# Patient Record
Sex: FEMALE | Race: ASIAN | NOT HISPANIC OR LATINO
[De-identification: names, ages, dates, MRNs, and addresses within clinical notes are randomized per-mention and may not be internally consistent; named-entity substitution may affect disease eponyms.]

---

## 2018-11-26 ENCOUNTER — APPOINTMENT (OUTPATIENT)
Dept: GYNECOLOGIC ONCOLOGY | Facility: CLINIC | Age: 48
End: 2018-11-26

## 2018-12-10 ENCOUNTER — APPOINTMENT (OUTPATIENT)
Dept: GYNECOLOGIC ONCOLOGY | Facility: CLINIC | Age: 48
End: 2018-12-10
Payer: COMMERCIAL

## 2018-12-10 VITALS
SYSTOLIC BLOOD PRESSURE: 114 MMHG | WEIGHT: 202 LBS | OXYGEN SATURATION: 98 % | HEIGHT: 65 IN | BODY MASS INDEX: 33.66 KG/M2 | DIASTOLIC BLOOD PRESSURE: 74 MMHG | HEART RATE: 87 BPM | TEMPERATURE: 97.8 F

## 2018-12-10 DIAGNOSIS — Z86.39 PERSONAL HISTORY OF OTHER ENDOCRINE, NUTRITIONAL AND METABOLIC DISEASE: ICD-10-CM

## 2018-12-10 PROCEDURE — 99245 OFF/OP CONSLTJ NEW/EST HI 55: CPT

## 2018-12-12 PROBLEM — Z86.39 HISTORY OF DIABETES MELLITUS: Status: RESOLVED | Noted: 2018-12-12 | Resolved: 2018-12-12

## 2019-01-08 ENCOUNTER — FORM ENCOUNTER (OUTPATIENT)
Age: 49
End: 2019-01-08

## 2019-01-09 ENCOUNTER — APPOINTMENT (OUTPATIENT)
Dept: ULTRASOUND IMAGING | Facility: HOSPITAL | Age: 49
End: 2019-01-09
Payer: COMMERCIAL

## 2019-01-09 ENCOUNTER — APPOINTMENT (OUTPATIENT)
Dept: UROLOGY | Facility: CLINIC | Age: 49
End: 2019-01-09
Payer: COMMERCIAL

## 2019-01-09 ENCOUNTER — OUTPATIENT (OUTPATIENT)
Dept: OUTPATIENT SERVICES | Facility: HOSPITAL | Age: 49
LOS: 1 days | End: 2019-01-09
Payer: COMMERCIAL

## 2019-01-09 VITALS
HEIGHT: 65.5 IN | DIASTOLIC BLOOD PRESSURE: 70 MMHG | TEMPERATURE: 97.6 F | SYSTOLIC BLOOD PRESSURE: 106 MMHG | BODY MASS INDEX: 32.92 KG/M2 | WEIGHT: 200 LBS

## 2019-01-09 DIAGNOSIS — N20.9 URINARY CALCULUS, UNSPECIFIED: ICD-10-CM

## 2019-01-09 DIAGNOSIS — R19.00 INTRA-ABDOMINAL AND PELVIC SWELLING, MASS AND LUMP, UNSPECIFIED SITE: ICD-10-CM

## 2019-01-09 DIAGNOSIS — Z78.9 OTHER SPECIFIED HEALTH STATUS: ICD-10-CM

## 2019-01-09 PROCEDURE — 76830 TRANSVAGINAL US NON-OB: CPT

## 2019-01-09 PROCEDURE — 76856 US EXAM PELVIC COMPLETE: CPT | Mod: 26

## 2019-01-09 PROCEDURE — 76830 TRANSVAGINAL US NON-OB: CPT | Mod: 26

## 2019-01-09 PROCEDURE — 99203 OFFICE O/P NEW LOW 30 MIN: CPT

## 2019-01-09 PROCEDURE — 76856 US EXAM PELVIC COMPLETE: CPT

## 2019-01-09 NOTE — REVIEW OF SYSTEMS
[Dry Eyes] : dryness of the eyes [Heart Rate Is Fast] : fast heart rate [Abdominal Pain] : abdominal pain [Vomiting] : vomiting [see HPI] : see HPI [Wake up at night to urinate  How many times?  ___] : wakes up to urinate [unfilled] times during the night [Joint Pain] : joint pain [Dizziness] : dizziness [Negative] : Heme/Lymph

## 2019-01-27 PROBLEM — N20.9 UROLITHIASIS: Status: ACTIVE | Noted: 2019-01-27

## 2019-01-27 PROBLEM — Z78.9 CAFFEINE USE: Status: ACTIVE | Noted: 2018-12-10

## 2019-01-27 NOTE — ASSESSMENT
[FreeTextEntry1] : Discussed the management options for non obstructing kidney stones- watch and wait vs intervention via ureteroscopic approach vs ESL. Risks and benefits of each modality were discussed.  \par will observe small stones\par Recommended Kidney stone prevention diet:\par - Good oral hydration so that urine is clear to light yellow, usually 1.5 to 2 Liters of fluids, mainly water\par - Less red meat\par - Less salt\par - Limit foods with oxalate like- dark green vegetables, rhubarb, chocolate, wheat bran, nuts, cranberries, and beans\par - Increase citrate in diet by consuming citrus fruits and juices. Discussed that tita and limes have the most citric acid, oranges, grapefruits, and berries also contain appreciable amounts.\par

## 2019-01-27 NOTE — HISTORY OF PRESENT ILLNESS
[FreeTextEntry1] : cc kidney stones \par 47 yo fem referred for eval dr kitchen for kidney stones \par ct 9/18 \par MPRESSION:  \par 1. There are 2 small, 0.2 cm stones, one in either kidney, without hydronephrosis.\par 2. There is a 0.3 cm upper pole right renal benign cyst.\par 3. There is a right ovarian cyst measuring up to 4.2 cm. I recommend either pelvic ultrasound or pelvic MRI, for further right ovarian evaluation.\par 4. Multiple mildly prominent retroperitoneal-periaortic and bilateral external iliac lymph nodes. I cannot rule out retroperitoneal adenopathy.\par no flank pain . mild chronci achy abd pain \par no voiding complaints \par no flank pain \par no fam h/o sotnes \par

## 2019-10-10 ENCOUNTER — FORM ENCOUNTER (OUTPATIENT)
Age: 49
End: 2019-10-10

## 2019-10-11 ENCOUNTER — OUTPATIENT (OUTPATIENT)
Dept: OUTPATIENT SERVICES | Facility: HOSPITAL | Age: 49
LOS: 1 days | End: 2019-10-11
Payer: COMMERCIAL

## 2019-10-11 ENCOUNTER — APPOINTMENT (OUTPATIENT)
Dept: ULTRASOUND IMAGING | Facility: HOSPITAL | Age: 49
End: 2019-10-11
Payer: COMMERCIAL

## 2019-10-11 DIAGNOSIS — R19.00 INTRA-ABDOMINAL AND PELVIC SWELLING, MASS AND LUMP, UNSPECIFIED SITE: ICD-10-CM

## 2019-10-11 PROCEDURE — 76856 US EXAM PELVIC COMPLETE: CPT

## 2019-10-11 PROCEDURE — 76830 TRANSVAGINAL US NON-OB: CPT | Mod: 26

## 2019-10-11 PROCEDURE — 76830 TRANSVAGINAL US NON-OB: CPT

## 2019-10-11 PROCEDURE — 76856 US EXAM PELVIC COMPLETE: CPT | Mod: 26

## 2019-11-10 PROBLEM — Z00.00 ENCOUNTER FOR PREVENTIVE HEALTH EXAMINATION: Status: ACTIVE | Noted: 2018-11-12

## 2019-11-11 ENCOUNTER — APPOINTMENT (OUTPATIENT)
Dept: GYNECOLOGIC ONCOLOGY | Facility: CLINIC | Age: 49
End: 2019-11-11
Payer: COMMERCIAL

## 2019-11-11 VITALS
HEIGHT: 65 IN | BODY MASS INDEX: 34.32 KG/M2 | HEART RATE: 86 BPM | TEMPERATURE: 97.7 F | WEIGHT: 206 LBS | OXYGEN SATURATION: 99 % | DIASTOLIC BLOOD PRESSURE: 82 MMHG | SYSTOLIC BLOOD PRESSURE: 147 MMHG

## 2019-11-11 DIAGNOSIS — Z00.00 ENCOUNTER FOR GENERAL ADULT MEDICAL EXAMINATION W/OUT ABNORMAL FINDINGS: ICD-10-CM

## 2019-11-11 PROCEDURE — 99214 OFFICE O/P EST MOD 30 MIN: CPT

## 2019-11-13 LAB
CANCER AG125 SERPL-ACNC: 15 U/ML
HPV HIGH+LOW RISK DNA PNL CVX: NOT DETECTED

## 2019-11-15 LAB — CYTOLOGY CVX/VAG DOC THIN PREP: NORMAL

## 2020-02-06 ENCOUNTER — FORM ENCOUNTER (OUTPATIENT)
Age: 50
End: 2020-02-06

## 2020-02-07 ENCOUNTER — APPOINTMENT (OUTPATIENT)
Dept: ULTRASOUND IMAGING | Facility: HOSPITAL | Age: 50
End: 2020-02-07
Payer: COMMERCIAL

## 2020-02-07 ENCOUNTER — OUTPATIENT (OUTPATIENT)
Dept: OUTPATIENT SERVICES | Facility: HOSPITAL | Age: 50
LOS: 1 days | End: 2020-02-07
Payer: COMMERCIAL

## 2020-02-07 DIAGNOSIS — N83.201 UNSPECIFIED OVARIAN CYST, RIGHT SIDE: ICD-10-CM

## 2020-02-07 PROCEDURE — 76830 TRANSVAGINAL US NON-OB: CPT | Mod: 26

## 2020-02-07 PROCEDURE — 76856 US EXAM PELVIC COMPLETE: CPT | Mod: 26

## 2020-02-07 PROCEDURE — 76830 TRANSVAGINAL US NON-OB: CPT

## 2020-02-07 PROCEDURE — 76856 US EXAM PELVIC COMPLETE: CPT

## 2020-02-24 ENCOUNTER — APPOINTMENT (OUTPATIENT)
Dept: GYNECOLOGIC ONCOLOGY | Facility: CLINIC | Age: 50
End: 2020-02-24
Payer: COMMERCIAL

## 2020-02-24 ENCOUNTER — LABORATORY RESULT (OUTPATIENT)
Age: 50
End: 2020-02-24

## 2020-02-24 ENCOUNTER — APPOINTMENT (OUTPATIENT)
Dept: INTERNAL MEDICINE | Facility: CLINIC | Age: 50
End: 2020-02-24
Payer: COMMERCIAL

## 2020-02-24 VITALS
TEMPERATURE: 97.6 F | SYSTOLIC BLOOD PRESSURE: 129 MMHG | DIASTOLIC BLOOD PRESSURE: 83 MMHG | HEART RATE: 89 BPM | WEIGHT: 207 LBS | HEIGHT: 65 IN | OXYGEN SATURATION: 98 % | BODY MASS INDEX: 34.49 KG/M2

## 2020-02-24 VITALS
SYSTOLIC BLOOD PRESSURE: 129 MMHG | DIASTOLIC BLOOD PRESSURE: 83 MMHG | OXYGEN SATURATION: 98 % | HEIGHT: 65 IN | WEIGHT: 207 LBS | BODY MASS INDEX: 34.49 KG/M2 | HEART RATE: 89 BPM | TEMPERATURE: 98.1 F

## 2020-02-24 VITALS
SYSTOLIC BLOOD PRESSURE: 129 MMHG | BODY MASS INDEX: 34.49 KG/M2 | DIASTOLIC BLOOD PRESSURE: 83 MMHG | HEART RATE: 89 BPM | WEIGHT: 207 LBS | OXYGEN SATURATION: 98 % | TEMPERATURE: 98.1 F | HEIGHT: 65 IN

## 2020-02-24 DIAGNOSIS — Z82.49 FAMILY HISTORY OF ISCHEMIC HEART DISEASE AND OTHER DISEASES OF THE CIRCULATORY SYSTEM: ICD-10-CM

## 2020-02-24 DIAGNOSIS — E04.1 NONTOXIC SINGLE THYROID NODULE: ICD-10-CM

## 2020-02-24 DIAGNOSIS — Z72.3 LACK OF PHYSICAL EXERCISE: ICD-10-CM

## 2020-02-24 DIAGNOSIS — Z83.3 FAMILY HISTORY OF DIABETES MELLITUS: ICD-10-CM

## 2020-02-24 PROCEDURE — 83014 H PYLORI DRUG ADMIN: CPT

## 2020-02-24 PROCEDURE — 99215 OFFICE O/P EST HI 40 MIN: CPT | Mod: 25

## 2020-02-24 PROCEDURE — 99205 OFFICE O/P NEW HI 60 MIN: CPT | Mod: 25

## 2020-02-24 PROCEDURE — 93000 ELECTROCARDIOGRAM COMPLETE: CPT

## 2020-02-24 PROCEDURE — 82270 OCCULT BLOOD FECES: CPT

## 2020-02-24 PROCEDURE — 58100 BIOPSY OF UTERUS LINING: CPT

## 2020-02-24 RX ORDER — DICLOFENAC SODIUM 10 MG/G
1 GEL TOPICAL
Qty: 100 | Refills: 0 | Status: COMPLETED | COMMUNITY
Start: 2019-03-26 | End: 2020-02-24

## 2020-02-24 RX ORDER — AZELASTINE HYDROCHLORIDE 0.5 MG/ML
0.05 SOLUTION/ DROPS OPHTHALMIC
Qty: 6 | Refills: 0 | Status: DISCONTINUED | COMMUNITY
Start: 2019-03-21 | End: 2020-02-24

## 2020-02-24 RX ORDER — BACLOFEN 10 MG/1
10 TABLET ORAL
Qty: 30 | Refills: 0 | Status: COMPLETED | COMMUNITY
Start: 2019-07-02 | End: 2020-02-24

## 2020-02-24 RX ORDER — BUTALBITAL, ACETAMINOPHEN AND CAFFEINE 325; 50; 40 MG/1; MG/1; MG/1
50-325-40 TABLET ORAL
Qty: 30 | Refills: 0 | Status: DISCONTINUED | COMMUNITY
Start: 2019-08-22 | End: 2020-02-24

## 2020-02-24 RX ORDER — METOPROLOL TARTRATE 50 MG/1
50 TABLET, FILM COATED ORAL
Refills: 0 | Status: DISCONTINUED | COMMUNITY
End: 2020-02-24

## 2020-02-24 NOTE — HISTORY OF PRESENT ILLNESS
[FreeTextEntry1] : Ms. Smith is a 49 year old G0, referred by Dr. Stiles and initially seen on 10/10/18 for evaluation of a 4.2 cm multiloculated hemorrhagic cystic lesion replacing the right ovary. Today returns for f/u to discuss US results, no pain, however c/o heavy menses last few months.  \par \par The patient has a pmhx significant for leukocytosis and uncontrolled DM. Denies previous GYN history of abnormal pap smear, postmenopausal bleeding, uterine fibroids, and ovarian cysts. \par \par Health Maintenance:\par Pap- 2/2015, normal 11/2019\par Mammo- Summer 2019\par Colonoscopy: Due Now - referral to Dr. Nichols - has appt today\par \par \par Imaging:\par US 2/7/20 1.9 cm left complex cyst and 1.1 cm left cyst new , thickened endometrial echo 2.1 cm from 1.1 cm\par \par Transvaginal and pelvic US 10/11/19:\par - Left ovary measures 2.5 x 2.5 x 2.2 cm and contains a 2.1 cm complex cyst with a thin internal septation, increased from prior measurement of 1.7 cm. \par - Right ovary measures 3.2 x 2.3 x 2.1 cm and contains a 1.7 cm complex follicular cyst with low-level internal echoes, decreased from prior measurement of 2.6 cm.\par  - Duplex Doppler flow is demonstrated for both ovaries. No pelvic free fluid.

## 2020-02-24 NOTE — LETTER BODY
[I had the pleasure of evaluating your patient, [unfilled] for ___] : I had the pleasure of evaluating your patient, [unfilled] for [unfilled]. [Dear  ___] : Dear  [unfilled], [Attached please find my note.] : Attached please find my note. [FreeTextEntry1] : Pathology

## 2020-02-24 NOTE — PHYSICAL EXAM
[Well Developed] : well developed [Well Nourished] : well nourished [Conjunctiva] : the conjunctiva were normal in both eyes [PERRL] : pupils were equal in size, round, and reactive to light [EOM Intact] : extraocular movements were intact [Outer Ear] : the ears and nose were normal in appearance [Both Tympanic Membranes Were Examined] : both tympanic membranes were normal [Examination Of The Oral Cavity] : the lips and gums were normal [Oropharynx] : the oropharynx was normal [Nasal Cavity] : the nasal mucosa and septum were normal [Normal Appearance] : was normal in appearance [Neck Supple] : was supple [No Tracheal Deviation] : the trachea was midline [___] : a single ~M [unfilled] ~Ucm nodule palpated on the left lobe of the thyroid [Thyroid Multiple Nodules Right] :  multiple ~M nodules palpated on the right lobe of the thyroid [Normal Rhythm/Effort] : normal respiratory rhythm and effort [Rate ___] : at [unfilled] breaths per minute [Normal to Percussion] : the lungs were normal to percussion [Clear Bilaterally] : the lungs were clear to auscultation bilaterally [5th Left ICS - MCL] : palpated at the 5th LICS in the midclavicular line [Normal Rate] : normal [Heart Rate ___] : [unfilled] bpm [Rhythm Regular] : regular [Normal S1] : normal S1 [Normal S2] : normal S2 [No Murmur] : no murmurs heard [Rt] : varicose veins of the right leg noted [No Pitting Edema] : no pitting edema present [Lt] : varicose veins of the left leg noted [No Abnormalities] : the abdominal aorta was not enlarged and no bruit was heard [2+] : left 2+ [Examination Of The Breasts] : a normal appearance [Soft, Nontender] : the abdomen was soft and nontender [Obese] : obese [No Mass] : no masses were palpated [No HSM] : no hepatosplenomegaly noted [Normal rectal exam] : was normal [None] : no CVA tenderness [Stool Sample Taken] : a stool sample was obtained [No Lymphangitis] : no lymphangitis observed [Normal Kyphosis] : normal kyphosis [No Visual Abnormalities] : no visible abnormalities [Normal Lordosis] : normal lordosis [No Scoliosis] : no scoliosis [No Tenderness to Palpation] : no spine tenderness on palpation [No Masses] : no masses [Full ROM] : full ROM [No Pain with ROM] : no pain with motion in any direction [Intact] : all reflexes within normal limits bilaterally [Normal Station and Gait] : the gait and station were normal [Normal Motor Tone] : the muscle tone was normal [Involuntary Movements] : no involuntary movements were seen [Thin Hair] : thin hair [Complexion Medium] : medium complexion [Normal] : the deep tendon reflexes were normal [Normal Mental Status] : the patient's orientation, memory, attention, language and fund of knowledge were normal [Appropriate] : appropriate [JVP Elevated ___cm] : the JVP was not elevated [S3] : no S3 [S4] : no S4 [Right Carotid Bruit] : no bruit heard over the right carotid [Right Femoral Bruit] : no bruit heard over the right femoral artery [Left Femoral Bruit] : no bruit heard over the left femoral artery [Left Carotid Bruit] : no bruit heard over the left carotid [Hogue's] : a negative Hogue's sign [Occult Blood Positive] : was negative for occult blood [Gross Blood] : no gross blood [Preauricular Lymph Nodes Enlarged Bilaterally] : nodes not enlarged [Postauricular Lymph Nodes Enlarged Bilaterally] : nodes not enlarged [Fecal Impaction] : no fecal impaction was present [Submandibular Lymph Nodes Enlarged Bilaterally] : nodes not enlarged [Suboccipital Lymph Nodes Enlarged Bilaterally] : nodes not enlarged [Submental Lymph Nodes Enlarged] : nodes not enlarged [Cervical Lymph Nodes Enlarged Posterior Bilaterally] : nodes not enlarged [Cervical Lymph Nodes Enlarged Anterior Bilaterally] : nodes not enlarged [Supraclavicular Lymph Nodes Enlarged Bilaterally] : nodes not enlarged [Epitrochlear Lymph Nodes Enlarged Bilaterally] : nodes not enlarged [Axillary Lymph Nodes Enlarged Bilaterally] : nodes not enlarged [Inguinal Lymph Nodes Enlarged Bilaterally] : nodes not enlarged [Femoral Lymph Nodes Enlarged Bilaterally] : nodes not enlarged [Skin Lesions 1] : no skin lesions were observed [Abnormal Color] : normal color and pigmentation [Skin Turgor Decreased] : normal skin turgor [Impaired judgment] : intact judgment [Impaired Insight] : intact insight

## 2020-02-24 NOTE — PROCEDURE
[Patient] : the patient [Endometrial Biopsy] : an endometrial biopsy [Written consent] : written consent was obtained prior to the procedure and is detailed in the patient's record [Betadine] : betadine [FreeTextEntry1] : 2 passes, scant material

## 2020-02-24 NOTE — DISCUSSION/SUMMARY
[FreeTextEntry1] : Patient presenting for f/u - new left ovarian cyst and unchanged prior left ovarian cyst. Also new findings of thickened endometrium - biopsy performed today. discussed continued surveillance vs. surgery and given new lesion on left, recommend surgical management. patient agrees. Will proceed with LSC USO, likely left and right cystectomy, D&C, [facility] pending endometrial biopsy results.\par \par Plan for EUA, LSC USO, cystectomy, possible BSO, [facility] D&C, Pelvic washings, all indicated procedures, possible open surgery\par \par Risks discussed including bleeding, infection, injury to bowel/bladder/vessels/nerves/ureters/risk of blood transfusion, reoperation, icu admission\par \par Patient would like to wait until May as she has no days off at this time\par plan for OR 5/8 [facility]\par Med clearance\par f/u endometrial biopsy\par \par

## 2020-02-24 NOTE — PHYSICAL EXAM
[Absent] : CVAT: absent [Normal] : Recto-Vaginal Exam: Normal [Fully active, able to carry on all pre-disease performance without restriction] : Status 0 - Fully active, able to carry on all pre-disease performance without restriction [de-identified] : small 8 wk size uterus, no adnexal masses [de-identified] : smooth rectovag septum, no cul de sac nodules [de-identified] : limited exam, ovaries not palpable,

## 2020-02-24 NOTE — OB HISTORY
[Menarche Age: ____] : age at menarche was [unfilled] [Definite:  ___ (Date)] : the last menstrual period was [unfilled] [___] : Total Pregnancies: [unfilled]

## 2020-02-24 NOTE — HISTORY OF PRESENT ILLNESS
[Vomiting] : denies vomiting [Diarrhea] : denies diarrhea [Yellow Skin Or Eyes (Jaundice)] : denies jaundice [Rectal Pain] : denies rectal pain [Wt Gain ___ Lbs] : recent [unfilled] ~Upound(s) weight gain [Heartburn] : heartburn [Nausea] : nausea [Constipation] : constipation [Abdominal Pain] : abdominal pain [Abdominal Swelling] : abdominal swelling [Good Compliance] : good compliance with treatment [Wt Loss ___ Lbs] : no recent weight loss [Peptic Ulcer Disease] : no peptic ulcer disease [GERD] : no gastroesophageal reflux disease [Hiatus Hernia] : no hiatus hernia [Kidney Stone] : no kidney stone [Pancreatitis] : no pancreatitis [Cholelithiasis] : no cholelithiasis [Diverticulitis] : no diverticulitis [Irritable Bowel Syndrome] : no irritable bowel syndrome [Inflammatory Bowel Disease] : no inflammatory bowel disease [Malignancy] : no malignancy [Alcohol Abuse] : no alcohol abuse [Abdominal Surgery] : no abdominal surgery [Appendectomy] : no appendectomy [de-identified] : Pt is a 50 yr old woman who states she has been having epigastric pain non radiating and is a gnawing pain.  THis occurs intermittently and is happening more .  She states the pain can worsen with certain movement. It is not associated with food intake.  she has constipation and has a bm 1-3 days.  Her stools are  hard and small balls and when she strains and she bleeds.  she takes a stool softener and is unsure if it helps.  she doesn’t have weight loss and no dysphagia.  she has a bitter taste in her mouth on occasion  and doesn’t wake her up during the night , no bleeding gums or sore throat.  she doesn’t have early satiety.  she had a colonoscopy 19 yr sago.  it was negative  she had egd 6 yrs ago.  She is unsure of feeding.  she doesn’t have vomiting but feels nausea with her headaches.  She has abd bloating.  She is from [facility]. [Cholecystectomy] : no cholecystectomy

## 2020-02-24 NOTE — ASSESSMENT
[FreeTextEntry1] :  colon cancer screening -  WE discussed procedure and will return prior for blood testing and instruction.  Colon and rectal cancer screening is a way in which doctors check the colon and rectum for signs of cancer or growths (called polyps) that might become cancer. It is done in people who have no symptoms and no reason to think they have cancer. The goal is to find and remove polyps before they become cancer, or to find cancer early, before it grows, spreads, or causes problems.\par \par The colon and rectum are the last part of the digestive tract (figure 1). When doctors talk about colon and rectal cancer screening, they use the term "colorectal." That is just a shorter way of saying "colon and rectal." It's also possible to say just colon cancer screening.\par \par Studies show that having colon cancer screening lowers the chance of dying from colon cancer. There are several different types of screening test that can do this.\par \par What are the different screening tests for colon cancer? — They include:\par \par ?Colonoscopy – Colonoscopy allows the doctor to see directly inside the entire colon. Before you can have a colonoscopy, you must clean out your colon. You do this at home by drinking a special liquid that causes watery diarrhea for several hours. On the day of the test, you get medicine to help you relax. Then a doctor puts a thin tube into your anus and advances it into your colon (figure 2). The tube has a tiny camera attached to it, so the doctor can see inside your colon. The tube also has tiny tools on the end, so the doctor can remove pieces of tissue or polyps if they are there. After polyps or pieces of tissue are removed, they are sent to a lab to be checked for cancer.\par \par \par •Advantages of this test – Colonoscopy finds most small polyps and almost all large polyps and cancers. If found, polyps can be removed right away. This test gives the most accurate results. If any other screening tests are done first and come back positive, a colonoscopy will need to be done for follow-up. If you have a colonoscopy as your first test, you will probably not need a second follow-up test soon after.\par \par \par •Drawbacks to this test – Colonoscopy has some small risks. It can cause bleeding or tear the inside of the colon, but this only happens in 1 out of 1,000 people. Also, cleaning out the bowel beforehand can be unpleasant. Plus, people usually cannot work or drive for the rest of the day after the test, because of the relaxation medicine they must take during the test.\par \par \par Sigmoidoscopy – A sigmoidoscopy is similar to a colonoscopy. The difference is that this test looks only at the last part of the colon, and a colonoscopy looks at the whole colon. Before you have a sigmoidoscopy, you must clean out the lower part of your colon using an enema. This bowel cleaning is not as thorough or unpleasant as the one for colonoscopy. For this test, you do not need to take medicines to help you relax, so you can drive and work afterward if you want.\par \par \par •Advantages of this test – Sigmoidoscopy can find polyps and cancers in the rectum and the last part of the colon. If polyps are found, they can be removed right away.\par \par \par •Drawbacks to this test – In about 2 out of 10,000 people, sigmoidoscopy tears the inside of the colon. The test also can't find polyps or cancers that are in the part of the colon the test does not view (figure 3). If doctors find polyps or cancer during a sigmoidoscopy, they usually follow up with a colonoscopy.\par \par \par CT colonography (also known as virtual colonoscopy or CTC) – CTC looks for cancer and polyps using a special X-ray called a "CT scan." For most CTC tests, the preparation is the same as it is for colonoscopy.\par \par \par •Advantages of this test – CTC can find polyps and cancers in the whole colon without the need for medicines to relax.\par \par \par •Drawbacks to this test – If doctors find polyps or cancer with CTC, they usually follow up with a colonoscopy. CTC sometimes finds areas that look abnormal but that turn out to be healthy. This means that CTC can lead to tests and procedures you did not need. Plus, CTC exposes you to radiation. In most cases, the preparation needed to clean the bowel is the same as the one needed for a colonoscopy. The test is expensive, and some insurance companies might not cover this test for screening.\par \par \par Stool test for blood – "Stool" is another word for bowel movements. Stool tests most commonly check for blood in samples of stool. Cancers and polyps can bleed, and if they bleed around the time you do the stool test, then blood will show up on the test. The test can find even small amounts of blood that you can't see in your stool. Other less serious conditions can also cause small amounts of blood in the stool, and that will show up in this test. You will have to collect small samples from your bowel movements, which you will put in a special container you get from your doctor or nurse. Then you follow the instructions to mail the container out for the testing.\par \par \par •Advantages of this test – This test does not involve cleaning out the colon or having any procedures.\par \par \par •Drawbacks to this test – Stool tests are less likely to find polyps than other screening tests. These tests also often come up abnormal even in people who do not have cancer. If a stool test shows something abnormal, doctors usually follow up with a colonoscopy.\par \par \par Stool DNA test – The stool DNA test checks for genetic markers of cancer, as well as for signs of blood. For this test, you get a special kit in order to collect a whole bowel movement. Then you follow the instructions about how and where to ship it.\par \par \par •Advantages of this test – This test does not involve cleaning out the colon or having any procedures. When cancer is not present, it is less likely to be falsely abnormal than a stool test for blood. That means it leads to fewer unnecessary colonoscopies.\par \par \par •Drawbacks to this test – It might be unpleasant to collect and ship a whole bowel movement. If a DNA test shows something abnormal, doctors usually follow up with a colonoscopy.\par \par \par There is no blood test that most experts think is accurate enough to use for screening.\par \par How do I choose which test to have? — Work with your doctor or nurse to decide which test is best for you. Some doctors might choose to combine screening tests, for example, sigmoidoscopy plus stool testing for blood. Being screened–no matter how–is more important than which test you choose.\par \par Who should be screened for colon cancer? — Doctors recommend that most people begin having colon cancer screening at age 50. People who have an increased risk of getting colon cancer sometimes begin screening at a younger age. That might include people with a strong family history of colon cancer, and people with diseases of the colon called "Crohn's disease" and "ulcerative colitis."\par \par Most people can stop being screened around the age of 75, or at the latest 85.\par \par How often should I be screened? — That depends on your risk of colon cancer and which test you have. People who have a high risk of colon cancer often need to be tested more often and should have a colonoscopy.\par \par Most people are not at high risk, so they can choose one of these schedules:\par \par Colonoscopy every 10 years\par \par CT colonography (CTC) every 5 years\par \par Sigmoidoscopy every 5 to 10 years\par \par Stool testing for blood once a year\par \par Stool DNA testing every 3 years (but doctors are not yet sure of the best time frame)\par  Abd pain will do abd sonogram and also  h pylori testing.   Pt has ovarian mass and has had increased in size and new cyst forming complexed and is to have surgery in may.  she is to have colonoscopy prior to the testing  and also evaluation of her abd pain.   if needed abd ct will be done .   thyroid nodule is being followed by her  endocrinologist .  DM will check and fu with her pcp and endocrinologist.   constipation- Increase water  intake , Dietary fiber — found mainly in fruits, vegetables, whole grains and legumes — is probably best known for its ability to prevent or relieve constipation. But foods containing fiber can provide other health benefits as well, such as helping to maintain a healthy weight and lowering your risk of diabetes and heart disease.\par \par Selecting tasty foods that provide fiber isn't difficult. Find out how much dietary fiber you need, the foods that contain it, and how to add them to meals and snacks.\par \par Dietary fiber, also known as roughage or bulk, includes the parts of plant foods your body can't digest or absorb. Unlike other food components, such as fats, proteins or carbohydrates — which your body breaks down and absorbs — fiber isn't digested by your body. Instead, it passes relatively intact through your stomach, small intestine and colon and out of your body.\par \par Fiber is commonly classified as soluble, which dissolves in water, or insoluble, which doesn't dissolve.\par •Soluble fiber. This type of fiber dissolves in water to form a gel-like material. It can help lower blood cholesterol and glucose levels. Soluble fiber is found in oats, peas, beans, apples, citrus fruits, carrots, barley and psyllium.\par •Insoluble fiber. This type of fiber promotes the movement of material through your digestive system and increases stool bulk, so it can be of benefit to those who struggle with constipation or irregular stools. Whole-wheat flour, wheat bran, nuts, beans and vegetables, such as cauliflower, green beans and potatoes, are good sources of insoluble fiber.\par \par Most plant-based foods, such as oatmeal and beans, contain both soluble and insoluble fiber. However, the amount of each type varies in different plant foods. To receive the greatest health benefit, eat a wide variety of high-fiber foods.\par \par A high-fiber diet has many benefits, which include:\par •Normalizes bowel movements. Dietary fiber increases the weight and size of your stool and softens it. A bulky stool is easier to pass, decreasing your chance of constipation. If you have loose, watery stools, fiber may help to solidify the stool because it absorbs water and adds bulk to stool.\par •Helps maintain bowel health. A high-fiber diet may lower your risk of developing hemorrhoids and small pouches in your colon (diverticular disease). Some fiber is fermented in the colon. Researchers are looking at how this may play a role in preventing diseases of the colon.\par •Lowers cholesterol levels. Soluble fiber found in beans, oats, flaxseed and oat bran may help lower total blood cholesterol levels by lowering low-density lipoprotein, or "bad," cholesterol levels. Studies also have shown that high-fiber foods may have other heart-health benefits, such as reducing blood pressure and inflammation.\par •Helps control blood sugar levels. In people with diabetes, fiber — particularly soluble fiber — can slow the absorption of sugar and help improve blood sugar levels. A healthy diet that includes insoluble fiber may also reduce the risk of developing type 2 diabetes.\par •Aids in achieving healthy weight. High-fiber foods tend to be more filling than low-fiber foods, so you're likely to eat less and stay satisfied longer. And high-fiber foods tend to take longer to eat and to be less "energy dense," which means they have fewer calories for the same volume of food.\par \par Another benefit attributed to dietary fiber is prevention of colorectal cancer. However, the evidence that fiber reduces colorectal cancer is mixed.\par \par The [employer] [employer], which provides science-based advice on matters of medicine and health, gives the following daily fiber recommendations for adults:\par \par \par \par Age 50 or younger\par \par Age 51 or older\par \par \par [place] [employer] \par \par Men 38 grams 30 grams \par Women 25 grams 21 grams \par \par If you aren't getting enough fiber each day, you may need to boost your intake. Good choices include:\par •Whole-grain products\par •Fruits\par •Vegetables\par •Beans, peas and other legumes\par •Nuts and seeds\par \par Refined or processed foods — such as canned fruits and vegetables, pulp-free juices, white breads and pastas, and non-whole-grain cereals — are lower in fiber. The grain-refining process removes the outer coat (bran) from the grain, which lowers its fiber content. Enriched foods have some of the B vitamins and iron back after processing, but not the fiber.\par \par Whole foods rather than fiber supplements are generally better. Fiber supplements — such as Metamucil, Citrucel and FiberCon — don't provide the variety of fibers, vitamins, minerals and other beneficial nutrients that foods do.\par \par Another way to get more fiber is to eat foods, such as cereal, granola bars, yogurt, and ice cream, with fiber added. The added fiber usually is labeled as "inulin" or "chicory root." Some people complain of gassiness after eating foods with added fiber.\par \par However, some people may still need a fiber supplement if dietary changes aren't sufficient or if they have certain medical conditions, such as constipation, diarrhea or irritable bowel syndrome. Check with your doctor before taking fiber supplements.\par \par Need ideas for adding more fiber to your meals and snacks? Try these suggestions:\par •Jump-start your day. For breakfast choose a high-fiber breakfast cereal — 5 or more grams of fiber a serving. Opt for cereals with "whole grain," "bran" or "fiber" in the name. Or add a few tablespoons of unprocessed wheat bran to your favorite cereal.\par •Switch to whole grains. Consume at least half of all grains as whole grains. Look for breads that list whole wheat, whole-wheat flour or another whole grain as the first ingredient on the label and have least 2 grams of dietary fiber a serving. Bodega with brown rice, wild rice, barley, whole-wheat pasta and bulgur wheat.\par •Bulk up baked goods. Substitute whole-grain flour for half or all of the white flour when baking. Try adding crushed bran cereal, unprocessed wheat bran or uncooked oatmeal to muffins, cakes and cookies.\par •Lean on legumes. Beans, peas and lentils are excellent sources of fiber. Add kidney beans to canned soup or a green salad. Or make nachos with refried black beans, lots of fresh veggies, whole-wheat tortilla chips and salsa.\par •Eat more fruit and vegetables. Fruits and vegetables are rich in fiber, as well as vitamins and minerals. Try to eat five or more servings daily.\par •Make snacks count. Fresh fruits, raw vegetables, low-fat popcorn and whole-grain crackers are all good choices. An occasional handful of nuts or dried fruits also is a healthy, high-fiber snack — although be aware that nuts and dried fruits are high in calories.\par \par High-fiber foods are good for your health. But adding too much fiber too quickly can promote intestinal gas, abdominal bloating and cramping. Increase fiber in your diet gradually over a period of a few weeks. This allows the natural bacteria in your digestive system to adjust to the change.\par \par Also, drink plenty of water. Fiber works best when it absorbs water, making your stool soft and bulky\par ekg rate 80/min normal sinus rhythm qrs 82ns qt 350 ms /403 pr 170 ms p 104  bmi 34  risks of obesity and need for diet and eating healthy Obesity is a complex disorder involving an excessive amount of body fat. Obesity isn't just a cosmetic concern. It increases your risk of diseases and health problems, such as heart disease, diabetes and high blood pressure.\par \par Being extremely obese means you are especially likely to have health problems related to your weight.\par \par \par The good news is that even modest weight loss can improve or prevent the health problems associated with obesity. Dietary changes, increased physical activity and behavior changes can help you lose weight. Prescription medications and weight-loss surgery are additional options for treating obesity.\par \par \par \par \par Symptoms\par \par Obesity is diagnosed when your body mass index (BMI) is 30 or higher. Your body mass index is calculated by dividing your weight in kilograms (kg) by your height in meters (m) squared. \par \par \par BMI\par \par Weight status\par \par \par Below 18.5 Underweight \par 18.5-24.9 Normal \par 25.0-29.9 Overweight \par 30.0-34.9 Obese (Class I) \par 35.0-39.9 Obese (Class II) \par 40.0 and higher Extreme obesity (Class III) \par \par For most people, BMI provides a reasonable estimate of body fat. However, BMI doesn't directly measure body fat, so some people, such as muscular athletes, may have a BMI in the obese category even though they don't have excess body fat. Ask your doctor if your BMI is a problem. \par \par When to see a doctor\par \par If you think you may be obese, and especially if you're concerned about weight-related health problems, see your doctor or health care provider. You and your provider can evaluate your health risks and discuss your weight-loss options. \par \par Request an Appointment at [facility]\par \par Causes\par \par Although there are genetic, behavioral and hormonal influences on body weight, obesity occurs when you take in more calories than you burn through exercise and normal daily activities. Your body stores these excess calories as fat.\par \par Obesity can sometimes be traced to a medical cause, such as Prader-Willi syndrome, Cushing's syndrome, and other diseases and conditions. However, these disorders are rare and, in general, the principal causes of obesity are:\par •Inactivity. If you're not very active, you don't burn as many calories. With a sedentary lifestyle, you can easily take in more calories every day than you use through exercise and normal daily activities.\par •Unhealthy diet and eating habits. Weight gain is inevitable if you regularly eat more calories than you burn. And most Americans' diets are too high in calories and are full of fast food and high-calorie beverages.\par \par Risk factors\par \par Obesity usually results from a combination of causes and contributing factors, including:\par •Genetics. Your genes may affect the amount of body fat you store, and where that fat is distributed. Genetics may also play a role in how efficiently your body converts food into energy and how your body burns calories during exercise.\par •Family lifestyle. Obesity tends to run in families. If one or both of your parents are obese, your risk of being obese is increased. That's not just because of genetics. Family members tend to share similar eating and activity habits.\par •Inactivity. If you're not very active, you don't burn as many calories. With a sedentary lifestyle, you can easily take in more calories every day than you burn through exercise and routine daily activities. Having medical problems, such as arthritis, can lead to decreased activity, which contributes to weight gain.\par •Unhealthy diet. A diet that's high in calories, lacking in fruits and vegetables, full of fast food, and laden with high-calorie beverages and oversized portions contributes to weight gain.\par •Medical problems. In some people, obesity can be traced to a medical cause, such as Prader-Willi syndrome, Cushing's syndrome and other conditions. Medical problems, such as arthritis, also can lead to decreased activity, which may result in weight gain.\par •Certain medications. Some medications can lead to weight gain if you don't compensate through diet or activity. These medications include some antidepressants, anti-seizure medications, diabetes medications, antipsychotic medications, steroids and beta blockers.\par •Social and economic issues. Research has linked social and economic factors to obesity. Avoiding obesity is difficult if you don't have safe areas to exercise. Similarly, you may not have been taught healthy ways of cooking, or you may not have money to buy healthier foods. In addition, the people you spend time with may influence your weight — you're more likely to become obese if you have obese friends or relatives.\par •Age. Obesity can occur at any age, even in young children. But as you age, hormonal changes and a less active lifestyle increase your risk of obesity. In addition, the amount of muscle in your body tends to decrease with age. This lower muscle mass leads to a decrease in metabolism. These changes also reduce calorie needs, and can make it harder to keep off excess weight. If you don't consciously control what you eat and become more physically active as you age, you'll likely gain weight.\par •Pregnancy. During pregnancy, a woman's weight necessarily increases. Some women find this weight difficult to lose after the baby is born. This weight gain may contribute to the development of obesity in women.\par •Quitting smoking. Quitting smoking is often associated with weight gain. And for some, it can lead to enough weight gain that the person becomes obese. In the long run, however, quitting smoking is still a greater benefit to your health than continuing to smoke.\par •Lack of sleep. Not getting enough sleep or getting too much sleep can cause changes in hormones that increase your appetite. You may also crave foods high in calories and carbohydrates, which can contribute to weight gain.\par \par Even if you have one or more of these risk factors, it doesn't mean that you're destined to become obese. You can counteract most risk factors through diet, physical activity and exercise, and behavior changes.\par \par Complications\par \par If you're obese, you're more likely to develop a number of potentially serious health problems, including:\par •High triglycerides and low high-density lipoprotein (HDL) cholesterol\par •Type 2 diabetes\par •High blood pressure\par •Metabolic syndrome — a combination of high blood sugar, high blood pressure, high triglycerides and low HDL cholesterol\par •Heart disease\par •Stroke\par •Cancer, including cancer of the uterus, cervix, endometrium, ovaries, breast, colon, rectum, esophagus, liver, gallbladder, pancreas, kidney and prostate\par •Breathing disorders, including sleep apnea, a potentially serious sleep disorder in which breathing repeatedly stops and starts\par •Gallbladder disease\par •Gynecological problems, such as infertility and irregular periods\par •Erectile dysfunction and sexual health issues\par •Nonalcoholic fatty liver disease, a condition in which fat builds up in the liver and can cause inflammation or scarring\par •Osteoarthritis\par \par Quality of life\par \par When you're obese, your overall quality of life may be diminished. You may not be able to do things you used to do, such as participating in enjoyable activities. You may avoid public places. Obese people may even encounter discrimination.\par \par Other weight-related issues that may affect your quality of life include:\par •Depression\par •Disability\par •Sexual problems\par •Shame and guilt\par •Social isolation\par •Lower work achievement\par \par Prevention\par \par Whether you're at risk of becoming obese, currently overweight or at a healthy weight, you can take steps to prevent unhealthy weight gain and related health problems. Not surprisingly, the steps to prevent weight gain are the same as the steps to lose weight: daily exercise, a healthy diet, and a long-term commitment to watch what you eat and drink.\par •Exercise regularly. You need to get 150 to 300 minutes of moderate-intensity activity a week to prevent weight gain. Moderately intense physical activities include fast walking and swimming.\par •Follow a healthy eating plan. Focus on low-calorie, nutrient-dense foods, such as fruits, veg\par

## 2020-02-25 DIAGNOSIS — D72.829 ELEVATED WHITE BLOOD CELL COUNT, UNSPECIFIED: ICD-10-CM

## 2020-02-25 LAB
APPEARANCE: CLEAR
BASOPHILS # BLD AUTO: 0.09 K/UL
BASOPHILS NFR BLD AUTO: 0.8 %
BILIRUBIN URINE: NEGATIVE
BLOOD URINE: NEGATIVE
COLOR: COLORLESS
EOSINOPHIL # BLD AUTO: 0.67 K/UL
EOSINOPHIL NFR BLD AUTO: 6.2 %
ESTIMATED AVERAGE GLUCOSE: 151 MG/DL
GLUCOSE QUALITATIVE U: ABNORMAL
HBA1C MFR BLD HPLC: 6.9 %
HCT VFR BLD CALC: 43.8 %
HGB BLD-MCNC: 14 G/DL
IMM GRANULOCYTES NFR BLD AUTO: 0.2 %
KETONES URINE: NEGATIVE
LEUKOCYTE ESTERASE URINE: NEGATIVE
LYMPHOCYTES # BLD AUTO: 4.14 K/UL
LYMPHOCYTES NFR BLD AUTO: 38.3 %
MAN DIFF?: NORMAL
MCHC RBC-ENTMCNC: 26.5 PG
MCHC RBC-ENTMCNC: 32 GM/DL
MCV RBC AUTO: 82.8 FL
MONOCYTES # BLD AUTO: 0.55 K/UL
MONOCYTES NFR BLD AUTO: 5.1 %
NEUTROPHILS # BLD AUTO: 5.34 K/UL
NEUTROPHILS NFR BLD AUTO: 49.4 %
NITRITE URINE: NEGATIVE
PH URINE: 5.5
PLATELET # BLD AUTO: 340 K/UL
PROTEIN URINE: NEGATIVE
RBC # BLD: 5.29 M/UL
RBC # FLD: 14.6 %
SPECIFIC GRAVITY URINE: 1.03
UROBILINOGEN URINE: NORMAL
WBC # FLD AUTO: 10.81 K/UL

## 2020-02-26 LAB
25(OH)D3 SERPL-MCNC: 26.9 NG/ML
ALBUMIN SERPL ELPH-MCNC: 4.4 G/DL
ALP BLD-CCNC: 91 U/L
ALT SERPL-CCNC: 19 U/L
AMYLASE/CREAT SERPL: 58 U/L
ANION GAP SERPL CALC-SCNC: 15 MMOL/L
AST SERPL-CCNC: 20 U/L
BILIRUB SERPL-MCNC: 0.3 MG/DL
BUN SERPL-MCNC: 14 MG/DL
CALCIUM SERPL-MCNC: 10.1 MG/DL
CHLORIDE SERPL-SCNC: 102 MMOL/L
CHOLEST SERPL-MCNC: 140 MG/DL
CHOLEST/HDLC SERPL: 3 RATIO
CO2 SERPL-SCNC: 23 MMOL/L
CREAT SERPL-MCNC: 0.7 MG/DL
FERRITIN SERPL-MCNC: 58 NG/ML
FRUCTOSAMINE SERPL-MCNC: 220 UMOL/L
GLUCOSE SERPL-MCNC: 49 MG/DL
HBV CORE IGG+IGM SER QL: NONREACTIVE
HBV SURFACE AB SER QL: NONREACTIVE
HBV SURFACE AG SER QL: NONREACTIVE
HCV AB SER QL: NONREACTIVE
HCV S/CO RATIO: 0.27 S/CO
HDLC SERPL-MCNC: 47 MG/DL
HEPATITIS A IGG ANTIBODY: REACTIVE
IRON SATN MFR SERPL: 13 %
IRON SERPL-MCNC: 47 UG/DL
LDLC SERPL CALC-MCNC: 59 MG/DL
LPL SERPL-CCNC: 33 U/L
POTASSIUM SERPL-SCNC: 4.8 MMOL/L
PROT SERPL-MCNC: 7 G/DL
SODIUM SERPL-SCNC: 140 MMOL/L
TIBC SERPL-MCNC: 347 UG/DL
TRIGL SERPL-MCNC: 170 MG/DL
TSH SERPL-ACNC: 2.5 UIU/ML
UIBC SERPL-MCNC: 300 UG/DL

## 2020-04-06 ENCOUNTER — APPOINTMENT (OUTPATIENT)
Dept: INTERNAL MEDICINE | Facility: CLINIC | Age: 50
End: 2020-04-06

## 2020-04-17 ENCOUNTER — APPOINTMENT (OUTPATIENT)
Dept: INTERNAL MEDICINE | Facility: HOSPITAL | Age: 50
End: 2020-04-17

## 2020-05-08 ENCOUNTER — APPOINTMENT (OUTPATIENT)
Dept: GYNECOLOGIC ONCOLOGY | Facility: HOSPITAL | Age: 50
End: 2020-05-08

## 2020-06-01 ENCOUNTER — APPOINTMENT (OUTPATIENT)
Dept: GYNECOLOGIC ONCOLOGY | Facility: CLINIC | Age: 50
End: 2020-06-01

## 2020-09-27 PROBLEM — N83.201 CYSTS OF BOTH OVARIES: Status: ACTIVE | Noted: 2019-11-10

## 2020-09-28 ENCOUNTER — APPOINTMENT (OUTPATIENT)
Dept: ULTRASOUND IMAGING | Facility: HOSPITAL | Age: 50
End: 2020-09-28

## 2020-09-28 ENCOUNTER — OUTPATIENT (OUTPATIENT)
Dept: OUTPATIENT SERVICES | Facility: HOSPITAL | Age: 50
LOS: 1 days | End: 2020-09-28
Payer: COMMERCIAL

## 2020-09-28 ENCOUNTER — APPOINTMENT (OUTPATIENT)
Dept: GYNECOLOGIC ONCOLOGY | Facility: CLINIC | Age: 50
End: 2020-09-28
Payer: COMMERCIAL

## 2020-09-28 ENCOUNTER — APPOINTMENT (OUTPATIENT)
Dept: INTERNAL MEDICINE | Facility: CLINIC | Age: 50
End: 2020-09-28
Payer: COMMERCIAL

## 2020-09-28 VITALS
BODY MASS INDEX: 35.32 KG/M2 | OXYGEN SATURATION: 98 % | TEMPERATURE: 98 F | DIASTOLIC BLOOD PRESSURE: 72 MMHG | WEIGHT: 212 LBS | SYSTOLIC BLOOD PRESSURE: 121 MMHG | HEART RATE: 80 BPM | HEIGHT: 65 IN

## 2020-09-28 VITALS
DIASTOLIC BLOOD PRESSURE: 72 MMHG | OXYGEN SATURATION: 98 % | WEIGHT: 212 LBS | HEART RATE: 80 BPM | TEMPERATURE: 98 F | RESPIRATION RATE: 12 BRPM | HEIGHT: 65 IN | SYSTOLIC BLOOD PRESSURE: 121 MMHG | BODY MASS INDEX: 35.32 KG/M2

## 2020-09-28 DIAGNOSIS — R19.00 INTRA-ABDOMINAL AND PELVIC SWELLING, MASS AND LUMP, UNSPECIFIED SITE: ICD-10-CM

## 2020-09-28 DIAGNOSIS — N83.202 UNSPECIFIED OVARIAN CYST, RIGHT SIDE: ICD-10-CM

## 2020-09-28 DIAGNOSIS — N83.201 UNSPECIFIED OVARIAN CYST, RIGHT SIDE: ICD-10-CM

## 2020-09-28 DIAGNOSIS — R79.89 OTHER SPECIFIED ABNORMAL FINDINGS OF BLOOD CHEMISTRY: ICD-10-CM

## 2020-09-28 DIAGNOSIS — E11.9 TYPE 2 DIABETES MELLITUS WITHOUT COMPLICATIONS: ICD-10-CM

## 2020-09-28 LAB
ALBUMIN SERPL ELPH-MCNC: 4.3 G/DL
ALP BLD-CCNC: 91 U/L
ALT SERPL-CCNC: 19 U/L
ANION GAP SERPL CALC-SCNC: 13 MMOL/L
AST SERPL-CCNC: 17 U/L
BASOPHILS # BLD AUTO: 0.07 K/UL
BASOPHILS NFR BLD AUTO: 0.6 %
BILIRUB SERPL-MCNC: 0.2 MG/DL
BUN SERPL-MCNC: 18 MG/DL
CALCIUM SERPL-MCNC: 9.8 MG/DL
CHLORIDE SERPL-SCNC: 106 MMOL/L
CO2 SERPL-SCNC: 23 MMOL/L
CREAT SERPL-MCNC: 0.77 MG/DL
EOSINOPHIL # BLD AUTO: 0.45 K/UL
EOSINOPHIL NFR BLD AUTO: 4.1 %
FRUCTOSAMINE SERPL-MCNC: 282 UMOL/L
GLUCOSE SERPL-MCNC: 137 MG/DL
HCG SERPL-MCNC: <1 MIU/ML
HCT VFR BLD CALC: 44.1 %
HGB BLD-MCNC: 13.7 G/DL
IMM GRANULOCYTES NFR BLD AUTO: 0.2 %
LYMPHOCYTES # BLD AUTO: 3.64 K/UL
LYMPHOCYTES NFR BLD AUTO: 32.9 %
MAN DIFF?: NORMAL
MCHC RBC-ENTMCNC: 26.6 PG
MCHC RBC-ENTMCNC: 31.1 GM/DL
MCV RBC AUTO: 85.6 FL
MONOCYTES # BLD AUTO: 0.49 K/UL
MONOCYTES NFR BLD AUTO: 4.4 %
NEUTROPHILS # BLD AUTO: 6.38 K/UL
NEUTROPHILS NFR BLD AUTO: 57.8 %
PLATELET # BLD AUTO: 341 K/UL
POTASSIUM SERPL-SCNC: 4.3 MMOL/L
PROT SERPL-MCNC: 6.8 G/DL
RBC # BLD: 5.15 M/UL
RBC # FLD: 14.8 %
SODIUM SERPL-SCNC: 142 MMOL/L
WBC # FLD AUTO: 11.05 K/UL

## 2020-09-28 PROCEDURE — 76856 US EXAM PELVIC COMPLETE: CPT | Mod: 26

## 2020-09-28 PROCEDURE — 99215 OFFICE O/P EST HI 40 MIN: CPT | Mod: 25

## 2020-09-28 PROCEDURE — 93000 ELECTROCARDIOGRAM COMPLETE: CPT

## 2020-09-28 PROCEDURE — 76856 US EXAM PELVIC COMPLETE: CPT

## 2020-09-28 PROCEDURE — 76830 TRANSVAGINAL US NON-OB: CPT

## 2020-09-28 PROCEDURE — 99214 OFFICE O/P EST MOD 30 MIN: CPT

## 2020-09-28 PROCEDURE — 76830 TRANSVAGINAL US NON-OB: CPT | Mod: 26

## 2020-09-28 RX ORDER — DOXYCYCLINE HYCLATE 100 MG/1
100 TABLET ORAL
Qty: 14 | Refills: 0 | Status: COMPLETED | COMMUNITY
Start: 2020-08-26

## 2020-09-28 RX ORDER — MUPIROCIN 20 MG/G
2 OINTMENT TOPICAL
Qty: 22 | Refills: 0 | Status: COMPLETED | COMMUNITY
Start: 2020-08-26

## 2020-09-28 RX ORDER — KETOROLAC TROMETHAMINE 10 MG/1
10 TABLET, FILM COATED ORAL
Qty: 10 | Refills: 0 | Status: DISCONTINUED | COMMUNITY
Start: 2020-07-04

## 2020-09-28 RX ORDER — MECLIZINE HYDROCHLORIDE 25 MG/1
25 TABLET ORAL
Qty: 60 | Refills: 0 | Status: COMPLETED | COMMUNITY
Start: 2019-06-14 | End: 2020-09-28

## 2020-09-28 RX ORDER — IBUPROFEN 600 MG/1
600 TABLET, FILM COATED ORAL
Qty: 30 | Refills: 0 | Status: DISCONTINUED | COMMUNITY
Start: 2019-07-02 | End: 2020-09-28

## 2020-09-28 RX ORDER — PREDNISONE 5 MG/1
5 TABLET ORAL
Qty: 7 | Refills: 0 | Status: COMPLETED | COMMUNITY
Start: 2020-05-20

## 2020-09-28 NOTE — RESULTS/DATA
[ECG Reviewed] : reviewed [Normal] : The 12 - lead ECG is normal [NSR] : normal sinus rhythm [Ventricular Rate___] : ventricular rate is [unfilled] beats per minute [P Waves Normal] : the P wave is normal [ECG Intervals OK.] : OK interval is normal [NJ Interval___] : [unfilled] seconds [Normal QRS] : the QRS is normal [QRS Interval___] : QRS interval: [unfilled] seconds [ECG Axis] : the QRS axis is normal [QTc Interval___] : QTc interval: [unfilled] [Normal ST Segments] : the ST segments are normal [ECG T. Waves] : normal

## 2020-09-29 LAB
APPEARANCE: CLEAR
BILIRUBIN URINE: NEGATIVE
BLOOD URINE: NEGATIVE
COLOR: COLORLESS
ESTIMATED AVERAGE GLUCOSE: 183 MG/DL
GLUCOSE QUALITATIVE U: ABNORMAL
HBA1C MFR BLD HPLC: 8 %
KETONES URINE: NEGATIVE
LEUKOCYTE ESTERASE URINE: NEGATIVE
NITRITE URINE: NEGATIVE
PH URINE: 6
PROTEIN URINE: NEGATIVE
SPECIFIC GRAVITY URINE: 1.03
UROBILINOGEN URINE: NORMAL

## 2020-09-30 NOTE — ASSESSMENT
[High Risk Surgery - Intraperitoneal, Intrathoracic or Supringuinal Vascular Procedures] : High Risk Surgery - Intraperitoneal, Intrathoracic or Supringuinal Vascular Procedures - No (0) [Ischemic Heart Disease] : Ischemic Heart Disease - No (0) [Congestive Heart Failure] : Congestive Heart Failure - No (0) [Prior Cerebrovascular Accident or TIA] : Prior Cerebrovascular Accident or TIA - No (0) [Insulin-dependent Diabetic (1 point)] : Insulin-dependent Diabetic - Yes (1) [Creatinine >= 2mg/dL (1 Point)] : Creatinine >= 2mg/dL - No (0) [1] : 1 , RCRI Class: II, Risk of Post-Op Cardiac Complications: 6.0%, 95% CI for Risk Estimate: 4.9% - 7.4% [As per surgery] : as per surgery [FreeTextEntry4] : Pt is having a low risk procedure and is low risks for cardiac adverse outcome and cri is score 1 and has 6% change of having post  procedure cardiac event.   .  she was explained the procedures egd and colonoscopy, anesthesia   risks and complications alternatives , prep and post procedure care.  I have answered all her questions.  she will have blood testing today  The risks, benefits, and alternatives were explained in detail to the patient. Risks including but not limited to bleeding, perforation, adverse reaction to medications, cardiopulmonary compromise and their attendant sequalae explained. Sequelae including but not limited to need for surgery, colostomy, prolonged hospital stay, placement of drainage tubes, blood transfusions, disability, morbidity and mortality was explained. \par It has been made clear to the patient that although colonoscopy is still considered the best test to screen for colon cancer and polyps, no test is 100% accurate. He/she indicated understanding of the above and wishes to proceed. \par All questions and concerns have been addressed. \par \par \par Upper endoscopy is a procedure that lets a doctor look at the lining of the upper digestive tract. The upper digestive tract includes the esophagus (the tube than connects the mouth to the stomach), the stomach, and the duodenum (the first part of the small intestine).\par \par \par Why might my doctor do an upper endoscopy?\par You might have an upper endoscopy if you have:\par \par ?Pain in your upper belly that you cannot explain\par \par ?A condition called "acid reflux"\par \par ?Nausea and vomiting that has lasted a long time\par \par ?Diarrhea that has lasted a long time\par \par ?Black bowel movements or blood in your vomit\par \par ?Trouble swallowing or a feeling of food getting stuck in your throat\par \par ?Abnormal results from other tests of your digestive system\par \par ?Swallowed an object that you should not have swallowed\par \par ?Had growths or ulcers in your digestive tract, and your doctor wants to follow up\par \par \par \par What should I do before an upper endoscopy?\par Your doctor will give you instructions about what to do before an upper endoscopy. He or she will tell you if you need to stop eating or drinking, or stop taking any of your usual medicines beforehand. Make sure to read the instructions as soon as you get them. You might have to stop some medicines up to a week before the test. Let your doctor know if you have trouble getting ready for your upper endoscopy.\par \par \par What happens during an upper endoscopy?\par Your doctor will give you an IV, a thin tube that goes into a vein. You will get medicines through the IV to make you feel relaxed. He or she might give you a mouth spray or gargle to numb your mouth. You will also get a plastic mouth guard to protect your teeth.\par \par Then your doctor will put a thin tube with a camera and light on the end into your mouth and down into your esophagus, stomach, and duodenum. He or she will look for irritation, bleeding, ulcers, or growths.\par \par During an upper endoscopy, your doctor might also:\par \par ?Do a test called a biopsy – During a biopsy, a doctor takes a small piece of tissue from the lining of the digestive tract. (You will not feel this.) Then he or she looks at the tissue under a microscope.\par \par \par ?Treat problems that he or she sees – For example, a doctor can stop bleeding or sometimes remove a growth. He or she can also widen any narrow areas of the esophagus. Narrow areas of the esophagus can cause trouble swallowing.\par \par \par \par What happens after an upper endoscopy?\par After an upper endoscopy, you will be watched for 1 to 2 hours until the medicines wear off. Most doctors recommend that people not drive or go to work right after an upper endoscopy. Most can drive and go back to work the next day.\par \par \par What are the side effects of an upper endoscopy?\par The most common side effect is feeling bloated. Some people have nausea because of the medicines used before the procedure. If this happens to you, your doctor can give you medicine to make the nausea better. Most people can eat as usual after the procedure.\par \par Other side effects are not as common, but can occur. These can include:\par \par ?Food from the stomach getting into the lungs\par \par ?Bleeding, for example, after a growth is removed\par \par ?Getting a tear in the digestive tract lining\par \par ?Having redness or swelling of the skin around the IV\par \par \par \par Should I call my doctor or nurse?\par Call your doctor or nurse immediately if you have any of the following problems after your upper endoscopy:\par \par ?Belly pain that is much worse than gas pain or cramps\par \par ?A bloated and hard belly\par \par ?Vomiting\par \par ?Fever\par \par ?Trouble swallowing or severe throat pain\par \par ?Black bowel movements\par \par ?A "crunching" feeling under the skin in the neck\par \par Pt is not taking saxenda  at this time  [FreeTextEntry7] : toprol xl 3hrs prior to procedures with sip of water on day of procedure hold jardiance till after  and on day of prep hold jardiance  and take toujeo 10 units, take fbs in am

## 2020-09-30 NOTE — HISTORY OF PRESENT ILLNESS
[No Pertinent Cardiac History] : no history of aortic stenosis, atrial fibrillation, coronary artery disease, recent myocardial infarction, or implantable device/pacemaker [No Pertinent Pulmonary History] : no history of asthma, COPD, sleep apnea, or smoking [No Adverse Anesthesia Reaction] : no adverse anesthesia reaction in self or family member [Diabetes] : diabetes [(Patient denies any chest pain, claudication, dyspnea on exertion, orthopnea, palpitations or syncope)] : Patient denies any chest pain, claudication, dyspnea on exertion, orthopnea, palpitations or syncope [Chronic Anticoagulation] : no chronic anticoagulation [Chronic Kidney Disease] : no chronic kidney disease [FreeTextEntry1] : colonoscopy  [FreeTextEntry2] : 10/2/20 [FreeTextEntry3] : [facility]  [FreeTextEntry4] : Pt is a  50 yr old  with pelvic mass  and abd discomfort and is to have surgery for pelvic mass and will have colonoscopy and egd.  She is on motrin for pain and restarted last week.  She has headaches and migraines  .  She takes anti inflammatory every few months .

## 2020-09-30 NOTE — PHYSICAL EXAM
[Well Developed] : well developed [Well Nourished] : well nourished [Normal Sclera/Conjunctiva] : normal sclera/conjunctiva [PERRL] : pupils equal round and reactive to light [EOMI] : extraocular movements intact [Normal Oropharynx] : the oropharynx was normal [No JVD] : no jugular venous distention [No Lymphadenopathy] : no lymphadenopathy [Supple] : supple [Rate ___] : at [unfilled] breaths per minute [Normal Rhythm/Effort] : normal respiratory rhythm and effort [Clear Bilaterally] : the lungs were clear to auscultation bilaterally [Normal to Percussion] : the lungs were normal to percussion [5th Left ICS - MCL] : palpated at the 5th LICS in the midclavicular line [Heart Rate ___] : [unfilled] bpm [Rhythm Regular] : regular [I] : a grade 1 [Normal Rate] : normal [Normal S1] : normal S1 [Normal S2] : normal S2 [No Pitting Edema] : no pitting edema present [2+] : left 2+ [No Abnormalities] : the abdominal aorta was not enlarged and no bruit was heard [Soft, Nontender] : the abdomen was soft and nontender [No Mass] : no masses were palpated [No HSM] : no hepatosplenomegaly noted [Normal Posterior Cervical Nodes] : no posterior cervical lymphadenopathy [Normal Anterior Cervical Nodes] : no anterior cervical lymphadenopathy [Normal Station and Gait] : the gait and station were normal [Normal Motor Tone] : the muscle tone was normal [Involuntary Movements] : no involuntary movements were seen [Normal Scalp] : inspection of the scalp showed no abnormalities [Examination Of The Hair] : texture and distribution of hair was normal [Complexion Medium] : medium complexion [Normal] : affect was normal and insight and judgment were intact [S3] : no S3 [S4] : no S4 [Rt] : no varicose veins of the right leg [Lt] : no varicose veins of the left leg [Right Carotid Bruit] : no bruit heard over the right carotid [Left Carotid Bruit] : no bruit heard over the left carotid [Right Femoral Bruit] : no bruit heard over the right femoral artery [Left Femoral Bruit] : no bruit heard over the left femoral artery [Bruit] : no bruit heard [Abnormal Color] : normal color and pigmentation [Skin Lesions 1] : no skin lesions were observed [Skin Turgor Decreased] : normal skin turgor

## 2020-10-02 ENCOUNTER — APPOINTMENT (OUTPATIENT)
Dept: INTERNAL MEDICINE | Facility: HOSPITAL | Age: 50
End: 2020-10-02

## 2020-10-02 ENCOUNTER — RESULT REVIEW (OUTPATIENT)
Age: 50
End: 2020-10-02

## 2020-10-02 ENCOUNTER — OUTPATIENT (OUTPATIENT)
Dept: OUTPATIENT SERVICES | Facility: HOSPITAL | Age: 50
LOS: 1 days | End: 2020-10-02
Payer: COMMERCIAL

## 2020-10-02 DIAGNOSIS — R10.9 UNSPECIFIED ABDOMINAL PAIN: ICD-10-CM

## 2020-10-02 DIAGNOSIS — Z12.11 ENCOUNTER FOR SCREENING FOR MALIGNANT NEOPLASM OF COLON: ICD-10-CM

## 2020-10-02 PROCEDURE — 43239 EGD BIOPSY SINGLE/MULTIPLE: CPT

## 2020-10-02 PROCEDURE — 88305 TISSUE EXAM BY PATHOLOGIST: CPT | Mod: 26

## 2020-10-02 PROCEDURE — 45380 COLONOSCOPY AND BIOPSY: CPT

## 2020-10-02 PROCEDURE — 88305 TISSUE EXAM BY PATHOLOGIST: CPT

## 2020-10-02 PROCEDURE — 88312 SPECIAL STAINS GROUP 1: CPT

## 2020-10-02 PROCEDURE — 45380 COLONOSCOPY AND BIOPSY: CPT | Mod: PT

## 2020-10-02 PROCEDURE — 88312 SPECIAL STAINS GROUP 1: CPT | Mod: 26

## 2020-10-02 NOTE — CHART NOTE - NSCHARTNOTEFT_GEN_A_CORE
START TIME:   8:46 am                        CECUM REACH TIME:   8:57 am            END TIME:  9:10 am                         WITHDRAWL TIME;  13 min  PREOPERATIVE DIAGNOSIS: colon cancer screening pelvic mass  PROCEDURE PERFORMED:  Colonoscopy.    ENDOSCOPIST: Tonio GAYTAN    INSTRUMENT USED:  # 0219    ANESTHESIA:  MAC provided by ANESTHESIA    EXTENT OF EXAM:  To the terminal Ileum    PREPARATION: good     LIMITATIONS:  None.    INDICATIONS FOR EXAMINATION:      PROCEDURE IN DETAIL:  A physical examination was performed. Consent on chart.  The patient was connected to the appropriate monitoring devices and an IV was started. Continuous oxygen was provided via nasal cannula and intravenous sedation was administered in divided doses throughout the procedure.     After adequate sedation was achieved, the patient was placed in the left lateral decubitus position and a digital rectal exam was performed. A well-lubricated colonoscope was then inserted into the rectum and advanced under direct visualization to the level of the cecum. The cecum was identified by both visual and anatomic landmarks. A photograph was taken of the cecal cap, as well as the intussuscepting ileum. The scope was then fully withdrawn while examining the color, texture, anatomy and integrity of the mucosa from the cecum to the anal canal. The findings were consistent with normal colonic mucosa. The scope was completely retrieved upon exiting the anal canal and the procedure was terminated. The patient was then transferred to the recovery room in stable condition.  EBL: 0    PROCEDURE PERFORMED:  Total colonoscopy, cold biopsy polypectomy.    DETAILS OF PROCEDURE:      RECTUM:  normal   recto sigmoid  small  flat polyps removed by cold forceps small bx#6 completely removed   SIGMOID: diverticuli     DESCENDING COLON: diverticuli   SPLENIC flexure: normal   TRANSVERSE COLON: normal   HEPATIC FLEXURE normal   ASCENDING COLON: normal   CECUM: normal   ILEOCECAL VALVE;normal   TERMINAL ILEUM: normal      IMPRESSION; colon polyps   RECOMMENDATIONS; fu pathology for further recommendations     ATTENDING; Tonio MORAN

## 2020-10-02 NOTE — CHART NOTE - NSCHARTNOTEFT_GEN_A_CORE
Esophagogastroduodenoscopy Report      Indication: abdominal pain, dyspepsia   Referring MD: Dr. Nichols  Anesthesia: MAC  Consent:  Informed consent was obtained from the patient after providing any opportunity for questions  Procedure: The gastroscope was gently passed through the incisoral orifice into the oral cavity and under direct visualization the esophagus was intubated. The endoscope was passed down the esophagus, through the stomach and into proximal jejunum. Color, texture, mucosa and anatomy of the esophagus, stomach, and duodenum were carefully examined with the scope. The patient tolerated the procedure well. After completion of the examination, the patient was transferred to the recovery room.   Preparation: NPO   Findings:     Oropharynx	Normal    Esophagus	Normal.  bx of distal esophagus Bx # 5    EG-junction	Normal Zline 40 cm from incisors     Cardia:	erythema fundus erythema     Body: erythema radiating streaks of erythema   bx # 4 greater curvature , polyps noted and cold forceps removed Bx # 3  lesser curvature erythema     Antrum:	radiating streaks of erythema bx # 2    Pylorus:	Normal.    Duodenal Bulb:	Normal     2nd portion:	Normal bx # 1    3rd portion:	Normal.    EBL:0      Impression: gastritis, gastric polyps     Plan: 1- [facility] fu pathology for further recommendations       Time Started:   8:38 am                                 Time Ended: 8:44 am scope 1852      Attending:     Tonio Amato M.D., F.A.C.P.

## 2020-10-09 ENCOUNTER — APPOINTMENT (OUTPATIENT)
Dept: GYNECOLOGIC ONCOLOGY | Facility: HOSPITAL | Age: 50
End: 2020-10-09

## 2020-10-26 ENCOUNTER — APPOINTMENT (OUTPATIENT)
Dept: GYNECOLOGIC ONCOLOGY | Facility: CLINIC | Age: 50
End: 2020-10-26

## 2021-01-11 ENCOUNTER — LABORATORY RESULT (OUTPATIENT)
Age: 51
End: 2021-01-11

## 2021-01-11 ENCOUNTER — APPOINTMENT (OUTPATIENT)
Dept: INTERNAL MEDICINE | Facility: CLINIC | Age: 51
End: 2021-01-11
Payer: COMMERCIAL

## 2021-01-11 VITALS
BODY MASS INDEX: 33.32 KG/M2 | HEIGHT: 65 IN | SYSTOLIC BLOOD PRESSURE: 128 MMHG | WEIGHT: 200 LBS | DIASTOLIC BLOOD PRESSURE: 80 MMHG | OXYGEN SATURATION: 99 % | HEART RATE: 92 BPM | TEMPERATURE: 97.8 F

## 2021-01-11 DIAGNOSIS — Z01.818 ENCOUNTER FOR OTHER PREPROCEDURAL EXAMINATION: ICD-10-CM

## 2021-01-11 DIAGNOSIS — Z09 ENCOUNTER FOR FOLLOW-UP EXAMINATION AFTER COMPLETED TREATMENT FOR CONDITIONS OTHER THAN MALIGNANT NEOPLASM: ICD-10-CM

## 2021-01-11 DIAGNOSIS — Z87.898 PERSONAL HISTORY OF OTHER SPECIFIED CONDITIONS: ICD-10-CM

## 2021-01-11 DIAGNOSIS — Z12.11 ENCOUNTER FOR SCREENING FOR MALIGNANT NEOPLASM OF COLON: ICD-10-CM

## 2021-01-11 DIAGNOSIS — K63.5 POLYP OF COLON: ICD-10-CM

## 2021-01-11 PROCEDURE — 99205 OFFICE O/P NEW HI 60 MIN: CPT | Mod: 25

## 2021-01-11 PROCEDURE — 99072 ADDL SUPL MATRL&STAF TM PHE: CPT

## 2021-01-11 RX ORDER — AMOXICILLIN AND CLAVULANATE POTASSIUM 875; 125 MG/1; MG/1
875-125 TABLET, COATED ORAL
Qty: 14 | Refills: 0 | Status: DISCONTINUED | COMMUNITY
Start: 2021-01-04 | End: 2021-01-11

## 2021-01-11 RX ORDER — BISACODYL 5 MG/1
5 TABLET, COATED ORAL
Qty: 4 | Refills: 0 | Status: COMPLETED | COMMUNITY
Start: 2020-09-28 | End: 2021-01-11

## 2021-01-11 RX ORDER — POLYETHYLENE GLYCOL 3350 17 G/17G
17 POWDER, FOR SOLUTION ORAL
Qty: 1 | Refills: 0 | Status: COMPLETED | COMMUNITY
Start: 2020-09-28 | End: 2021-01-11

## 2021-01-11 NOTE — CONSULT LETTER
[Dear  ___] : Dear  [unfilled], [Courtesy Letter:] : I had the pleasure of seeing your patient, [unfilled], in my office today. [Please see my note below.] : Please see my note below. [Consult Closing:] : Thank you very much for allowing me to participate in the care of this patient.  If you have any questions, please do not hesitate to contact me. [Sincerely,] : Sincerely, [FreeTextEntry3] : Tonio Amato MDFACP

## 2021-01-11 NOTE — ASSESSMENT
[FreeTextEntry1] : 1 abd pain  perri obtain us of abd and will do testing for her for celiac disease and  food allergies  discussed Rule of 2's; pt should avoid eating too much; too fast; too spicy; too lousy; less than two hours before bed \par -Things to avoid including overeating, spicy foods, tight clothing, eating within three hours of bed, this list is not all inclusive. \par -For treatment of reflux, possible options discussed including diet control, H2 blockers, PPIs, as well as coating motility agents discussed as treatment options. Timing of meals and proximity of last meal to sleep were discussed. If symptoms persist, a formal gastrointestinal evaluation is needed. \par \par 2 she had fundic gland polyps and this is seen with pts with hpylori or ppi therapy they doent have any malignancy potential and no fu is warranted.  In patients with sporadic fundic gland polyps attributed to PPI use, we discontinue PPIs in patients with =20 polyps or polyps >1 cm. Hypergastrinemia in these patients suggests either the presence of a gastrinoma (Zollinger-Scanlon syndrome) or more commonly PPI-induced hypochlorhydria, which if present is further reason to stop or reduce the PPI dose. If acid suppression is needed for the management of gastroesophageal reflux disease (GERD) and patients fail a trial of an H2 receptor blocker, we consider using a different PPI at the lowest effective dose.\par 3 hyperplastic polyp of recto sigmoid  b9 and repeat colonoscopy is 10 yrs as long as she doesn’t have any gyn ca or other  changes  Small hyperplastic polyps are typically biopsied or removed in the process of endoscopy with biopsy forceps because they can be difficult to distinguish from adenomatous polyps . Although small left-sided hyperplastic polyps are not a significant marker of colon cancer risk, the biologic characteristics and natural history of large hyperplastic polyps, particularly the microvesicular type, are not well understood. In patients with <20 hyperplastic polyps that are <10 mm, surveillance colonoscopy is recommended in 10 years in countries including the [place] where screening for colorectal cancer is advocated\par 4 chest discomfort I discussed this could be  heart and could mean she has decreased blood supply to her heart or ischemia and needs to  go to cardiologist and let h er pcp  know . I offered her to see cardiologist today and refused .   If she gets pain in her chest go to er.

## 2021-01-11 NOTE — PHYSICAL EXAM
[General Appearance - Alert] : alert [General Appearance - In No Acute Distress] : in no acute distress [PERRL With Normal Accommodation] : pupils were equal in size, round, and reactive to light [Oropharynx] : the oropharynx was normal [Auscultation Breath Sounds / Voice Sounds] : lungs were clear to auscultation bilaterally [Apical Impulse] : the apical impulse was normal [Heart Rate And Rhythm] : heart rate was normal and rhythm regular [Heart Sounds] : normal S1 and S2 [Edema] : there was no peripheral edema [Full Pulse] : the pedal pulses are present [Distended] : distended [Normal] : normal [Soft, Nontender] : the abdomen was soft and nontender [No Mass] : no masses were palpated [No HSM] : no hepatosplenomegaly noted [Cervical Lymph Nodes Enlarged Posterior Bilaterally] : posterior cervical [Cervical Lymph Nodes Enlarged Anterior Bilaterally] : anterior cervical [Supraclavicular Lymph Nodes Enlarged Bilaterally] : supraclavicular [Axillary Lymph Nodes Enlarged Bilaterally] : axillary [Inguinal Lymph Nodes Enlarged Bilaterally] : inguinal [Femoral Lymph Nodes Enlarged Bilaterally] : femoral [No CVA Tenderness] : no ~M costovertebral angle tenderness [No Spinal Tenderness] : no spinal tenderness [Abnormal Walk] : normal gait [Nail Clubbing] : no clubbing  or cyanosis of the fingernails [Musculoskeletal - Swelling] : no joint swelling seen [Motor Tone] : muscle strength and tone were normal [Skin Color & Pigmentation] : normal skin color and pigmentation [Skin Turgor] : normal skin turgor [] : no rash [Deep Tendon Reflexes (DTR)] : deep tendon reflexes were 2+ and symmetric [Sensation] : the sensory exam was normal to light touch and pinprick [No Focal Deficits] : no focal deficits [Oriented To Time, Place, And Person] : oriented to person, place, and time [Impaired Insight] : insight and judgment were intact [Affect] : the affect was normal

## 2021-01-11 NOTE — HISTORY OF PRESENT ILLNESS
[Heartburn] : denies heartburn [Nausea] : denies nausea [Vomiting] : denies vomiting [Diarrhea] : denies diarrhea [Constipation] : denies constipation [Yellow Skin Or Eyes (Jaundice)] : denies jaundice [Abdominal Swelling] : denies abdominal swelling [Rectal Pain] : denies rectal pain [Wt Loss ___ Lbs] : recent [unfilled] ~Upound(s) weight loss [Abdominal Pain] : abdominal pain [GERD] : no gastroesophageal reflux disease [Hiatus Hernia] : no hiatus hernia [Peptic Ulcer Disease] : no peptic ulcer disease [Pancreatitis] : no pancreatitis [Cholelithiasis] : no cholelithiasis [Kidney Stone] : kidney stone [Inflammatory Bowel Disease] : no inflammatory bowel disease [Irritable Bowel Syndrome] : no irritable bowel syndrome [Diverticulitis] : no diverticulitis [Alcohol Abuse] : no alcohol abuse [Malignancy] : no malignancy [Abdominal Surgery] : no abdominal surgery [Appendectomy] : no appendectomy [Cholecystectomy] : no cholecystectomy [de-identified] : Pt has pain on left side of abd and midline  she had upper gi endoscopy and colonoscopy and has mild inflammation and fundic gland polyp and  hyperplastic polyp.   She no longer has constipation and doesn’t have black stools.  she doesn’t have reflux of acid.  she states if she walking she feels pressure in her chest.  She will see her pcp and cardiologist .i explained it could be her heart and should go immediately for evaluation.  No nausea or vomiting or diarrhea.  no dysphagia

## 2021-01-12 LAB — IGA SER QL IEP: 360 MG/DL

## 2021-01-13 LAB
BARLEY IGE QN: 0.16 KUA/L
CHERRY IGE QN: 0.38 KUA/L
COW MILK IGE QN: <0.1 KUA/L
CRAB IGE QN: <0.1 KUA/L
DEPRECATED BARLEY IGE RAST QL: NORMAL
DEPRECATED CHERRY IGE RAST QL: 1
DEPRECATED COW MILK IGE RAST QL: 0
DEPRECATED CRAB IGE RAST QL: 0
DEPRECATED EGG WHITE IGE RAST QL: NORMAL
DEPRECATED OAT IGE RAST QL: NORMAL
DEPRECATED PEANUT IGE RAST QL: NORMAL
DEPRECATED RYE IGE RAST QL: NORMAL
DEPRECATED SOYBEAN IGE RAST QL: NORMAL
DEPRECATED WHEAT IGE RAST QL: NORMAL
EGG WHITE IGE QN: 0.29 KUA/L
OAT IGE QN: 0.16 KUA/L
PEANUT IGE QN: 0.34 KUA/L
RYE IGE QN: 0.17 KUA/L
SOYBEAN IGE QN: 0.17 KUA/L
TOTAL IGE SMQN RAST: 95 KU/L
TTG IGA SER IA-ACNC: <1.2 U/ML
TTG IGA SER-ACNC: NEGATIVE
TTG IGG SER IA-ACNC: 4.4 U/ML
TTG IGG SER IA-ACNC: NEGATIVE
WHEAT IGE QN: 0.2 KUA/L

## 2021-02-03 ENCOUNTER — OUTPATIENT (OUTPATIENT)
Dept: OUTPATIENT SERVICES | Facility: HOSPITAL | Age: 51
LOS: 1 days | End: 2021-02-03
Payer: COMMERCIAL

## 2021-02-03 ENCOUNTER — APPOINTMENT (OUTPATIENT)
Dept: ULTRASOUND IMAGING | Facility: HOSPITAL | Age: 51
End: 2021-02-03
Payer: COMMERCIAL

## 2021-02-03 DIAGNOSIS — R10.9 UNSPECIFIED ABDOMINAL PAIN: ICD-10-CM

## 2021-02-03 PROCEDURE — 76700 US EXAM ABDOM COMPLETE: CPT

## 2021-02-03 PROCEDURE — 76700 US EXAM ABDOM COMPLETE: CPT | Mod: 26

## 2021-03-08 ENCOUNTER — APPOINTMENT (OUTPATIENT)
Dept: INTERNAL MEDICINE | Facility: CLINIC | Age: 51
End: 2021-03-08
Payer: COMMERCIAL

## 2021-03-08 VITALS
DIASTOLIC BLOOD PRESSURE: 78 MMHG | WEIGHT: 208 LBS | OXYGEN SATURATION: 98 % | TEMPERATURE: 98.2 F | SYSTOLIC BLOOD PRESSURE: 125 MMHG | HEART RATE: 84 BPM | HEIGHT: 65 IN | BODY MASS INDEX: 34.66 KG/M2

## 2021-03-08 DIAGNOSIS — D13.1 BENIGN NEOPLASM OF STOMACH: ICD-10-CM

## 2021-03-08 PROCEDURE — 99072 ADDL SUPL MATRL&STAF TM PHE: CPT

## 2021-03-08 PROCEDURE — 99213 OFFICE O/P EST LOW 20 MIN: CPT

## 2021-03-08 NOTE — PHYSICAL EXAM
[General Appearance - Alert] : alert [General Appearance - In No Acute Distress] : in no acute distress [PERRL With Normal Accommodation] : pupils were equal in size, round, and reactive to light [Outer Ear] : the ears and nose were normal in appearance [Oropharynx] : the oropharynx was normal [Auscultation Breath Sounds / Voice Sounds] : lungs were clear to auscultation bilaterally [Apical Impulse] : the apical impulse was normal [Heart Rate And Rhythm] : heart rate was normal and rhythm regular [Heart Sounds] : normal S1 and S2 [Heart Sounds Gallop] : no gallops [Murmurs] : no murmurs [Edema] : there was no peripheral edema [Bowel Sounds] : normal bowel sounds [Abdomen Soft] : soft [Abdomen Tenderness] : non-tender [Abdomen Mass (___ Cm)] : no abdominal mass palpated [No CVA Tenderness] : no ~M costovertebral angle tenderness [Abnormal Walk] : normal gait [Nail Clubbing] : no clubbing  or cyanosis of the fingernails [Musculoskeletal - Swelling] : no joint swelling seen [Motor Tone] : muscle strength and tone were normal [Skin Color & Pigmentation] : normal skin color and pigmentation [Skin Turgor] : normal skin turgor [] : no rash [Oriented To Time, Place, And Person] : oriented to person, place, and time [Impaired Insight] : insight and judgment were intact [Affect] : the affect was normal

## 2021-03-08 NOTE — HISTORY OF PRESENT ILLNESS
[Heartburn] : denies heartburn [Nausea] : denies nausea [Vomiting] : denies vomiting [Diarrhea] : denies diarrhea [Constipation] : denies constipation [Yellow Skin Or Eyes (Jaundice)] : denies jaundice [Abdominal Pain] : denies abdominal pain [Abdominal Swelling] : denies abdominal swelling [Rectal Pain] : denies rectal pain [Wt Gain ___ Lbs] : recent [unfilled] ~Upound(s) weight gain [de-identified] : Pt states she stopped taking iron and she feels much better.  She no longer has abd discomfort  and has normal bm.  she is taking famotidine as needed. she has food allergies  mild.  We discussed what her food allergies are and to avoid eating several of the foods at the same time .She would note when she gets discomfort  .  she is negative  celiac testing.  She has negative abd sonogram.

## 2021-03-08 NOTE — ASSESSMENT
[FreeTextEntry1] : abd discomfort We discussed allergies and keeping a diary of when she has discomfort  and to avoid eating foods she is allergic to.   \par 2 hyperplastic polyp of colon  fu 10 yrs unless her family hx or immediate family develops colon cancer \par 3. fundic glands polyp  no further fu needed .  \par 4 leukocytosis She has seen hematologist  and is being evaluated .  \par  5 renal stones - fu with urologist

## 2021-04-15 ENCOUNTER — APPOINTMENT (OUTPATIENT)
Dept: INTERNAL MEDICINE | Facility: CLINIC | Age: 51
End: 2021-04-15

## 2021-08-30 ENCOUNTER — APPOINTMENT (OUTPATIENT)
Dept: INTERNAL MEDICINE | Facility: CLINIC | Age: 51
End: 2021-08-30
Payer: COMMERCIAL

## 2021-08-30 VITALS
TEMPERATURE: 97.6 F | WEIGHT: 206 LBS | BODY MASS INDEX: 34.32 KG/M2 | OXYGEN SATURATION: 98 % | HEART RATE: 86 BPM | HEIGHT: 65 IN | SYSTOLIC BLOOD PRESSURE: 123 MMHG | DIASTOLIC BLOOD PRESSURE: 81 MMHG

## 2021-08-30 DIAGNOSIS — Z79.4 TYPE 2 DIABETES MELLITUS W/OUT COMPLICATIONS: ICD-10-CM

## 2021-08-30 DIAGNOSIS — K59.00 CONSTIPATION, UNSPECIFIED: ICD-10-CM

## 2021-08-30 DIAGNOSIS — Z91.018 ALLERGY TO OTHER FOODS: ICD-10-CM

## 2021-08-30 DIAGNOSIS — E11.9 TYPE 2 DIABETES MELLITUS W/OUT COMPLICATIONS: ICD-10-CM

## 2021-08-30 DIAGNOSIS — R10.13 EPIGASTRIC PAIN: ICD-10-CM

## 2021-08-30 DIAGNOSIS — R10.9 UNSPECIFIED ABDOMINAL PAIN: ICD-10-CM

## 2021-08-30 PROCEDURE — 99213 OFFICE O/P EST LOW 20 MIN: CPT

## 2021-08-30 RX ORDER — ERYTHROMYCIN 5 MG/G
5 OINTMENT OPHTHALMIC
Qty: 3 | Refills: 0 | Status: COMPLETED | COMMUNITY
Start: 2021-08-20

## 2021-08-30 NOTE — ASSESSMENT
[FreeTextEntry1] : 1 .  abd discomfort improved  and only occurs occ  and is unsure if its related to her food intake.   \par 2 food allergies she will see a allergist  for further  more comprehensive testing.  \par 3 constipation continue fiber in your diet  healthy greens  veggies  Dietary fiber — found mainly in fruits, vegetables, whole grains and legumes — is probably best known for its ability to prevent or relieve constipation. But foods containing fiber can provide other health benefits as well, such as helping to maintain a healthy weight and lowering your risk of diabetes and heart disease.\par \par Selecting tasty foods that provide fiber isn't difficult. Find out how much dietary fiber you need, the foods that contain it, and how to add them to meals and snacks.\par \par Dietary fiber, also known as roughage or bulk, includes the parts of plant foods your body can't digest or absorb. Unlike other food components, such as fats, proteins or carbohydrates — which your body breaks down and absorbs — fiber isn't digested by your body. Instead, it passes relatively intact through your stomach, small intestine and colon and out of your body.\par \par Fiber is commonly classified as soluble, which dissolves in water, or insoluble, which doesn't dissolve.\par •Soluble fiber. This type of fiber dissolves in water to form a gel-like material. It can help lower blood cholesterol and glucose levels. Soluble fiber is found in oats, peas, beans, apples, citrus fruits, carrots, barley and psyllium.\par •Insoluble fiber. This type of fiber promotes the movement of material through your digestive system and increases stool bulk, so it can be of benefit to those who struggle with constipation or irregular stools. Whole-wheat flour, wheat bran, nuts, beans and vegetables, such as cauliflower, green beans and potatoes, are good sources of insoluble fiber.\par \par Most plant-based foods, such as oatmeal and beans, contain both soluble and insoluble fiber. However, the amount of each type varies in different plant foods. To receive the greatest health benefit, eat a wide variety of high-fiber foods.\par \par A high-fiber diet has many benefits, which include:\par •Normalizes bowel movements. Dietary fiber increases the weight and size of your stool and softens it. A bulky stool is easier to pass, decreasing your chance of constipation. If you have loose, watery stools, fiber may help to solidify the stool because it absorbs water and adds bulk to stool.\par •Helps maintain bowel health. A high-fiber diet may lower your risk of developing hemorrhoids and small pouches in your colon (diverticular disease). Some fiber is fermented in the colon. Researchers are looking at how this may play a role in preventing diseases of the colon.\par •Lowers cholesterol levels. Soluble fiber found in beans, oats, flaxseed and oat bran may help lower total blood cholesterol levels by lowering low-density lipoprotein, or "bad," cholesterol levels. Studies also have shown that high-fiber foods may have other heart-health benefits, such as reducing blood pressure and inflammation.\par •Helps control blood sugar levels. In people with diabetes, fiber — particularly soluble fiber — can slow the absorption of sugar and help improve blood sugar levels. A healthy diet that includes insoluble fiber may also reduce the risk of developing type 2 diabetes.\par •Aids in achieving healthy weight. High-fiber foods tend to be more filling than low-fiber foods, so you're likely to eat less and stay satisfied longer. And high-fiber foods tend to take longer to eat and to be less "energy dense," which means they have fewer calories for the same volume of food.\par \par Another benefit attributed to dietary fiber is prevention of colorectal cancer. However, the evidence that fiber reduces colorectal cancer is mixed.\par \par The [employer], which provides science-based advice on matters of medicine and health, gives the following daily fiber recommendations for adults:\par \par \par \par Age 50 or younger\par \par Age 51 or older\par \par \par [employer] \par \par Men 38 grams 30 grams \par Women 25 grams 21 grams \par \par If you aren't getting enough fiber each day, you may need to boost your intake. Good choices include:\par •Whole-grain products\par •Fruits\par •Vegetables\par •Beans, peas and other legumes\par •Nuts and seeds\par \par Refined or processed foods — such as canned fruits and vegetables, pulp-free juices, white breads and pastas, and non-whole-grain cereals — are lower in fiber. The grain-refining process removes the outer coat (bran) from the grain, which lowers its fiber content. Enriched foods have some of the B vitamins and iron back after processing, but not the fiber.\par \par Whole foods rather than fiber supplements are generally better. Fiber supplements — such as Metamucil, Citrucel and FiberCon — don't provide the variety of fibers, vitamins, minerals and other beneficial nutrients that foods do.\par \par Another way to get more fiber is to eat foods, such as cereal, granola bars, yogurt, and ice cream, with fiber added. The added fiber usually is labeled as "inulin" or "chicory root." Some people complain of gassiness after eating foods with added fiber.\par \par However, some people may still need a fiber supplement if dietary changes aren't sufficient or if they have certain medical conditions, such as constipation, diarrhea or irritable bowel syndrome. Check with your doctor before taking fiber supplements.\par \par Need ideas for adding more fiber to your meals and snacks? Try these suggestions:\par •Jump-start your day. For breakfast choose a high-fiber breakfast cereal — 5 or more grams of fiber a serving. Opt for cereals with "whole grain," "bran" or "fiber" in the name. Or add a few tablespoons of unprocessed wheat bran to your favorite cereal.\par •Switch to whole grains. Consume at least half of all grains as whole grains. Look for breads that list whole wheat, whole-wheat flour or another whole grain as the first ingredient on the label and have least 2 grams of dietary fiber a serving. [place] with brown rice, wild rice, barley, whole-wheat pasta and bulgur wheat.\par •Bulk up baked goods. Substitute whole-grain flour for half or all of the white flour when baking. Try adding crushed bran cereal, unprocessed wheat bran or uncooked oatmeal to muffins, cakes and cookies.\par •Lean on legumes. Beans, peas and lentils are excellent sources of fiber. Add kidney beans to canned soup or a green salad. Or make nachos with refried black beans, lots of fresh veggies, whole-wheat tortilla chips and salsa.\par •Eat more fruit and vegetables. Fruits and vegetables are rich in fiber, as well as vitamins and minerals. Try to eat five or more servings daily.\par •Make snacks count. Fresh fruits, raw vegetables, low-fat popcorn and whole-grain crackers are all good choices. An occasional handful of nuts or dried fruits also is a healthy, high-fiber snack — although be aware that nuts and dried fruits are high in calories.\par \par High-fiber foods are good for your health. But adding too much fiber too quickly can promote intestinal gas, abdominal bloating and cramping. Increase fiber in your diet gradually over a period of a few weeks. This allows the natural bacteria in your digestive system to adjust to the change.\par \par Also, drink plenty of water. Fiber works best when it absorbs water, making your stool soft and bulky\par  It has improved.  \par 4 dm This is being managed by her endocrinologist   ---The following has been discussed:---\par -Targets for weight and HgA1c have been discussed with patient \par -FS goals have been reviewed with the patient in detail:\par AM <130 post meal<160-180\par -Diet and weight goals have been discussed with the patient in detail.\par -The importance of exercise in the treatment of diabetes has been discussed \par with the patient in detail.\par -Extensive dietary advice provided to patient and the need to avoid concentrated \par sweets/simple carbohydrates and to ensure to consume protein with each meal. \par -Patient instructed to limit carbohydrates to 60 gms per meal and 15 gms per \par snacks. \par -Patient to keep a blood sugar log to check fasting and before meals\par -Patient instructed on importance of daily feet inspection and to reports any \par open lesions to physician promptly\par \par

## 2021-08-30 NOTE — HISTORY OF PRESENT ILLNESS
[Heartburn] : denies heartburn [Nausea] : denies nausea [Vomiting] : denies vomiting [Diarrhea] : denies diarrhea [Constipation] : denies constipation [Yellow Skin Or Eyes (Jaundice)] : denies jaundice [Abdominal Pain] : denies abdominal pain [Abdominal Swelling] : denies abdominal swelling [Rectal Pain] : denies rectal pain [Wt Loss ___ Lbs] : recent [unfilled] ~Upound(s) weight loss [GERD] : no gastroesophageal reflux disease [Hiatus Hernia] : no hiatus hernia [Peptic Ulcer Disease] : no peptic ulcer disease [Pancreatitis] : no pancreatitis [Cholelithiasis] : no cholelithiasis [Kidney Stone] : no kidney stone [Inflammatory Bowel Disease] : no inflammatory bowel disease [Irritable Bowel Syndrome] : no irritable bowel syndrome [Diverticulitis] : no diverticulitis [Alcohol Abuse] : no alcohol abuse [Malignancy] : no malignancy [Abdominal Surgery] : no abdominal surgery [Appendectomy] : no appendectomy [Cholecystectomy] : no cholecystectomy [de-identified] : Pt is a 50 year old female who is here for followup. Patient still complaining of LUQ abdominal pain intermittently that is sharp, frequency less often than before, few times a month. Patient states she has been eating crackers and peaches since she does not know what to eat.  she states her constipation has improved and having daily bm .  she is taking  famotidine when needed.  she has allergies  to eggs, milk peanuts soy and has mild allergies.   she should fu with allergist  .

## 2021-08-30 NOTE — PHYSICAL EXAM
[General Appearance - Alert] : alert [General Appearance - In No Acute Distress] : in no acute distress [PERRL With Normal Accommodation] : pupils were equal in size, round, and reactive to light [Oropharynx] : the oropharynx was normal [Auscultation Breath Sounds / Voice Sounds] : lungs were clear to auscultation bilaterally [Apical Impulse] : the apical impulse was normal [Heart Rate And Rhythm] : heart rate was normal and rhythm regular [Heart Sounds] : normal S1 and S2 [Heart Sounds Gallop] : no gallops [Edema] : there was no peripheral edema [Normal] : normal [Soft, Nontender] : the abdomen was soft and nontender [No Mass] : no masses were palpated [No HSM] : no hepatosplenomegaly noted [Cervical Lymph Nodes Enlarged Posterior Bilaterally] : posterior cervical [Cervical Lymph Nodes Enlarged Anterior Bilaterally] : anterior cervical [No CVA Tenderness] : no ~M costovertebral angle tenderness [No Spinal Tenderness] : no spinal tenderness [Abnormal Walk] : normal gait [Nail Clubbing] : no clubbing  or cyanosis of the fingernails [Musculoskeletal - Swelling] : no joint swelling seen [Motor Tone] : muscle strength and tone were normal [Skin Color & Pigmentation] : normal skin color and pigmentation [Skin Turgor] : normal skin turgor [] : no rash [Deep Tendon Reflexes (DTR)] : deep tendon reflexes were 2+ and symmetric [Sensation] : the sensory exam was normal to light touch and pinprick [No Focal Deficits] : no focal deficits [Oriented To Time, Place, And Person] : oriented to person, place, and time [Impaired Insight] : insight and judgment were intact [Affect] : the affect was normal

## 2022-03-10 ENCOUNTER — INPATIENT (INPATIENT)
Facility: HOSPITAL | Age: 52
LOS: 2 days | Discharge: HOME | DRG: 247 | End: 2022-03-13
Attending: INTERNAL MEDICINE | Admitting: INTERNAL MEDICINE
Payer: COMMERCIAL

## 2022-03-10 VITALS
WEIGHT: 207.9 LBS | RESPIRATION RATE: 16 BRPM | HEART RATE: 50 BPM | HEIGHT: 67 IN | DIASTOLIC BLOOD PRESSURE: 65 MMHG | SYSTOLIC BLOOD PRESSURE: 123 MMHG | OXYGEN SATURATION: 97 % | TEMPERATURE: 98 F

## 2022-03-10 DIAGNOSIS — I21.4 NON-ST ELEVATION (NSTEMI) MYOCARDIAL INFARCTION: ICD-10-CM

## 2022-03-10 LAB
ALBUMIN SERPL ELPH-MCNC: 4.2 G/DL — SIGNIFICANT CHANGE UP (ref 3.3–5)
ALP SERPL-CCNC: 90 U/L — SIGNIFICANT CHANGE UP (ref 40–120)
ALT FLD-CCNC: 29 U/L — SIGNIFICANT CHANGE UP (ref 10–45)
ANION GAP SERPL CALC-SCNC: 14 MMOL/L — SIGNIFICANT CHANGE UP (ref 5–17)
APTT BLD: 35 SEC — SIGNIFICANT CHANGE UP (ref 27.5–35.5)
AST SERPL-CCNC: 124 U/L — HIGH (ref 10–40)
BASOPHILS # BLD AUTO: 0.09 K/UL — SIGNIFICANT CHANGE UP (ref 0–0.2)
BASOPHILS NFR BLD AUTO: 0.5 % — SIGNIFICANT CHANGE UP (ref 0–2)
BILIRUB SERPL-MCNC: 0.8 MG/DL — SIGNIFICANT CHANGE UP (ref 0.2–1.2)
BUN SERPL-MCNC: 13 MG/DL — SIGNIFICANT CHANGE UP (ref 7–23)
CALCIUM SERPL-MCNC: 10.2 MG/DL — SIGNIFICANT CHANGE UP (ref 8.4–10.5)
CHLORIDE SERPL-SCNC: 102 MMOL/L — SIGNIFICANT CHANGE UP (ref 96–108)
CO2 SERPL-SCNC: 24 MMOL/L — SIGNIFICANT CHANGE UP (ref 22–31)
CREAT SERPL-MCNC: 0.63 MG/DL — SIGNIFICANT CHANGE UP (ref 0.5–1.3)
EGFR: 108 ML/MIN/1.73M2 — SIGNIFICANT CHANGE UP
EOSINOPHIL # BLD AUTO: 0.05 K/UL — SIGNIFICANT CHANGE UP (ref 0–0.5)
EOSINOPHIL NFR BLD AUTO: 0.3 % — SIGNIFICANT CHANGE UP (ref 0–6)
GLUCOSE SERPL-MCNC: 136 MG/DL — HIGH (ref 70–99)
HCG SERPL-ACNC: <2 MIU/ML — SIGNIFICANT CHANGE UP
HCT VFR BLD CALC: 46.4 % — HIGH (ref 34.5–45)
HGB BLD-MCNC: 15.4 G/DL — SIGNIFICANT CHANGE UP (ref 11.5–15.5)
IMM GRANULOCYTES NFR BLD AUTO: 0.2 % — SIGNIFICANT CHANGE UP (ref 0–1.5)
INR BLD: 1.11 RATIO — SIGNIFICANT CHANGE UP (ref 0.88–1.16)
LYMPHOCYTES # BLD AUTO: 14.8 % — SIGNIFICANT CHANGE UP (ref 13–44)
LYMPHOCYTES # BLD AUTO: 2.62 K/UL — SIGNIFICANT CHANGE UP (ref 1–3.3)
MAGNESIUM SERPL-MCNC: 1.9 MG/DL — SIGNIFICANT CHANGE UP (ref 1.6–2.6)
MCHC RBC-ENTMCNC: 26.6 PG — LOW (ref 27–34)
MCHC RBC-ENTMCNC: 33.2 GM/DL — SIGNIFICANT CHANGE UP (ref 32–36)
MCV RBC AUTO: 80.1 FL — SIGNIFICANT CHANGE UP (ref 80–100)
MONOCYTES # BLD AUTO: 0.6 K/UL — SIGNIFICANT CHANGE UP (ref 0–0.9)
MONOCYTES NFR BLD AUTO: 3.4 % — SIGNIFICANT CHANGE UP (ref 2–14)
NEUTROPHILS # BLD AUTO: 14.31 K/UL — HIGH (ref 1.8–7.4)
NEUTROPHILS NFR BLD AUTO: 80.8 % — HIGH (ref 43–77)
NRBC # BLD: 0 /100 WBCS — SIGNIFICANT CHANGE UP (ref 0–0)
NT-PROBNP SERPL-SCNC: 362 PG/ML — HIGH (ref 0–300)
PLATELET # BLD AUTO: 347 K/UL — SIGNIFICANT CHANGE UP (ref 150–400)
POTASSIUM SERPL-MCNC: 4.5 MMOL/L — SIGNIFICANT CHANGE UP (ref 3.5–5.3)
POTASSIUM SERPL-SCNC: 4.5 MMOL/L — SIGNIFICANT CHANGE UP (ref 3.5–5.3)
PROT SERPL-MCNC: 7.5 G/DL — SIGNIFICANT CHANGE UP (ref 6–8.3)
PROTHROM AB SERPL-ACNC: 12.8 SEC — SIGNIFICANT CHANGE UP (ref 10.5–13.4)
RBC # BLD: 5.79 M/UL — HIGH (ref 3.8–5.2)
RBC # FLD: 14.4 % — SIGNIFICANT CHANGE UP (ref 10.3–14.5)
SODIUM SERPL-SCNC: 140 MMOL/L — SIGNIFICANT CHANGE UP (ref 135–145)
TROPONIN T, HIGH SENSITIVITY RESULT: 1172 NG/L — HIGH (ref 0–51)
WBC # BLD: 17.71 K/UL — HIGH (ref 3.8–10.5)
WBC # FLD AUTO: 17.71 K/UL — HIGH (ref 3.8–10.5)

## 2022-03-10 PROCEDURE — 71045 X-RAY EXAM CHEST 1 VIEW: CPT | Mod: 26

## 2022-03-10 PROCEDURE — 92921: CPT | Mod: RC

## 2022-03-10 PROCEDURE — 93458 L HRT ARTERY/VENTRICLE ANGIO: CPT | Mod: 26,59

## 2022-03-10 PROCEDURE — 92941 PRQ TRLML REVSC TOT OCCL AMI: CPT | Mod: RC

## 2022-03-10 PROCEDURE — 99285 EMERGENCY DEPT VISIT HI MDM: CPT

## 2022-03-10 PROCEDURE — 99152 MOD SED SAME PHYS/QHP 5/>YRS: CPT

## 2022-03-10 PROCEDURE — 93010 ELECTROCARDIOGRAM REPORT: CPT

## 2022-03-10 RX ORDER — ASPIRIN/CALCIUM CARB/MAGNESIUM 324 MG
81 TABLET ORAL DAILY
Refills: 0 | Status: DISCONTINUED | OUTPATIENT
Start: 2022-03-10 | End: 2022-03-13

## 2022-03-10 RX ORDER — CHLORHEXIDINE GLUCONATE 213 G/1000ML
1 SOLUTION TOPICAL AT BEDTIME
Refills: 0 | Status: DISCONTINUED | OUTPATIENT
Start: 2022-03-10 | End: 2022-03-13

## 2022-03-10 RX ORDER — HEPARIN SODIUM 5000 [USP'U]/ML
INJECTION INTRAVENOUS; SUBCUTANEOUS
Qty: 25000 | Refills: 0 | Status: DISCONTINUED | OUTPATIENT
Start: 2022-03-10 | End: 2022-03-10

## 2022-03-10 RX ORDER — TICAGRELOR 90 MG/1
90 TABLET ORAL EVERY 12 HOURS
Refills: 0 | Status: DISCONTINUED | OUTPATIENT
Start: 2022-03-11 | End: 2022-03-13

## 2022-03-10 RX ORDER — HEPARIN SODIUM 5000 [USP'U]/ML
4000 INJECTION INTRAVENOUS; SUBCUTANEOUS EVERY 6 HOURS
Refills: 0 | Status: DISCONTINUED | OUTPATIENT
Start: 2022-03-10 | End: 2022-03-10

## 2022-03-10 RX ORDER — ATORVASTATIN CALCIUM 80 MG/1
80 TABLET, FILM COATED ORAL AT BEDTIME
Refills: 0 | Status: DISCONTINUED | OUTPATIENT
Start: 2022-03-10 | End: 2022-03-13

## 2022-03-10 RX ORDER — HEPARIN SODIUM 5000 [USP'U]/ML
4000 INJECTION INTRAVENOUS; SUBCUTANEOUS ONCE
Refills: 0 | Status: COMPLETED | OUTPATIENT
Start: 2022-03-10 | End: 2022-03-10

## 2022-03-10 RX ADMIN — HEPARIN SODIUM 4000 UNIT(S): 5000 INJECTION INTRAVENOUS; SUBCUTANEOUS at 20:59

## 2022-03-10 RX ADMIN — HEPARIN SODIUM 1000 UNIT(S)/HR: 5000 INJECTION INTRAVENOUS; SUBCUTANEOUS at 21:01

## 2022-03-10 NOTE — ED PROVIDER NOTE - CADM POA PRESS ULCER
[facility]  [id number] [place] [id number]  Phone: [phone]    December 15, 2020        Prince Patel  [id number] [facility] [address] [id number]          To whom it may concern:    RE: Prince Patel    Patient was seen and treated today at our clinic and tested for COVID. She may not return to work until her test result is back and is negative.      Please contact me for questions or concerns.      Sincerely,        Susan Haase, APRN CNP  
No

## 2022-03-10 NOTE — CONSULT NOTE ADULT - SUBJECTIVE AND OBJECTIVE BOX
Patient seen and evaluated at bedside    Time of STEMI: 09:49PM   Patient on cath lab table: 10:35PM    Chief Complaint: NSTEMI    HPI:  52F history of DM, HTN, sister with MI at early age who presented to OSH with CP upon wakening this morning at 6am. CP left sided, no radiation, 9/10, with associated SOB. At OSH, trop I 0.6 with normal ECG. Loaded with ASA, Brilinta, Atorvastatin and transferred over to [facility] as STEMI. Upon arrival, patient CP free (received nitro prior to transfer). Initial ECG unremarkable. Trop 1100. Serial repeat ECG showing dynamic ST changes in inferior leads c/f STEMI. Patient with recurrence of CP (5/10). Cath team activated and patient went emergently to cath.     PMHx:   Diabetes mellitus    Essential hypertension    Hyperlipidemia        PSHx:       Allergies:  amoxicillin (Unknown)      Home Meds: As per admission medication reconcilliation.     Current Medications:   heparin   Injectable 4000 Unit(s) IV Push every 6 hours PRN  heparin  Infusion.  Unit(s)/Hr IV Continuous <Continuous>      FAMILY HISTORY: Sister with MI at early age.       Social History: No tobacco or etoh.     REVIEW OF SYSTEMS:  Constitutional:     [x ] negative [ ] fevers [ ] chills [ ] weight loss [ ] weight gain  HEENT:                  [x ] negative [ ] dry eyes [ ] eye irritation [ ] postnasal drip [ ] nasal congestion  CV:                         [ ] negative  [x ] chest pain [ ] orthopnea [ ] palpitations [ ] murmur  Resp:                     [x ] negative [ ] cough [ ] shortness of breath [ ] dyspnea [ ] wheezing [ ] sputum [ ]hemoptysis  GI:                          [ x] negative [ ] nausea [ ] vomiting [ ] diarrhea [ ] constipation [ ] abd pain [ ] dysphagia   :                        [ x] negative [ ] dysuria [ ] nocturia [ ] hematuria [ ] increased urinary frequency  Musculoskeletal: [x ] negative [ ] back pain [ ] myalgias [ ] arthralgias [ ] fracture  Skin:                       [ x] negative [ ] rash [ ] itch  Neurological:        [ x] negative [ ] headache [ ] dizziness [ ] syncope [ ] weakness [ ] numbness  Psychiatric:           [ x] negative [ ] anxiety [ ] depression  Endocrine:            [ x] negative [ ] diabetes [ ] thyroid problem  Heme/Lymph:      [ x] negative [ ] anemia [ ] bleeding problem  Allergic/Immune: [ x] negative [ ] itchy eyes [ ] nasal discharge [ ] hives [ ] angioedema    [ x] All other systems negative  [ ] Unable to assess ROS due to      Physical Exam:  T(F): 98.5 (03-10), Max: 98.5 (03-10)  HR: 60 (03-10) (50 - 60)  BP: 140/69 (03-10) (123/65 - 140/69)  RR: 17 (03-10)  SpO2: 100% (03-10)  General: Alert, no acute distress, appears comfortable   HEENT: No scleral icterus, EOMI, no facial dysmorphia, no external ear lesions   Cardiac: Regular rate and rhythm, no murmurs, no rubs, no gallops   Pulmonary: Clear breath sounds throughout, no wheezing, no stridor, no crackles   Abdomen: Nondistended, nontender, appears soft   Skin: no obvious rash or lesions   Extremities: no LE edema  Neurological: Moving all 4 extremities, no overt focal deficits noted   Psych: normal mood and affect     Cardiovascular Diagnostic Testing:    ECG: Personally reviewed:  Initial ECG unremarkable. Repeat with inferior ST changes.     Echo: Personally reviewed:    Stress Testing:    Cath:    Imaging:    CXR: Personally reviewed    Labs: Personally reviewed                        15.4   17.71 )-----------( 347      ( 10 Mar 2022 20:21 )             46.4     03-10    140  |  102  |  13  ----------------------------<  136<H>  4.5   |  24  |  0.63    Ca    10.2      10 Mar 2022 20:21  Mg     1.9     03-10    TPro  7.5  /  Alb  4.2  /  TBili  0.8  /  DBili  x   /  AST  124<H>  /  ALT  29  /  AlkPhos  90  03-10    PT/INR - ( 10 Mar 2022 20:21 )   PT: 12.8 sec;   INR: 1.11 ratio         PTT - ( 10 Mar 2022 20:21 )  PTT:35.0 sec  Serum Pro-Brain Natriuretic Peptide: 362 pg/mL (03-10 @ 20:21)

## 2022-03-10 NOTE — H&P ADULT - NSHPLABSRESULTS_GEN_ALL_CORE
.  LABS:                         15.4   17.71 )-----------( 347      ( 10 Mar 2022 20:21 )             46.4     03-10    140  |  102  |  13  ----------------------------<  136<H>  4.5   |  24  |  0.63    Ca    10.2      10 Mar 2022 20:21  Mg     1.9     03-10    TPro  7.5  /  Alb  4.2  /  TBili  0.8  /  DBili  x   /  AST  124<H>  /  ALT  29  /  AlkPhos  90  03-10    PT/INR - ( 10 Mar 2022 20:21 )   PT: 12.8 sec;   INR: 1.11 ratio         PTT - ( 10 Mar 2022 20:21 )  PTT:35.0 sec    Serum Pro-Brain Natriuretic Peptide: 362 pg/mL (03-10 @ 20:21)        RADIOLOGY, EKG & ADDITIONAL TESTS: Reviewed.

## 2022-03-10 NOTE — ED PROVIDER NOTE - PHYSICAL EXAMINATION
Attn - alert, NAD, no pallor or jaundice, PERRL 3 mm, moist mm, skin - warm and dry, Lungs - clear, no w/r/r, good BS bilaterally, Cor - rr, no M, no rub, Abdo - ND, soft, NT, no HSM, no CVAT, no guarding or rebound. Extremities - no edema, no calf tenderness, distal pulses intact and symmetrical, Neuro - intact and non-focal

## 2022-03-10 NOTE — H&P ADULT - HISTORY OF PRESENT ILLNESS
Jagruti DO PGY-2:  [facility] CICU    52F history of DM, HTN, sister with MI at early age who presented to OSH with CP upon wakening this morning at 6am. CP left sided, no radiation, 9/10, with associated SOB. At OSH, trop I 0.6 with normal ECG. Loaded with ASA, Brilinta, Atorvastatin and transferred over to [facility] as STEMI. Upon arrival, patient CP free (received nitro prior to transfer). Initial ECG unremarkable. Trop 1100 at [facility] ED. Serial repeat ECG showing dynamic ST changes in inferior leads concerning for STEMI. Patient with recurrence of CP (5/10). Cath team activated and patient went emergently to cath.

## 2022-03-10 NOTE — H&P ADULT - NSHPPHYSICALEXAM_GEN_ALL_CORE
CONSTITUTIONAL: Well-developed; well-nourished; in no acute distress.   SKIN: warm, dry  HEAD: Normocephalic; atraumatic.  EYES: no conjunctival injection. PERRL.   ENT: No nasal discharge; airway clear.  NECK: Supple; non tender.  CARD: S1, S2 normal; no murmurs, gallops, or rubs. Regular rate and rhythm.   RESP: No wheezes, rales or rhonchi. Good air movement bilaterally.   ABD: soft ntnd, no guarding, no distention, no rigidity.   EXT: No cyanosis or edema.   NEURO: non-focal   PSYCH: Cooperative, appropriate.

## 2022-03-10 NOTE — H&P ADULT - ATTENDING COMMENTS
Patient seen and examined. Agree with assessment and plan as outlined above.  51 yo F with HTN, DM, family history of early CAD with inferior STEMI s/p PCI with GAVIN to RCA. Patient loaded with brilinta, on aspirin, high intensity statin. Patient hemodynamically stable. Cardiac enzymes peaked. Start low dose beta blocker and add ACE-I. Follow-up TTE. Patient for telemetry transfer.

## 2022-03-10 NOTE — ED ADULT NURSE NOTE - OBJECTIVE STATEMENT
52 y.o F PMH of diabetes and high cholesterol. Presenting to the ED as a transfer from [facility] as a NSTEMI. Pt was BIBEMS, complaining of 7/10 midsternal chest pain that does not radiate. Pt endorses nausea, denies episodes of vomiting/diarrhea. Denies blurry vison. headache, backpain. Pt describes the pain as "chest tightness and chest pressure that feels like it wants to move to the back." The pt received nitro, aspirin and brilinta at [facility]. 52 y.o F PMH of diabetes, HTN, high cholesterol. Presenting to the ED as a transfer from [facility] as a NSTEMI. Pt was BIBEMS, complaining of 7/10 midsternal chest pain that does not radiate. Pt endorses nausea, denies episodes of vomiting/diarrhea. Pt describes the pain as "chest tightness and chest pressure that feels like it wants to move to the back." The pt received nitro, aspirin and brilinta at [facility]. Pt states that she has been feeling SOB lately, the chest pain began this morning but has since improved slightly. Denies dizziness, weakness, palpitations, blurry vison, headaches. 52 y.o F PMH of diabetes, HTN, high cholesterol. Presenting to the ED as a transfer from [facility] as a NSTEMI. Pt was BIBEMS, complaining of 7/10 midsternal chest pain that does not radiate. Pt endorses nausea, denies episodes of vomiting/diarrhea. Pt describes the pain as "chest tightness and chest pressure that feels like it wants to move to the back." The pt received nitro, aspirin and brilinta at [facility]. Pt states that she has been feeling SOB lately, the chest pain began this morning but has since improved slightly. Denies dizziness, weakness, palpitations, blurry vison, headaches. Denies smoking, drinking history. AOx3, moves all extremities, distal pulses intact, abdomen soft nontender/nondistended, skin warm/dry/intact. Bed in lowest position, wheels locked, appropriate siderails up. Placed on cardiac monitor. Safety maintained, comfort provided. Will continue to monitor.

## 2022-03-10 NOTE — CONSULT NOTE ADULT - ASSESSMENT
52F history of DM who presents with inferior STEMI.     #IWSTEMI     Recommendations:  -Resume ASA+Brilinta.   -Resume Atorvastatin 80mg, obtain lipid profile.   -Obtain TSH, HgbA1c.   -Obtain formal TTE.   -Admit to CICU.

## 2022-03-10 NOTE — ED PROVIDER NOTE - CLINICAL SUMMARY MEDICAL DECISION MAKING FREE TEXT BOX
Attn - pt with NSTEMI transfer from [facility] - Cardiology consult, labs, ekg, cardiac monitor. heparin

## 2022-03-10 NOTE — PATIENT PROFILE ADULT - FALL HARM RISK - RISK INTERVENTIONS
Assistance OOB with selected safe patient handling equipment/Assistance with ambulation/Communicate Fall Risk and Risk Factors to all staff, patient, and family/Monitor gait and stability/Reinforce activity limits and safety measures with patient and family/Sit up slowly, dangle for a short time, stand at bedside before walking/Use of alarms - bed, chair and/or voice tab/Visual Cue: Yellow wristband/Bed in lowest position, wheels locked, appropriate side rails in place/Call bell, personal items and telephone in reach/Instruct patient to call for assistance before getting out of bed or chair/Non-slip footwear when patient is out of bed/[place] to call system/Physically safe environment - no spills, clutter or unnecessary equipment/Purposeful Proactive Rounding/Room/bathroom lighting operational, light cord in reach

## 2022-03-10 NOTE — ED PROVIDER NOTE - PROGRESS NOTE DETAILS
Nilesh Smith, PGY3: Trop ~1200, patient w/ persistent mild chest pain. Cardiology aware, requesting repeat normal and posterior EKGs, as well as stat TTE which was ordered 30 minutes ago. He will see patient again shortly. Patient may require more urgent cath. Nilesh Smith, PGY3: Patient w/ dynamic EKG changes. Cardiology fellow at bedside. Will admit for cath.

## 2022-03-10 NOTE — H&P ADULT - ASSESSMENT
ASSESSMENT AND PLAN  52F history of DM, HTN, sister with MI at early age who presented to OSH with CP upon wakening this morning at 6am. CP left sided, no radiation, 9/10, with associated SOB. At OSH, trop I 0.6 with normal ECG. Loaded with ASA, Brilinta, Atorvastatin and transferred over to [facility] as STEMI. Upon arrival, patient CP free (received nitro prior to transfer). Initial ECG unremarkable. Trop 1100. Serial repeat ECG showing dynamic ST changes in inferior leads c/f STEMI. Patient with recurrence of CP (5/10). Cath team activated and patient went emergently to cath.     Impression: pt p/w chest pain, positive troponin and EKG reflecting inferior was STEMI    #Neuro  No acute issues     #Pulm  No acute issues    #Cardiovasc  IWSTEMI on 3/10/22  S/p emregnt cath soon after ED arrival.   Hx of HLD and HTN  C/w atorvastatin 80mg   C/w ASA and Brilinta  Transferred from [facility] due to chest pain, was given nitro and loaded w/ ASA and brillinta.   F/u lipid profile  F/u TTE     #GI    #Renal  Replete electrolytes as a needed   Monitor I's and O's     #Heme    #Endocrine  Hx of DM  F/u A1c   F/u TSH    #ID  Monitor WBC  Monitor for fevers        ASSESSMENT AND PLAN  52F history of DM, HTN, sister with MI at early age who presented to OSH with CP upon wakening this morning at 6am. CP left sided, no radiation, 9/10, with associated SOB. At OSH, trop I 0.6 with normal ECG. Loaded with ASA, Brilinta, Atorvastatin and transferred over to [facility] as STEMI. Upon arrival, patient CP free (received nitro prior to transfer). Initial ECG unremarkable. Trop 1100. Serial repeat ECG showing dynamic ST changes in inferior leads c/f STEMI. Patient with recurrence of CP (5/10). Cath team activated and patient went emergently to cath.     Impression: pt p/w chest pain, positive troponin and EKG reflecting inferior wall STEMI    #Neuro  No acute issues     #Pulm  No acute issues    #Cardiovasc  IWSTEMI on 3/10/22  S/p emregnt cath soon after ED arrival.   Hx of HLD and HTN  C/w atorvastatin 80mg   C/w ASA 81mg QD and Brilinta 90mg BID   Transferred from [facility] due to chest pain, was given nitro and loaded w/ ASA and brillinta.   F/u lipid profile  F/u TTE     #GI  No acute issues     #Renal  Replete electrolytes as a needed   Monitor I's and O's     #Heme  Monitor H/H on daily CBC     #Endocrine  Hx of DM  F/u A1c   F/u TSH    #ID  Monitor WBC  Monitor for fevers

## 2022-03-11 LAB
A1C WITH ESTIMATED AVERAGE GLUCOSE RESULT: 6.7 % — HIGH (ref 4–5.6)
A1C WITH ESTIMATED AVERAGE GLUCOSE RESULT: 7.4 % — HIGH (ref 4–5.6)
ALBUMIN SERPL ELPH-MCNC: 3.6 G/DL — SIGNIFICANT CHANGE UP (ref 3.3–5)
ALP SERPL-CCNC: 82 U/L — SIGNIFICANT CHANGE UP (ref 40–120)
ALT FLD-CCNC: 40 U/L — SIGNIFICANT CHANGE UP (ref 10–45)
ANION GAP SERPL CALC-SCNC: 12 MMOL/L — SIGNIFICANT CHANGE UP (ref 5–17)
APTT BLD: 90.3 SEC — HIGH (ref 27.5–35.5)
AST SERPL-CCNC: 264 U/L — HIGH (ref 10–40)
BILIRUB SERPL-MCNC: 1.1 MG/DL — SIGNIFICANT CHANGE UP (ref 0.2–1.2)
BLD GP AB SCN SERPL QL: NEGATIVE — SIGNIFICANT CHANGE UP
BUN SERPL-MCNC: 18 MG/DL — SIGNIFICANT CHANGE UP (ref 7–23)
CALCIUM SERPL-MCNC: 9.4 MG/DL — SIGNIFICANT CHANGE UP (ref 8.4–10.5)
CHLORIDE SERPL-SCNC: 104 MMOL/L — SIGNIFICANT CHANGE UP (ref 96–108)
CHOLEST SERPL-MCNC: 162 MG/DL — SIGNIFICANT CHANGE UP
CK MB BLD-MCNC: 8.7 % — HIGH (ref 0–3.5)
CK MB BLD-MCNC: 9.4 % — HIGH (ref 0–3.5)
CK MB CFR SERPL CALC: 146.4 NG/ML — HIGH (ref 0–3.8)
CK MB CFR SERPL CALC: 221.5 NG/ML — HIGH (ref 0–3.8)
CK SERPL-CCNC: 1681 U/L — HIGH (ref 25–170)
CK SERPL-CCNC: 2352 U/L — HIGH (ref 25–170)
CO2 SERPL-SCNC: 24 MMOL/L — SIGNIFICANT CHANGE UP (ref 22–31)
CREAT SERPL-MCNC: 0.61 MG/DL — SIGNIFICANT CHANGE UP (ref 0.5–1.3)
EGFR: 109 ML/MIN/1.73M2 — SIGNIFICANT CHANGE UP
ESTIMATED AVERAGE GLUCOSE: 146 MG/DL — HIGH (ref 68–114)
ESTIMATED AVERAGE GLUCOSE: 166 MG/DL — HIGH (ref 68–114)
GLUCOSE BLDC GLUCOMTR-MCNC: 179 MG/DL — HIGH (ref 70–99)
GLUCOSE BLDC GLUCOMTR-MCNC: 183 MG/DL — HIGH (ref 70–99)
GLUCOSE BLDC GLUCOMTR-MCNC: 231 MG/DL — HIGH (ref 70–99)
GLUCOSE SERPL-MCNC: 127 MG/DL — HIGH (ref 70–99)
HCT VFR BLD CALC: 42.7 % — SIGNIFICANT CHANGE UP (ref 34.5–45)
HCT VFR BLD CALC: 44.3 % — SIGNIFICANT CHANGE UP (ref 34.5–45)
HDLC SERPL-MCNC: 44 MG/DL — LOW
HGB BLD-MCNC: 14.2 G/DL — SIGNIFICANT CHANGE UP (ref 11.5–15.5)
HGB BLD-MCNC: 14.4 G/DL — SIGNIFICANT CHANGE UP (ref 11.5–15.5)
INR BLD: 1.21 RATIO — HIGH (ref 0.88–1.16)
LIPID PNL WITH DIRECT LDL SERPL: 88 MG/DL — SIGNIFICANT CHANGE UP
MAGNESIUM SERPL-MCNC: 1.8 MG/DL — SIGNIFICANT CHANGE UP (ref 1.6–2.6)
MCHC RBC-ENTMCNC: 26.9 PG — LOW (ref 27–34)
MCHC RBC-ENTMCNC: 27.2 PG — SIGNIFICANT CHANGE UP (ref 27–34)
MCHC RBC-ENTMCNC: 32.5 GM/DL — SIGNIFICANT CHANGE UP (ref 32–36)
MCHC RBC-ENTMCNC: 33.3 GM/DL — SIGNIFICANT CHANGE UP (ref 32–36)
MCV RBC AUTO: 81 FL — SIGNIFICANT CHANGE UP (ref 80–100)
MCV RBC AUTO: 83.6 FL — SIGNIFICANT CHANGE UP (ref 80–100)
NON HDL CHOLESTEROL: 118 MG/DL — SIGNIFICANT CHANGE UP
NRBC # BLD: 0 /100 WBCS — SIGNIFICANT CHANGE UP (ref 0–0)
NRBC # BLD: 0 /100 WBCS — SIGNIFICANT CHANGE UP (ref 0–0)
PHOSPHATE SERPL-MCNC: 3.3 MG/DL — SIGNIFICANT CHANGE UP (ref 2.5–4.5)
PLATELET # BLD AUTO: 294 K/UL — SIGNIFICANT CHANGE UP (ref 150–400)
PLATELET # BLD AUTO: 307 K/UL — SIGNIFICANT CHANGE UP (ref 150–400)
POTASSIUM SERPL-MCNC: 4.1 MMOL/L — SIGNIFICANT CHANGE UP (ref 3.5–5.3)
POTASSIUM SERPL-SCNC: 4.1 MMOL/L — SIGNIFICANT CHANGE UP (ref 3.5–5.3)
PROT SERPL-MCNC: 6.9 G/DL — SIGNIFICANT CHANGE UP (ref 6–8.3)
PROTHROM AB SERPL-ACNC: 13.9 SEC — HIGH (ref 10.5–13.4)
RBC # BLD: 5.27 M/UL — HIGH (ref 3.8–5.2)
RBC # BLD: 5.3 M/UL — HIGH (ref 3.8–5.2)
RBC # FLD: 14.3 % — SIGNIFICANT CHANGE UP (ref 10.3–14.5)
RBC # FLD: 14.8 % — HIGH (ref 10.3–14.5)
RH IG SCN BLD-IMP: POSITIVE — SIGNIFICANT CHANGE UP
SARS-COV-2 RNA SPEC QL NAA+PROBE: SIGNIFICANT CHANGE UP
SODIUM SERPL-SCNC: 140 MMOL/L — SIGNIFICANT CHANGE UP (ref 135–145)
TRIGL SERPL-MCNC: 149 MG/DL — SIGNIFICANT CHANGE UP
TROPONIN T, HIGH SENSITIVITY RESULT: 2543 NG/L — HIGH (ref 0–51)
TROPONIN T, HIGH SENSITIVITY RESULT: 3962 NG/L — HIGH (ref 0–51)
TSH SERPL-MCNC: 0.57 UIU/ML — SIGNIFICANT CHANGE UP (ref 0.27–4.2)
WBC # BLD: 15.73 K/UL — HIGH (ref 3.8–10.5)
WBC # BLD: 20.55 K/UL — HIGH (ref 3.8–10.5)
WBC # FLD AUTO: 15.73 K/UL — HIGH (ref 3.8–10.5)
WBC # FLD AUTO: 20.55 K/UL — HIGH (ref 3.8–10.5)

## 2022-03-11 PROCEDURE — 93010 ELECTROCARDIOGRAM REPORT: CPT

## 2022-03-11 PROCEDURE — 93306 TTE W/DOPPLER COMPLETE: CPT | Mod: 26

## 2022-03-11 PROCEDURE — 99291 CRITICAL CARE FIRST HOUR: CPT

## 2022-03-11 RX ORDER — ACETAMINOPHEN 500 MG
650 TABLET ORAL EVERY 6 HOURS
Refills: 0 | Status: DISCONTINUED | OUTPATIENT
Start: 2022-03-11 | End: 2022-03-13

## 2022-03-11 RX ORDER — INSULIN LISPRO 100/ML
VIAL (ML) SUBCUTANEOUS
Refills: 0 | Status: DISCONTINUED | OUTPATIENT
Start: 2022-03-11 | End: 2022-03-13

## 2022-03-11 RX ORDER — HEPARIN SODIUM 5000 [USP'U]/ML
5000 INJECTION INTRAVENOUS; SUBCUTANEOUS EVERY 8 HOURS
Refills: 0 | Status: DISCONTINUED | OUTPATIENT
Start: 2022-03-12 | End: 2022-03-13

## 2022-03-11 RX ORDER — GLUCAGON INJECTION, SOLUTION 0.5 MG/.1ML
1 INJECTION, SOLUTION SUBCUTANEOUS ONCE
Refills: 0 | Status: DISCONTINUED | OUTPATIENT
Start: 2022-03-11 | End: 2022-03-11

## 2022-03-11 RX ORDER — SODIUM CHLORIDE 9 MG/ML
1000 INJECTION, SOLUTION INTRAVENOUS
Refills: 0 | Status: DISCONTINUED | OUTPATIENT
Start: 2022-03-11 | End: 2022-03-11

## 2022-03-11 RX ORDER — DEXTROSE 50 % IN WATER 50 %
25 SYRINGE (ML) INTRAVENOUS ONCE
Refills: 0 | Status: DISCONTINUED | OUTPATIENT
Start: 2022-03-11 | End: 2022-03-11

## 2022-03-11 RX ORDER — DEXTROSE 50 % IN WATER 50 %
15 SYRINGE (ML) INTRAVENOUS ONCE
Refills: 0 | Status: DISCONTINUED | OUTPATIENT
Start: 2022-03-11 | End: 2022-03-11

## 2022-03-11 RX ORDER — DEXTROSE 50 % IN WATER 50 %
12.5 SYRINGE (ML) INTRAVENOUS ONCE
Refills: 0 | Status: DISCONTINUED | OUTPATIENT
Start: 2022-03-11 | End: 2022-03-11

## 2022-03-11 RX ORDER — INSULIN LISPRO 100/ML
VIAL (ML) SUBCUTANEOUS AT BEDTIME
Refills: 0 | Status: DISCONTINUED | OUTPATIENT
Start: 2022-03-11 | End: 2022-03-13

## 2022-03-11 RX ORDER — MAGNESIUM SULFATE 500 MG/ML
2 VIAL (ML) INJECTION ONCE
Refills: 0 | Status: COMPLETED | OUTPATIENT
Start: 2022-03-11 | End: 2022-03-11

## 2022-03-11 RX ORDER — METOPROLOL TARTRATE 50 MG
12.5 TABLET ORAL
Refills: 0 | Status: DISCONTINUED | OUTPATIENT
Start: 2022-03-11 | End: 2022-03-12

## 2022-03-11 RX ADMIN — TICAGRELOR 90 MILLIGRAM(S): 90 TABLET ORAL at 05:47

## 2022-03-11 RX ADMIN — Medication 25 GRAM(S): at 05:47

## 2022-03-11 RX ADMIN — Medication 650 MILLIGRAM(S): at 12:37

## 2022-03-11 RX ADMIN — Medication 650 MILLIGRAM(S): at 13:10

## 2022-03-11 RX ADMIN — ATORVASTATIN CALCIUM 80 MILLIGRAM(S): 80 TABLET, FILM COATED ORAL at 21:57

## 2022-03-11 RX ADMIN — Medication 12.5 MILLIGRAM(S): at 17:14

## 2022-03-11 RX ADMIN — CHLORHEXIDINE GLUCONATE 1 APPLICATION(S): 213 SOLUTION TOPICAL at 21:55

## 2022-03-11 RX ADMIN — Medication 1: at 12:39

## 2022-03-11 RX ADMIN — Medication 1: at 17:22

## 2022-03-11 RX ADMIN — Medication 12.5 MILLIGRAM(S): at 05:47

## 2022-03-11 RX ADMIN — TICAGRELOR 90 MILLIGRAM(S): 90 TABLET ORAL at 17:14

## 2022-03-11 RX ADMIN — Medication 81 MILLIGRAM(S): at 12:31

## 2022-03-11 NOTE — PROGRESS NOTE ADULT - SUBJECTIVE AND OBJECTIVE BOX
CHELITA MONTEMAYOR  MRN-[id number]  Patient is a 52y old  Female who presents with a chief complaint of STEMI (10 Mar 2022 23:25)    HPI:  O'Ezekiel DO PGY-2:  [facility] CICU    52F history of DM, HTN, sister with MI at early age who presented to OSH with CP upon wakening this morning at 6am. CP left sided, no radiation, 9/10, with associated SOB. At OSH, trop I 0.6 with normal ECG. Loaded with ASA, Brilinta, Atorvastatin and transferred over to [facility] as STEMI. Upon arrival, patient CP free (received nitro prior to transfer). Initial ECG unremarkable. Trop 1100 at [facility] ED. Serial repeat ECG showing dynamic ST changes in inferior leads concerning for STEMI. Patient with recurrence of CP (5/10). Cath team activated and patient went emergently to cath.  (10 Mar 2022 23:25)      24 HOUR EVENTS: Patient reports chest pain improved. Denies SOB, cough, chills, pain with urination, diarrhea, and constipation. RFA removed this morning.     REVIEW OF SYSTEMS:    CONSTITUTIONAL: No weakness, fevers or chills  EYES/ENT: No visual changes;  No vertigo or throat pain   NECK: No pain or stiffness  RESPIRATORY: No cough, wheezing, hemoptysis; No shortness of breath  CARDIOVASCULAR: No chest pain or palpitations  GASTROINTESTINAL: No abdominal or epigastric pain. No nausea, vomiting, or hematemesis; No diarrhea or constipation. No melena or hematochezia.  GENITOURINARY: No dysuria, frequency or hematuria  NEUROLOGICAL: No numbness or weakness  SKIN: No itching, rashes      ICU Vital Signs Last 24 Hrs  T(C): 36.6 (11 Mar 2022 03:00), Max: 36.9 (10 Mar 2022 19:45)  T(F): 97.8 (11 Mar 2022 03:00), Max: 98.5 (10 Mar 2022 19:45)  HR: 64 (11 Mar 2022 06:00) (50 - 78)  BP: 125/63 (11 Mar 2022 06:00) (103/60 - 140/69)  BP(mean): 89 (11 Mar 2022 06:00) (77 - 91)  ABP: --  ABP(mean): --  RR: 20 (11 Mar 2022 06:00) (14 - 23)  SpO2: 95% (11 Mar 2022 06:00) (91% - 100%)    I&O's Summary    10 Mar 2022 07:01  -  11 Mar 2022 07:00  --------------------------------------------------------  IN: 170 mL / OUT: 0 mL / NET: 170 mL        CAPILLARY BLOOD GLUCOSE    CAPILLARY BLOOD GLUCOSE          PHYSICAL EXAM:  GENERAL: No acute distress, well-developed  HEAD:  Atraumatic, Normocephalic  EYES: EOMI, PERRLA, conjunctiva and sclera clear  NECK: Supple, no lymphadenopathy, no JVD  CHEST/LUNG: CTAB; No wheezes, rales, or rhonchi  HEART: Regular rate and rhythm. Normal S1/S2. No murmurs, rubs, or gallops  ABDOMEN: Soft, non-tender, non-distended; normal bowel sounds, no organomegaly  EXTREMITIES:  2+ peripheral pulses b/l, No clubbing, cyanosis, or edema. R femoral access site dry, no bleeding, no signs of hematoma  NEUROLOGY: A&O x 3, no focal deficits  SKIN: No rashes or lesions    ============================I/O===========================   I&O's Detail    10 Mar 2022 07:01  -  11 Mar 2022 07:00  --------------------------------------------------------  IN:    IV PiggyBack: 50 mL    Oral Fluid: 120 mL  Total IN: 170 mL    OUT:  Total OUT: 0 mL    Total NET: 170 mL        ============================ LABS =========================                        14.2   20.55 )-----------( 307      ( 11 Mar 2022 03:19 )             42.7     03-11    140  |  104  |  18  ----------------------------<  127<H>  4.1   |  24  |  0.61    Ca    9.4      11 Mar 2022 03:19  Phos  3.3     03-11  Mg     1.8     03-11    TPro  6.9  /  Alb  3.6  /  TBili  1.1  /  DBili  x   /  AST  264<H>  /  ALT  40  /  AlkPhos  82  03-11    Troponin T, High Sensitivity Result: 3962 ng/L (03-11-22 @ 03:19)  Troponin T, High Sensitivity Result: 1172 ng/L (03-10-22 @ 20:21)    CKMB Units: 221.5 ng/mL (03-11-22 @ 03:19)    Creatine Kinase, Serum: 2352 U/L (03-11-22 @ 03:19)    CPK Mass Assay %: 9.4 % (03-11-22 @ 03:19)        LIVER FUNCTIONS - ( 11 Mar 2022 03:19 )  Alb: 3.6 g/dL / Pro: 6.9 g/dL / ALK PHOS: 82 U/L / ALT: 40 U/L / AST: 264 U/L / GGT: x           PT/INR - ( 11 Mar 2022 03:19 )   PT: 13.9 sec;   INR: 1.21 ratio         PTT - ( 11 Mar 2022 03:19 )  PTT:90.3 sec        ======================Micro/Rad/Cardio=================  Telemetry: Reviewed   EKG: Reviewed  CXR: Reviewed    ======================================================  PAST MEDICAL & SURGICAL HISTORY:  Diabetes mellitus    Essential hypertension    Hyperlipidemia

## 2022-03-11 NOTE — CHART NOTE - NSCHARTNOTEFT_GEN_A_CORE
MAR Accept Note  Transfer to:    Accepting Attending Physician:    Assigned Room:      Patient seen and examined.   Labs and data reviewed.   No findings precluding transfer of service.       HPI/MICU COURSE:   Please refer to MICU transfer note for full details. Briefly, this is a      FOR FOLLOW-UP:      Katerina Cox  PGY3
CCU Transfer Note    Transfer from: CCU    Transfer to: (  ) Medicine    ( x ) Telemetry     (   ) RCU        (    ) Palliative         (   ) Stroke Unit       (  ) MICU   (   ) __________________    Accepting Physician: Dr. Falk    Signout given to: Dr. Falk     CCU COURSE: 52F DM, HTN transferred from [facility] to [facility] for Inf Wall STEMI. S/p C 3/11 GAVIN x1 RCA, pt also with diffuse CAD in LCx and LAD not amenable to PCI. Pt was continued on DAPT and GDMT and has remained hemodynamically stable. TTE performed      PAST MEDICAL & SURGICAL HISTORY:  Diabetes mellitus    Essential hypertension    Hyperlipidemia        Vital Signs Last 24 Hrs  T(C): 36.7 (11 Mar 2022 08:00), Max: 36.9 (10 Mar 2022 19:45)  T(F): 98 (11 Mar 2022 08:00), Max: 98.5 (10 Mar 2022 19:45)  HR: 70 (11 Mar 2022 15:00) (50 - 78)  BP: 123/59 (11 Mar 2022 15:00) (103/60 - 140/69)  BP(mean): 85 (11 Mar 2022 15:00) (77 - 94)  RR: 23 (11 Mar 2022 15:00) (14 - 31)  SpO2: 95% (11 Mar 2022 15:00) (91% - 100%)  I&O's Summary    10 Mar 2022 07:01  -  11 Mar 2022 07:00  --------------------------------------------------------  IN: 170 mL / OUT: 0 mL / NET: 170 mL    11 Mar 2022 07:01  -  11 Mar 2022 16:15  --------------------------------------------------------  IN: 480 mL / OUT: 0 mL / NET: 480 mL      Allergies    amoxicillin (Unknown)  Cheese (Unknown)  eggs (Unknown)    Intolerances      MEDICATIONS  (STANDING):  aspirin  chewable 81 milliGRAM(s) Oral daily  atorvastatin 80 milliGRAM(s) Oral at bedtime  chlorhexidine 2% Cloths 1 Application(s) Topical at bedtime  insulin lispro (ADMELOG) corrective regimen sliding scale   SubCutaneous three times a day before meals  insulin lispro (ADMELOG) corrective regimen sliding scale   SubCutaneous at bedtime  metoprolol tartrate 12.5 milliGRAM(s) Oral two times a day  ticagrelor 90 milliGRAM(s) Oral every 12 hours    MEDICATIONS  (PRN):  acetaminophen     Tablet .. 650 milliGRAM(s) Oral every 6 hours PRN Mild Pain (1 - 3), Moderate Pain (4 - 6)      Adult Advanced Hemodynamics Last 24 Hrs  CVP(mm Hg): --  CVP(cm H2O): --  CO: --  CI: --  PA: --  PA(mean): --  PCWP: --  SVR: --  SVRI: --  PVR: --  PVRI: --    CARDIAC MARKERS ( 11 Mar 2022 11:55 )  x     / x     / 1681 U/L / x     / 146.4 ng/mL  CARDIAC MARKERS ( 11 Mar 2022 03:19 )  x     / x     / 2352 U/L / x     / 221.5 ng/mL                            14.4   15.73 )-----------( 294      ( 11 Mar 2022 11:56 )             44.3     03-11    140  |  104  |  18  ----------------------------<  127<H>  4.1   |  24  |  0.61    Ca    9.4      11 Mar 2022 03:19  Phos  3.3     03-11  Mg     1.8     03-11    TPro  6.9  /  Alb  3.6  /  TBili  1.1  /  DBili  x   /  AST  264<H>  /  ALT  40  /  AlkPhos  82  03-11    PT/INR - ( 11 Mar 2022 03:19 )   PT: 13.9 sec;   INR: 1.21 ratio         PTT - ( 11 Mar 2022 03:19 )  PTT:90.3 sec      PLAN:     #NEURO  - AOx3, no active concerns  - continue to monitor per ICU protocol     #PULM  - O2 >90% on RA  - Continue to monitor    #CARDIOVASCULAR  IWSTEMI  - LHC via RFA with GAVIN in RCA LAD/LCx with diffuse CAD no PCI   - ASA/Brilinta and Lipitor 80  - Lopressor 12.5 BID needs addition of ACei or ARB pending AM serum Cr   - f/u lipid profile  - f/u echo results    #GI  - No concerns  - diabetic diet    #RENAL  - BUN, Cr WNL  - continue to monitor BUN, Cr, I&Os  - Replete electrolytes as needed    #HEME  - H/H stable  - Continue to monitor    VTE ppx   - HSQ q8h    #ID  - afebrile, no signs of infection    #ENDOCRINE  D2M  - ISS, A1c 6.7%         FOR FOLLOW UP:  - TTE Results  - GDMT optimization: Start ACEi or ARB
Removal of Femoral Sheath    Pulses in the right lower extremity are palpable and audible by doppler. The patient was placed in the supine position. The insertion site was identified and the sutures were removed per protocol.  The __6__ [place] femoral sheath was then removed. Direct pressure was applied for  __16____ minutes.     Monitoring of the right groin and both lower extremities including neuro-vascular checks and vital signs every 15 minutes x 4, then every 30 minutes x 2, then every 1 hour was ordered.    Complications: None/Other    Comments: Pt tolerated well, no cyanosis and hematoma noted.

## 2022-03-11 NOTE — PROGRESS NOTE ADULT - ASSESSMENT
52 year old female with PMH HTN, DM, sister with MI at 35, who presented to OSH with CP in AM of 3/10, found to have STEMI, transferred to [facility] now s/p PCI.     #NEURO  - AOx3, no active concerns  - continue to monitor per ICU protocol     #PULM  - O2 >90% on RA  - Continue to monitor    #CARDIOVASCULAR  NSTEMI  - LHC via RFA with GAVIN in RCA  - atorvastation 80mg  - ASA 81mg QD and Brilinta 90mg QD  - f/u lipid profile  - f/u echo results  Hx HLD   - atorvastatin 90mg QD  Hx HTN  - metoprolol 12.5mg BID    #GI  - No concerns    #RENAL  - BUN, Cr WNL  - continue to monitor BUN, Cr, I&Os  - Replete electrolytes as needed    #HEME  - H/H stable  - Continue to monitor    #ID  - WBC uptrending  - afebrile, no signs of infection  - continue to monitor CBC    #ENDOCRINE  - Hx DM  - f/u A1c  - f/u TSH 52 year old female with PMH HTN, DM, sister with MI at 35, who presented to OSH with CP in AM of 3/10, found to have STEMI, transferred to [facility] now s/p PCI w/ GAVIN to RCA.     #NEURO  - AOx3, no active concerns  - continue to monitor per ICU protocol     #PULM  - O2 >90% on RA  - Continue to monitor    #CARDIOVASCULAR  NSTEMI  - LHC via RFA with GAVIN in RCA  - atorvastation 80mg  - ASA 81mg QD and Brilinta 90mg QD  - f/u lipid profile  - f/u echo results  Hx HLD   - atorvastatin 90mg QD  Hx HTN  - metoprolol 12.5mg BID    #GI  - No concerns    #RENAL  - BUN, Cr WNL  - continue to monitor BUN, Cr, I&Os  - Replete electrolytes as needed    #HEME  - H/H stable  - Continue to monitor    #ID  - WBC uptrending  - afebrile, no signs of infection  - continue to monitor CBC    #ENDOCRINE  - Hx DM  - f/u A1c  - f/u TSH 52 year old female with PMH HTN, DM, sister with MI at 35, who presented to OSH with CP in AM of 3/10, found to have STEMI, transferred to [facility] now s/p PCI w/ GAVIN to RCA.     #NEURO  - AOx3, no active concerns  - continue to monitor per ICU protocol     #PULM  - O2 >90% on RA  - Continue to monitor    #CARDIOVASCULAR  NSTEMI  - LHC via RFA with GAVIN in RCA  - atorvastation 80mg  - ASA 81mg QD and Brilinta 90mg QD  - f/u lipid profile  - f/u echo results  - continue to trend cardiac enzymes  Hx HLD   - atorvastatin 90mg QD  Hx HTN  - metoprolol 12.5mg BID    #GI  - No concerns  - diabetic diet    #RENAL  - BUN, Cr WNL  - continue to monitor BUN, Cr, I&Os  - Replete electrolytes as needed    #HEME  - H/H stable  - Continue to monitor    #ID  - WBC uptrending  - afebrile, no signs of infection  - continue to monitor CBC    #ENDOCRINE  D2M  - ISS  - f/u A1c  - f/u TSH

## 2022-03-12 ENCOUNTER — TRANSCRIPTION ENCOUNTER (OUTPATIENT)
Age: 52
End: 2022-03-12

## 2022-03-12 DIAGNOSIS — Z29.9 ENCOUNTER FOR PROPHYLACTIC MEASURES, UNSPECIFIED: ICD-10-CM

## 2022-03-12 DIAGNOSIS — I21.9 ACUTE MYOCARDIAL INFARCTION, UNSPECIFIED: ICD-10-CM

## 2022-03-12 LAB
ALBUMIN SERPL ELPH-MCNC: 3.6 G/DL — SIGNIFICANT CHANGE UP (ref 3.3–5)
ALP SERPL-CCNC: 74 U/L — SIGNIFICANT CHANGE UP (ref 40–120)
ALT FLD-CCNC: 35 U/L — SIGNIFICANT CHANGE UP (ref 10–45)
ANION GAP SERPL CALC-SCNC: 11 MMOL/L — SIGNIFICANT CHANGE UP (ref 5–17)
AST SERPL-CCNC: 121 U/L — HIGH (ref 10–40)
BASOPHILS # BLD AUTO: 0.08 K/UL — SIGNIFICANT CHANGE UP (ref 0–0.2)
BASOPHILS NFR BLD AUTO: 0.6 % — SIGNIFICANT CHANGE UP (ref 0–2)
BILIRUB SERPL-MCNC: 1.3 MG/DL — HIGH (ref 0.2–1.2)
BUN SERPL-MCNC: 17 MG/DL — SIGNIFICANT CHANGE UP (ref 7–23)
CALCIUM SERPL-MCNC: 8.9 MG/DL — SIGNIFICANT CHANGE UP (ref 8.4–10.5)
CHLORIDE SERPL-SCNC: 104 MMOL/L — SIGNIFICANT CHANGE UP (ref 96–108)
CO2 SERPL-SCNC: 23 MMOL/L — SIGNIFICANT CHANGE UP (ref 22–31)
CREAT SERPL-MCNC: 0.76 MG/DL — SIGNIFICANT CHANGE UP (ref 0.5–1.3)
EGFR: 95 ML/MIN/1.73M2 — SIGNIFICANT CHANGE UP
EOSINOPHIL # BLD AUTO: 0.19 K/UL — SIGNIFICANT CHANGE UP (ref 0–0.5)
EOSINOPHIL NFR BLD AUTO: 1.4 % — SIGNIFICANT CHANGE UP (ref 0–6)
GLUCOSE BLDC GLUCOMTR-MCNC: 133 MG/DL — HIGH (ref 70–99)
GLUCOSE BLDC GLUCOMTR-MCNC: 139 MG/DL — HIGH (ref 70–99)
GLUCOSE BLDC GLUCOMTR-MCNC: 178 MG/DL — HIGH (ref 70–99)
GLUCOSE BLDC GLUCOMTR-MCNC: 99 MG/DL — SIGNIFICANT CHANGE UP (ref 70–99)
GLUCOSE SERPL-MCNC: 111 MG/DL — HIGH (ref 70–99)
HCT VFR BLD CALC: 40.4 % — SIGNIFICANT CHANGE UP (ref 34.5–45)
HGB BLD-MCNC: 13.4 G/DL — SIGNIFICANT CHANGE UP (ref 11.5–15.5)
IMM GRANULOCYTES NFR BLD AUTO: 0.4 % — SIGNIFICANT CHANGE UP (ref 0–1.5)
LYMPHOCYTES # BLD AUTO: 25.1 % — SIGNIFICANT CHANGE UP (ref 13–44)
LYMPHOCYTES # BLD AUTO: 3.35 K/UL — HIGH (ref 1–3.3)
MAGNESIUM SERPL-MCNC: 1.8 MG/DL — SIGNIFICANT CHANGE UP (ref 1.6–2.6)
MCHC RBC-ENTMCNC: 27 PG — SIGNIFICANT CHANGE UP (ref 27–34)
MCHC RBC-ENTMCNC: 33.2 GM/DL — SIGNIFICANT CHANGE UP (ref 32–36)
MCV RBC AUTO: 81.3 FL — SIGNIFICANT CHANGE UP (ref 80–100)
MONOCYTES # BLD AUTO: 0.89 K/UL — SIGNIFICANT CHANGE UP (ref 0–0.9)
MONOCYTES NFR BLD AUTO: 6.7 % — SIGNIFICANT CHANGE UP (ref 2–14)
NEUTROPHILS # BLD AUTO: 8.78 K/UL — HIGH (ref 1.8–7.4)
NEUTROPHILS NFR BLD AUTO: 65.8 % — SIGNIFICANT CHANGE UP (ref 43–77)
NRBC # BLD: 0 /100 WBCS — SIGNIFICANT CHANGE UP (ref 0–0)
PHOSPHATE SERPL-MCNC: 2.3 MG/DL — LOW (ref 2.5–4.5)
PLATELET # BLD AUTO: 295 K/UL — SIGNIFICANT CHANGE UP (ref 150–400)
POTASSIUM SERPL-MCNC: 3.9 MMOL/L — SIGNIFICANT CHANGE UP (ref 3.5–5.3)
POTASSIUM SERPL-SCNC: 3.9 MMOL/L — SIGNIFICANT CHANGE UP (ref 3.5–5.3)
PROT SERPL-MCNC: 6.3 G/DL — SIGNIFICANT CHANGE UP (ref 6–8.3)
RBC # BLD: 4.97 M/UL — SIGNIFICANT CHANGE UP (ref 3.8–5.2)
RBC # FLD: 14.6 % — HIGH (ref 10.3–14.5)
SODIUM SERPL-SCNC: 138 MMOL/L — SIGNIFICANT CHANGE UP (ref 135–145)
WBC # BLD: 13.35 K/UL — HIGH (ref 3.8–10.5)
WBC # FLD AUTO: 13.35 K/UL — HIGH (ref 3.8–10.5)

## 2022-03-12 PROCEDURE — 99233 SBSQ HOSP IP/OBS HIGH 50: CPT | Mod: GC

## 2022-03-12 RX ORDER — TICAGRELOR 90 MG/1
1 TABLET ORAL
Qty: 0 | Refills: 0 | DISCHARGE
Start: 2022-03-12

## 2022-03-12 RX ORDER — LIRAGLUTIDE 6 MG/ML
1 INJECTION SUBCUTANEOUS
Qty: 0 | Refills: 0 | DISCHARGE

## 2022-03-12 RX ORDER — MAGNESIUM OXIDE 400 MG ORAL TABLET 241.3 MG
400 TABLET ORAL EVERY 4 HOURS
Refills: 0 | Status: COMPLETED | OUTPATIENT
Start: 2022-03-12 | End: 2022-03-12

## 2022-03-12 RX ORDER — LISINOPRIL 2.5 MG/1
5 TABLET ORAL DAILY
Refills: 0 | Status: DISCONTINUED | OUTPATIENT
Start: 2022-03-12 | End: 2022-03-13

## 2022-03-12 RX ORDER — FAMOTIDINE 10 MG/ML
20 INJECTION INTRAVENOUS DAILY
Refills: 0 | Status: DISCONTINUED | OUTPATIENT
Start: 2022-03-12 | End: 2022-03-13

## 2022-03-12 RX ORDER — LISINOPRIL 2.5 MG/1
1 TABLET ORAL
Qty: 0 | Refills: 0 | DISCHARGE
Start: 2022-03-12

## 2022-03-12 RX ORDER — METOPROLOL TARTRATE 50 MG
50 TABLET ORAL DAILY
Refills: 0 | Status: DISCONTINUED | OUTPATIENT
Start: 2022-03-12 | End: 2022-03-13

## 2022-03-12 RX ORDER — TICAGRELOR 90 MG/1
1 TABLET ORAL
Qty: 30 | Refills: 0
Start: 2022-03-12 | End: 2022-04-10

## 2022-03-12 RX ORDER — ASPIRIN/CALCIUM CARB/MAGNESIUM 324 MG
1 TABLET ORAL
Qty: 0 | Refills: 0 | DISCHARGE
Start: 2022-03-12

## 2022-03-12 RX ORDER — SODIUM,POTASSIUM PHOSPHATES 278-250MG
1 POWDER IN PACKET (EA) ORAL ONCE
Refills: 0 | Status: COMPLETED | OUTPATIENT
Start: 2022-03-12 | End: 2022-03-12

## 2022-03-12 RX ADMIN — Medication 12.5 MILLIGRAM(S): at 06:33

## 2022-03-12 RX ADMIN — LISINOPRIL 5 MILLIGRAM(S): 2.5 TABLET ORAL at 09:57

## 2022-03-12 RX ADMIN — MAGNESIUM OXIDE 400 MG ORAL TABLET 400 MILLIGRAM(S): 241.3 TABLET ORAL at 09:57

## 2022-03-12 RX ADMIN — CHLORHEXIDINE GLUCONATE 1 APPLICATION(S): 213 SOLUTION TOPICAL at 22:04

## 2022-03-12 RX ADMIN — TICAGRELOR 90 MILLIGRAM(S): 90 TABLET ORAL at 17:48

## 2022-03-12 RX ADMIN — HEPARIN SODIUM 5000 UNIT(S): 5000 INJECTION INTRAVENOUS; SUBCUTANEOUS at 13:42

## 2022-03-12 RX ADMIN — FAMOTIDINE 20 MILLIGRAM(S): 10 INJECTION INTRAVENOUS at 12:36

## 2022-03-12 RX ADMIN — TICAGRELOR 90 MILLIGRAM(S): 90 TABLET ORAL at 06:33

## 2022-03-12 RX ADMIN — HEPARIN SODIUM 5000 UNIT(S): 5000 INJECTION INTRAVENOUS; SUBCUTANEOUS at 22:01

## 2022-03-12 RX ADMIN — Medication 1 PACKET(S): at 09:57

## 2022-03-12 RX ADMIN — ATORVASTATIN CALCIUM 80 MILLIGRAM(S): 80 TABLET, FILM COATED ORAL at 22:00

## 2022-03-12 RX ADMIN — Medication 81 MILLIGRAM(S): at 12:37

## 2022-03-12 RX ADMIN — HEPARIN SODIUM 5000 UNIT(S): 5000 INJECTION INTRAVENOUS; SUBCUTANEOUS at 06:33

## 2022-03-12 RX ADMIN — MAGNESIUM OXIDE 400 MG ORAL TABLET 400 MILLIGRAM(S): 241.3 TABLET ORAL at 13:42

## 2022-03-12 NOTE — PHYSICAL THERAPY INITIAL EVALUATION ADULT - PRECAUTIONS/LIMITATIONS, REHAB EVAL
Initial ECG unremarkable. Trop 1100 at [facility] ED. Serial repeat ECG showing dynamic ST changes in inferior leads concerning for STEMI. Patient with recurrence of CP (5/10). Cath team activated and patient went emergently to cath./no known precautions/limitations

## 2022-03-12 NOTE — PROGRESS NOTE ADULT - ASSESSMENT
52 year old female with PMH HTN, DM, sister with MI at 35, who presented to OSH with CP in AM of 3/10, found to have STEMI, transferred to [facility] now s/p PCI w/ GAVIN to RCA. Mild segmental LV dysfunction.

## 2022-03-12 NOTE — PHYSICAL THERAPY INITIAL EVALUATION ADULT - ADDITIONAL COMMENTS
Pt resides in private home with family, flight of steps, PTA independent with mobility and ADL's, owns cane, ambulatory without AD, (+)working.

## 2022-03-12 NOTE — PROGRESS NOTE ADULT - ASSESSMENT
52 year old female with PMH HTN, DM, sister with MI at 35, who presented to OSH with CP in AM of 3/10, found to have STEMI, transferred to [facility] now s/p PCI w/ GAVIN to RCA. Mild segmental LV dysfunction.     - LHC via RFA with GAVIN in RCA  - atorvastation 80mg  - ASA 81mg QD and Brilinta 90mg QD  - mild segmental LV dysfunction on TTE   - agree with metoprolol, can increase / transition to metoprolol succinate 50mg, hold for HR < 60, no evidence of conduction disease   - agree with lisinopril  - cardiology will continue to follow, will need close follow up on discharge

## 2022-03-12 NOTE — PROGRESS NOTE ADULT - SUBJECTIVE AND OBJECTIVE BOX
Helio Kruger, PGY1    PROGRESS NOTE    Patient is a 52y old  Female who presents with a chief complaint of STEMI (12 Mar 2022 07:10)      SUBJECTIVE/INTERVAL EVENTS:     Review of Systems:  See subjective above. All other systems unchanged from previous.    MEDICATIONS  (STANDING):  aspirin  chewable 81 milliGRAM(s) Oral daily  atorvastatin 80 milliGRAM(s) Oral at bedtime  chlorhexidine 2% Cloths 1 Application(s) Topical at bedtime  heparin   Injectable 5000 Unit(s) SubCutaneous every 8 hours  insulin lispro (ADMELOG) corrective regimen sliding scale   SubCutaneous three times a day before meals  insulin lispro (ADMELOG) corrective regimen sliding scale   SubCutaneous at bedtime  lisinopril 5 milliGRAM(s) Oral daily  metoprolol succinate ER 50 milliGRAM(s) Oral daily  ticagrelor 90 milliGRAM(s) Oral every 12 hours    MEDICATIONS  (PRN):  acetaminophen     Tablet .. 650 milliGRAM(s) Oral every 6 hours PRN Mild Pain (1 - 3), Moderate Pain (4 - 6)      VITAL SIGNS:  T(F): 99.1 (03-12-22 @ 04:12), Max: 99.1 (03-12-22 @ 04:12)  HR: 68 (03-12-22 @ 04:12) (62 - 76)  BP: 109/62 (03-12-22 @ 04:12) (109/62 - 135/74)  RR: 18 (03-12-22 @ 04:12) (18 - 31)  SpO2: 97% (03-12-22 @ 04:12) (94% - 100%)    I&O's Summary    11 Mar 2022 07:01  -  12 Mar 2022 07:00  --------------------------------------------------------  IN: 720 mL / OUT: 0 mL / NET: 720 mL      Daily     Daily     PHYSICAL EXAM:  Gen: Alert, NAD  HEENT: NCAT, conjunctiva clear, sclera anicteric, mmm  Neck: Supple, no JVD  CV: RRR, S1S2, no m/r/g  Resp: CTAB, normal respiratory effort  Abd: Soft, nontender, nondistended, normal bowel sounds  Ext: no edema, no clubbing or cyanosis  Neuro: AOx3, CN2-12 grossly intact, ZAMAN  SKIN: warm, perfused    LABS:                        13.4   13.35 )-----------( 295      ( 12 Mar 2022 05:48 )             40.4     Hgb Trend: 13.4<--, 14.4<--, 14.2<--, 15.4<--  03-12    138  |  104  |  17  ----------------------------<  111<H>  3.9   |  23  |  0.76    Ca    8.9      12 Mar 2022 05:49  Phos  2.3     03-12  Mg     1.8     03-12    TPro  6.3  /  Alb  3.6  /  TBili  1.3<H>  /  DBili  x   /  AST  121<H>  /  ALT  35  /  AlkPhos  74  03-12    Creatinine Trend: 0.76<--, 0.61<--, 0.63<--  LIVER FUNCTIONS - ( 12 Mar 2022 05:49 )  Alb: 3.6 g/dL / Pro: 6.3 g/dL / ALK PHOS: 74 U/L / ALT: 35 U/L / AST: 121 U/L / GGT: x           PT/INR - ( 11 Mar 2022 03:19 )   PT: 13.9 sec;   INR: 1.21 ratio         PTT - ( 11 Mar 2022 03:19 )  PTT:90.3 sec  CARDIAC MARKERS ( 11 Mar 2022 11:55 )  x     / x     / 1681 U/L / x     / 146.4 ng/mL  CARDIAC MARKERS ( 11 Mar 2022 03:19 )  x     / x     / 2352 U/L / x     / 221.5 ng/mL          CAPILLARY BLOOD GLUCOSE      POCT Blood Glucose.: 231 mg/dL (11 Mar 2022 21:49)  POCT Blood Glucose.: 179 mg/dL (11 Mar 2022 17:09)  POCT Blood Glucose.: 183 mg/dL (11 Mar 2022 11:20)      RADIOLOGY & ADDITIONAL TESTS: Reviewed    Imaging Personally Reviewed:    Consultant(s) Notes Reviewed:      Care Discussed with Consultants/Other Providers:   Helio Kruger, PGY1    PROGRESS NOTE    Patient is a 52y old  Female who presents with a chief complaint of STEMI (12 Mar 2022 07:10)      SUBJECTIVE/INTERVAL EVENTS:   Patient was seen and examined at bedside this morning. Periodic chest heaviness, not with exertion. Denies any nausea/vomiting/diarrhea, headache, shortness of breath, abdominal pain or chest pain/palpitations. Patient responding appropriately to questions and able to make needs known. Vital signs/imaging/telemetry events reviewed.      Review of Systems:  See subjective above. All other systems unchanged from previous.    MEDICATIONS  (STANDING):  aspirin  chewable 81 milliGRAM(s) Oral daily  atorvastatin 80 milliGRAM(s) Oral at bedtime  chlorhexidine 2% Cloths 1 Application(s) Topical at bedtime  heparin   Injectable 5000 Unit(s) SubCutaneous every 8 hours  insulin lispro (ADMELOG) corrective regimen sliding scale   SubCutaneous three times a day before meals  insulin lispro (ADMELOG) corrective regimen sliding scale   SubCutaneous at bedtime  lisinopril 5 milliGRAM(s) Oral daily  metoprolol succinate ER 50 milliGRAM(s) Oral daily  ticagrelor 90 milliGRAM(s) Oral every 12 hours    MEDICATIONS  (PRN):  acetaminophen     Tablet .. 650 milliGRAM(s) Oral every 6 hours PRN Mild Pain (1 - 3), Moderate Pain (4 - 6)      VITAL SIGNS:  T(F): 99.1 (03-12-22 @ 04:12), Max: 99.1 (03-12-22 @ 04:12)  HR: 68 (03-12-22 @ 04:12) (62 - 76)  BP: 109/62 (03-12-22 @ 04:12) (109/62 - 135/74)  RR: 18 (03-12-22 @ 04:12) (18 - 31)  SpO2: 97% (03-12-22 @ 04:12) (94% - 100%)    I&O's Summary    11 Mar 2022 07:01  -  12 Mar 2022 07:00  --------------------------------------------------------  IN: 720 mL / OUT: 0 mL / NET: 720 mL      Daily     Daily     PHYSICAL EXAM:  Gen: Alert, NAD  HEENT: NCAT, conjunctiva clear, sclera anicteric, mmm  Neck: Supple, no JVD  CV: RRR, S1S2, no m/r/g  Resp: CTAB, normal respiratory effort  Abd: Soft, nontender, nondistended, normal bowel sounds  Ext: no edema, no clubbing or cyanosis  Neuro: AOx3, CN2-12 grossly intact, ZAMAN  SKIN: warm, perfused    LABS:                        13.4   13.35 )-----------( 295      ( 12 Mar 2022 05:48 )             40.4     Hgb Trend: 13.4<--, 14.4<--, 14.2<--, 15.4<--  03-12    138  |  104  |  17  ----------------------------<  111<H>  3.9   |  23  |  0.76    Ca    8.9      12 Mar 2022 05:49  Phos  2.3     03-12  Mg     1.8     03-12    TPro  6.3  /  Alb  3.6  /  TBili  1.3<H>  /  DBili  x   /  AST  121<H>  /  ALT  35  /  AlkPhos  74  03-12    Creatinine Trend: 0.76<--, 0.61<--, 0.63<--  LIVER FUNCTIONS - ( 12 Mar 2022 05:49 )  Alb: 3.6 g/dL / Pro: 6.3 g/dL / ALK PHOS: 74 U/L / ALT: 35 U/L / AST: 121 U/L / GGT: x           PT/INR - ( 11 Mar 2022 03:19 )   PT: 13.9 sec;   INR: 1.21 ratio         PTT - ( 11 Mar 2022 03:19 )  PTT:90.3 sec  CARDIAC MARKERS ( 11 Mar 2022 11:55 )  x     / x     / 1681 U/L / x     / 146.4 ng/mL  CARDIAC MARKERS ( 11 Mar 2022 03:19 )  x     / x     / 2352 U/L / x     / 221.5 ng/mL          CAPILLARY BLOOD GLUCOSE      POCT Blood Glucose.: 231 mg/dL (11 Mar 2022 21:49)  POCT Blood Glucose.: 179 mg/dL (11 Mar 2022 17:09)  POCT Blood Glucose.: 183 mg/dL (11 Mar 2022 11:20)      RADIOLOGY & ADDITIONAL TESTS: Reviewed    Imaging Personally Reviewed:    Consultant(s) Notes Reviewed:      Care Discussed with Consultants/Other Providers:

## 2022-03-12 NOTE — DISCHARGE NOTE PROVIDER - PROVIDER TOKENS
FREE:[LAST:[LEONARDO],FIRST:[ALDAIR],PHONE:[(   )    -],FAX:[(   )    -],ADDRESS:[INTERNAL MEDICINE],FOLLOWUP:[1 week],ESTABLISHEDPATIENT:[T]]

## 2022-03-12 NOTE — DISCHARGE NOTE PROVIDER - NSDCFUADDAPPT_GEN_ALL_CORE_FT
Follow-up with a cardiologist within 1 week. Follow-up with a cardiologist within 1 week.  1. Please be sure to follow-up with your PCP in 1 to 2 weeks to monitor the progression of your symptoms. If you do not have a primary care physician, please call your insurance company to inquire about locating a primary care provider close to you, or call [facility] at [phone].

## 2022-03-12 NOTE — DISCHARGE NOTE PROVIDER - NSDCCPCAREPLAN_GEN_ALL_CORE_FT
PRINCIPAL DISCHARGE DIAGNOSIS  Diagnosis: NSTEMI (non-ST elevation myocardial infarction)  Assessment and Plan of Treatment: A heart attack happens when the blood vessels that supply blood to your heart are blocked. This can damage your heart or lead to an abnormal heart rhythm or heart failure. A heart attack is also called a myocardial infarction.  Call your local emergency number (911 in the [place]) for any of the following:   •You have any of the following signs of a heart attack:   Squeezing, pressure, or pain in your chest  You may also have any of the following:   Discomfort or pain in your back, neck, jaw, stomach, or arm  Shortness of breath  Nausea or vomiting  Lightheadedness or a sudden cold sweat  Seek care immediately if:   •You are tired and cannot think clearly.  •Your heart is beating faster than usual.  •You are bleeding from your gums or nose.  •You see blood in your urine or bowel movements.  •You urinate less than usual or not at all.  •You have new or increased swelling in your feet or ankles.  Call your doctor or cardiologist if:   •You have trouble taking your heart medicine.  Patient was started on atorvastatin 80 qD, ASA 81 qD Brilinta 90 BID, metoprolol succinate 50mg qD, lisinopril 5mg.  You will continue to take:   atorvastatin 80mg once per day  ASA 81 once per day  Brilinta 90 twice per day  metoprolol succinate 50mg once per day  lisinopril 5mg once per day

## 2022-03-12 NOTE — DISCHARGE NOTE PROVIDER - NSDCMRMEDTOKEN_GEN_ALL_CORE_FT
aspirin 81 mg oral tablet, chewable: 1 tab(s) orally once a day  atorvastatin 40 mg oral tablet: 1 tab(s) orally once a day  baclofen 20 mg oral tablet: 1 tab(s) orally 3 times a day, As Needed  eletriptan 40 mg oral tablet: 1 tab(s) orally once a day, As Needed  famotidine 20 mg oral tablet: 1 tab(s) orally once a day  Jardiance 10 mg oral tablet: 1 tab(s) orally once a day (in the morning)  lisinopril 5 mg oral tablet: 1 tab(s) orally once a day  metoprolol succinate 50 mg oral tablet, extended release: 1 tab(s) orally once a day  Saxenda 18 mg/3 mL subcutaneous solution: 1 unit(s) subcutaneous 3 times a day, As Needed  ticagrelor 90 mg oral tablet: 1 tab(s) orally every 12 hours  Toujeo SoloStar 300 units/mL subcutaneous solution: 110 unit(s) subcutaneous once a day (at bedtime)   aspirin 81 mg oral tablet, chewable: 1 tab(s) orally once a day  atorvastatin 40 mg oral tablet: 1 tab(s) orally once a day  baclofen 20 mg oral tablet: 1 tab(s) orally 3 times a day, As Needed  eletriptan 40 mg oral tablet: 1 tab(s) orally once a day, As Needed  famotidine 20 mg oral tablet: 1 tab(s) orally once a day  Jardiance 10 mg oral tablet: 1 tab(s) orally once a day (in the morning)  lisinopril 5 mg oral tablet: 1 tab(s) orally once a day  metoprolol succinate 50 mg oral tablet, extended release: 1 tab(s) orally once a day  Saxenda 18 mg/3 mL subcutaneous solution: 1.2 unit(s) subcutaneous once a day, As Needed  ticagrelor 90 mg oral tablet: 1 tab(s) orally every 12 hours  Toujeo SoloStar 300 units/mL subcutaneous solution: 110 unit(s) subcutaneous once a day (at bedtime)

## 2022-03-12 NOTE — PHYSICAL THERAPY INITIAL EVALUATION ADULT - PERTINENT HX OF CURRENT PROBLEM, REHAB EVAL
Pt is 52F admitted 3/10/22 PMHx DM, HTN, sister with MI at early age who presented to OSH with CP upon wakening this morning at 6am.

## 2022-03-12 NOTE — DISCHARGE NOTE PROVIDER - NSFOLLOWUPCLINICS_GEN_ALL_ED_FT
[facility]  Cardiology  [address] [place] [id number]  Phone: [phone]  Fax:   Follow Up Time: 1 week

## 2022-03-12 NOTE — DISCHARGE NOTE PROVIDER - HOSPITAL COURSE
HPI:  [facility] CICU    52F history of DM, HTN, sister with MI at early age who presented to OSH with CP upon wakening this morning at 6am. CP left sided, no radiation, 9/10, with associated SOB. At OSH, trop I 0.6 with normal ECG. Loaded with ASA, Brilinta, Atorvastatin and transferred over to [facility] as STEMI. Upon arrival, patient CP free (received nitro prior to transfer). Initial ECG unremarkable. Trop 1100 at [facility] ED. Serial repeat ECG showing dynamic ST changes in inferior leads concerning for STEMI. Patient with recurrence of CP (5/10). Cath team activated and patient went emergently to cath.  (10 Mar 2022 23:25)    Hospital Course:   S/p LHC via RFA with GAVIN in RCA then transitioned from CICU to medicine floors. Echo had shown mild segmental LV dysfunction.   Patient was started on atorvastatin 80 qD, ASA 81 qD Brilinta 90 BID, metoprolol succinate 50mg qD, lisinopril 5mg.    Patient to follow-up with cardiologist.

## 2022-03-12 NOTE — PROGRESS NOTE ADULT - PROBLEM SELECTOR PLAN 2
Plan:  DVT: [facility] ppx  Diet: CC diet  Bowel regimen: None  PT: f/u recs  PCP:   Family:   Dispo: Plan:  DVT: [facility] ppx  Diet: CC diet  Bowel regimen: None  PT: home   PCP:   Family:   Dispo: Home

## 2022-03-12 NOTE — PROGRESS NOTE ADULT - SUBJECTIVE AND OBJECTIVE BOX
Patient seen and examined at bedside.    Overnight Events: No acute events, remains asymptomatic     Review Of Systems:   CONSTITUTIONAL: No weakness, fevers or chills  EYES/ENT: No visual changes;  No dysphagia  NECK: No pain or stiffness  RESPIRATORY: No cough, wheezing, hemoptysis  CARDIOVASCULAR: No chest pain or palpitations; No lower extremity edema  GASTROINTESTINAL: No abdominal or epigastric pain. No nausea, vomiting, or hematemesis; No diarrhea or constipation. No melena or hematochezia.  BACK: No back pain  GENITOURINARY: No dysuria, frequency or hematuria  NEUROLOGICAL: No numbness or weakness  SKIN: No itching, burning, rashes, or lesions   All other review of systems is negative unless indicated above.          Current Meds:  acetaminophen     Tablet .. 650 milliGRAM(s) Oral every 6 hours PRN  aspirin  chewable 81 milliGRAM(s) Oral daily  atorvastatin 80 milliGRAM(s) Oral at bedtime  chlorhexidine 2% Cloths 1 Application(s) Topical at bedtime  heparin   Injectable 5000 Unit(s) SubCutaneous every 8 hours  insulin lispro (ADMELOG) corrective regimen sliding scale   SubCutaneous three times a day before meals  insulin lispro (ADMELOG) corrective regimen sliding scale   SubCutaneous at bedtime  lisinopril 5 milliGRAM(s) Oral daily  metoprolol tartrate 12.5 milliGRAM(s) Oral two times a day  ticagrelor 90 milliGRAM(s) Oral every 12 hours      Vitals:  T(F): 99.1 (-), Max: 99.1 ()  HR: 68 () (62 - 76)  BP: 109/62 () (109/62 - 135/74)  RR: 18 (-)  SpO2: 97% ()  I&O's Summary    11 Mar 2022 07:01  -  12 Mar 2022 07:00  --------------------------------------------------------  IN: 720 mL / OUT: 0 mL / NET: 720 mL        Physical Exam:  Appearance: No acute distress; well appearing  Eyes: PERRL, EOMI, pink conjunctiva  HEENT: Normal oral mucosa  Cardiovascular: RRR, S1, S2, no murmurs, rubs, or gallops; no edema; no JVD  Respiratory: Clear to auscultation bilaterally  Gastrointestinal: soft, non-tender, non-distended with normal bowel sounds  Musculoskeletal: No clubbing; no joint deformity   Neurologic: Non-focal  Lymphatic: No lymphadenopathy  Psychiatry: AAOx3, mood & affect appropriate  Skin: No rashes, ecchymoses, or cyanosis                          13.4   13.35 )-----------( 295      ( 12 Mar 2022 05:48 )             40.4     03-12    138  |  104  |  17  ----------------------------<  111<H>  3.9   |  23  |  0.76    Ca    8.9      12 Mar 2022 05:49  Phos  2.3     03-12  Mg     1.8     -12    TPro  6.3  /  Alb  3.6  /  TBili  1.3<H>  /  DBili  x   /  AST  121<H>  /  ALT  35  /  AlkPhos  74  03-12    PT/INR - ( 11 Mar 2022 03:19 )   PT: 13.9 sec;   INR: 1.21 ratio         PTT - ( 11 Mar 2022 03:19 )  PTT:90.3 sec  CARDIAC MARKERS ( 11 Mar 2022 11:55 )  2543 ng/L / x     / x     / 1681 U/L / x     / 146.4 ng/mL  CARDIAC MARKERS ( 11 Mar 2022 03:19 )  3962 ng/L / x     / x     / 2352 U/L / x     / 221.5 ng/mL  CARDIAC MARKERS ( 10 Mar 2022 20:21 )  1172 ng/L / x     / x     / x     / x     / x          Serum Pro-Brain Natriuretic Peptide: 362 pg/mL (03-10 @ 20:21)    Total Cholesterol: 162  LDL: --  HDL: 44  T    Acute inferior STEMI - EF 40%. Culprit lesion in the mRCA and rpda.  Diffuse OM and mLAD disease best treated with med    Rx. - Successful PCiI to RCA, RPDA, RPL. GAVIN placed to RCA and POBA to  distal small branches    Recommendations:     DAPT for 9 mo. Aggressive med Rx. Integrilin gtt for 2 hours.     Conclusions:  1. Normal mitral valve. Minimal mitral regurgitation.  2. Endocardial visualization enhanced with intravenous  injection of Ultrasonic Enhancing Agent (Definity). Mild  segmental left ventricular systolic dysfunction. No left  ventricular thrombus. The mid anterior septum, the apical  septum, the basal anterior septum, the basal inferior wall,  the mid inferior wall, the mid inferoseptum, and the basal  inferoseptum are hypokinetic.  3. Mild diastolic dysfunction (Stage I).  4. The right ventricle is not well visualized; grossly  normal right ventricular systolic function.  *** No previous Echo exam.   Patient seen and examined at bedside.    Overnight Events: No acute events, remains asymptomatic     Review Of Systems:   CONSTITUTIONAL: No weakness, fevers or chills  EYES/ENT: No visual changes;  No dysphagia  NECK: No pain or stiffness  RESPIRATORY: No cough, wheezing, hemoptysis  CARDIOVASCULAR: No chest pain or palpitations; No lower extremity edema  GASTROINTESTINAL: No abdominal or epigastric pain. No nausea, vomiting, or hematemesis; No diarrhea or constipation. No melena or hematochezia.  BACK: No back pain  GENITOURINARY: No dysuria, frequency or hematuria  NEUROLOGICAL: No numbness or weakness  SKIN: No itching, burning, rashes, or lesions   All other review of systems is negative unless indicated above.          Current Meds:  acetaminophen     Tablet .. 650 milliGRAM(s) Oral every 6 hours PRN  aspirin  chewable 81 milliGRAM(s) Oral daily  atorvastatin 80 milliGRAM(s) Oral at bedtime  chlorhexidine 2% Cloths 1 Application(s) Topical at bedtime  heparin   Injectable 5000 Unit(s) SubCutaneous every 8 hours  insulin lispro (ADMELOG) corrective regimen sliding scale   SubCutaneous three times a day before meals  insulin lispro (ADMELOG) corrective regimen sliding scale   SubCutaneous at bedtime  lisinopril 5 milliGRAM(s) Oral daily  metoprolol tartrate 12.5 milliGRAM(s) Oral two times a day  ticagrelor 90 milliGRAM(s) Oral every 12 hours      Vitals:  T(F): 99.1 (-), Max: 99.1 ()  HR: 68 () (62 - 76)  BP: 109/62 () (109/62 - 135/74)  RR: 18 (-)  SpO2: 97% ()  I&O's Summary    11 Mar 2022 07:01  -  12 Mar 2022 07:00  --------------------------------------------------------  IN: 720 mL / OUT: 0 mL / NET: 720 mL        Physical Exam:  Appearance: No acute distress; well appearing  Eyes: PERRL, EOMI, pink conjunctiva  HEENT: Normal oral mucosa  Cardiovascular: RRR, S1, S2, no murmurs, rubs, or gallops; no edema; no JVD  Respiratory: Clear to auscultation bilaterally  Gastrointestinal: soft, non-tender, non-distended with normal bowel sounds  Musculoskeletal: No clubbing; no joint deformity   Neurologic: Non-focal  Lymphatic: No lymphadenopathy  Psychiatry: AAOx3, mood & affect appropriate  Skin: No rashes, ecchymoses, or cyanosis    telemetry - sinus rhythm 70s.                          13.4   13.35 )-----------( 295      ( 12 Mar 2022 05:48 )             40.4     03-12    138  |  104  |  17  ----------------------------<  111<H>  3.9   |  23  |  0.76    Ca    8.9      12 Mar 2022 05:49  Phos  2.3     03-12  Mg     1.8     -12    TPro  6.3  /  Alb  3.6  /  TBili  1.3<H>  /  DBili  x   /  AST  121<H>  /  ALT  35  /  AlkPhos  74  03-12    PT/INR - ( 11 Mar 2022 03:19 )   PT: 13.9 sec;   INR: 1.21 ratio         PTT - ( 11 Mar 2022 03:19 )  PTT:90.3 sec  CARDIAC MARKERS ( 11 Mar 2022 11:55 )  2543 ng/L / x     / x     / 1681 U/L / x     / 146.4 ng/mL  CARDIAC MARKERS ( 11 Mar 2022 03:19 )  3962 ng/L / x     / x     / 2352 U/L / x     / 221.5 ng/mL  CARDIAC MARKERS ( 10 Mar 2022 20:21 )  1172 ng/L / x     / x     / x     / x     / x          Serum Pro-Brain Natriuretic Peptide: 362 pg/mL (03-10 @ 20:21)    Total Cholesterol: 162  LDL: --  HDL: 44  T    Acute inferior STEMI - EF 40%. Culprit lesion in the mRCA and rpda.  Diffuse OM and mLAD disease best treated with med    Rx. - Successful PCiI to RCA, RPDA, RPL. GAVIN placed to RCA and POBA to  distal small branches    Recommendations:     DAPT for 9 mo. Aggressive med Rx. Integrilin gtt for 2 hours.     Conclusions:  1. Normal mitral valve. Minimal mitral regurgitation.  2. Endocardial visualization enhanced with intravenous  injection of Ultrasonic Enhancing Agent (Definity). Mild  segmental left ventricular systolic dysfunction. No left  ventricular thrombus. The mid anterior septum, the apical  septum, the basal anterior septum, the basal inferior wall,  the mid inferior wall, the mid inferoseptum, and the basal  inferoseptum are hypokinetic.  3. Mild diastolic dysfunction (Stage I).  4. The right ventricle is not well visualized; grossly  normal right ventricular systolic function.  *** No previous Echo exam.

## 2022-03-12 NOTE — DISCHARGE NOTE PROVIDER - CARE PROVIDER_API CALL
ALDAIR PATTERSON  INTERNAL MEDICINE  Phone: (   )    -  Fax: (   )    -  Established Patient  Follow Up Time: 1 week

## 2022-03-13 ENCOUNTER — TRANSCRIPTION ENCOUNTER (OUTPATIENT)
Age: 52
End: 2022-03-13

## 2022-03-13 VITALS
OXYGEN SATURATION: 97 % | DIASTOLIC BLOOD PRESSURE: 76 MMHG | RESPIRATION RATE: 17 BRPM | TEMPERATURE: 99 F | SYSTOLIC BLOOD PRESSURE: 110 MMHG | HEART RATE: 76 BPM

## 2022-03-13 LAB
ALBUMIN SERPL ELPH-MCNC: 3.5 G/DL — SIGNIFICANT CHANGE UP (ref 3.3–5)
ALP SERPL-CCNC: 74 U/L — SIGNIFICANT CHANGE UP (ref 40–120)
ALT FLD-CCNC: 24 U/L — SIGNIFICANT CHANGE UP (ref 10–45)
ANION GAP SERPL CALC-SCNC: 11 MMOL/L — SIGNIFICANT CHANGE UP (ref 5–17)
AST SERPL-CCNC: 52 U/L — HIGH (ref 10–40)
BILIRUB SERPL-MCNC: 1.2 MG/DL — SIGNIFICANT CHANGE UP (ref 0.2–1.2)
BUN SERPL-MCNC: 15 MG/DL — SIGNIFICANT CHANGE UP (ref 7–23)
CALCIUM SERPL-MCNC: 9.1 MG/DL — SIGNIFICANT CHANGE UP (ref 8.4–10.5)
CHLORIDE SERPL-SCNC: 102 MMOL/L — SIGNIFICANT CHANGE UP (ref 96–108)
CO2 SERPL-SCNC: 23 MMOL/L — SIGNIFICANT CHANGE UP (ref 22–31)
CREAT SERPL-MCNC: 0.71 MG/DL — SIGNIFICANT CHANGE UP (ref 0.5–1.3)
EGFR: 104 ML/MIN/1.73M2 — SIGNIFICANT CHANGE UP
GLUCOSE BLDC GLUCOMTR-MCNC: 121 MG/DL — HIGH (ref 70–99)
GLUCOSE BLDC GLUCOMTR-MCNC: 147 MG/DL — HIGH (ref 70–99)
GLUCOSE SERPL-MCNC: 133 MG/DL — HIGH (ref 70–99)
MAGNESIUM SERPL-MCNC: 1.8 MG/DL — SIGNIFICANT CHANGE UP (ref 1.6–2.6)
PHOSPHATE SERPL-MCNC: 2.8 MG/DL — SIGNIFICANT CHANGE UP (ref 2.5–4.5)
POTASSIUM SERPL-MCNC: 3.9 MMOL/L — SIGNIFICANT CHANGE UP (ref 3.5–5.3)
POTASSIUM SERPL-SCNC: 3.9 MMOL/L — SIGNIFICANT CHANGE UP (ref 3.5–5.3)
PROT SERPL-MCNC: 6.6 G/DL — SIGNIFICANT CHANGE UP (ref 6–8.3)
SODIUM SERPL-SCNC: 136 MMOL/L — SIGNIFICANT CHANGE UP (ref 135–145)

## 2022-03-13 PROCEDURE — 99233 SBSQ HOSP IP/OBS HIGH 50: CPT | Mod: GC

## 2022-03-13 PROCEDURE — 93458 L HRT ARTERY/VENTRICLE ANGIO: CPT | Mod: 59

## 2022-03-13 PROCEDURE — 84443 ASSAY THYROID STIM HORMONE: CPT

## 2022-03-13 PROCEDURE — C1887: CPT

## 2022-03-13 PROCEDURE — 86901 BLOOD TYPING SEROLOGIC RH(D): CPT

## 2022-03-13 PROCEDURE — 96374 THER/PROPH/DIAG INJ IV PUSH: CPT

## 2022-03-13 PROCEDURE — 84484 ASSAY OF TROPONIN QUANT: CPT

## 2022-03-13 PROCEDURE — 84702 CHORIONIC GONADOTROPIN TEST: CPT

## 2022-03-13 PROCEDURE — 86850 RBC ANTIBODY SCREEN: CPT

## 2022-03-13 PROCEDURE — 97161 PT EVAL LOW COMPLEX 20 MIN: CPT

## 2022-03-13 PROCEDURE — 99152 MOD SED SAME PHYS/QHP 5/>YRS: CPT

## 2022-03-13 PROCEDURE — C8929: CPT

## 2022-03-13 PROCEDURE — 85610 PROTHROMBIN TIME: CPT

## 2022-03-13 PROCEDURE — 36415 COLL VENOUS BLD VENIPUNCTURE: CPT

## 2022-03-13 PROCEDURE — C1725: CPT

## 2022-03-13 PROCEDURE — 82962 GLUCOSE BLOOD TEST: CPT

## 2022-03-13 PROCEDURE — 85730 THROMBOPLASTIN TIME PARTIAL: CPT

## 2022-03-13 PROCEDURE — 86900 BLOOD TYPING SEROLOGIC ABO: CPT

## 2022-03-13 PROCEDURE — 93005 ELECTROCARDIOGRAM TRACING: CPT

## 2022-03-13 PROCEDURE — 92921: CPT | Mod: RC

## 2022-03-13 PROCEDURE — 80053 COMPREHEN METABOLIC PANEL: CPT

## 2022-03-13 PROCEDURE — U0005: CPT

## 2022-03-13 PROCEDURE — U0003: CPT

## 2022-03-13 PROCEDURE — 83695 ASSAY OF LIPOPROTEIN(A): CPT

## 2022-03-13 PROCEDURE — 82553 CREATINE MB FRACTION: CPT

## 2022-03-13 PROCEDURE — 99285 EMERGENCY DEPT VISIT HI MDM: CPT

## 2022-03-13 PROCEDURE — 83036 HEMOGLOBIN GLYCOSYLATED A1C: CPT

## 2022-03-13 PROCEDURE — 85025 COMPLETE CBC W/AUTO DIFF WBC: CPT

## 2022-03-13 PROCEDURE — C1769: CPT

## 2022-03-13 PROCEDURE — 83880 ASSAY OF NATRIURETIC PEPTIDE: CPT

## 2022-03-13 PROCEDURE — 82550 ASSAY OF CK (CPK): CPT

## 2022-03-13 PROCEDURE — C1874: CPT

## 2022-03-13 PROCEDURE — 99239 HOSP IP/OBS DSCHRG MGMT >30: CPT | Mod: GC

## 2022-03-13 PROCEDURE — 80061 LIPID PANEL: CPT

## 2022-03-13 PROCEDURE — 85027 COMPLETE CBC AUTOMATED: CPT

## 2022-03-13 PROCEDURE — 99153 MOD SED SAME PHYS/QHP EA: CPT

## 2022-03-13 PROCEDURE — 83735 ASSAY OF MAGNESIUM: CPT

## 2022-03-13 PROCEDURE — 84100 ASSAY OF PHOSPHORUS: CPT

## 2022-03-13 PROCEDURE — 71045 X-RAY EXAM CHEST 1 VIEW: CPT

## 2022-03-13 PROCEDURE — C9606: CPT | Mod: RC

## 2022-03-13 PROCEDURE — C1894: CPT

## 2022-03-13 RX ORDER — TICAGRELOR 90 MG/1
1 TABLET ORAL
Qty: 60 | Refills: 0
Start: 2022-03-13 | End: 2022-04-11

## 2022-03-13 RX ORDER — ASPIRIN/CALCIUM CARB/MAGNESIUM 324 MG
1 TABLET ORAL
Qty: 30 | Refills: 0
Start: 2022-03-13 | End: 2022-04-11

## 2022-03-13 RX ORDER — LISINOPRIL 2.5 MG/1
1 TABLET ORAL
Qty: 30 | Refills: 0
Start: 2022-03-13 | End: 2022-04-11

## 2022-03-13 RX ADMIN — LISINOPRIL 5 MILLIGRAM(S): 2.5 TABLET ORAL at 05:32

## 2022-03-13 RX ADMIN — HEPARIN SODIUM 5000 UNIT(S): 5000 INJECTION INTRAVENOUS; SUBCUTANEOUS at 05:33

## 2022-03-13 RX ADMIN — TICAGRELOR 90 MILLIGRAM(S): 90 TABLET ORAL at 05:32

## 2022-03-13 RX ADMIN — Medication 50 MILLIGRAM(S): at 05:32

## 2022-03-13 RX ADMIN — Medication 81 MILLIGRAM(S): at 11:10

## 2022-03-13 RX ADMIN — FAMOTIDINE 20 MILLIGRAM(S): 10 INJECTION INTRAVENOUS at 11:09

## 2022-03-13 NOTE — PROGRESS NOTE ADULT - PROBLEM SELECTOR PLAN 1
s/p LHC via RFA with GAVIN in RCA  - atorvastatin 80mg  - ASA 81mg QD, Brilinta 90mg QD  - metoprolol sucicnate 50mg, hold HR < 60  - lisinopril 5mg  - f/u cards recs
s/p LHC via RFA with GAVIN in RCA  - atorvastatin 80mg  - ASA 81mg QD, Brilinta 90mg QD  - metoprolol sucicnate 50mg, hold HR < 60  - lisinopril 5mg  - f/u cards recs

## 2022-03-13 NOTE — PROGRESS NOTE ADULT - SUBJECTIVE AND OBJECTIVE BOX
Patient seen and examined at bedside.    Overnight Events: No acute events overnight      REVIEW OF SYSTEMS:  Constitutional:     [x ] negative [ ] fevers [ ] chills [ ] weight loss [ ] weight gain  HEENT:                  [ x] negative [ ] dry eyes [ ] eye irritation [ ] postnasal drip [ ] nasal congestion  CV:                         [ x] negative  [ ] chest pain [ ] orthopnea [ ] palpitations [ ] murmur  Resp:                     [ x] negative [ ] cough [ ] shortness of breath [ ] dyspnea [ ] wheezing [ ] sputum [ ]hemoptysis  GI:                          [x ] negative [ ] nausea [ ] vomiting [ ] diarrhea [ ] constipation [ ] abd pain [ ] dysphagia   :                        [x ] negative [ ] dysuria [ ] nocturia [ ] hematuria [ ] increased urinary frequency  Musculoskeletal: [x ] negative [ ] back pain [ ] myalgias [ ] arthralgias [ ] fracture  Skin:                       [x ] negative [ ] rash [ ] itch  Neurological:        [x ] negative [ ] headache [ ] dizziness [ ] syncope [ ] weakness [ ] numbness  Psychiatric:           [ x] negative [ ] anxiety [ ] depression  Endocrine:            [x ] negative [ ] diabetes [ ] thyroid problem  Heme/Lymph:      [x ] negative [ ] anemia [ ] bleeding problem  Allergic/Immune: [x ] negative [ ] itchy eyes [ ] nasal discharge [ ] hives [ ] angioedema    [x ] All other systems negative  [ ] Unable to assess ROS due to    Current Meds:  acetaminophen     Tablet .. 650 milliGRAM(s) Oral every 6 hours PRN  aspirin  chewable 81 milliGRAM(s) Oral daily  atorvastatin 80 milliGRAM(s) Oral at bedtime  chlorhexidine 2% Cloths 1 Application(s) Topical at bedtime  famotidine    Tablet 20 milliGRAM(s) Oral daily  heparin   Injectable 5000 Unit(s) SubCutaneous every 8 hours  insulin lispro (ADMELOG) corrective regimen sliding scale   SubCutaneous three times a day before meals  insulin lispro (ADMELOG) corrective regimen sliding scale   SubCutaneous at bedtime  lisinopril 5 milliGRAM(s) Oral daily  metoprolol succinate ER 50 milliGRAM(s) Oral daily  ticagrelor 90 milliGRAM(s) Oral every 12 hours      PAST MEDICAL & SURGICAL HISTORY:  Diabetes mellitus    Essential hypertension    Hyperlipidemia        Vitals:  T(F): 99.3 (03-13), Max: 99.3 (03-13)  HR: 80 (03-13) (63 - 88)  BP: 117/73 (03-13) (106/68 - 123/78)  RR: 18 (03-13)  SpO2: 97% (03-13)  I&O's Summary    12 Mar 2022 06:01  -  13 Mar 2022 07:00  --------------------------------------------------------  IN: 680 mL / OUT: 0 mL / NET: 680 mL      Physical Exam:  Appearance: No acute distress; well appearing  Eyes: PERRL, EOMI, pink conjunctiva  HEENT: Normal oral mucosa  Cardiovascular: RRR, S1, S2, no murmurs, rubs, or gallops; no edema; no JVD  Respiratory: Clear to auscultation bilaterally  Gastrointestinal: soft, non-tender, non-distended with normal bowel sounds  Musculoskeletal: No clubbing; no joint deformity   Neurologic: Non-focal  Lymphatic: No lymphadenopathy  Psychiatry: AAOx3, mood & affect appropriate  Skin: No rashes, ecchymoses, or cyanosis                            13.4   13.35 )-----------( 295      ( 12 Mar 2022 05:48 )             40.4     03-13    136  |  102  |  15  ----------------------------<  133<H>  3.9   |  23  |  0.71    Ca    9.1      13 Mar 2022 05:37  Phos  2.8     03-13  Mg     1.8     03-13    TPro  6.6  /  Alb  3.5  /  TBili  1.2  /  DBili  x   /  AST  52<H>  /  ALT  24  /  AlkPhos  74  03-13      CARDIAC MARKERS ( 11 Mar 2022 11:55 )  x     / x     / 1681 U/L / x     / 146.4 ng/mL      Serum Pro-Brain Natriuretic Peptide: 362 pg/mL (03-10 @ 20:21)      Acute inferior STEMI - EF 40%. Culprit lesion in the mRCA and rpda.  Diffuse OM and mLAD disease best treated with med    Rx. - Successful PCiI to RCA, RPDA, RPL. GAVIN placed to RCA and POBA to  distal small branches    Recommendations:     DAPT for 9 mo. Aggressive med Rx. Integrilin gtt for 2 hours.     Conclusions:  1. Normal mitral valve. Minimal mitral regurgitation.  2. Endocardial visualization enhanced with intravenous  injection of Ultrasonic Enhancing Agent (Definity). Mild  segmental left ventricular systolic dysfunction. No left  ventricular thrombus. The mid anterior septum, the apical  septum, the basal anterior septum, the basal inferior wall,  the mid inferior wall, the mid inferoseptum, and the basal  inferoseptum are hypokinetic.  3. Mild diastolic dysfunction (Stage I).  4. The right ventricle is not well visualized; grossly  normal right ventricular systolic function.  *** No previous Echo exam.

## 2022-03-13 NOTE — DISCHARGE NOTE NURSING/CASE MANAGEMENT/SOCIAL WORK - NSDCFUADDAPPT_GEN_ALL_CORE_FT
Follow-up with a cardiologist within 1 week.  1. Please be sure to follow-up with your PCP in 1 to 2 weeks to monitor the progression of your symptoms. If you do not have a primary care physician, please call your insurance company to inquire about locating a primary care provider close to you, or call [facility] at [phone].

## 2022-03-13 NOTE — PROGRESS NOTE ADULT - ASSESSMENT
52 year old female with PMH HTN, DM, sister with MI at 35, who presented to OSH with CP in AM of 3/10, found to have STEMI, transferred to [facility] now s/p PCI w/ GAVIN to RCA. Mild segmental LV dysfunction.      52 year old female with PMH HTN, DM, sister with MI at 35, who presented to OSH with CP in AM of 3/10, found to have STEMI, transferred to [facility] now s/p PCI w/ GAVIN to RCA. Mild segmental LV dysfunction.   Medically ready for discharge to home today 3/13/21.

## 2022-03-13 NOTE — DISCHARGE NOTE NURSING/CASE MANAGEMENT/SOCIAL WORK - NSDCPEFALRISK_GEN_ALL_CORE
For information on Fall & Injury Prevention, visit: [email or web address] OR  [email or web address] OR  https://www.cdc.gov/steadi/patient.html

## 2022-03-13 NOTE — DISCHARGE NOTE NURSING/CASE MANAGEMENT/SOCIAL WORK - PATIENT PORTAL LINK FT
You can access the FollowMyHealth Patient Portal offered by [facility] by registering at the following website: [email or web address]. By joining [facility]’s FollowMyHealth portal, you will also be able to view your health information using other applications (apps) compatible with our system.

## 2022-03-13 NOTE — PROGRESS NOTE ADULT - ASSESSMENT
52 year old female with PMH HTN, DM, sister with MI at 35, who presented to OSH with CP in AM of 3/10, found to have STEMI, transferred to [facility] now s/p PCI w/ GAVIN to RCA. Mild segmental LV dysfunction.     - LHC via RFA with GAVIN in RCA.  PDA and POBA to distal small branches. Diffuse mid obtuse marginal disease and mid LAD disease deemed best treated medically  - atorvastatin 80mg  - ASA 81mg QD and Brilinta 90mg QD  - TTE- mild segmental LV dysfunction EF 40-45%. The mid anterior septum, the apical  septum, the basal anterior septum, the basal inferior wall,  the mid inferior wall, the mid inferoseptum, and the basal  inferoseptum are hypokinetic.  -continue metoprolol succinate 50mg, hold for HR < 60, no evidence of conduction disease   - continue lisinopril 5mg  - cardiology will continue to follow, will need close follow up on discharge

## 2022-03-13 NOTE — PROGRESS NOTE ADULT - SUBJECTIVE AND OBJECTIVE BOX
INTERNAL MEDICINE PROGRESS NOTE    Patient is a 52y old  Female who presents with a chief complaint of STEMI (13 Mar 2022 07:20)    Overnight events: none  Subjective: no CP or SOB. Feels tired as did not sleep well overnight due to environmental noise.    Medications:  MEDICATIONS  (STANDING):  aspirin  chewable 81 milliGRAM(s) Oral daily  atorvastatin 80 milliGRAM(s) Oral at bedtime  chlorhexidine 2% Cloths 1 Application(s) Topical at bedtime  famotidine    Tablet 20 milliGRAM(s) Oral daily  heparin   Injectable 5000 Unit(s) SubCutaneous every 8 hours  insulin lispro (ADMELOG) corrective regimen sliding scale   SubCutaneous three times a day before meals  insulin lispro (ADMELOG) corrective regimen sliding scale   SubCutaneous at bedtime  lisinopril 5 milliGRAM(s) Oral daily  metoprolol succinate ER 50 milliGRAM(s) Oral daily  ticagrelor 90 milliGRAM(s) Oral every 12 hours    MEDICATIONS  (PRN):  acetaminophen     Tablet .. 650 milliGRAM(s) Oral every 6 hours PRN Mild Pain (1 - 3), Moderate Pain (4 - 6)      Objective:    Vitals: Vital Signs Last 24 Hrs  T(C): 37.2 (03-13-22 @ 11:01), Max: 37.4 (03-13-22 @ 05:08)  T(F): 99 (03-13-22 @ 11:01), Max: 99.3 (03-13-22 @ 05:08)  HR: 76 (03-13-22 @ 11:01) (63 - 82)  BP: 110/76 (03-13-22 @ 11:01) (106/68 - 117/73)  BP(mean): --  RR: 17 (03-13-22 @ 11:01) (17 - 18)  SpO2: 97% (03-13-22 @ 11:01) (96% - 97%)              I&O's Summary    12 Mar 2022 06:01  -  13 Mar 2022 07:00  --------------------------------------------------------  IN: 680 mL / OUT: 0 mL / NET: 680 mL    13 Mar 2022 07:01  -  13 Mar 2022 11:39  --------------------------------------------------------  IN: 240 mL / OUT: 0 mL / NET: 240 mL    TELE: NSR /    PHYSICAL EXAM:  GENERAL: NAD, conversant  CHEST/LUNG: Clear to auscultation bilaterally; No crackles, rhonchi, wheezing, or rubs  HEART: Regular rate and rhythm; No murmurs, rubs, or gallops  ABDOMEN: Soft, Nontender, Nondistended; Bowel sounds present  EXTREMITIES:  2+ Peripheral Pulses, No clubbing, cyanosis, or edema  SKIN: No rashes or lesions  NERVOUS SYSTEM:  Alert & Oriented, Good concentration                                                                                  LABS:  03-13    136  |  102  |  15  ----------------------------<  133<H>  3.9   |  23  |  0.71  03-12    138  |  104  |  17  ----------------------------<  111<H>  3.9   |  23  |  0.76  03-11    140  |  104  |  18  ----------------------------<  127<H>  4.1   |  24  |  0.61    Ca    9.1      13 Mar 2022 05:37  Ca    8.9      12 Mar 2022 05:49  Ca    9.4      11 Mar 2022 03:19  Phos  2.8     03-13  Mg     1.8     03-13    TPro  6.6  /  Alb  3.5  /  TBili  1.2  /  DBili  x   /  AST  52<H>  /  ALT  24  /  AlkPhos  74  03-13  TPro  6.3  /  Alb  3.6  /  TBili  1.3<H>  /  DBili  x   /  AST  121<H>  /  ALT  35  /  AlkPhos  74  03-12  TPro  6.9  /  Alb  3.6  /  TBili  1.1  /  DBili  x   /  AST  264<H>  /  ALT  40  /  AlkPhos  82  03-11                                                    13.4   13.35 )-----------( 295      ( 12 Mar 2022 05:48 )             40.4                         14.4   15.73 )-----------( 294      ( 11 Mar 2022 11:56 )             44.3                         14.2   20.55 )-----------( 307      ( 11 Mar 2022 03:19 )             42.7     CAPILLARY BLOOD GLUCOSE      POCT Blood Glucose.: 121 mg/dL (13 Mar 2022 07:51)  POCT Blood Glucose.: 178 mg/dL (12 Mar 2022 21:42)  POCT Blood Glucose.: 139 mg/dL (12 Mar 2022 16:33)  POCT Blood Glucose.: 133 mg/dL (12 Mar 2022 11:45)    CARDIAC MARKERS ( 11 Mar 2022 11:55 )  x     / x     / 1681 U/L / x     / 146.4 ng/mL    RECENT CULTURES:     RADIOLOGY & ADDITIONAL TESTS: N  Consultants involved in case: cardiology

## 2022-03-13 NOTE — PROGRESS NOTE ADULT - ATTENDING COMMENTS
52 year old woman with HTN, DM, family history of early CAD present with acute inferior wall STEMI, had acute PCI GAVIN to mid RCA culprit lesion and PDA and POBA to distal small branches. Also observed diffuse mid obtuse marginal disease and mid LAD disease deemed best treated medically. She is now maintained on ticagrelor, aspirin, high intensity statin low dose beta blocker which should be increased and converted to long acting and also on ACE-i. Hemodynamically stable and transferred out of CICU. Cardiac enzymes peaked. Started low dose beta blocker and dose increased appropropriately and added ACE-I. Follow-up TTE with EF 40-45% inferior and inferoseptal, apical septal hypokinesis. Ready for discharge to home today. Need close outpatient cardiology follow up.    To contact call Cardiology Fellow or Attending as listed on [email or web address] password: [email or web address].
52 year old woman with HTN, DM, family history of early CAD present with acute inferior wall STEMI, had acute PCI GAVIN to mid RCA culprit lesion and PDA and POBA to distal small branches. Also observed diffuse mid obtuse marginal disease and mid LAD disease deemed best treated medically. She is now maintained on ticagrelor, aspirin, high intensity statin low dose beta blocker which should be increased and converted to long acting and also on ACE-i. Hemodynamically stable and transferred out of CICU. Cardiac enzymes peaked. Start low dose beta blocker and add ACE-I. Follow-up TTE with EF 40-45% inferior and inferoseptal, apical septal hypokinesis.    To contact call Cardiology Fellow or Attending as listed on [email or web address] password: [email or web address].
Patient seen and examined. Agree with assessment and plan as outlined above.  53 yo F with HTN, DM, family history of early CAD with inferior STEMI s/p PCI with GAVIN to RCA. Patient loaded with brilinta, on aspirin, high intensity statin. Patient hemodynamically stable. Cardiac enzymes peaked. Start low dose beta blocker and add ACE-I. Follow-up TTE. Patient for telemetry transfer.
52 year old female with PMH HTN, DM, sister with MI at 35, who presented to OSH with CP in AM of 3/10, found to have STEMI, transferred to [facility] now s/p PCI w/ GAVIN to RCA. Mild segmental LV dysfunction.   Medically ready for discharge to home today 3/13/21.    STEMI:  s/p PCI w/ GAVIN to RCA. c/w - atorvastatin 80mg, ASA 81mg QD, Brilinta 90mg QD, metoprolol sucicnate 50mg, hold HR < 60, lisinopril 5mg. Pt feels well. Pt for DC today. Appreciate cardiology recs. Pt  to followup with cardiology after discharge.     Discharge; 35 minutes spent on Discharge.
STEMI without CP now s/p GAVIN to RCA. Being medically managed with aspirin, brilinta, beta blocker, high dose statin, and low dose ACEI. TTE without LV thrombus or reduced EF. Will monitor today and likely discharge tomorrow.

## 2022-03-14 ENCOUNTER — NON-APPOINTMENT (OUTPATIENT)
Age: 52
End: 2022-03-14

## 2022-03-14 LAB — LIDOCAIN SERPL-MCNC: 171.1 NMOL/L — HIGH

## 2022-03-23 PROBLEM — E78.5 HYPERLIPIDEMIA, UNSPECIFIED: Chronic | Status: ACTIVE | Noted: 2022-03-10

## 2022-03-23 PROBLEM — E11.9 TYPE 2 DIABETES MELLITUS WITHOUT COMPLICATIONS: Chronic | Status: ACTIVE | Noted: 2022-03-10

## 2022-03-23 PROBLEM — I10 ESSENTIAL (PRIMARY) HYPERTENSION: Chronic | Status: ACTIVE | Noted: 2022-03-10

## 2022-03-28 ENCOUNTER — APPOINTMENT (OUTPATIENT)
Dept: CARDIOLOGY | Facility: CLINIC | Age: 52
End: 2022-03-28
Payer: COMMERCIAL

## 2022-03-28 ENCOUNTER — NON-APPOINTMENT (OUTPATIENT)
Age: 52
End: 2022-03-28

## 2022-03-28 VITALS
HEART RATE: 82 BPM | OXYGEN SATURATION: 98 % | HEIGHT: 65 IN | WEIGHT: 204 LBS | SYSTOLIC BLOOD PRESSURE: 118 MMHG | DIASTOLIC BLOOD PRESSURE: 80 MMHG | BODY MASS INDEX: 33.99 KG/M2 | TEMPERATURE: 97.6 F

## 2022-03-28 DIAGNOSIS — I21.9 ACUTE MYOCARDIAL INFARCTION, UNSPECIFIED: ICD-10-CM

## 2022-03-28 PROCEDURE — 93000 ELECTROCARDIOGRAM COMPLETE: CPT

## 2022-03-28 PROCEDURE — 99214 OFFICE O/P EST MOD 30 MIN: CPT

## 2022-03-28 RX ORDER — IRBESARTAN 150 MG/1
150 TABLET ORAL DAILY
Qty: 90 | Refills: 0 | Status: DISCONTINUED | COMMUNITY
End: 2022-03-28

## 2022-03-28 RX ORDER — ELETRIPTAN HYDROBROMIDE 40 MG/1
40 TABLET, FILM COATED ORAL
Qty: 6 | Refills: 0 | Status: DISCONTINUED | COMMUNITY
Start: 2019-06-14 | End: 2022-03-28

## 2022-03-28 RX ORDER — NAPROXEN SODIUM 550 MG/1
550 TABLET ORAL
Qty: 60 | Refills: 0 | Status: DISCONTINUED | COMMUNITY
Start: 2021-08-19 | End: 2022-03-28

## 2022-03-28 RX ORDER — ASPIRIN ENTERIC COATED TABLETS 81 MG 81 MG/1
81 TABLET, DELAYED RELEASE ORAL DAILY
Refills: 0 | Status: ACTIVE | COMMUNITY

## 2022-03-28 RX ORDER — LISINOPRIL 5 MG/1
5 TABLET ORAL DAILY
Refills: 0 | Status: DISCONTINUED | COMMUNITY
End: 2022-03-28

## 2022-03-28 NOTE — PHYSICAL EXAM
[Normal S1, S2] : normal S1, S2 [No Murmur] : no murmur [Soft] : abdomen soft [Non Tender] : non-tender [Normal Bowel Sounds] : normal bowel sounds [Normal] : alert and oriented, normal memory

## 2022-03-29 NOTE — REASON FOR VISIT
[Hyperlipidemia] : hyperlipidemia [Hypertension] : hypertension [Coronary Artery Disease] : coronary artery disease [FreeTextEntry3] : Dr. Hill Arriola.

## 2022-03-29 NOTE — REVIEW OF SYSTEMS
[Feeling Fatigued] : feeling fatigued [Joint Pain] : joint pain [Negative] : Respiratory [Fever] : no fever [Headache] : no headache [Chills] : no chills [Blurry Vision] : no blurred vision [Dyspnea on exertion] : not dyspnea during exertion [Chest Discomfort] : no chest discomfort [Lower Ext Edema] : no extremity edema [Palpitations] : no palpitations [Orthopnea] : no orthopnea [PND] : no PND [Abdominal Pain] : no abdominal pain [Nausea] : no nausea [Vomiting] : no vomiting [Rash] : no rash [Itching] : no itching [Dizziness] : no dizziness [Tremor] : no tremor was seen [Numbness (Hypoesthesia)] : no numbness [Tingling (Paresthesia)] : no tingling [Confusion] : no confusion was observed [Anxiety] : no anxiety [Under Stress] : not under stress [Easy Bleeding] : no tendency for easy bleeding [Easy Bruising] : no tendency for easy bruising [FreeTextEntry5] : mild shortness of breath.

## 2022-03-29 NOTE — DISCUSSION/SUMMARY
[FreeTextEntry1] : In a summary Joselyn Escobedo is a middle aged female with recent MI and stents, continue Aspirin and Brilinta. Hypertension and cardiomyopathy, continue Metoprolol. Dry cough may be secondary to Lisinopril which discontinued. Started on Losartan 25 mg once daily. Euvolemic. Hypercholesterolemia, continue Atorvastatin and low cholesterol diet. LDL goal less than 70 g/dL. Diabetes, on medications and low Carb diet. Cath and Echo reports reviewed. Healthy lifestyle. Follow up in 1 month. Spent over 50 minutes.

## 2022-03-29 NOTE — HISTORY OF PRESENT ILLNESS
[FreeTextEntry1] : Joselyn Escobedo is a 52 year old female with recent inferior STEMI admitted to [facility] s/p cardiac cath and stents to Mid RCA and RPDA , ischemic cardiomyopathy , Hypertension, hypercholesterolemia and Diabetes comes for cardiac evaluation. Denies any chest pains or palpitations. Shortness of breath on exertion improving. Still having fatigue. Taking medications regularly. Dry cough since started taking Lisinopril. Started her work since last week.

## 2022-04-25 ENCOUNTER — APPOINTMENT (OUTPATIENT)
Dept: CARDIOLOGY | Facility: CLINIC | Age: 52
End: 2022-04-25
Payer: COMMERCIAL

## 2022-04-25 VITALS
BODY MASS INDEX: 33.99 KG/M2 | HEIGHT: 65 IN | DIASTOLIC BLOOD PRESSURE: 82 MMHG | WEIGHT: 204 LBS | OXYGEN SATURATION: 99 % | TEMPERATURE: 97.5 F | HEART RATE: 91 BPM | SYSTOLIC BLOOD PRESSURE: 134 MMHG

## 2022-04-25 PROCEDURE — 93225 XTRNL ECG REC<48 HRS REC: CPT

## 2022-04-25 PROCEDURE — 99214 OFFICE O/P EST MOD 30 MIN: CPT

## 2022-04-25 NOTE — DISCUSSION/SUMMARY
[FreeTextEntry1] : In a summary Joselyn Escobedo is a middle aged female with recent MI and stents, continue Aspirin and Brilinta. Hypertension and cardiomyopathy, continue Metoprolol and  Losartan 25 mg once daily. Euvolemic. Hypercholesterolemia, continue Atorvastatin and low cholesterol diet. LDL goal less than 70 g/dL. Diabetes, on medications and low Carb diet. Palpitations, stop caffeinated drinks. 24 hour Holter monitor to rule out arrhythmias.  Healthy lifestyle. Follow up in 3 months.

## 2022-04-25 NOTE — HISTORY OF PRESENT ILLNESS
[FreeTextEntry1] : Joselyn Escobedo is a 52 year old female with recent inferior STEMI ( 3/2022) admitted to [facility] s/p cardiac cath and stents to Mid RCA and RPDA , ischemic cardiomyopathy , Hypertension, hypercholesterolemia and Diabetes comes for follow up visit.. Denies any chest pains. Complaining of on and off palpitations, random onset lasts for few seconds and relieved by itself of couple of weeks duration. Drinks coffee and Tea.  Shortness of breath on exertion improving slowly. Still having fatigue. Complaint to medications and diet.

## 2022-04-25 NOTE — REVIEW OF SYSTEMS
[Feeling Fatigued] : feeling fatigued [Palpitations] : palpitations [Joint Pain] : joint pain [Negative] : Respiratory [Fever] : no fever [Headache] : no headache [Chills] : no chills [Blurry Vision] : no blurred vision [Dyspnea on exertion] : not dyspnea during exertion [Chest Discomfort] : no chest discomfort [Lower Ext Edema] : no extremity edema [Orthopnea] : no orthopnea [PND] : no PND [Abdominal Pain] : no abdominal pain [Nausea] : no nausea [Vomiting] : no vomiting [Rash] : no rash [Itching] : no itching [Dizziness] : no dizziness [Tremor] : no tremor was seen [Numbness (Hypoesthesia)] : no numbness [Tingling (Paresthesia)] : no tingling [Confusion] : no confusion was observed [Anxiety] : no anxiety [Under Stress] : not under stress [Easy Bleeding] : no tendency for easy bleeding [Easy Bruising] : no tendency for easy bruising [FreeTextEntry5] : mild shortness of breath.

## 2022-04-25 NOTE — REASON FOR VISIT
[Hyperlipidemia] : hyperlipidemia [Hypertension] : hypertension [Coronary Artery Disease] : coronary artery disease [Other: ____] : [unfilled] [FreeTextEntry3] : Dr. Hill Arriola.

## 2022-04-29 ENCOUNTER — NON-APPOINTMENT (OUTPATIENT)
Age: 52
End: 2022-04-29

## 2022-05-03 ENCOUNTER — APPOINTMENT (OUTPATIENT)
Dept: CARDIOLOGY | Facility: CLINIC | Age: 52
End: 2022-05-03
Payer: COMMERCIAL

## 2022-05-03 PROCEDURE — 93227 XTRNL ECG REC<48 HR R&I: CPT

## 2022-07-08 ENCOUNTER — APPOINTMENT (OUTPATIENT)
Dept: VASCULAR SURGERY | Facility: CLINIC | Age: 52
End: 2022-07-08

## 2022-07-08 VITALS
DIASTOLIC BLOOD PRESSURE: 79 MMHG | BODY MASS INDEX: 33.99 KG/M2 | HEART RATE: 79 BPM | WEIGHT: 204 LBS | HEIGHT: 65 IN | SYSTOLIC BLOOD PRESSURE: 120 MMHG

## 2022-07-08 PROCEDURE — 93970 EXTREMITY STUDY: CPT

## 2022-07-08 PROCEDURE — 99204 OFFICE O/P NEW MOD 45 MIN: CPT

## 2022-07-08 NOTE — CONSULT LETTER
[Dear  ___] : Dear  [unfilled], [Consult Letter:] : I had the pleasure of evaluating your patient, [unfilled]. [Please see my note below.] : Please see my note below. [Consult Closing:] : Thank you very much for allowing me to participate in the care of this patient.  If you have any questions, please do not hesitate to contact me. [Sincerely,] : Sincerely, [FreeTextEntry3] : Sherif Shipley M.D., F.MENAS., [email or web address].\par  of Vascular Surgery\par Assistant Professor of Radiology\par Director of Endovascular Program/ Vascular Access Center\par [facility]

## 2022-07-08 NOTE — PHYSICAL EXAM
[JVD] : no jugular venous distention  [Normal Breath Sounds] : Normal breath sounds [Normal Heart Sounds] : normal heart sounds [2+] : left 2+ [] : of the left leg [Ankle Swelling On The Right] : mild [Abdomen Masses] : No abdominal masses

## 2022-07-08 NOTE — HISTORY OF PRESENT ILLNESS
[FreeTextEntry1] : Patient is a 52-year-old female with past medical history significant for diabetes, hypertension, coronary artery stent placement for coronary artery disease presenting to us for evaluation of left lower extremity.  Patient does not smoke.  No complaint of significant claudication or rest pain.  Patient complains of left medial ankle discoloration with intermittent pain.  No chest pain or shortness of breath.  No history of DVT.

## 2022-07-08 NOTE — ASSESSMENT
[FreeTextEntry1] : Patient with left lower extremity discoloration and pain.  No evidence of arterial insufficiency.  Patient has no evidence of significant DVT or venous insufficiency.  There is significant edema around the left medial ankle with a question of resolved cellulitis.  This may be residual effects of the cellulitis and therefore recommend compression stockings and elevation.  No vascular intervention necessary.

## 2022-07-22 ENCOUNTER — NON-APPOINTMENT (OUTPATIENT)
Age: 52
End: 2022-07-22

## 2022-07-22 ENCOUNTER — APPOINTMENT (OUTPATIENT)
Dept: CARDIOLOGY | Facility: CLINIC | Age: 52
End: 2022-07-22

## 2022-07-22 VITALS
HEART RATE: 80 BPM | WEIGHT: 204 LBS | SYSTOLIC BLOOD PRESSURE: 130 MMHG | HEIGHT: 65 IN | DIASTOLIC BLOOD PRESSURE: 80 MMHG | BODY MASS INDEX: 33.99 KG/M2 | OXYGEN SATURATION: 96 %

## 2022-07-22 DIAGNOSIS — R07.89 OTHER CHEST PAIN: ICD-10-CM

## 2022-07-22 PROCEDURE — 99214 OFFICE O/P EST MOD 30 MIN: CPT

## 2022-07-22 PROCEDURE — 93000 ELECTROCARDIOGRAM COMPLETE: CPT

## 2022-07-22 NOTE — HISTORY OF PRESENT ILLNESS
[FreeTextEntry1] : Joselyn Escobedo is a 52 year old female with recent inferior STEMI ( 3/2022) admitted to [facility] s/p cardiac cath and stents to Mid RCA and RPDA , ischemic cardiomyopathy , Hypertension, hypercholesterolemia and Diabetes comes for follow up visit. Complaining of chest pressure on walking 8/10 in intensity, non radiating relieved with rest in 10- 15 minutes.  Complaining of on and off palpitations.   Shortness of breath on exertion improving slowly. Still having fatigue. Complaint to medications and diet.

## 2022-07-22 NOTE — REVIEW OF SYSTEMS
[Feeling Fatigued] : feeling fatigued [Joint Pain] : joint pain [Negative] : Respiratory [Fever] : no fever [Headache] : no headache [Chills] : no chills [Blurry Vision] : no blurred vision [Dyspnea on exertion] : not dyspnea during exertion [Chest Discomfort] : no chest discomfort [Lower Ext Edema] : no extremity edema [Palpitations] : no palpitations [Orthopnea] : no orthopnea [PND] : no PND [Abdominal Pain] : no abdominal pain [Nausea] : no nausea [Vomiting] : no vomiting [Rash] : no rash [Itching] : no itching [Dizziness] : no dizziness [Tremor] : no tremor was seen [Numbness (Hypoesthesia)] : no numbness [Tingling (Paresthesia)] : no tingling [Confusion] : no confusion was observed [Anxiety] : no anxiety [Under Stress] : not under stress [Easy Bleeding] : no tendency for easy bleeding [Easy Bruising] : no tendency for easy bruising [FreeTextEntry5] : mild shortness of breath and chest pressure on walking.

## 2022-07-22 NOTE — DISCUSSION/SUMMARY
[FreeTextEntry1] : In a summary Joselyn Escobedo is a middle aged female with recent MI and stents, continue Aspirin and Brilinta. Hypertension and cardiomyopathy, continue Metoprolol and  Losartan 25 mg once daily. Euvolemic. Hypercholesterolemia, continue Atorvastatin and low cholesterol diet. LDL goal less than 70 g/dL. Diabetes, on medications and low Carb diet. Palpitations, stop caffeinated drinks. Chest pressure and CAD, Will get Nuclear stress test to evaluate stents. Started on Imdur 30 mg once daily. Further plan based on Stress test results. Healthy lifestyle. Follow up in 1 month.

## 2022-07-29 ENCOUNTER — OUTPATIENT (OUTPATIENT)
Dept: OUTPATIENT SERVICES | Facility: HOSPITAL | Age: 52
LOS: 1 days | End: 2022-07-29

## 2022-07-29 ENCOUNTER — APPOINTMENT (OUTPATIENT)
Dept: CV DIAGNOSTICS | Facility: HOSPITAL | Age: 52
End: 2022-07-29

## 2022-07-29 DIAGNOSIS — I25.10 ATHEROSCLEROTIC HEART DISEASE OF NATIVE CORONARY ARTERY WITHOUT ANGINA PECTORIS: ICD-10-CM

## 2022-07-29 DIAGNOSIS — R07.89 OTHER CHEST PAIN: ICD-10-CM

## 2022-07-29 LAB — HCG UR QL: NEGATIVE — SIGNIFICANT CHANGE UP

## 2022-07-29 PROCEDURE — 93018 CV STRESS TEST I&R ONLY: CPT | Mod: GC

## 2022-07-29 PROCEDURE — 93016 CV STRESS TEST SUPVJ ONLY: CPT | Mod: GC

## 2022-07-29 PROCEDURE — 78452 HT MUSCLE IMAGE SPECT MULT: CPT | Mod: 26,MH

## 2022-08-12 ENCOUNTER — OUTPATIENT (OUTPATIENT)
Dept: OUTPATIENT SERVICES | Facility: HOSPITAL | Age: 52
LOS: 1 days | Discharge: HOME | End: 2022-08-12
Payer: COMMERCIAL

## 2022-08-12 DIAGNOSIS — R07.89 OTHER CHEST PAIN: ICD-10-CM

## 2022-08-12 DIAGNOSIS — Z95.5 PRESENCE OF CORONARY ANGIOPLASTY IMPLANT AND GRAFT: Chronic | ICD-10-CM

## 2022-08-12 LAB
ANION GAP SERPL CALC-SCNC: 12 MMOL/L — SIGNIFICANT CHANGE UP (ref 7–14)
BUN SERPL-MCNC: 12 MG/DL — SIGNIFICANT CHANGE UP (ref 7–23)
CALCIUM SERPL-MCNC: 9.6 MG/DL — SIGNIFICANT CHANGE UP (ref 8.4–10.5)
CHLORIDE SERPL-SCNC: 101 MMOL/L — SIGNIFICANT CHANGE UP (ref 98–107)
CO2 SERPL-SCNC: 26 MMOL/L — SIGNIFICANT CHANGE UP (ref 22–31)
CREAT SERPL-MCNC: 0.71 MG/DL — SIGNIFICANT CHANGE UP (ref 0.5–1.3)
EGFR: 104 ML/MIN/1.73M2 — SIGNIFICANT CHANGE UP
GLUCOSE SERPL-MCNC: 115 MG/DL — HIGH (ref 70–99)
HCT VFR BLD CALC: 45.2 % — HIGH (ref 34.5–45)
HGB BLD-MCNC: 14.6 G/DL — SIGNIFICANT CHANGE UP (ref 11.5–15.5)
MCHC RBC-ENTMCNC: 27 PG — SIGNIFICANT CHANGE UP (ref 27–34)
MCHC RBC-ENTMCNC: 32.3 GM/DL — SIGNIFICANT CHANGE UP (ref 32–36)
MCV RBC AUTO: 83.5 FL — SIGNIFICANT CHANGE UP (ref 80–100)
NRBC # BLD: 0 /100 WBCS — SIGNIFICANT CHANGE UP
NRBC # FLD: 0 K/UL — SIGNIFICANT CHANGE UP
PLATELET # BLD AUTO: 362 K/UL — SIGNIFICANT CHANGE UP (ref 150–400)
POTASSIUM SERPL-MCNC: 3.9 MMOL/L — SIGNIFICANT CHANGE UP (ref 3.5–5.3)
POTASSIUM SERPL-SCNC: 3.9 MMOL/L — SIGNIFICANT CHANGE UP (ref 3.5–5.3)
RBC # BLD: 5.41 M/UL — HIGH (ref 3.8–5.2)
RBC # FLD: 14.3 % — SIGNIFICANT CHANGE UP (ref 10.3–14.5)
SODIUM SERPL-SCNC: 139 MMOL/L — SIGNIFICANT CHANGE UP (ref 135–145)
WBC # BLD: 9.9 K/UL — SIGNIFICANT CHANGE UP (ref 3.8–10.5)
WBC # FLD AUTO: 9.9 K/UL — SIGNIFICANT CHANGE UP (ref 3.8–10.5)

## 2022-08-12 PROCEDURE — 93010 ELECTROCARDIOGRAM REPORT: CPT | Mod: 76

## 2022-08-12 PROCEDURE — 0715T: CPT | Mod: 1L

## 2022-08-12 RX ORDER — FAMOTIDINE 10 MG/ML
20 INJECTION INTRAVENOUS
Refills: 0 | Status: DISCONTINUED | OUTPATIENT
Start: 2022-08-12 | End: 2022-08-12

## 2022-08-12 RX ORDER — DEXTROSE 50 % IN WATER 50 %
25 SYRINGE (ML) INTRAVENOUS ONCE
Refills: 0 | Status: DISCONTINUED | OUTPATIENT
Start: 2022-08-12 | End: 2022-08-12

## 2022-08-12 RX ORDER — BACLOFEN 100 %
1 POWDER (GRAM) MISCELLANEOUS
Qty: 0 | Refills: 0 | DISCHARGE

## 2022-08-12 RX ORDER — METOPROLOL TARTRATE 50 MG
50 TABLET ORAL DAILY
Refills: 0 | Status: DISCONTINUED | OUTPATIENT
Start: 2022-08-12 | End: 2022-08-12

## 2022-08-12 RX ORDER — LOSARTAN POTASSIUM 100 MG/1
25 TABLET, FILM COATED ORAL DAILY
Refills: 0 | Status: DISCONTINUED | OUTPATIENT
Start: 2022-08-12 | End: 2022-08-12

## 2022-08-12 RX ORDER — LIRAGLUTIDE 6 MG/ML
1.2 INJECTION SUBCUTANEOUS
Qty: 0 | Refills: 0 | DISCHARGE

## 2022-08-12 RX ORDER — DEXTROSE 50 % IN WATER 50 %
12.5 SYRINGE (ML) INTRAVENOUS ONCE
Refills: 0 | Status: DISCONTINUED | OUTPATIENT
Start: 2022-08-12 | End: 2022-08-12

## 2022-08-12 RX ORDER — SODIUM CHLORIDE 9 MG/ML
1000 INJECTION INTRAMUSCULAR; INTRAVENOUS; SUBCUTANEOUS
Refills: 0 | Status: DISCONTINUED | OUTPATIENT
Start: 2022-08-12 | End: 2022-08-12

## 2022-08-12 RX ORDER — GLUCAGON INJECTION, SOLUTION 0.5 MG/.1ML
1 INJECTION, SOLUTION SUBCUTANEOUS ONCE
Refills: 0 | Status: DISCONTINUED | OUTPATIENT
Start: 2022-08-12 | End: 2022-08-12

## 2022-08-12 RX ORDER — SODIUM CHLORIDE 9 MG/ML
3 INJECTION INTRAMUSCULAR; INTRAVENOUS; SUBCUTANEOUS EVERY 8 HOURS
Refills: 0 | Status: DISCONTINUED | OUTPATIENT
Start: 2022-08-12 | End: 2022-08-12

## 2022-08-12 RX ORDER — ATORVASTATIN CALCIUM 80 MG/1
40 TABLET, FILM COATED ORAL AT BEDTIME
Refills: 0 | Status: DISCONTINUED | OUTPATIENT
Start: 2022-08-12 | End: 2022-08-12

## 2022-08-12 RX ORDER — SODIUM CHLORIDE 9 MG/ML
1000 INJECTION, SOLUTION INTRAVENOUS
Refills: 0 | Status: DISCONTINUED | OUTPATIENT
Start: 2022-08-12 | End: 2022-08-12

## 2022-08-12 RX ORDER — ELETRIPTAN HYDROBROMIDE 20 MG/1
1 TABLET, FILM COATED ORAL
Qty: 0 | Refills: 0 | DISCHARGE

## 2022-08-12 RX ORDER — INSULIN GLARGINE 100 [IU]/ML
110 INJECTION, SOLUTION SUBCUTANEOUS
Qty: 0 | Refills: 0 | DISCHARGE

## 2022-08-12 RX ORDER — INSULIN LISPRO 100/ML
VIAL (ML) SUBCUTANEOUS
Refills: 0 | Status: DISCONTINUED | OUTPATIENT
Start: 2022-08-12 | End: 2022-08-12

## 2022-08-12 RX ORDER — INSULIN GLARGINE 100 [IU]/ML
30 INJECTION, SOLUTION SUBCUTANEOUS AT BEDTIME
Refills: 0 | Status: DISCONTINUED | OUTPATIENT
Start: 2022-08-12 | End: 2022-08-12

## 2022-08-12 RX ORDER — ASPIRIN/CALCIUM CARB/MAGNESIUM 324 MG
81 TABLET ORAL DAILY
Refills: 0 | Status: DISCONTINUED | OUTPATIENT
Start: 2022-08-13 | End: 2022-08-12

## 2022-08-12 RX ORDER — TICAGRELOR 90 MG/1
90 TABLET ORAL EVERY 12 HOURS
Refills: 0 | Status: DISCONTINUED | OUTPATIENT
Start: 2022-08-12 | End: 2022-08-12

## 2022-08-12 RX ORDER — SODIUM CHLORIDE 9 MG/ML
250 INJECTION INTRAMUSCULAR; INTRAVENOUS; SUBCUTANEOUS ONCE
Refills: 0 | Status: COMPLETED | OUTPATIENT
Start: 2022-08-12 | End: 2022-08-12

## 2022-08-12 RX ORDER — DEXTROSE 50 % IN WATER 50 %
15 SYRINGE (ML) INTRAVENOUS ONCE
Refills: 0 | Status: DISCONTINUED | OUTPATIENT
Start: 2022-08-12 | End: 2022-08-12

## 2022-08-12 RX ORDER — FAMOTIDINE 10 MG/ML
1 INJECTION INTRAVENOUS
Qty: 0 | Refills: 0 | DISCHARGE

## 2022-08-12 RX ADMIN — SODIUM CHLORIDE 50 MILLILITER(S): 9 INJECTION INTRAMUSCULAR; INTRAVENOUS; SUBCUTANEOUS at 09:09

## 2022-08-12 RX ADMIN — SODIUM CHLORIDE 500 MILLILITER(S): 9 INJECTION INTRAMUSCULAR; INTRAVENOUS; SUBCUTANEOUS at 09:01

## 2022-08-12 RX ADMIN — SODIUM CHLORIDE 100 MILLILITER(S): 9 INJECTION INTRAMUSCULAR; INTRAVENOUS; SUBCUTANEOUS at 11:15

## 2022-08-12 RX ADMIN — SODIUM CHLORIDE 3 MILLILITER(S): 9 INJECTION INTRAMUSCULAR; INTRAVENOUS; SUBCUTANEOUS at 14:05

## 2022-08-12 NOTE — H&P CARDIOLOGY - REVIEW OF SYSTEMS
Admits exertional chest pressure, SOB.   Denies  palpitations, dizziness, presyncope, syncope,  headache, visual disturbances, CVA, PE, DVT, MAULIK, abdominal pain, N/V/D/C, hematochezia, melena, dysuria, hematuria, fever, chills, ulcers, b/l lower extremities edema.

## 2022-08-12 NOTE — H&P CARDIOLOGY - HISTORY OF PRESENT ILLNESS
52 year old female with a PMH of hypertension, hyperlipidemia, diabetes mellitus, CAD status post stents x2 (03/2022), ischemic CMP with EF 40-45%  presented today for elective cardiac catheterization. The patient  was found to have abnormal Nuclear Stress Test which shows a large, severe defect in inferolateral wall that is partially reversible, suggestive of moderate to severe ischemia with focal basal inferolateral scarring (infarct).There is a small, mild defect in anterolateral wall that is reversible, suggestive of ischemia. Patient also admits chest pressure on exertion after walking couple of blocks associated with SOB. Due to patient's symptoms and abnormal non invasive tests, the patient was recommended to have cardiac catheterization.  The process of the procedures along with the risk and benefits for the procedure were explained in detail which included but not limited to bleeding, infection, stroke, pericardial effusion, cardiac tamponade, renal failure, intubation, and death. Patient expressed understanding an all questions were answered.  The patient denies palpitations, dizziness, presyncope, syncope,  headache, visual disturbances, CVA, PE, DVT, MAULIK, abdominal pain, N/V/D/C, hematochezia, melena, dysuria, hematuria, fever, chills, ulcers, b/l lower extremities edema.      7/29/22:  IMPRESSIONS:Abnormal Study  * Myocardial Perfusion SPECT results are abnormal.  * The left ventricle was normal in size. There is a large,  severe defect in inferolateral wall that is partially  reversible, suggestive of moderate to severe ischemia with  focal basal inferolateral scarring (infarct).There is a  small, mild defect in anterolateral wall that is  reversible, suggestive of ischemia.  * Post-stress gated wall motion analysis was performed  (LVEF = 55 %;LVEDV = 77 ml.), revealing normal overall  LV  function, despite segmental wall motion abnormality. There  was focal inferolateral severe hypokinesis with absence of  systolic thickening. The remaining segments contracted  very well, thus overall function was normal. RV function  appeared normal.

## 2022-08-12 NOTE — H&P CARDIOLOGY - NSICDXPASTMEDICALHX_GEN_ALL_CORE_FT
PAST MEDICAL HISTORY:  CAD (coronary artery disease) 3/10/22 stent to midRCA and RPDA    Diabetes mellitus     Essential hypertension     Hyperlipidemia

## 2022-08-12 NOTE — H&P CARDIOLOGY - PATIENT'S PREFERRED PRONOUN
[FreeTextEntry1] : Pt with Nasal congestion with sharp septal deviation and severe bilateral turbinate hypertrophy. Will start regiment to see if may improve symptoms and escalate if needed. Pt refractory to medical management and with no improvement with nasal sprays. \par - continue Nasonex \par - patient follows up status post ESS, septoplasty with turbs 01/27/2021. The patient was debrided bilaterally with rigid suction as a result of nasal crusting. breathing much improved after detriment. \par - covid test ordered \par \par \par \par \par 
Her/She

## 2022-08-15 ENCOUNTER — NON-APPOINTMENT (OUTPATIENT)
Age: 52
End: 2022-08-15

## 2022-08-17 PROBLEM — I25.10 ATHEROSCLEROTIC HEART DISEASE OF NATIVE CORONARY ARTERY WITHOUT ANGINA PECTORIS: Chronic | Status: ACTIVE | Noted: 2022-08-12

## 2022-08-22 ENCOUNTER — APPOINTMENT (OUTPATIENT)
Dept: CARDIOLOGY | Facility: CLINIC | Age: 52
End: 2022-08-22

## 2022-08-22 VITALS
HEART RATE: 74 BPM | SYSTOLIC BLOOD PRESSURE: 112 MMHG | BODY MASS INDEX: 33.99 KG/M2 | DIASTOLIC BLOOD PRESSURE: 76 MMHG | HEIGHT: 65 IN | WEIGHT: 204 LBS | OXYGEN SATURATION: 98 %

## 2022-08-22 DIAGNOSIS — I25.5 ISCHEMIC CARDIOMYOPATHY: ICD-10-CM

## 2022-08-22 DIAGNOSIS — Z95.5 PRESENCE OF CORONARY ANGIOPLASTY IMPLANT AND GRAFT: ICD-10-CM

## 2022-08-22 PROCEDURE — 99214 OFFICE O/P EST MOD 30 MIN: CPT

## 2022-08-22 NOTE — REVIEW OF SYSTEMS
[Feeling Fatigued] : feeling fatigued [Dyspnea on exertion] : dyspnea during exertion [Joint Pain] : joint pain [Negative] : Respiratory [Fever] : no fever [Headache] : no headache [Chills] : no chills [Blurry Vision] : no blurred vision [Chest Discomfort] : no chest discomfort [Lower Ext Edema] : no extremity edema [Palpitations] : no palpitations [Orthopnea] : no orthopnea [PND] : no PND [Abdominal Pain] : no abdominal pain [Nausea] : no nausea [Vomiting] : no vomiting [Rash] : no rash [Itching] : no itching [Dizziness] : no dizziness [Tremor] : no tremor was seen [Numbness (Hypoesthesia)] : no numbness [Tingling (Paresthesia)] : no tingling [Confusion] : no confusion was observed [Anxiety] : no anxiety [Under Stress] : not under stress [Easy Bleeding] : no tendency for easy bleeding [Easy Bruising] : no tendency for easy bruising [FreeTextEntry5] : on and off palpitations.

## 2022-08-22 NOTE — HISTORY OF PRESENT ILLNESS
[FreeTextEntry1] : Joselyn Escobedo is a 52 year old female with recent inferior STEMI ( 3/2022) admitted to [facility] s/p cardiac cath and stents to Mid RCA and RPDA , ischemic cardiomyopathy , Hypertension, hypercholesterolemia and Diabetes comes for follow up visit. Had cardiac cath and stent to Proximal RCA on 8/12/ 2022. Chest pains resolved.  Complaining of still on and off palpitations.   Shortness of breath on exertion improving slowly. Still having fatigue. Complaint to medications and diet.

## 2022-08-22 NOTE — DISCUSSION/SUMMARY
[FreeTextEntry1] : In a summary Joselyn Escobedo is a middle aged female with recent MI and stents, and stent to Proximal RCA 10 days ago,  continue Aspirin and Brilinta. Hypertension and cardiomyopathy, continue Metoprolol and  Losartan 25 mg once daily. Euvolemic. Hypercholesterolemia, continue Atorvastatin and low cholesterol diet. LDL goal less than 70 g/dL. Diabetes, on medications and low Carb diet. Palpitations, stop caffeinated drinks. Continue Imdur 30 mg once daily. Healthy lifestyle. Follow up in 6 weeks.

## 2022-09-19 ENCOUNTER — APPOINTMENT (OUTPATIENT)
Dept: INTERNAL MEDICINE | Facility: CLINIC | Age: 52
End: 2022-09-19

## 2022-10-03 ENCOUNTER — APPOINTMENT (OUTPATIENT)
Dept: CARDIOLOGY | Facility: CLINIC | Age: 52
End: 2022-10-03

## 2022-10-21 ENCOUNTER — APPOINTMENT (OUTPATIENT)
Dept: CARDIOLOGY | Facility: CLINIC | Age: 52
End: 2022-10-21

## 2022-10-24 ENCOUNTER — NON-APPOINTMENT (OUTPATIENT)
Age: 52
End: 2022-10-24

## 2022-10-24 ENCOUNTER — APPOINTMENT (OUTPATIENT)
Dept: ELECTROPHYSIOLOGY | Facility: CLINIC | Age: 52
End: 2022-10-24

## 2022-10-24 VITALS
WEIGHT: 206 LBS | HEIGHT: 65 IN | DIASTOLIC BLOOD PRESSURE: 74 MMHG | HEART RATE: 86 BPM | OXYGEN SATURATION: 100 % | BODY MASS INDEX: 34.32 KG/M2 | SYSTOLIC BLOOD PRESSURE: 110 MMHG

## 2022-10-24 PROCEDURE — 93000 ELECTROCARDIOGRAM COMPLETE: CPT

## 2022-10-24 PROCEDURE — 99205 OFFICE O/P NEW HI 60 MIN: CPT

## 2022-10-25 NOTE — REASON FOR VISIT
[Symptom and Test Evaluation] : symptom and test evaluation [FreeTextEntry3] : Vianca De La Rosa MD

## 2022-10-25 NOTE — CARDIOLOGY SUMMARY
[de-identified] : 7/29/2022: IMPRESSIONS:Abnormal Study\par * Myocardial Perfusion SPECT results are abnormal.\par * The left ventricle was normal in size. There is a large,\par severe defect in inferolateral wall that is partially\par reversible, suggestive of moderate to severe ischemia with\par focal basal inferolateral scarring (infarct).There is a\par small, mild defect in anterolateral wall that is\par reversible, suggestive of ischemia.\par * Post-stress gated wall motion analysis was performed\par (LVEF = 55 %;LVEDV = 77 ml.), revealing normal overall  LV\par function, despite segmental wall motion abnormality. There\par was focal inferolateral severe hypokinesis with absence of\par systolic thickening. The remaining segments contracted\par very well, thus overall function was normal. RV function\par appeared normal. [de-identified] : 3/11/2022 Conclusions: \par 1. Normal mitral valve. Minimal mitral regurgitation.\par 2. Endocardial visualization enhanced with intravenous\par injection of Ultrasonic Enhancing Agent (Definity). Mild\par segmental left ventricular systolic dysfunction. No left\par ventricular thrombus. The mid anterior septum, the apical\par septum, the basal anterior septum, the basal inferior wall,\par the mid inferior wall, the mid inferoseptum, and the basal\par inferoseptum are hypokinetic. \par 3. Mild diastolic dysfunction (Stage I).\par 4. The right ventricle is not well visualized; grossly\par normal right ventricular systolic function.\par *** No previous Echo exam. [de-identified] : 8/12/2022: \par \par \par The coronary circulation is right dominant.  \par \par LM \par Left main artery: Normal.  \par \par LAD \par Mid left anterior descending: There is a 50 % stenosis.  \par \par CX \par Circumflex: Angiography shows moderate atherosclerosis. Small vessel,\par diffuse disease.\par \par RCA \par Distal right coronary artery: There is a 99 % stenosis at the distal\par margin of the stented segment. CLAU Flow 3. First right\par posterolateral: There is a 90 % stenosis. CLAU Flow 3.  \par \par Interventional Findings: \par \par Interventional Details \par Distal right coronary artery: This was a 99 % De Sunday stenosis. The\par lesion length was 15 mm. This was an ACC/AHA High/C\par lesion for intervention. Guidewire crossing was successful.  \par \par A successful Balloon angioplasty was performed using a 6 Fr. JR 4.0\par Launcher, a [employer] Sanya Blue 190CM, and a 2.5 X 12\par Euphora RX.  \par   The inflation pressure was 14 rona for the duration of 34.0 seconds. \par   The inflation pressure was 14 rona for the duration of 227.0 seconds.\par \par \par A successful Drug Eluting Stent was deployed using a 2.5 X 22 Resolute\par Meredith.\par   The inflation pressure was 14 rona for the duration of 49.0 seconds. \par \par A successful Balloon angioplasty was performed using a 2.5 X 22\par Resolute Diego.\par   The inflation pressure was 0 rona for the duration of 7.0 seconds. \par \par A successful Balloon angioplasty was performed using a 3.0 X 20\par Euphora NC.\par   The inflation pressure was 20 rona for the duration of 9.0 seconds. \par   The inflation pressure was 20 rona for the duration of 16.0 seconds. \par   The inflation pressure was 18 rona for the duration of 6.0 seconds. \par   The inflation pressure was 18 rona for the duration of 4.0 seconds. \par \par Following intervention there is a 1 % residual stenosis. There was\par CLAU Flow 3 before the procedure and CLAU Flow 3 following the\par procedure. Following intervention there is an excellent angiographic\par appearance and no evidence of thrombus.\par \par First right posterolateral: The initial stenosis was 90 %. Guidewire\par crossing was successful.\par \par A Balloon was attempted but was unsuccessful using a 6 Fr. JR 4.0\par Launcher, a [employer] Sanya Blue 190CM, a 190cm BMW\par Universal II, and a Legend RX 1.5 X 15.  \par   The inflation pressure was 12 rona for the duration of 0.0 seconds. \par   The inflation pressure was 12 rona for the duration of 0.0 seconds. \par \par Following intervention there is a 90 % residual stenosis. There was\par CLAU Flow 3 before the procedure and CLAU Flow 3 following\par the procedure.  \par \par Patient: CHELITA MONTEMAYOR             MRN: Study Date: 08/12/2022\par 09:39 AM      Page 2 of 5

## 2022-10-25 NOTE — DISCUSSION/SUMMARY
[EKG obtained to assist in diagnosis and management of assessed problem(s)] : EKG obtained to assist in diagnosis and management of assessed problem(s) [FreeTextEntry1] : Impression:\par \par 1. Palpitations: EKG performed today to assess for presence of pre excitation and reveals NSR. Review of recent ECHO with normal LVEF. Recent LHC 8/2022 with PCI placement. Last Holter in 4/2022 prior to onset of symptoms without noted tachyarrhythmias. Given recurrent symptoms despite metoprolol use, recommend undergoing 30 day CardioNet to assess for presence of tachyarrhythmias associated with symptoms. \par \par 2. HTN: resume oral antihypertensives as prescribed. Encouraged heart healthy diet, sodium restriction, and weight loss. Continue regular f/u with Cardiologist for further HTN management.\par \par 3. HLD: resume statin therapy as prescribed and regular f/u with Cardiologist for routine lipid monitoring and management.\par \par Plan for 30 day CardioNet. \par

## 2022-10-25 NOTE — HISTORY OF PRESENT ILLNESS
[FreeTextEntry1] : Joselyn Smith is a 51y/o woman with Hx of HTN, HLD, DMII, CAD s/p MI and PCI, and recurrent palpitations who presents today for initial evaluation. Admits recurring palpitations which occurs daily, sometimes lasting all day. Has been going on for almost two months now. Feels like her heart is racing and will lay down when it occurs to relieve symptoms. Has been on metoprolol 50mg daily. Can also feel an overall sensation of pressure and discomfort. Last Holter in April 2022 with no noted tachyarrhythmias and rare PVCs. During episodes, can feel out of breath and lightheaded as well. Denies chest pain, syncope or near syncope.\par

## 2022-11-08 ENCOUNTER — NON-APPOINTMENT (OUTPATIENT)
Age: 52
End: 2022-11-08

## 2022-11-10 ENCOUNTER — INPATIENT (INPATIENT)
Facility: HOSPITAL | Age: 52
LOS: 0 days | Discharge: ANOTHER HOSPITAL | End: 2022-11-11
Attending: STUDENT IN AN ORGANIZED HEALTH CARE EDUCATION/TRAINING PROGRAM | Admitting: STUDENT IN AN ORGANIZED HEALTH CARE EDUCATION/TRAINING PROGRAM

## 2022-11-10 VITALS
HEART RATE: 89 BPM | DIASTOLIC BLOOD PRESSURE: 74 MMHG | SYSTOLIC BLOOD PRESSURE: 121 MMHG | TEMPERATURE: 98 F | RESPIRATION RATE: 20 BRPM | OXYGEN SATURATION: 98 %

## 2022-11-10 DIAGNOSIS — Z95.5 PRESENCE OF CORONARY ANGIOPLASTY IMPLANT AND GRAFT: Chronic | ICD-10-CM

## 2022-11-10 DIAGNOSIS — R07.9 CHEST PAIN, UNSPECIFIED: ICD-10-CM

## 2022-11-10 LAB
ALBUMIN SERPL ELPH-MCNC: 4.1 G/DL — SIGNIFICANT CHANGE UP (ref 3.3–5)
ALP SERPL-CCNC: 89 U/L — SIGNIFICANT CHANGE UP (ref 40–120)
ALT FLD-CCNC: 22 U/L — SIGNIFICANT CHANGE UP (ref 4–33)
ANION GAP SERPL CALC-SCNC: 11 MMOL/L — SIGNIFICANT CHANGE UP (ref 7–14)
AST SERPL-CCNC: 17 U/L — SIGNIFICANT CHANGE UP (ref 4–32)
BASOPHILS # BLD AUTO: 0.08 K/UL — SIGNIFICANT CHANGE UP (ref 0–0.2)
BASOPHILS NFR BLD AUTO: 0.7 % — SIGNIFICANT CHANGE UP (ref 0–2)
BILIRUB SERPL-MCNC: 0.7 MG/DL — SIGNIFICANT CHANGE UP (ref 0.2–1.2)
BUN SERPL-MCNC: 16 MG/DL — SIGNIFICANT CHANGE UP (ref 7–23)
CALCIUM SERPL-MCNC: 9.9 MG/DL — SIGNIFICANT CHANGE UP (ref 8.4–10.5)
CHLORIDE SERPL-SCNC: 104 MMOL/L — SIGNIFICANT CHANGE UP (ref 98–107)
CK MB BLD-MCNC: 1 % — SIGNIFICANT CHANGE UP (ref 0–2.5)
CK MB CFR SERPL CALC: 1.5 NG/ML — SIGNIFICANT CHANGE UP
CK SERPL-CCNC: 143 U/L — SIGNIFICANT CHANGE UP (ref 25–170)
CO2 SERPL-SCNC: 27 MMOL/L — SIGNIFICANT CHANGE UP (ref 22–31)
CREAT SERPL-MCNC: 0.74 MG/DL — SIGNIFICANT CHANGE UP (ref 0.5–1.3)
EGFR: 98 ML/MIN/1.73M2 — SIGNIFICANT CHANGE UP
EOSINOPHIL # BLD AUTO: 0.45 K/UL — SIGNIFICANT CHANGE UP (ref 0–0.5)
EOSINOPHIL NFR BLD AUTO: 4 % — SIGNIFICANT CHANGE UP (ref 0–6)
FLUAV AG NPH QL: SIGNIFICANT CHANGE UP
FLUBV AG NPH QL: SIGNIFICANT CHANGE UP
GLUCOSE SERPL-MCNC: 85 MG/DL — SIGNIFICANT CHANGE UP (ref 70–99)
HCT VFR BLD CALC: 44.1 % — SIGNIFICANT CHANGE UP (ref 34.5–45)
HGB BLD-MCNC: 14.4 G/DL — SIGNIFICANT CHANGE UP (ref 11.5–15.5)
IANC: 6.82 K/UL — SIGNIFICANT CHANGE UP (ref 1.8–7.4)
IMM GRANULOCYTES NFR BLD AUTO: 0.4 % — SIGNIFICANT CHANGE UP (ref 0–0.9)
LYMPHOCYTES # BLD AUTO: 29.5 % — SIGNIFICANT CHANGE UP (ref 13–44)
LYMPHOCYTES # BLD AUTO: 3.36 K/UL — HIGH (ref 1–3.3)
MCHC RBC-ENTMCNC: 26.6 PG — LOW (ref 27–34)
MCHC RBC-ENTMCNC: 32.7 GM/DL — SIGNIFICANT CHANGE UP (ref 32–36)
MCV RBC AUTO: 81.4 FL — SIGNIFICANT CHANGE UP (ref 80–100)
MONOCYTES # BLD AUTO: 0.64 K/UL — SIGNIFICANT CHANGE UP (ref 0–0.9)
MONOCYTES NFR BLD AUTO: 5.6 % — SIGNIFICANT CHANGE UP (ref 2–14)
NEUTROPHILS # BLD AUTO: 6.82 K/UL — SIGNIFICANT CHANGE UP (ref 1.8–7.4)
NEUTROPHILS NFR BLD AUTO: 59.8 % — SIGNIFICANT CHANGE UP (ref 43–77)
NRBC # BLD: 0 /100 WBCS — SIGNIFICANT CHANGE UP (ref 0–0)
NRBC # FLD: 0 K/UL — SIGNIFICANT CHANGE UP (ref 0–0)
NT-PROBNP SERPL-SCNC: 99 PG/ML — SIGNIFICANT CHANGE UP
PLATELET # BLD AUTO: 317 K/UL — SIGNIFICANT CHANGE UP (ref 150–400)
POTASSIUM SERPL-MCNC: 3.9 MMOL/L — SIGNIFICANT CHANGE UP (ref 3.5–5.3)
POTASSIUM SERPL-SCNC: 3.9 MMOL/L — SIGNIFICANT CHANGE UP (ref 3.5–5.3)
PROT SERPL-MCNC: 7.5 G/DL — SIGNIFICANT CHANGE UP (ref 6–8.3)
RBC # BLD: 5.42 M/UL — HIGH (ref 3.8–5.2)
RBC # FLD: 14.8 % — HIGH (ref 10.3–14.5)
RSV RNA NPH QL NAA+NON-PROBE: SIGNIFICANT CHANGE UP
SARS-COV-2 RNA SPEC QL NAA+PROBE: SIGNIFICANT CHANGE UP
SODIUM SERPL-SCNC: 142 MMOL/L — SIGNIFICANT CHANGE UP (ref 135–145)
TROPONIN T, HIGH SENSITIVITY RESULT: 12 NG/L — SIGNIFICANT CHANGE UP
TROPONIN T, HIGH SENSITIVITY RESULT: 7 NG/L — SIGNIFICANT CHANGE UP
WBC # BLD: 11.39 K/UL — HIGH (ref 3.8–10.5)
WBC # FLD AUTO: 11.39 K/UL — HIGH (ref 3.8–10.5)

## 2022-11-10 PROCEDURE — 99285 EMERGENCY DEPT VISIT HI MDM: CPT

## 2022-11-10 PROCEDURE — 71046 X-RAY EXAM CHEST 2 VIEWS: CPT | Mod: 26

## 2022-11-10 PROCEDURE — 93010 ELECTROCARDIOGRAM REPORT: CPT

## 2022-11-10 PROCEDURE — 99223 1ST HOSP IP/OBS HIGH 75: CPT

## 2022-11-10 RX ORDER — ASPIRIN/CALCIUM CARB/MAGNESIUM 324 MG
81 TABLET ORAL DAILY
Refills: 0 | Status: DISCONTINUED | OUTPATIENT
Start: 2022-11-10 | End: 2022-11-11

## 2022-11-10 RX ORDER — CLOPIDOGREL BISULFATE 75 MG/1
75 TABLET, FILM COATED ORAL DAILY
Refills: 0 | Status: DISCONTINUED | OUTPATIENT
Start: 2022-11-10 | End: 2022-11-11

## 2022-11-10 RX ADMIN — Medication 81 MILLIGRAM(S): at 20:21

## 2022-11-10 RX ADMIN — CLOPIDOGREL BISULFATE 75 MILLIGRAM(S): 75 TABLET, FILM COATED ORAL at 20:21

## 2022-11-10 NOTE — ED PROVIDER NOTE - ATTENDING APP SHARED VISIT CONTRIBUTION OF CARE
GEN - NAD; well appearing; A+O x3   HEAD - NC/AT   EYES- PERRL, EOMI  ENT: Airway patent, mmm, Oral cavity and pharynx normal. No inflammation, swelling, exudate, or lesions.  NECK: Neck supple  PULMONARY - CTA b/l, symmetric breath sounds, unlabored breathingReason likely.   CARDIAC -s1s2, RRR, no M,G,R, ext monitor to left chest, no crepitus.  ABDOMEN - +BS, ND, NT, soft, no guarding, no rebound, no masses   BACK - no CVA tenderness, Normal  spine   EXTREMITIES - FROM, no edema   SKIN - no rash or bruising   NEUROLOGIC - alert, speech clear, no focal deficits  PSYCH -nl mood/affect, nl insight.  [id number]old female presenting to the ED with intermittent chest pain and shortness of breath, exertional primarily, none in the ED at rest,.  Reports that started since having a stent placed a few months back, has had an increase in frequency of palpitations and chest pain since then.  She saw EP/cardiology over the last week with had an external monitor placed.  Has noted that the chest pain and palpitations is worsened over the last couple of weeks associated with shortness of breath even on minimal exertion such as going up the stairs or walking a block or 2.  No fevers chills cough headache abdominal pain vomiting diarrhea dysuria or increase in lower extremity edema.  No recent travel.  No hormone use.  Vital signs noted, on exam appears nontoxic speaking in full sentences unlabored breathing clear lungs abdomen soft nontender no edema present all extremities warm and well-perfused.  Plan for labs chest x-ray telemetry monitoring coordinate with cardiology evaluate for diff including but not limited to ACS anemia electrolyte disturbance organ dysfunction admission pending initial work-up.

## 2022-11-10 NOTE — ED ADULT NURSE REASSESSMENT NOTE - NS ED NURSE REASSESS COMMENT FT1
Received report from Day RN Emiliano Singh: Pt appears to be resting comfortably, NAD, no complaints at this moment, respirations are even and unlabored, Safety precautions implemented as per protocol, awaiting further MD orders, will continue to monitor.

## 2022-11-10 NOTE — ED ADULT NURSE NOTE - OBJECTIVE STATEMENT
Pt presents to ED brought in by EMS for CPx a few weeks. States CP is sometimes exertional sometimes not, intermittent on the left side, feels sometimes sharp sometimes like pressure, and was relieved by full dose asa today given by EMS. Pt states when the pain occurs its accompanied by SOB and nausea. Pt currently denies any SOB or CP but endorses mild nausea. Pt with hx of 3 stents and has loop recorder in place. Denies any other medical complaints. Pt arrived to room 17 with 18G iv in RAC, flushed without difficulty and no s/s infiltration. call bell within reach. Education provided to pt on how to and when to use call bell for assistance. Will continue to monitor. FREDDY Coyle

## 2022-11-10 NOTE — ED PROVIDER NOTE - PROGRESS NOTE DETAILS
HAZEL Finch: Spoke with cards fellow, will see pt in the ED HAZEL Finch: Cards saw pt, requesting medicine admission for cath tomorrow. Pt aware of admission HAZEL Finch: Spoke with hospitalist who accepts pt. Pt aware of admission

## 2022-11-10 NOTE — CONSULT NOTE ADULT - SUBJECTIVE AND OBJECTIVE BOX
HPI:  Ms. Smith is a 52F with a PMH CAD s/p 3 stents (last stent 4/22) HTN T2DM presenting with intermittent chest pain and palpitations with associated shortness of breath for the past two weeks. Reports chest pain is pressure like sensation similar in quality but not as severe in intensity compared to prior CP episodes in relation to her prior heart attacks. Pain radiates up to her throat.  Denies orthopnea, PND or LE edema. No fevers/chills. Saw EP on Oct 24th due to palpitations and had CardioNet placed. Recent documentation of CardioNet records APCs and PVCs. Currently patient reports chest pain improved when lying flat and motionless but if movement occurs has worsening chest pain.       PMH:   Diabetes mellitus    Essential hypertension    Hyperlipidemia    CAD (coronary artery disease)      PSH:   S/P coronary artery stent placement      Medications:     Allergies:  amoxicillin (Unknown)  Cheese (Unknown)  eggs (Unknown)    FAMILY HISTORY:    Social History:  Smoking: denies  Alcohol: denies  Drugs: denies    Review of Systems:  Constitutional: [ ] Fever [ ] Chills [ ] Fatigue [ ] Weight change   HEENT: [ ] Blurred vision [ ] Eye Pain [ ] Headache [ ] Runny nose [ ] Sore Throat   Respiratory: [ ] Cough [ ] Wheezing [ ] Shortness of breath  Cardiovascular: [ ] Chest Pain [ ] Palpitations [ ] LANDRY [ ] PND [ ] Orthopnea  Gastrointestinal: [ ] Abdominal Pain [ ] Diarrhea [ ] Constipation [ ] Hemorrhoids [ ] Nausea [ ] Vomiting  Genitourinary: [ ] Nocturia [ ] Dysuria [ ] Incontinence  Extremities: [ ] Swelling [ ] Joint Pain  Neurologic: [ ] Focal deficit [ ] Paresthesias [ ] Syncope  Lymphatic: [ ] Swelling [ ] Lymphadenopathy   Skin: [ ] Rash [ ] Ecchymoses [ ] Wounds [ ] Lesions  Psychiatry: [ ] Depression [ ] Suicidal/Homicidal Ideation [ ] Anxiety [ ] Sleep Disturbances  [ X] 10 point review of systems is otherwise negative except as mentioned above            [ ]Unable to obtain    Physical Exam:  T(C): 36.7 (11-10-22 @ 15:58), Max: 36.7 (11-10-22 @ 13:22)  HR: 72 (11-10-22 @ 15:58) (71 - 89)  BP: 112/83 (11-10-22 @ 15:58) (112/83 - 122/71)  RR: 16 (11-10-22 @ 15:58) (16 - 20)  SpO2: 98% (11-10-22 @ 15:58) (98% - 98%)  Wt(kg): --    Daily     Daily     Appearance: NAD  Eyes: PERRL, EOMI  HENT: Normal oral mucosa, NC/AT  Cardiovascular: normal S1 and S2, RRR, no m/r/g, no edema, normal JVP  Respiratory: Clear to auscultation bilaterally  Gastrointestinal: Soft, non-tender, non-distended, BS+  Musculoskeletal: No clubbing, no joint deformity   Neurologic: Non-focal  Lymphatic: No lymphadenopathy  Psychiatry: AAOx3, mood & affect appropriate  Skin: No rashes, no ecchymoses, no cyanosis    Cardiovascular Diagnostic Testing:    Labs:                        14.4   11.39 )-----------( 317      ( 10 Nov 2022 16:08 )             44.1     11-10    142  |  104  |  16  ----------------------------<  85  3.9   |  27  |  0.74    Ca    9.9      10 Nov 2022 16:08    TPro  7.5  /  Alb  4.1  /  TBili  0.7  /  DBili  x   /  AST  17  /  ALT  22  /  AlkPhos  89  11-10          Serum Pro-Brain Natriuretic Peptide: 99 pg/mL (11-10 @ 16:08)

## 2022-11-10 NOTE — ED ADULT TRIAGE NOTE - ESI TRIAGE ACUITY LEVEL, MLM
2
Atonic bladder  self catherization 6 to 7 times a day since 2005  Bilateral hydronephrosis    BPH (benign prostatic hypertrophy)  luts  Erectile dysfunction    Hyperlipidemia    Rotator cuff syndrome    Urinary retention

## 2022-11-10 NOTE — ED ADULT TRIAGE NOTE - CHIEF COMPLAINT QUOTE
p/t living with DM type2, MI, 3 cardiac stents, c/o of chest discomfort since this afternoon radiating to back, p/t c/o of sob on exertion as well, [id number], zofran 4mg IVP given by EMS

## 2022-11-10 NOTE — ED PROVIDER NOTE - OBJECTIVE STATEMENT
52yF w/pmhx HTN, DM, CAD w/3 stents, MI BIBEMS with palpitations, chest pain and shortness of breath. Pt reports since having a stent placed in August she has daily palpitations which have been increasing in frequency. She saw EP last week and was given an external monitor. Pt reports worsening episodes of left sided chest pain with radiation to the back the past 2 weeks. Chest pain with exertion and at rest. She reports associated shortness of breath when pain is severe. Pt denies fever/chills, cough, headache, dizziness, syncope, abd pain, n/v/d, diaphoresis, leg pain or swelling, recent travel or any other concerns. 52yF w/pmhx HTN, DM, CAD w/3 stents, MI BIBEMS with palpitations, chest pain and shortness of breath. Pt reports since having a stent placed in August she has daily palpitations which have been increasing in frequency. She saw EP last week and was given an external monitor. Pt reports worsening episodes of left sided chest pain with radiation to the back the past 2 weeks. Chest pain with exertion and at rest. She reports associated shortness of breath when pain is severe. Pt denies fever/chills, cough, headache, dizziness, syncope, abd pain, n/v/d, diaphoresis, leg pain or swelling, recent travel or any other concerns.  Pt was seen by her cardiologist today and sent to the ED for evaluation, per documentation cath team was notified. 52yF w/pmhx HTN, DM, CAD w/3 stents, MI BIBEMS with palpitations, chest pain and shortness of breath. Pt reports since having a stent placed in August she has daily palpitations which have been increasing in frequency. She saw EP last week and was given an external monitor. Pt reports worsening episodes of left sided chest pain with radiation to the back the past 2 weeks. Chest pain with exertion and at rest. She reports associated shortness of breath when pain is severe. Pt denies fever/chills, cough, headache, dizziness, syncope, abd pain, n/v/d, diaphoresis, leg pain or swelling, recent travel or any other concerns.  Pt was seen by her cardiologist today and sent to the ED for evaluation, per documentation cath team was notified. Pt given 324mg aspirin by EMS

## 2022-11-10 NOTE — ED PROVIDER NOTE - IV ALTEPLASE EXCL REL HIDDEN
SUBJECTIVE:  Howard Dos Santos is a 50 show

## 2022-11-10 NOTE — ED PROVIDER NOTE - CLINICAL SUMMARY MEDICAL DECISION MAKING FREE TEXT BOX
52yF w/pmhx HTN, DM, CAD w/3 stents, MI BIBEMS with palpitations, chest pain and shortness of breath x weeks worsening. Recently saw EP and has loop recorder. Pt sent to ED by her cardiologist. Concern for ACS. Plan: cbc/cmp, trop, bnp, CXR. EKG non ischemic and unchanged from prior. Given 324mg aspirin by EMS. Plan for admission

## 2022-11-11 ENCOUNTER — INPATIENT (INPATIENT)
Facility: HOSPITAL | Age: 52
LOS: 9 days | Discharge: HOME HEALTH | DRG: 235 | End: 2022-11-21
Attending: THORACIC SURGERY (CARDIOTHORACIC VASCULAR SURGERY) | Admitting: THORACIC SURGERY (CARDIOTHORACIC VASCULAR SURGERY)
Payer: COMMERCIAL

## 2022-11-11 ENCOUNTER — TRANSCRIPTION ENCOUNTER (OUTPATIENT)
Age: 52
End: 2022-11-11

## 2022-11-11 VITALS
HEART RATE: 72 BPM | OXYGEN SATURATION: 100 % | TEMPERATURE: 99 F | DIASTOLIC BLOOD PRESSURE: 74 MMHG | SYSTOLIC BLOOD PRESSURE: 127 MMHG | RESPIRATION RATE: 18 BRPM

## 2022-11-11 VITALS
SYSTOLIC BLOOD PRESSURE: 138 MMHG | RESPIRATION RATE: 18 BRPM | WEIGHT: 199.96 LBS | OXYGEN SATURATION: 98 % | DIASTOLIC BLOOD PRESSURE: 80 MMHG | TEMPERATURE: 99 F | HEART RATE: 75 BPM

## 2022-11-11 DIAGNOSIS — Z29.9 ENCOUNTER FOR PROPHYLACTIC MEASURES, UNSPECIFIED: ICD-10-CM

## 2022-11-11 DIAGNOSIS — I10 ESSENTIAL (PRIMARY) HYPERTENSION: ICD-10-CM

## 2022-11-11 DIAGNOSIS — E78.5 HYPERLIPIDEMIA, UNSPECIFIED: ICD-10-CM

## 2022-11-11 DIAGNOSIS — I25.10 ATHEROSCLEROTIC HEART DISEASE OF NATIVE CORONARY ARTERY WITHOUT ANGINA PECTORIS: ICD-10-CM

## 2022-11-11 DIAGNOSIS — E11.9 TYPE 2 DIABETES MELLITUS WITHOUT COMPLICATIONS: ICD-10-CM

## 2022-11-11 DIAGNOSIS — R00.2 PALPITATIONS: ICD-10-CM

## 2022-11-11 DIAGNOSIS — I20.0 UNSTABLE ANGINA: ICD-10-CM

## 2022-11-11 DIAGNOSIS — Z95.5 PRESENCE OF CORONARY ANGIOPLASTY IMPLANT AND GRAFT: Chronic | ICD-10-CM

## 2022-11-11 LAB
A1C WITH ESTIMATED AVERAGE GLUCOSE RESULT: 7.6 % — HIGH (ref 4–5.6)
ALBUMIN SERPL ELPH-MCNC: 3.9 G/DL — SIGNIFICANT CHANGE UP (ref 3.3–5)
ALP SERPL-CCNC: 87 U/L — SIGNIFICANT CHANGE UP (ref 40–120)
ALT FLD-CCNC: 18 U/L — SIGNIFICANT CHANGE UP (ref 4–33)
ANION GAP SERPL CALC-SCNC: 11 MMOL/L — SIGNIFICANT CHANGE UP (ref 7–14)
AST SERPL-CCNC: 16 U/L — SIGNIFICANT CHANGE UP (ref 4–32)
B-OH-BUTYR SERPL-SCNC: <0 MMOL/L — SIGNIFICANT CHANGE UP (ref 0–0.4)
BASOPHILS # BLD AUTO: 0.12 K/UL — SIGNIFICANT CHANGE UP (ref 0–0.2)
BASOPHILS NFR BLD AUTO: 0.9 % — SIGNIFICANT CHANGE UP (ref 0–2)
BILIRUB SERPL-MCNC: 0.7 MG/DL — SIGNIFICANT CHANGE UP (ref 0.2–1.2)
BUN SERPL-MCNC: 16 MG/DL — SIGNIFICANT CHANGE UP (ref 7–23)
CALCIUM SERPL-MCNC: 9.7 MG/DL — SIGNIFICANT CHANGE UP (ref 8.4–10.5)
CHLORIDE SERPL-SCNC: 102 MMOL/L — SIGNIFICANT CHANGE UP (ref 98–107)
CHOLEST SERPL-MCNC: 134 MG/DL — SIGNIFICANT CHANGE UP
CK MB BLD-MCNC: 1 % — SIGNIFICANT CHANGE UP (ref 0–2.5)
CK MB CFR SERPL CALC: 1.4 NG/ML — SIGNIFICANT CHANGE UP
CK SERPL-CCNC: 135 U/L — SIGNIFICANT CHANGE UP (ref 25–170)
CO2 SERPL-SCNC: 25 MMOL/L — SIGNIFICANT CHANGE UP (ref 22–31)
CREAT SERPL-MCNC: 0.77 MG/DL — SIGNIFICANT CHANGE UP (ref 0.5–1.3)
EGFR: 94 ML/MIN/1.73M2 — SIGNIFICANT CHANGE UP
EOSINOPHIL # BLD AUTO: 0.45 K/UL — SIGNIFICANT CHANGE UP (ref 0–0.5)
EOSINOPHIL NFR BLD AUTO: 3.5 % — SIGNIFICANT CHANGE UP (ref 0–6)
ESTIMATED AVERAGE GLUCOSE: 171 — SIGNIFICANT CHANGE UP
GLUCOSE BLDC GLUCOMTR-MCNC: 111 MG/DL — HIGH (ref 70–99)
GLUCOSE BLDC GLUCOMTR-MCNC: 123 MG/DL — HIGH (ref 70–99)
GLUCOSE BLDC GLUCOMTR-MCNC: 130 MG/DL — HIGH (ref 70–99)
GLUCOSE BLDC GLUCOMTR-MCNC: 161 MG/DL — HIGH (ref 70–99)
GLUCOSE BLDC GLUCOMTR-MCNC: 161 MG/DL — HIGH (ref 70–99)
GLUCOSE BLDC GLUCOMTR-MCNC: 61 MG/DL — LOW (ref 70–99)
GLUCOSE BLDC GLUCOMTR-MCNC: 64 MG/DL — LOW (ref 70–99)
GLUCOSE BLDC GLUCOMTR-MCNC: 74 MG/DL — SIGNIFICANT CHANGE UP (ref 70–99)
GLUCOSE BLDC GLUCOMTR-MCNC: 88 MG/DL — SIGNIFICANT CHANGE UP (ref 70–99)
GLUCOSE SERPL-MCNC: 83 MG/DL — SIGNIFICANT CHANGE UP (ref 70–99)
HCG SERPL-ACNC: <5 MIU/ML — SIGNIFICANT CHANGE UP
HCT VFR BLD CALC: 42.5 % — SIGNIFICANT CHANGE UP (ref 34.5–45)
HDLC SERPL-MCNC: 39 MG/DL — LOW
HGB BLD-MCNC: 14 G/DL — SIGNIFICANT CHANGE UP (ref 11.5–15.5)
IANC: 6.89 K/UL — SIGNIFICANT CHANGE UP (ref 1.8–7.4)
LIPID PNL WITH DIRECT LDL SERPL: 64 MG/DL — SIGNIFICANT CHANGE UP
LYMPHOCYTES # BLD AUTO: 43.5 % — SIGNIFICANT CHANGE UP (ref 13–44)
LYMPHOCYTES # BLD AUTO: 5.56 K/UL — HIGH (ref 1–3.3)
MAGNESIUM SERPL-MCNC: 1.8 MG/DL — SIGNIFICANT CHANGE UP (ref 1.6–2.6)
MCHC RBC-ENTMCNC: 26.6 PG — LOW (ref 27–34)
MCHC RBC-ENTMCNC: 32.9 GM/DL — SIGNIFICANT CHANGE UP (ref 32–36)
MCV RBC AUTO: 80.6 FL — SIGNIFICANT CHANGE UP (ref 80–100)
MICROCYTES BLD QL: SLIGHT — SIGNIFICANT CHANGE UP
MONOCYTES # BLD AUTO: 0.55 K/UL — SIGNIFICANT CHANGE UP (ref 0–0.9)
MONOCYTES NFR BLD AUTO: 4.3 % — SIGNIFICANT CHANGE UP (ref 2–14)
NEUTROPHILS # BLD AUTO: 5.56 K/UL — SIGNIFICANT CHANGE UP (ref 1.8–7.4)
NEUTROPHILS NFR BLD AUTO: 43.5 % — SIGNIFICANT CHANGE UP (ref 43–77)
NON HDL CHOLESTEROL: 95 MG/DL — SIGNIFICANT CHANGE UP
OVALOCYTES BLD QL SMEAR: SLIGHT — SIGNIFICANT CHANGE UP
PHOSPHATE SERPL-MCNC: 4 MG/DL — SIGNIFICANT CHANGE UP (ref 2.5–4.5)
PLAT MORPH BLD: NORMAL — SIGNIFICANT CHANGE UP
PLATELET # BLD AUTO: 318 K/UL — SIGNIFICANT CHANGE UP (ref 150–400)
PLATELET COUNT - ESTIMATE: NORMAL — SIGNIFICANT CHANGE UP
POLYCHROMASIA BLD QL SMEAR: SLIGHT — SIGNIFICANT CHANGE UP
POTASSIUM SERPL-MCNC: 3.5 MMOL/L — SIGNIFICANT CHANGE UP (ref 3.5–5.3)
POTASSIUM SERPL-SCNC: 3.5 MMOL/L — SIGNIFICANT CHANGE UP (ref 3.5–5.3)
PROT SERPL-MCNC: 7.1 G/DL — SIGNIFICANT CHANGE UP (ref 6–8.3)
RBC # BLD: 5.27 M/UL — HIGH (ref 3.8–5.2)
RBC # FLD: 14.5 % — SIGNIFICANT CHANGE UP (ref 10.3–14.5)
RBC BLD AUTO: NORMAL — SIGNIFICANT CHANGE UP
SMUDGE CELLS # BLD: PRESENT — SIGNIFICANT CHANGE UP
SODIUM SERPL-SCNC: 138 MMOL/L — SIGNIFICANT CHANGE UP (ref 135–145)
TRIGL SERPL-MCNC: 157 MG/DL — HIGH
TROPONIN T, HIGH SENSITIVITY RESULT: 10 NG/L — SIGNIFICANT CHANGE UP
TSH SERPL-MCNC: 2.51 UIU/ML — SIGNIFICANT CHANGE UP (ref 0.27–4.2)
VARIANT LYMPHS # BLD: 4.3 % — SIGNIFICANT CHANGE UP (ref 0–6)
WBC # BLD: 12.78 K/UL — HIGH (ref 3.8–10.5)
WBC # FLD AUTO: 12.78 K/UL — HIGH (ref 3.8–10.5)

## 2022-11-11 PROCEDURE — 93571 IV DOP VEL&/PRESS C FLO 1ST: CPT | Mod: 26,LD

## 2022-11-11 PROCEDURE — 99233 SBSQ HOSP IP/OBS HIGH 50: CPT

## 2022-11-11 PROCEDURE — 99222 1ST HOSP IP/OBS MODERATE 55: CPT

## 2022-11-11 PROCEDURE — 93458 L HRT ARTERY/VENTRICLE ANGIO: CPT | Mod: 26

## 2022-11-11 PROCEDURE — 94010 BREATHING CAPACITY TEST: CPT | Mod: 26

## 2022-11-11 RX ORDER — ENOXAPARIN SODIUM 100 MG/ML
40 INJECTION SUBCUTANEOUS EVERY 24 HOURS
Refills: 0 | Status: DISCONTINUED | OUTPATIENT
Start: 2022-11-11 | End: 2022-11-12

## 2022-11-11 RX ORDER — DEXTROSE 50 % IN WATER 50 %
15 SYRINGE (ML) INTRAVENOUS ONCE
Refills: 0 | Status: DISCONTINUED | OUTPATIENT
Start: 2022-11-11 | End: 2022-11-11

## 2022-11-11 RX ORDER — DEXTROSE 50 % IN WATER 50 %
12.5 SYRINGE (ML) INTRAVENOUS ONCE
Refills: 0 | Status: DISCONTINUED | OUTPATIENT
Start: 2022-11-11 | End: 2022-11-11

## 2022-11-11 RX ORDER — INSULIN LISPRO 100/ML
VIAL (ML) SUBCUTANEOUS AT BEDTIME
Refills: 0 | Status: DISCONTINUED | OUTPATIENT
Start: 2022-11-11 | End: 2022-11-15

## 2022-11-11 RX ORDER — INSULIN GLARGINE 100 [IU]/ML
15 INJECTION, SOLUTION SUBCUTANEOUS AT BEDTIME
Refills: 0 | Status: DISCONTINUED | OUTPATIENT
Start: 2022-11-11 | End: 2022-11-15

## 2022-11-11 RX ORDER — DEXTROSE 50 % IN WATER 50 %
25 SYRINGE (ML) INTRAVENOUS ONCE
Refills: 0 | Status: DISCONTINUED | OUTPATIENT
Start: 2022-11-11 | End: 2022-11-11

## 2022-11-11 RX ORDER — TICAGRELOR 90 MG/1
90 TABLET ORAL EVERY 12 HOURS
Refills: 0 | Status: DISCONTINUED | OUTPATIENT
Start: 2022-11-11 | End: 2022-11-11

## 2022-11-11 RX ORDER — FAMOTIDINE 10 MG/ML
1 INJECTION INTRAVENOUS
Qty: 0 | Refills: 0 | DISCHARGE

## 2022-11-11 RX ORDER — BNT162B2 ORIGINAL AND OMICRON BA.4/BA.5 .1125; .1125 MG/2.25ML; MG/2.25ML
0.3 INJECTION, SUSPENSION INTRAMUSCULAR ONCE
Refills: 0 | Status: DISCONTINUED | OUTPATIENT
Start: 2022-11-11 | End: 2022-11-11

## 2022-11-11 RX ORDER — INSULIN LISPRO 100/ML
VIAL (ML) SUBCUTANEOUS
Refills: 0 | Status: DISCONTINUED | OUTPATIENT
Start: 2022-11-11 | End: 2022-11-15

## 2022-11-11 RX ORDER — DEXTROSE 50 % IN WATER 50 %
12.5 SYRINGE (ML) INTRAVENOUS ONCE
Refills: 0 | Status: COMPLETED | OUTPATIENT
Start: 2022-11-11 | End: 2022-11-11

## 2022-11-11 RX ORDER — LANOLIN ALCOHOL/MO/W.PET/CERES
3 CREAM (GRAM) TOPICAL AT BEDTIME
Refills: 0 | Status: DISCONTINUED | OUTPATIENT
Start: 2022-11-11 | End: 2022-11-11

## 2022-11-11 RX ORDER — METOPROLOL TARTRATE 50 MG
50 TABLET ORAL DAILY
Refills: 0 | Status: DISCONTINUED | OUTPATIENT
Start: 2022-11-11 | End: 2022-11-11

## 2022-11-11 RX ORDER — METOPROLOL TARTRATE 50 MG
50 TABLET ORAL DAILY
Refills: 0 | Status: DISCONTINUED | OUTPATIENT
Start: 2022-11-12 | End: 2022-11-14

## 2022-11-11 RX ORDER — ATORVASTATIN CALCIUM 80 MG/1
40 TABLET, FILM COATED ORAL AT BEDTIME
Refills: 0 | Status: DISCONTINUED | OUTPATIENT
Start: 2022-11-11 | End: 2022-11-11

## 2022-11-11 RX ORDER — INSULIN LISPRO 100/ML
VIAL (ML) SUBCUTANEOUS AT BEDTIME
Refills: 0 | Status: DISCONTINUED | OUTPATIENT
Start: 2022-11-11 | End: 2022-11-11

## 2022-11-11 RX ORDER — LANOLIN ALCOHOL/MO/W.PET/CERES
1 CREAM (GRAM) TOPICAL
Qty: 0 | Refills: 0 | DISCHARGE
Start: 2022-11-11

## 2022-11-11 RX ORDER — GLUCAGON INJECTION, SOLUTION 0.5 MG/.1ML
1 INJECTION, SOLUTION SUBCUTANEOUS ONCE
Refills: 0 | Status: DISCONTINUED | OUTPATIENT
Start: 2022-11-11 | End: 2022-11-11

## 2022-11-11 RX ORDER — EMPAGLIFLOZIN 10 MG/1
1 TABLET, FILM COATED ORAL
Qty: 0 | Refills: 0 | DISCHARGE

## 2022-11-11 RX ORDER — ACETAMINOPHEN 500 MG
650 TABLET ORAL EVERY 6 HOURS
Refills: 0 | Status: DISCONTINUED | OUTPATIENT
Start: 2022-11-11 | End: 2022-11-11

## 2022-11-11 RX ORDER — ATORVASTATIN CALCIUM 80 MG/1
40 TABLET, FILM COATED ORAL AT BEDTIME
Refills: 0 | Status: DISCONTINUED | OUTPATIENT
Start: 2022-11-11 | End: 2022-11-15

## 2022-11-11 RX ORDER — INSULIN GLARGINE 100 [IU]/ML
15 INJECTION, SOLUTION SUBCUTANEOUS AT BEDTIME
Refills: 0 | Status: DISCONTINUED | OUTPATIENT
Start: 2022-11-11 | End: 2022-11-11

## 2022-11-11 RX ORDER — BNT162B2 ORIGINAL AND OMICRON BA.4/BA.5 .1125; .1125 MG/2.25ML; MG/2.25ML
0.3 INJECTION, SUSPENSION INTRAMUSCULAR ONCE
Refills: 0 | Status: DISCONTINUED | OUTPATIENT
Start: 2022-11-11 | End: 2022-11-14

## 2022-11-11 RX ORDER — SODIUM CHLORIDE 9 MG/ML
250 INJECTION INTRAMUSCULAR; INTRAVENOUS; SUBCUTANEOUS ONCE
Refills: 0 | Status: COMPLETED | OUTPATIENT
Start: 2022-11-11 | End: 2022-11-11

## 2022-11-11 RX ORDER — SODIUM CHLORIDE 9 MG/ML
500 INJECTION INTRAMUSCULAR; INTRAVENOUS; SUBCUTANEOUS
Refills: 0 | Status: DISCONTINUED | OUTPATIENT
Start: 2022-11-11 | End: 2022-11-11

## 2022-11-11 RX ORDER — SODIUM CHLORIDE 9 MG/ML
1000 INJECTION, SOLUTION INTRAVENOUS
Refills: 0 | Status: DISCONTINUED | OUTPATIENT
Start: 2022-11-11 | End: 2022-11-11

## 2022-11-11 RX ORDER — SODIUM CHLORIDE 9 MG/ML
3 INJECTION INTRAMUSCULAR; INTRAVENOUS; SUBCUTANEOUS EVERY 8 HOURS
Refills: 0 | Status: DISCONTINUED | OUTPATIENT
Start: 2022-11-11 | End: 2022-11-15

## 2022-11-11 RX ORDER — METFORMIN HYDROCHLORIDE 850 MG/1
1 TABLET ORAL
Qty: 0 | Refills: 0 | DISCHARGE

## 2022-11-11 RX ORDER — HEPARIN SODIUM 5000 [USP'U]/ML
5000 INJECTION INTRAVENOUS; SUBCUTANEOUS EVERY 12 HOURS
Refills: 0 | Status: DISCONTINUED | OUTPATIENT
Start: 2022-11-11 | End: 2022-11-11

## 2022-11-11 RX ORDER — ASPIRIN/CALCIUM CARB/MAGNESIUM 324 MG
81 TABLET ORAL DAILY
Refills: 0 | Status: DISCONTINUED | OUTPATIENT
Start: 2022-11-11 | End: 2022-11-15

## 2022-11-11 RX ORDER — INSULIN GLARGINE 100 [IU]/ML
25 INJECTION, SOLUTION SUBCUTANEOUS AT BEDTIME
Refills: 0 | Status: DISCONTINUED | OUTPATIENT
Start: 2022-11-11 | End: 2022-11-11

## 2022-11-11 RX ORDER — INSULIN LISPRO 100/ML
VIAL (ML) SUBCUTANEOUS
Refills: 0 | Status: DISCONTINUED | OUTPATIENT
Start: 2022-11-11 | End: 2022-11-11

## 2022-11-11 RX ORDER — INSULIN GLARGINE 100 [IU]/ML
100 INJECTION, SOLUTION SUBCUTANEOUS
Qty: 0 | Refills: 0 | DISCHARGE

## 2022-11-11 RX ADMIN — TICAGRELOR 90 MILLIGRAM(S): 90 TABLET ORAL at 06:26

## 2022-11-11 RX ADMIN — Medication 50 MILLIGRAM(S): at 06:26

## 2022-11-11 RX ADMIN — Medication 650 MILLIGRAM(S): at 14:55

## 2022-11-11 RX ADMIN — SODIUM CHLORIDE 3 MILLILITER(S): 9 INJECTION INTRAMUSCULAR; INTRAVENOUS; SUBCUTANEOUS at 22:55

## 2022-11-11 RX ADMIN — HEPARIN SODIUM 5000 UNIT(S): 5000 INJECTION INTRAVENOUS; SUBCUTANEOUS at 17:49

## 2022-11-11 RX ADMIN — SODIUM CHLORIDE 1000 MILLILITER(S): 9 INJECTION INTRAMUSCULAR; INTRAVENOUS; SUBCUTANEOUS at 11:19

## 2022-11-11 RX ADMIN — Medication 12.5 GRAM(S): at 12:40

## 2022-11-11 RX ADMIN — Medication 650 MILLIGRAM(S): at 12:29

## 2022-11-11 RX ADMIN — SODIUM CHLORIDE 75 MILLILITER(S): 9 INJECTION INTRAMUSCULAR; INTRAVENOUS; SUBCUTANEOUS at 11:19

## 2022-11-11 RX ADMIN — ATORVASTATIN CALCIUM 40 MILLIGRAM(S): 80 TABLET, FILM COATED ORAL at 23:08

## 2022-11-11 RX ADMIN — HEPARIN SODIUM 5000 UNIT(S): 5000 INJECTION INTRAVENOUS; SUBCUTANEOUS at 06:26

## 2022-11-11 RX ADMIN — Medication 81 MILLIGRAM(S): at 09:24

## 2022-11-11 RX ADMIN — ENOXAPARIN SODIUM 40 MILLIGRAM(S): 100 INJECTION SUBCUTANEOUS at 23:08

## 2022-11-11 NOTE — H&P ADULT - NSHPPHYSICALEXAM_GEN_ALL_CORE
General: NAD, well appearing female sitting in bed  HEENT:  NC/AT  Neuro: A&Ox4, gait steady, speech clear, no focal deficits noted  Respiratory: B/L BS CTA, no wheeze, no rhonchi, no crackles noted  Cardiovascular: RRR, normal S1S2, no murmur noted  GI: Abd soft, NT/ND, +BSx4Q +BM  Peripheral Vascular:  B/L LE neg edema, 2+ peripheral pulses, no clubbing, cyanosis, varicosities/PVD noted  Musculoskeletal: B/L UE and LE 5/5 strength, groins stable   Psychiatric: Normal mood, normal affect observed  Skin: Normal exam to inspection and palpation. no bleeding, no hematoma.

## 2022-11-11 NOTE — H&P ADULT - PROBLEM SELECTOR PLAN 1
Assessment:  - patient presented for new onset chest pain  - high risk, known to have CAD with multiple stents  - Troponins 12 -> 7, normal CK, CKMB  - EKG shows NSR with Q waves on II, III, avF    Plan:  - cardiology consulted - cath tomorrow AM, NPO after midnight  - c/w DAPT  - no heparin drip  - will repeat EKG, troponins/CK  - cardiology aware  - Echo pending  - will hold losartan since the patient is to get cath tomorrow Assessment:  - patient presented for new onset chest pain  - high risk, known to have CAD with multiple stents  - Troponins 12 -> 7, normal CK, CKMB  - EKG shows NSR with Q waves on II, III, avF  - old records reviewed: patient presented for chest pain, had cath, showing 99% RCA, GAVIN placed    Plan:  - cardiology consulted - cath tomorrow AM, NPO after midnight  - c/w DAPT  - no heparin drip  - will repeat EKG, troponins/CK  - cardiology aware  - Echo pending  - will hold losartan since the patient is to get cath tomorrow Assessment:  - patient presented for new onset chest pain  - high risk, known to have CAD with multiple stents  - Troponins 12 -> 7, normal CK, CKMB  - EKG shows NSR with Q waves on II, III, avF  - old records reviewed: patient presented for chest pain, had cath, showing 99% RCA, GAVIN placed  - CXR shows clear lungs    Plan:  - cardiology consulted - cath tomorrow AM, NPO after midnight  - c/w DAPT  - no heparin drip  - will repeat EKG, troponins/CK  - cardiology aware  - Echo pending  - will hold losartan since the patient is to get cath tomorrow

## 2022-11-11 NOTE — H&P ADULT - ASSESSMENT
53 y/o F with pmhx of CAD with multiple stents (3, last one placed in 8/22), DM2, HTN, presented to the ED for new onset chest pain. Patient with multiple risk factors concerning for unstable angina/ACS. Admit for ACS work up.

## 2022-11-11 NOTE — DISCHARGE NOTE NURSING/CASE MANAGEMENT/SOCIAL WORK - NSDCPEFALRISK_GEN_ALL_CORE
For information on Fall & Injury Prevention, visit: [email or web address] OR  [email or web address] OR  https://www.cdc.gov/steadi/patient.html

## 2022-11-11 NOTE — H&P ADULT - NSHPSOCIALHISTORY_GEN_ALL_CORE
Marital Status:   Occupation: works in medical office  Lives with: mother, brother    Substance Use : denies  Tobacco Usage:  (  x ) never smoked   (   ) former smoker   (   ) current smoker  (     ) pack year  (        ) last tobacco use date  Alcohol Usage: social

## 2022-11-11 NOTE — H&P ADULT - NSHPLABSRESULTS_GEN_ALL_CORE
14.4   11.39 )-----------( 317      ( 10 Nov 2022 16:08 )             44.1       11-10    142  |  104  |  16  ----------------------------<  85  3.9   |  27  |  0.74    Ca    9.9      10 Nov 2022 16:08    TPro  7.5  /  Alb  4.1  /  TBili  0.7  /  DBili  x   /  AST  17  /  ALT  22  /  AlkPhos  89  11-10      CARDIAC MARKERS ( 10 Nov 2022 20:10 )  x     / x     / 143 U/L / x     / 1.5 ng/mL

## 2022-11-11 NOTE — H&P ADULT - HISTORY OF PRESENT ILLNESS
This is a 53 y/o F with pmhx of CAD with multiple stents (3, last one placed in 8/22), DM2, HTN, presented to the ED for new onset chest pain. The patient reported chest pain for 2 weeks, waxes and wanes. Chest pain is pressure like, mainly in the L side of the chest. Reports worsening chest pain with exertion even with minimal activity in the house. The patient recently had a stent placed on 8/22 in RCA. Reports chest pain similar to previous episode in april. The patient also endorsed palpitations during these episodes. No LOC. ROS otherwise negative. The patient still endorsing some chest pain at bedside however it is better than before, however still present. No palpitations at this time. Difficult to obtain hx due to pain.

## 2022-11-11 NOTE — H&P ADULT - NSHPREVIEWOFSYSTEMS_GEN_ALL_CORE
REVIEW OF SYSTEMS:  CONSTITUTIONAL: No fever, weight loss, or fatigue  EYES: No eye pain, visual disturbances, or discharge  ENMT:  No difficulty hearing, tinnitus, vertigo; No sinus or throat pain  NECK: No pain or stiffness  RESPIRATORY: No non/productive cough, No wheezing, chills or hemoptysis; No shortness of breath  CARDIOVASCULAR: No chest pain, No palpitations, dizziness, or leg swelling  GASTROINTESTINAL: No abdominal or epigastric pain. No nausea, vomiting, or hematemesis; No diarrhea, No melena  GENITOURINARY: No dysuria, frequency, hematuria, or incontinence  NEUROLOGICAL: No headaches, memory loss, loss of strength, numbness, or tremors  SKIN: No itching, burning, rashes, or lesions   LYMPH NODES: No enlarged glands  ENDOCRINE: No heat or cold intolerance; No hair loss  MUSCULOSKELETAL: No joint pain or swelling; No muscle, back, or extremity pain  PSYCHIATRIC: No depression, anxiety, mood swings, or difficulty sleeping  HEME/LYMPH: No easy bruising, or bleeding gums  ALLERGY: No hives or eczema

## 2022-11-11 NOTE — H&P ADULT - ASSESSMENT
51 y/o F with pmhx of CAD with multiple stents (3, last one placed in 8/22), DM2, HTN, presented to the ED for new onset chest pain. The patient reported chest pain for 2 weeks, waxes and wanes. Patient  underwent cath showing LAD 70 (IFR 0.8), LCx 90, RCA 90; Patient was recommended for CTS CABG evaluation; last coronary stents placed in August 2022. PTransferred to [facility] for CABG evaluation with Dr. Mace. Preop w/u in progress, no CP, palpitation, SOB, dizziness at time of transfer. All labs and imaging reviewed with Dr. Mace.     11/11 Transferred to [facility] for CABG eval, preop w/u in progress, Kimberli loaded at [facility] f/u P2Y12, TTE, Carotids, preop labs

## 2022-11-11 NOTE — H&P ADULT - NSHPADDITIONALINFOADULT_GEN_ALL_CORE
Olya Nuñez, AGACNP-BC  Cardiovascular & Thoracic Surgery  [address] [place] [address]  Office: [phone]    Available on Microsoft Teams  Spectra: [phone]  New Consults: [id number]

## 2022-11-11 NOTE — PROGRESS NOTE ADULT - SUBJECTIVE AND OBJECTIVE BOX
[place] Division of Hospital Medicine  LEOanamika Tina JOE  Pager [phone]  Available via MS Teams    SUBJECTIVE / OVERNIGHT EVENTS: patient with no complaints. Denies ant CP, n/v/d/c, SOB. Feeling fatigued this AM    ADDITIONAL REVIEW OF SYSTEMS:    MEDICATIONS  (STANDING):  aspirin  chewable 81 milliGRAM(s) Oral daily  atorvastatin 40 milliGRAM(s) Oral at bedtime  coronavirus bivalent (EUA) Booster Vaccine (PFIZER) 0.3 milliLiter(s) IntraMuscular once  dextrose 5%. 1000 milliLiter(s) (100 mL/Hr) IV Continuous <Continuous>  dextrose 5%. 1000 milliLiter(s) (50 mL/Hr) IV Continuous <Continuous>  dextrose 50% Injectable 25 Gram(s) IV Push once  dextrose 50% Injectable 12.5 Gram(s) IV Push once  dextrose 50% Injectable 25 Gram(s) IV Push once  glucagon  Injectable 1 milliGRAM(s) IntraMuscular once  heparin   Injectable 5000 Unit(s) SubCutaneous every 12 hours  insulin glargine Injectable (LANTUS) 25 Unit(s) SubCutaneous at bedtime  insulin lispro (ADMELOG) corrective regimen sliding scale   SubCutaneous three times a day before meals  insulin lispro (ADMELOG) corrective regimen sliding scale   SubCutaneous at bedtime  metoprolol succinate ER 50 milliGRAM(s) Oral daily  sodium chloride 0.9%. 500 milliLiter(s) (75 mL/Hr) IV Continuous <Continuous>  ticagrelor 90 milliGRAM(s) Oral every 12 hours    MEDICATIONS  (PRN):  acetaminophen     Tablet .. 650 milliGRAM(s) Oral every 6 hours PRN Temp greater or equal to 38C (100.4F), Mild Pain (1 - 3)  dextrose Oral Gel 15 Gram(s) Oral once PRN Blood Glucose LESS THAN 70 milliGRAM(s)/deciliter  melatonin 3 milliGRAM(s) Oral at bedtime PRN Insomnia      I&O's Summary      PHYSICAL EXAM:  Vital Signs Last 24 Hrs  T(C): 36.6 (11 Nov 2022 06:00), Max: 36.8 (10 Nov 2022 19:44)  T(F): 97.9 (11 Nov 2022 06:00), Max: 98.3 (11 Nov 2022 02:00)  HR: 71 (11 Nov 2022 06:00) (69 - 89)  BP: 125/73 (11 Nov 2022 06:00) (112/83 - 147/88)  BP(mean): --  RR: 16 (11 Nov 2022 06:00) (15 - 20)  SpO2: 100% (11 Nov 2022 06:00) (98% - 100%)    Parameters below as of 11 Nov 2022 06:00  Patient On (Oxygen Delivery Method): room air      CONSTITUTIONAL: NAD, well-developed, well-groomed  RESPIRATORY: Normal respiratory effort; lungs are clear to auscultation bilaterally  CARDIOVASCULAR: Regular rate and rhythm, normal S1 and S2, no murmur/rub/gallop; No lower extremity edema; Peripheral pulses are 2+ bilaterally  ABDOMEN: Nontender to palpation, normoactive bowel sounds, no rebound/guarding; No hepatosplenomegaly  MUSCULOSKELETAL:  Normal gait; no clubbing or cyanosis of digits; no joint swelling or tenderness to palpation  PSYCH: A+O to person, place, and time; affect appropriate  NEUROLOGY: CN 2-12 are intact and symmetric; no gross sensory deficits   SKIN: No rashes; no palpable lesions    LABS:                        14.0   12.78 )-----------( 318      ( 11 Nov 2022 01:30 )             42.5     11-11    138  |  102  |  16  ----------------------------<  83  3.5   |  25  |  0.77    Ca    9.7      11 Nov 2022 01:30  Phos  4.0     11-11  Mg     1.80     11-11    TPro  7.1  /  Alb  3.9  /  TBili  0.7  /  DBili  x   /  AST  16  /  ALT  18  /  AlkPhos  87  11-11      CARDIAC MARKERS ( 11 Nov 2022 01:30 )  x     / x     / 135 U/L / x     / 1.4 ng/mL  CARDIAC MARKERS ( 10 Nov 2022 20:10 )  x     / x     / 143 U/L / x     / 1.5 ng/mL

## 2022-11-11 NOTE — DISCHARGE NOTE PROVIDER - HOSPITAL COURSE
51 y/o F with pmhx of CAD with multiple stents (3, last one placed in 8/22), DM2, HTN, presented to the ED for new onset chest pain. The patient reported chest pain for 2 weeks, waxes and wanes. Chest pain is pressure like, mainly in the L side of the chest. Reports worsening chest pain with exertion even with minimal activity in the house. The patient recently had a stent placed on 8/22 in RCA. Reports chest pain similar to previous episode in april. The patient also endorsed palpitations during these episodes. No LOC. ROS otherwise negative. The patient still endorsing some chest pain at bedside however it is better than before, however still present. No palpitations at this time. Difficult to obtain hx due to pain.    Hospital course:   Patient  underwent cath showing LAD 70 (IFR 0.8), LCx 90, RCA 90; Patient was recommended for CTS CABG evaluation; last coronary stents placed in August 2022. Patient's Brilinta was discontinued as per Dr REINALDO Montana, in anticipation for CABG evaluation. Patient underwent cath via left radial artery. L radial site is stable. No hematoma. Good cap refill. Patient is being transferred to [facility] for CABG eval. 53 y/o F with pmhx of CAD with multiple stents (3, last one placed in 8/22), DM2, HTN, presented to the ED for new onset chest pain. The patient reported chest pain for 2 weeks, waxes and wanes. Chest pain is pressure like, mainly in the L side of the chest. Reports worsening chest pain with exertion even with minimal activity in the house. The patient recently had a stent placed on 8/22 in RCA. Reports chest pain similar to previous episode in april. The patient also endorsed palpitations during these episodes. No LOC. ROS otherwise negative. The patient still endorsing some chest pain at bedside however it is better than before, however still present. No palpitations at this time. Difficult to obtain hx due to pain.    Hospital course:   Patient  underwent cath showing LAD 70 (IFR 0.8), LCx 90, RCA 90; Patient was recommended for CTS CABG evaluation; last coronary stents placed in August 2022. Patient's Brilinta was discontinued as per Dr REINALDO Montana, in anticipation for CABG evaluation. Patient underwent cath via left radial artery. L radial site is stable. No hematoma. Good cap refill. Patient is being transferred to [facility] for CABG eval. Hospitalist, Dr. Lawrence, on call notified.

## 2022-11-11 NOTE — PROGRESS NOTE ADULT - PROBLEM SELECTOR PLAN 3
- patient takes 100U basaglar qhs   - started on  with 25U lantus QHs with low dose sliding scale, will decrease lantus to 15 Units given low sugars  - sugars low this AM to 70s, as patient NPO for cath procedure this AM

## 2022-11-11 NOTE — DISCHARGE NOTE NURSING/CASE MANAGEMENT/SOCIAL WORK - PATIENT PORTAL LINK FT
You can access the FollowMyHealth Patient Portal offered by [facility] by registering at the following website: [email or web address]. By joining [facility]’s FollowMyHealth portal, you will also be able to view your health information using other applications (apps) compatible with our system.

## 2022-11-11 NOTE — PATIENT PROFILE ADULT - FALL HARM RISK - RISK INTERVENTIONS
Assistance OOB with selected safe patient handling equipment/Assistance with ambulation/Communicate Fall Risk and Risk Factors to all staff, patient, and family/Discuss with provider need for PT consult/Monitor gait and stability/Provide patient with walking aids - walker, cane, crutches/Reinforce activity limits and safety measures with patient and family/Sit up slowly, dangle for a short time, stand at bedside before walking/Use of alarms - bed, chair and/or voice tab/Visual Cue: Yellow wristband/Bed in lowest position, wheels locked, appropriate side rails in place/Call bell, personal items and telephone in reach/Instruct patient to call for assistance before getting out of bed or chair/Non-slip footwear when patient is out of bed/[place] to call system/Physically safe environment - no spills, clutter or unnecessary equipment/Purposeful Proactive Rounding/Room/bathroom lighting operational, light cord in reach

## 2022-11-11 NOTE — H&P ADULT - PROBLEM SELECTOR PLAN 1
Cath Report Pre-Cerda: LAD 70 (IFR 0.8), LCx 90, RCA 90  c/w ASA 81, Atorvastatin 40, Toprol 50  f/u P2Y12  Preop w/u in progress- Carotids, PFTs, TFTs, TTE, T&Sx2, MRSA  Tentative OR Date: Friday, 11/18  Labs and imaging reviewed with Dr. Mace

## 2022-11-11 NOTE — H&P ADULT - NSHPPHYSICALEXAM_GEN_ALL_CORE
PHYSICAL EXAM:  VITALS: Vital Signs Last 24 Hrs  T(C): 36.8 (10 Nov 2022 21:52), Max: 36.8 (10 Nov 2022 19:44)  T(F): 98.2 (10 Nov 2022 21:52), Max: 98.2 (10 Nov 2022 19:44)  HR: 69 (10 Nov 2022 21:52) (69 - 89)  BP: 118/69 (10 Nov 2022 21:52) (112/83 - 122/71)  BP(mean): --  RR: 15 (10 Nov 2022 21:52) (15 - 20)  SpO2: 100% (10 Nov 2022 21:52) (98% - 100%)    Parameters below as of 10 Nov 2022 21:52  Patient On (Oxygen Delivery Method): room air      GENERAL: NAD, well-developed, well-nourished, morbidly obese  HEAD:  Atraumatic, Normocephalic  EYES: EOMI, PERRL, conjunctiva and sclera clear  ENT: Moist Mucus Membranes present, no ulcers appreciated  NECK: Supple, No JVD  CHEST/LUNG: Clear to auscultation bilaterally; No wheezes, rales or rhonchi, no accessory muscle use  HEART: Regular rate and rhythm; No murmurs, rubs, or gallops, (+)S1, S2  ABDOMEN: Soft, Nontender, Nondistended; Normal Bowel sounds   EXTREMITIES:  2+ Peripheral Pulses, No clubbing, cyanosis, or edema  PSYCH: normal mood and affect  NEUROLOGY: AAOx3, non-focal  SKIN: No rashes or lesions

## 2022-11-11 NOTE — CHART NOTE - NSCHARTNOTEFT_GEN_A_CORE
s/p CATH revealing LAD 70 (IFR 0.8), LCx 90, RCA 90; recommended for CTS CABG evaluation; last coronary stents placed in August 2022, as advised by Dr REINALDO Montana, discontinue Brilinta therapy in anticipation for CABG evaluation

## 2022-11-11 NOTE — CHART NOTE - NSCHARTNOTEFT_GEN_A_CORE
Patient is s/p cardiac cath. Patient without any complaints. LRA site is stable. No hematoma. Dressing is clean/intact/dry. 2+ radial pulse and good rap refill.  will monitor closely.

## 2022-11-11 NOTE — DISCHARGE NOTE PROVIDER - NSDCMRMEDTOKEN_GEN_ALL_CORE_FT
aspirin 81 mg oral tablet, chewable: 1 tab(s) orally once a day  atorvastatin 40 mg oral tablet: 1 tab(s) orally once a day  Jardiance 25 mg oral tablet: 1 tab(s) orally once a day (in the morning)  losartan 25 mg oral tablet: 1 tab(s) orally once a day  metFORMIN 500 mg oral tablet, extended release: 1 tab(s) orally once a day  metoprolol succinate 50 mg oral tablet, extended release: 1 tab(s) orally once a day  ticagrelor 90 mg oral tablet: 1 tab(s) orally every 12 hours  Toujeo SoloStar 300 units/mL subcutaneous solution: 100 unit(s) subcutaneous once a day (at bedtime)   aspirin 81 mg oral tablet, chewable: 1 tab(s) orally once a day  atorvastatin 40 mg oral tablet: 1 tab(s) orally once a day  losartan 25 mg oral tablet: 1 tab(s) orally once a day  melatonin 3 mg oral tablet: 1 tab(s) orally once a day (at bedtime), As needed, Insomnia  metoprolol succinate 50 mg oral tablet, extended release: 1 tab(s) orally once a day  ticagrelor 90 mg oral tablet: 1 tab(s) orally every 12 hours   aspirin 81 mg oral tablet, chewable: 1 tab(s) orally once a day  atorvastatin 40 mg oral tablet: 1 tab(s) orally once a day  losartan 25 mg oral tablet: 1 tab(s) orally once a day  melatonin 3 mg oral tablet: 1 tab(s) orally once a day (at bedtime), As needed, Insomnia  metoprolol succinate 50 mg oral tablet, extended release: 1 tab(s) orally once a day   acetaminophen: 2 tab(s) orally every 6 hours, As Needed  for mild pain and/or headache  aspirin 81 mg oral delayed release tablet: 1 tab(s) orally once a day  atorvastatin 80 mg oral tablet: 1 tab(s) orally once a day (at bedtime)  Insulin Pen Needles, 4mm: 1 application subcutaneously 4 times a day. ** Use with insulin pen **   Iron:   Jardiance 25 mg oral tablet: 1 tab(s) orally once a day  Lasix 40 mg oral tablet: 1 tab(s) orally once a day   metFORMIN 500 mg oral tablet, extended release: 1 tab(s) orally 2 times a day  metoprolol tartrate 50 mg oral tablet: 1 tab(s) orally 2 times a day  oxyCODONE 5 mg oral tablet: 1 tab(s) orally every 6 hours, As Needed -Moderate Pain (4 - 6) MDD:4  Ozempic 2 mg/1.5 mL (0.25 mg or 0.5 mg dose) subcutaneous solution: 0.5 milligram(s) subcutaneously once a week   pantoprazole 40 mg oral delayed release tablet: 1 tab(s) orally once a day (before a meal)  senna leaf extract oral tablet: 2 tab(s) orally once a day (at bedtime)  spironolactone 25 mg oral tablet: 2 tab(s) orally once a day   ticagrelor 90 mg oral tablet: 1 tab(s) orally 2 times a day  Toujeo SoloStar 300 units/mL subcutaneous solution: 50 unit(s) subcutaneous once a day (at bedtime)   Vitamin B12:   Vitamin C:

## 2022-11-11 NOTE — H&P ADULT - PROBLEM SELECTOR PLAN 2
Assessment:  - patient unable to tell me in more detail about the palpitations other than that it related to the chest pain  - the patient had been seen by EP outpatient and was given external monitor  - old records showed that there was some APC and PVCs on the monitor    Plan:  - recommend EP consult  - cardiology consult as above to r/o worsening CAD/ACS

## 2022-11-11 NOTE — CHART NOTE - NSCHARTNOTEFT_GEN_A_CORE
patient awaiting cardiac catheterization for unstable angina; last dose Jardiance for morning dose on 11/10/22; no anion gap noted on BPM this AM, NPO since 11/10/22 at 2000 - will add on Beta hydroxybuterate for monitoring purposes as needed and proceed with cath as discussed with Dr REINALDO Montana

## 2022-11-11 NOTE — DISCHARGE NOTE PROVIDER - NSDCCPCAREPLAN_GEN_ALL_CORE_FT
PRINCIPAL DISCHARGE DIAGNOSIS  Diagnosis: Chest pain  Assessment and Plan of Treatment: Transfer to [facility] for CABG eval

## 2022-11-11 NOTE — DISCHARGE NOTE PROVIDER - NSDCFUSCHEDAPPT_GEN_ALL_CORE_FT
Ellie Quiles  [facility]  CTS[facility]G [address]  Scheduled Appointment: 12/15/2022

## 2022-11-11 NOTE — DISCHARGE NOTE PROVIDER - CARE PROVIDER_API CALL
KIRIT CARTER  Internal Medicine  [address] [place] [address]  Phone: [phone]  Fax: [phone]  Follow Up Time:

## 2022-11-11 NOTE — PROGRESS NOTE ADULT - PROBLEM SELECTOR PLAN 1
- patient presented for new onset chest pain  - high risk, known to have CAD with multiple stents  - Troponins 12 -> 7, normal CK, CKMB; EKG shows NSR with Q waves on II, III, avF   - TTE from 3/2022 with EF 40% and diffuse hypokineis   - old records reviewed: patient presented for chest pain, had cath, showing 99% RCA, GAVIN placed  - CXR shows clear lungs  - cardiology consulted - cath today,   - c/w DAPT

## 2022-11-11 NOTE — PATIENT PROFILE ADULT - FALL HARM RISK - RISK INTERVENTIONS
Assistance OOB with selected safe patient handling equipment/Assistance with ambulation/Communicate Fall Risk and Risk Factors to all staff, patient, and family/Discuss with provider need for PT consult/Monitor gait and stability/Provide patient with walking aids - walker, cane, crutches/Reinforce activity limits and safety measures with patient and family/Sit up slowly, dangle for a short time, stand at bedside before walking/Use of alarms - bed, chair and/or voice tab/Visual Cue: Yellow wristband/Bed in lowest position, wheels locked, appropriate side rails in place/Call bell, personal items and telephone in reach/Instruct patient to call for assistance before getting out of bed or chair/Non-slip footwear when patient is out of bed/[place] to call system/Physically safe environment - no spills, clutter or unnecessary equipment/Purposeful Proactive Rounding/Room/bathroom lighting operational, light cord in reach

## 2022-11-11 NOTE — H&P ADULT - NSHPREVIEWOFSYSTEMS_GEN_ALL_CORE
REVIEW OF SYSTEMS:    CONSTITUTIONAL: No weakness, fevers or chills  EYES/ENT: No visual changes;  No dysphagia; No sore throat; No rhinorrhea; No sinus pain/pressure  NECK: No pain or stiffness  RESPIRATORY: No cough, wheezing, hemoptysis; No shortness of breath  CARDIOVASCULAR: + chest pain, + palpitations; No lower extremity edema  GASTROINTESTINAL: No abdominal or epigastric pain. No nausea, vomiting, or hematemesis; No diarrhea or constipation. No melena or hematochezia.  GENITOURINARY: No dysuria, frequency or hematuria  NEUROLOGICAL: No numbness or weakness  MSK: ambulates without assistance  SKIN: No itching, burning, rashes, or lesions   All other review of systems is negative unless indicated above.

## 2022-11-11 NOTE — H&P ADULT - NSHPLABSRESULTS_GEN_ALL_CORE
< from: TTE with Doppler (w/Cont) (03.11.22 @ 09:26) >    Conclusions:  1. Normal mitral valve. Minimal mitral regurgitation.  2. Endocardial visualization enhanced with intravenous  injection of Ultrasonic Enhancing Agent (Definity). Mild  segmental left ventricular systolic dysfunction. No left  ventricular thrombus. The mid anterior septum, the apical  septum, the basal anterior septum, the basal inferior wall,  the mid inferior wall, the mid inferoseptum, and the basal  inferoseptum are hypokinetic.  3. Mild diastolic dysfunction (Stage I).  4. The right ventricle is not well visualized; grossly  normal right ventricular systolic function.  *** No previous Echo exam.        < from: Xray Chest 2 Views PA/Lat (11.10.22 @ 17:20) >    IMPRESSION:    Clear lungs.

## 2022-11-11 NOTE — H&P ADULT - HISTORY OF PRESENT ILLNESS
53 y/o F with pmhx of CAD with multiple stents (3, last one placed in 8/22), DM2, HTN, presented to the ED for new onset chest pain. The patient reported chest pain for 2 weeks, waxes and wanes. Chest pain is pressure like, mainly in the L side of the chest. Reports worsening chest pain with exertion even with minimal activity in the house. The patient recently had a stent placed on 8/22 in RCA. Reports chest pain similar to previous episode in april. The patient also endorsed palpitations during these episodes. No LOC. ROS otherwise negative. The patient still endorsing some chest pain at bedside however it is better than before, however still present. No palpitations at this time. Difficult to obtain hx due to pain. Patient  underwent cath showing LAD 70 (IFR 0.8), LCx 90, RCA 90; Patient was recommended for CTS CABG evaluation; last coronary stents placed in August 2022. Patient's Brilinta was discontinued as per Dr REINALDO Montana, in anticipation for CABG evaluation. Patient underwent cath via left radial artery. L radial site is stable. No hematoma. Good cap refill. Patient is being transferred to [facility] for CABG eval. Hospitalist, Dr. Lawrence, on call notified.     Transferred to [facility] for CABG evaluation with Dr. Mace. Preop w/u in progress, no CP, palpitation, SOB, dizziness at time of transfer. All labs and imaging reviewed with Dr. Mace.

## 2022-11-11 NOTE — H&P ADULT - PROBLEM SELECTOR PLAN 3
- patient takes 100U basaglar  - will start with 25U lantus QHs with low dose sliding scale, patient is normoglycemic at this time and to be NPO therefore will start lower. Can uptitrate as needed for hyperglycemia

## 2022-11-11 NOTE — PROGRESS NOTE ADULT - ASSESSMENT
51 y/o F with pmhx of CAD with multiple stents (3, last one placed in 8/22), DM2, HTN, presented to the ED for new onset chest pain. Patient with multiple risk factors concerning for unstable angina/ACS. Admit for ACS work up. Pending cath this AM.

## 2022-11-12 DIAGNOSIS — N39.0 URINARY TRACT INFECTION, SITE NOT SPECIFIED: ICD-10-CM

## 2022-11-12 LAB
A1C WITH ESTIMATED AVERAGE GLUCOSE RESULT: 8 % — HIGH (ref 4–5.6)
ALBUMIN SERPL ELPH-MCNC: 3.9 G/DL — SIGNIFICANT CHANGE UP (ref 3.3–5)
ALP SERPL-CCNC: 90 U/L — SIGNIFICANT CHANGE UP (ref 40–120)
ALT FLD-CCNC: 18 U/L — SIGNIFICANT CHANGE UP (ref 10–45)
ANION GAP SERPL CALC-SCNC: 11 MMOL/L — SIGNIFICANT CHANGE UP (ref 5–17)
APPEARANCE UR: CLEAR — SIGNIFICANT CHANGE UP
APTT BLD: 43.5 SEC — HIGH (ref 27.5–35.5)
AST SERPL-CCNC: 17 U/L — SIGNIFICANT CHANGE UP (ref 10–40)
BACTERIA # UR AUTO: ABNORMAL
BILIRUB SERPL-MCNC: 0.5 MG/DL — SIGNIFICANT CHANGE UP (ref 0.2–1.2)
BILIRUB UR-MCNC: NEGATIVE — SIGNIFICANT CHANGE UP
BLD GP AB SCN SERPL QL: NEGATIVE — SIGNIFICANT CHANGE UP
BUN SERPL-MCNC: 18 MG/DL — SIGNIFICANT CHANGE UP (ref 7–23)
CALCIUM SERPL-MCNC: 9.2 MG/DL — SIGNIFICANT CHANGE UP (ref 8.4–10.5)
CHLORIDE SERPL-SCNC: 105 MMOL/L — SIGNIFICANT CHANGE UP (ref 96–108)
CO2 SERPL-SCNC: 25 MMOL/L — SIGNIFICANT CHANGE UP (ref 22–31)
COLOR SPEC: SIGNIFICANT CHANGE UP
CREAT SERPL-MCNC: 0.71 MG/DL — SIGNIFICANT CHANGE UP (ref 0.5–1.3)
DIFF PNL FLD: ABNORMAL
EGFR: 104 ML/MIN/1.73M2 — SIGNIFICANT CHANGE UP
EPI CELLS # UR: 3 /HPF — SIGNIFICANT CHANGE UP
ESTIMATED AVERAGE GLUCOSE: 183 MG/DL — HIGH (ref 68–114)
FIBRINOGEN PPP-MCNC: 485 MG/DL — HIGH (ref 200–445)
GLUCOSE SERPL-MCNC: 122 MG/DL — HIGH (ref 70–99)
GLUCOSE UR QL: ABNORMAL
HCT VFR BLD CALC: 41.8 % — SIGNIFICANT CHANGE UP (ref 34.5–45)
HGB BLD-MCNC: 13.9 G/DL — SIGNIFICANT CHANGE UP (ref 11.5–15.5)
HYALINE CASTS # UR AUTO: 2 /LPF — SIGNIFICANT CHANGE UP (ref 0–2)
INR BLD: 1.14 RATIO — SIGNIFICANT CHANGE UP (ref 0.88–1.16)
KETONES UR-MCNC: NEGATIVE — SIGNIFICANT CHANGE UP
LEUKOCYTE ESTERASE UR-ACNC: ABNORMAL
MCHC RBC-ENTMCNC: 26.9 PG — LOW (ref 27–34)
MCHC RBC-ENTMCNC: 33.3 GM/DL — SIGNIFICANT CHANGE UP (ref 32–36)
MCV RBC AUTO: 80.9 FL — SIGNIFICANT CHANGE UP (ref 80–100)
NITRITE UR-MCNC: NEGATIVE — SIGNIFICANT CHANGE UP
NRBC # BLD: 0 /100 WBCS — SIGNIFICANT CHANGE UP (ref 0–0)
NT-PROBNP SERPL-SCNC: 52 PG/ML — SIGNIFICANT CHANGE UP (ref 0–300)
PA ADP PRP-ACNC: 131 PRU — LOW (ref 194–417)
PH UR: 6 — SIGNIFICANT CHANGE UP (ref 5–8)
PLATELET # BLD AUTO: 269 K/UL — SIGNIFICANT CHANGE UP (ref 150–400)
POTASSIUM SERPL-MCNC: 3.8 MMOL/L — SIGNIFICANT CHANGE UP (ref 3.5–5.3)
POTASSIUM SERPL-SCNC: 3.8 MMOL/L — SIGNIFICANT CHANGE UP (ref 3.5–5.3)
PROT SERPL-MCNC: 7.1 G/DL — SIGNIFICANT CHANGE UP (ref 6–8.3)
PROT UR-MCNC: SIGNIFICANT CHANGE UP
PROTHROM AB SERPL-ACNC: 13.1 SEC — SIGNIFICANT CHANGE UP (ref 10.5–13.4)
RBC # BLD: 5.17 M/UL — SIGNIFICANT CHANGE UP (ref 3.8–5.2)
RBC # FLD: 14.6 % — HIGH (ref 10.3–14.5)
RBC CASTS # UR COMP ASSIST: 44 /HPF — HIGH (ref 0–4)
RH IG SCN BLD-IMP: POSITIVE — SIGNIFICANT CHANGE UP
SODIUM SERPL-SCNC: 141 MMOL/L — SIGNIFICANT CHANGE UP (ref 135–145)
SP GR SPEC: 1.04 — HIGH (ref 1.01–1.02)
T4 FREE SERPL-MCNC: 1.1 NG/DL — SIGNIFICANT CHANGE UP (ref 0.9–1.8)
TSH SERPL-MCNC: 2.95 UIU/ML — SIGNIFICANT CHANGE UP (ref 0.27–4.2)
UROBILINOGEN FLD QL: NEGATIVE — SIGNIFICANT CHANGE UP
WBC # BLD: 10.29 K/UL — SIGNIFICANT CHANGE UP (ref 3.8–10.5)
WBC # FLD AUTO: 10.29 K/UL — SIGNIFICANT CHANGE UP (ref 3.8–10.5)
WBC UR QL: 23 /HPF — HIGH (ref 0–5)

## 2022-11-12 PROCEDURE — 93306 TTE W/DOPPLER COMPLETE: CPT | Mod: 26

## 2022-11-12 PROCEDURE — 71045 X-RAY EXAM CHEST 1 VIEW: CPT | Mod: 26

## 2022-11-12 PROCEDURE — 93880 EXTRACRANIAL BILAT STUDY: CPT | Mod: 26

## 2022-11-12 PROCEDURE — 99233 SBSQ HOSP IP/OBS HIGH 50: CPT

## 2022-11-12 PROCEDURE — 93010 ELECTROCARDIOGRAM REPORT: CPT

## 2022-11-12 RX ORDER — ENOXAPARIN SODIUM 100 MG/ML
90 INJECTION SUBCUTANEOUS EVERY 12 HOURS
Refills: 0 | Status: DISCONTINUED | OUTPATIENT
Start: 2022-11-12 | End: 2022-11-14

## 2022-11-12 RX ADMIN — SODIUM CHLORIDE 3 MILLILITER(S): 9 INJECTION INTRAMUSCULAR; INTRAVENOUS; SUBCUTANEOUS at 12:32

## 2022-11-12 RX ADMIN — INSULIN GLARGINE 15 UNIT(S): 100 INJECTION, SOLUTION SUBCUTANEOUS at 00:26

## 2022-11-12 RX ADMIN — ENOXAPARIN SODIUM 90 MILLIGRAM(S): 100 INJECTION SUBCUTANEOUS at 17:54

## 2022-11-12 RX ADMIN — Medication 81 MILLIGRAM(S): at 12:34

## 2022-11-12 RX ADMIN — ATORVASTATIN CALCIUM 40 MILLIGRAM(S): 80 TABLET, FILM COATED ORAL at 22:14

## 2022-11-12 RX ADMIN — Medication 50 MILLIGRAM(S): at 06:13

## 2022-11-12 RX ADMIN — INSULIN GLARGINE 15 UNIT(S): 100 INJECTION, SOLUTION SUBCUTANEOUS at 22:14

## 2022-11-12 RX ADMIN — SODIUM CHLORIDE 3 MILLILITER(S): 9 INJECTION INTRAMUSCULAR; INTRAVENOUS; SUBCUTANEOUS at 22:53

## 2022-11-12 RX ADMIN — SODIUM CHLORIDE 3 MILLILITER(S): 9 INJECTION INTRAMUSCULAR; INTRAVENOUS; SUBCUTANEOUS at 06:16

## 2022-11-12 NOTE — PROGRESS NOTE ADULT - ASSESSMENT
53 y/o F with pmhx of CAD with multiple stents (3, last one placed in 8/22), DM2, HTN, presented to the ED for new onset chest pain. The patient reported chest pain for 2 weeks, waxes and wanes. Chest pain is pressure like, mainly in the L side of the chest. Reports worsening chest pain with exertion even with minimal activity in the house. The patient recently had a stent placed on 8/22 in RCA. Reports chest pain similar to previous episode in april. The patient also endorsed palpitations during these episodes. No LOC. ROS otherwise negative. The patient still endorsing some chest pain at bedside however it is better than before, however still present. No palpitations at this time. Difficult to obtain hx due to pain. Patient  underwent cath showing LAD 70 (IFR 0.8), LCx 90, RCA 90; Patient was recommended for CTS CABG evaluation; last coronary stents placed in August 2022. Patient's Brilinta was discontinued as per Dr REINALDO Montana, in anticipation for CABG evaluation. Patient underwent cath via left radial artery. L radial site is stable. No hematoma. Good cap refill. Patient is being transferred to [facility] for CABG eval. Hospitalist, Dr. Lawrence, on call notified.     Transferred to [facility] for CABG evaluation with Dr. Mace. Preop w/u in progress, no CP, palpitation, SOB, dizziness at time of transfer. All labs and imaging reviewed with Dr. Mace.

## 2022-11-12 NOTE — PROGRESS NOTE ADULT - PROBLEM SELECTOR PLAN 1
Pre-Cerda: LAD 70 (IFR 0.8), LCx 90, RCA 90  c/w ASA 81, Atorvastatin 40, Toprol 50  f/u P2Y12  Preop w/u in progress- Carotids, PFTs, TFTs, TTE, T&Sx2, MRSA  Weight based lovenox bid  Tentative OR Date: Friday, 11/18

## 2022-11-12 NOTE — PROGRESS NOTE ADULT - SUBJECTIVE AND OBJECTIVE BOX
VITAL SIGNS    Telemetry:  SR 60-80  Vital Signs Last 24 Hrs  T(C): 36.6 (22 @ 04:51), Max: 37.2 (22 @ 20:35)  T(F): 97.8 (22 @ 04:51), Max: 98.9 (22 @ 20:35)  HR: 74 (22 @ 04:51) (70 - 75)  BP: 139/74 (22 @ 04:51) (127/74 - 139/74)  RR: 18 (22 @ 04:51) (16 - 18)  SpO2: 98% (22 @ 04:51) (98% - 100%)             Daily     Daily Weight in k.7 (2022 21:36)  Admit Wt: Drug Dosing Weight  Height (cm): 165.1 (2022 02:00)  Weight (kg): 90.7 (2022 21:36)  BMI (kg/m2): 33.3 (2022 21:36)  BSA (m2): 1.98 (2022 21:36)    Bilirubin Total, Serum: 0.5 mg/dL ( @ 06:58)    CAPILLARY BLOOD GLUCOSE      POCT Blood Glucose.: 106 mg/dL (2022 08:39)  POCT Blood Glucose.: 168 mg/dL (2022 00:22)  POCT Blood Glucose.: 155 mg/dL (2022 22:32)  POCT Blood Glucose.: 161 mg/dL (2022 20:31)  POCT Blood Glucose.: 161 mg/dL (2022 17:36)  POCT Blood Glucose.: 111 mg/dL (2022 16:21)  POCT Blood Glucose.: 64 mg/dL (2022 15:48)  POCT Blood Glucose.: 130 mg/dL (2022 12:58)  POCT Blood Glucose.: 61 mg/dL (2022 12:32)          MEDICATIONS  aspirin enteric coated 81 milliGRAM(s) Oral daily  atorvastatin 40 milliGRAM(s) Oral at bedtime  coronavirus bivalent (EUA) Booster Vaccine (PFIZER) 0.3 milliLiter(s) IntraMuscular once  enoxaparin Injectable 90 milliGRAM(s) SubCutaneous every 12 hours  insulin glargine Injectable (LANTUS) 15 Unit(s) SubCutaneous at bedtime  insulin lispro (ADMELOG) corrective regimen sliding scale   SubCutaneous three times a day before meals  insulin lispro (ADMELOG) corrective regimen sliding scale   SubCutaneous at bedtime  metoprolol succinate ER 50 milliGRAM(s) Oral daily  sodium chloride 0.9% lock flush 3 milliLiter(s) IV Push every 8 hours      >>> <<<  PHYSICAL EXAM  Subjective: NAD  Neurology: alert and oriented x 3, nonfocal, no gross deficits  CV :s1s2  Lungs: CTA  Abdomen: soft, NT,ND, (- )BM  :  voiding  Extremities:  -c/c/e     LABS      141  |  105  |  18  ----------------------------<  122<H>  3.8   |  25  |  0.71    Ca    9.2      2022 06:58  Phos  4.0       Mg     1.80         TPro  7.1  /  Alb  3.9  /  TBili  0.5  /  DBili  x   /  AST  17  /  ALT  18  /  AlkPhos  90                                   13.9   10.29 )-----------( 269      ( 2022 06:58 )             41.8          PT/INR - ( 2022 06:58 )   PT: 13.1 sec;   INR: 1.14 ratio         PTT - ( 2022 06:58 )  PTT:43.5 sec       PAST MEDICAL & SURGICAL HISTORY:  Diabetes mellitus      Essential hypertension      Hyperlipidemia      CAD (coronary artery disease)  3/10/22 stent to midRCA and RPDA      S/P coronary artery stent placement

## 2022-11-13 LAB
ANION GAP SERPL CALC-SCNC: 11 MMOL/L — SIGNIFICANT CHANGE UP (ref 5–17)
BUN SERPL-MCNC: 12 MG/DL — SIGNIFICANT CHANGE UP (ref 7–23)
CALCIUM SERPL-MCNC: 9.1 MG/DL — SIGNIFICANT CHANGE UP (ref 8.4–10.5)
CHLORIDE SERPL-SCNC: 106 MMOL/L — SIGNIFICANT CHANGE UP (ref 96–108)
CO2 SERPL-SCNC: 23 MMOL/L — SIGNIFICANT CHANGE UP (ref 22–31)
CREAT SERPL-MCNC: 0.64 MG/DL — SIGNIFICANT CHANGE UP (ref 0.5–1.3)
EGFR: 108 ML/MIN/1.73M2 — SIGNIFICANT CHANGE UP
GLUCOSE SERPL-MCNC: 98 MG/DL — SIGNIFICANT CHANGE UP (ref 70–99)
HCT VFR BLD CALC: 40.1 % — SIGNIFICANT CHANGE UP (ref 34.5–45)
HGB BLD-MCNC: 13.1 G/DL — SIGNIFICANT CHANGE UP (ref 11.5–15.5)
MCHC RBC-ENTMCNC: 26.6 PG — LOW (ref 27–34)
MCHC RBC-ENTMCNC: 32.7 GM/DL — SIGNIFICANT CHANGE UP (ref 32–36)
MCV RBC AUTO: 81.5 FL — SIGNIFICANT CHANGE UP (ref 80–100)
MRSA PCR RESULT.: SIGNIFICANT CHANGE UP
NRBC # BLD: 0 /100 WBCS — SIGNIFICANT CHANGE UP (ref 0–0)
PLATELET # BLD AUTO: 258 K/UL — SIGNIFICANT CHANGE UP (ref 150–400)
POTASSIUM SERPL-MCNC: 3.8 MMOL/L — SIGNIFICANT CHANGE UP (ref 3.5–5.3)
POTASSIUM SERPL-SCNC: 3.8 MMOL/L — SIGNIFICANT CHANGE UP (ref 3.5–5.3)
RBC # BLD: 4.92 M/UL — SIGNIFICANT CHANGE UP (ref 3.8–5.2)
RBC # FLD: 14.3 % — SIGNIFICANT CHANGE UP (ref 10.3–14.5)
S AUREUS DNA NOSE QL NAA+PROBE: SIGNIFICANT CHANGE UP
SODIUM SERPL-SCNC: 140 MMOL/L — SIGNIFICANT CHANGE UP (ref 135–145)
WBC # BLD: 9.5 K/UL — SIGNIFICANT CHANGE UP (ref 3.8–10.5)
WBC # FLD AUTO: 9.5 K/UL — SIGNIFICANT CHANGE UP (ref 3.8–10.5)

## 2022-11-13 PROCEDURE — 99233 SBSQ HOSP IP/OBS HIGH 50: CPT

## 2022-11-13 PROCEDURE — 93010 ELECTROCARDIOGRAM REPORT: CPT

## 2022-11-13 RX ADMIN — SODIUM CHLORIDE 3 MILLILITER(S): 9 INJECTION INTRAMUSCULAR; INTRAVENOUS; SUBCUTANEOUS at 16:50

## 2022-11-13 RX ADMIN — INSULIN GLARGINE 15 UNIT(S): 100 INJECTION, SOLUTION SUBCUTANEOUS at 21:42

## 2022-11-13 RX ADMIN — Medication 1: at 17:06

## 2022-11-13 RX ADMIN — ENOXAPARIN SODIUM 90 MILLIGRAM(S): 100 INJECTION SUBCUTANEOUS at 17:06

## 2022-11-13 RX ADMIN — ENOXAPARIN SODIUM 90 MILLIGRAM(S): 100 INJECTION SUBCUTANEOUS at 05:00

## 2022-11-13 RX ADMIN — SODIUM CHLORIDE 3 MILLILITER(S): 9 INJECTION INTRAMUSCULAR; INTRAVENOUS; SUBCUTANEOUS at 06:08

## 2022-11-13 RX ADMIN — Medication 1: at 11:59

## 2022-11-13 RX ADMIN — SODIUM CHLORIDE 3 MILLILITER(S): 9 INJECTION INTRAMUSCULAR; INTRAVENOUS; SUBCUTANEOUS at 22:22

## 2022-11-13 RX ADMIN — Medication 81 MILLIGRAM(S): at 11:59

## 2022-11-13 RX ADMIN — Medication 50 MILLIGRAM(S): at 05:00

## 2022-11-13 RX ADMIN — ATORVASTATIN CALCIUM 40 MILLIGRAM(S): 80 TABLET, FILM COATED ORAL at 21:42

## 2022-11-13 NOTE — PROGRESS NOTE ADULT - ASSESSMENT
53 y/o F with pmhx of CAD with multiple stents (3, last one placed in 8/22), DM2, HTN, presented to the ED for new onset chest pain. The patient reported chest pain for 2 weeks, waxes and wanes. Chest pain is pressure like, mainly in the L side of the chest. Reports worsening chest pain with exertion even with minimal activity in the house. The patient recently had a stent placed on 8/22 in RCA. Reports chest pain similar to previous episode in april. The patient also endorsed palpitations during these episodes. No LOC. ROS otherwise negative. The patient still endorsing some chest pain at bedside however it is better than before, however still present. No palpitations at this time. Difficult to obtain hx due to pain. Patient  underwent cath showing LAD 70 (IFR 0.8), LCx 90, RCA 90; Patient was recommended for CTS CABG evaluation; last coronary stents placed in August 2022. Patient's Brilinta was discontinued as per Dr REINALDO [place], in anticipation for CABG evaluation. Patient underwent cath via left radial artery. L radial site is stable. No hematoma. Good cap refill. Patient is being transferred to [facility] for CABG eval. Hospitalist, Dr. Lawrence, on call notified.     Transferred to [facility] 2 [facility] for CABG evaluation with Dr. Mace. Preop w/u in progress, no CP, palpitation, SOB, dizziness at time of transfer. All labs and imaging reviewed with Dr. Mace.   11/13 P2y12 131, Continue with BBlocker asa, and statin. Weight based lovenox.

## 2022-11-13 NOTE — PROGRESS NOTE ADULT - SUBJECTIVE AND OBJECTIVE BOX
VITAL SIGNS    Telemetry:  SR 60-70  Vital Signs Last 24 Hrs  T(C): 36.8 (11-13-22 @ 12:50), Max: 36.8 (11-12-22 @ 21:15)  T(F): 98.2 (11-13-22 @ 12:50), Max: 98.3 (11-12-22 @ 21:15)  HR: 71 (11-13-22 @ 12:50) (71 - 73)  BP: 130/75 (11-13-22 @ 12:50) (120/76 - 134/72)  RR: 18 (11-13-22 @ 12:50) (18 - 18)  SpO2: 97% (11-13-22 @ 12:50) (94% - 99%)            11-12 @ 07:01  -  11-13 @ 07:00  --------------------------------------------------------  IN: 240 mL / OUT: 850 mL / NET: -610 mL    11-13 @ 07:01  -  11-13 @ 15:15  --------------------------------------------------------  IN: 780 mL / OUT: 0 mL / NET: 780 mL    Daily   Admit Wt: Drug Dosing Weight  Height (cm): 165.1 (11 Nov 2022 02:00)  Weight (kg): 90.7 (11 Nov 2022 21:36)  BMI (kg/m2): 33.3 (11 Nov 2022 21:36)  BSA (m2): 1.98 (11 Nov 2022 21:36)      CAPILLARY BLOOD GLUCOSE      POCT Blood Glucose.: 175 mg/dL (13 Nov 2022 11:29)  POCT Blood Glucose.: 94 mg/dL (13 Nov 2022 08:28)  POCT Blood Glucose.: 148 mg/dL (12 Nov 2022 21:36)  POCT Blood Glucose.: 139 mg/dL (12 Nov 2022 17:14)          MEDICATIONS  aspirin enteric coated 81 milliGRAM(s) Oral daily  atorvastatin 40 milliGRAM(s) Oral at bedtime  coronavirus bivalent (EUA) Booster Vaccine (PFIZER) 0.3 milliLiter(s) IntraMuscular once  enoxaparin Injectable 90 milliGRAM(s) SubCutaneous every 12 hours  insulin glargine Injectable (LANTUS) 15 Unit(s) SubCutaneous at bedtime  insulin lispro (ADMELOG) corrective regimen sliding scale   SubCutaneous three times a day before meals  insulin lispro (ADMELOG) corrective regimen sliding scale   SubCutaneous at bedtime  metoprolol succinate ER 50 milliGRAM(s) Oral daily  sodium chloride 0.9% lock flush 3 milliLiter(s) IV Push every 8 hours      >>> <<<  PHYSICAL EXAM  Subjective: NAD  Neurology: alert and oriented x 3, nonfocal, no gross deficits  CV : s1s2  Lungs: CTA  Abdomen: soft, NT,ND, ( +)BM  :  voiding  Extremities: -c/c/e       LABS  11-13    140  |  106  |  12  ----------------------------<  98  3.8   |  23  |  0.64    Ca    9.1      13 Nov 2022 09:20    TPro  7.1  /  Alb  3.9  /  TBili  0.5  /  DBili  x   /  AST  17  /  ALT  18  /  AlkPhos  90  11-12                                 13.1   9.50  )-----------( 258      ( 13 Nov 2022 09:20 )             40.1          PT/INR - ( 12 Nov 2022 06:58 )   PT: 13.1 sec;   INR: 1.14 ratio         PTT - ( 12 Nov 2022 06:58 )  PTT:43.5 sec       PAST MEDICAL & SURGICAL HISTORY:  Diabetes mellitus      Essential hypertension      Hyperlipidemia      CAD (coronary artery disease)  3/10/22 stent to midRCA and RPDA      S/P coronary artery stent placement

## 2022-11-14 ENCOUNTER — TRANSCRIPTION ENCOUNTER (OUTPATIENT)
Age: 52
End: 2022-11-14

## 2022-11-14 LAB
ALBUMIN SERPL ELPH-MCNC: 3.6 G/DL — SIGNIFICANT CHANGE UP (ref 3.3–5)
ALP SERPL-CCNC: 87 U/L — SIGNIFICANT CHANGE UP (ref 40–120)
ALT FLD-CCNC: 20 U/L — SIGNIFICANT CHANGE UP (ref 10–45)
ANION GAP SERPL CALC-SCNC: 11 MMOL/L — SIGNIFICANT CHANGE UP (ref 5–17)
AST SERPL-CCNC: 22 U/L — SIGNIFICANT CHANGE UP (ref 10–40)
BILIRUB SERPL-MCNC: 0.7 MG/DL — SIGNIFICANT CHANGE UP (ref 0.2–1.2)
BLD GP AB SCN SERPL QL: NEGATIVE — SIGNIFICANT CHANGE UP
BUN SERPL-MCNC: 12 MG/DL — SIGNIFICANT CHANGE UP (ref 7–23)
CALCIUM SERPL-MCNC: 9.1 MG/DL — SIGNIFICANT CHANGE UP (ref 8.4–10.5)
CHLORIDE SERPL-SCNC: 104 MMOL/L — SIGNIFICANT CHANGE UP (ref 96–108)
CK MB BLD-MCNC: 0.8 % — SIGNIFICANT CHANGE UP (ref 0–3.5)
CK MB CFR SERPL CALC: 1.2 NG/ML — SIGNIFICANT CHANGE UP (ref 0–3.8)
CK SERPL-CCNC: 145 U/L — SIGNIFICANT CHANGE UP (ref 25–170)
CO2 SERPL-SCNC: 22 MMOL/L — SIGNIFICANT CHANGE UP (ref 22–31)
CREAT SERPL-MCNC: 0.63 MG/DL — SIGNIFICANT CHANGE UP (ref 0.5–1.3)
CULTURE RESULTS: SIGNIFICANT CHANGE UP
EGFR: 108 ML/MIN/1.73M2 — SIGNIFICANT CHANGE UP
GLUCOSE SERPL-MCNC: 174 MG/DL — HIGH (ref 70–99)
HCT VFR BLD CALC: 40.6 % — SIGNIFICANT CHANGE UP (ref 34.5–45)
HGB BLD-MCNC: 13.4 G/DL — SIGNIFICANT CHANGE UP (ref 11.5–15.5)
LMWH PPP CHRO-ACNC: 0.52 IU/ML — SIGNIFICANT CHANGE UP (ref 0.5–1.1)
MAGNESIUM SERPL-MCNC: 1.8 MG/DL — SIGNIFICANT CHANGE UP (ref 1.6–2.6)
MCHC RBC-ENTMCNC: 26.7 PG — LOW (ref 27–34)
MCHC RBC-ENTMCNC: 33 GM/DL — SIGNIFICANT CHANGE UP (ref 32–36)
MCV RBC AUTO: 80.9 FL — SIGNIFICANT CHANGE UP (ref 80–100)
NRBC # BLD: 0 /100 WBCS — SIGNIFICANT CHANGE UP (ref 0–0)
PA ADP PRP-ACNC: 224 PRU — SIGNIFICANT CHANGE UP (ref 194–417)
PLATELET # BLD AUTO: 273 K/UL — SIGNIFICANT CHANGE UP (ref 150–400)
POTASSIUM SERPL-MCNC: 4.4 MMOL/L — SIGNIFICANT CHANGE UP (ref 3.5–5.3)
POTASSIUM SERPL-SCNC: 4.4 MMOL/L — SIGNIFICANT CHANGE UP (ref 3.5–5.3)
PROT SERPL-MCNC: 6.9 G/DL — SIGNIFICANT CHANGE UP (ref 6–8.3)
RBC # BLD: 5.02 M/UL — SIGNIFICANT CHANGE UP (ref 3.8–5.2)
RBC # FLD: 13.6 % — SIGNIFICANT CHANGE UP (ref 10.3–14.5)
RH IG SCN BLD-IMP: POSITIVE — SIGNIFICANT CHANGE UP
SARS-COV-2 RNA SPEC QL NAA+PROBE: SIGNIFICANT CHANGE UP
SODIUM SERPL-SCNC: 137 MMOL/L — SIGNIFICANT CHANGE UP (ref 135–145)
SPECIMEN SOURCE: SIGNIFICANT CHANGE UP
TROPONIN T, HIGH SENSITIVITY RESULT: 58 NG/L — HIGH (ref 0–51)
WBC # BLD: 11.28 K/UL — HIGH (ref 3.8–10.5)
WBC # FLD AUTO: 11.28 K/UL — HIGH (ref 3.8–10.5)

## 2022-11-14 PROCEDURE — ZZZZZ: CPT

## 2022-11-14 RX ORDER — MORPHINE SULFATE 50 MG/1
1 CAPSULE, EXTENDED RELEASE ORAL ONCE
Refills: 0 | Status: DISCONTINUED | OUTPATIENT
Start: 2022-11-14 | End: 2022-11-14

## 2022-11-14 RX ORDER — METOPROLOL TARTRATE 50 MG
100 TABLET ORAL DAILY
Refills: 0 | Status: DISCONTINUED | OUTPATIENT
Start: 2022-11-14 | End: 2022-11-15

## 2022-11-14 RX ORDER — GABAPENTIN 400 MG/1
300 CAPSULE ORAL ONCE
Refills: 0 | Status: COMPLETED | OUTPATIENT
Start: 2022-11-14 | End: 2022-11-15

## 2022-11-14 RX ORDER — METOPROLOL TARTRATE 50 MG
2.5 TABLET ORAL ONCE
Refills: 0 | Status: COMPLETED | OUTPATIENT
Start: 2022-11-14 | End: 2022-11-14

## 2022-11-14 RX ORDER — ACETAMINOPHEN 500 MG
325 TABLET ORAL EVERY 4 HOURS
Refills: 0 | Status: DISCONTINUED | OUTPATIENT
Start: 2022-11-14 | End: 2022-11-15

## 2022-11-14 RX ORDER — ACETAMINOPHEN 500 MG
1000 TABLET ORAL ONCE
Refills: 0 | Status: COMPLETED | OUTPATIENT
Start: 2022-11-14 | End: 2022-11-14

## 2022-11-14 RX ORDER — ASCORBIC ACID 60 MG
2000 TABLET,CHEWABLE ORAL ONCE
Refills: 0 | Status: COMPLETED | OUTPATIENT
Start: 2022-11-14 | End: 2022-11-15

## 2022-11-14 RX ORDER — CHLORHEXIDINE GLUCONATE 213 G/1000ML
1 SOLUTION TOPICAL DAILY
Refills: 0 | Status: DISCONTINUED | OUTPATIENT
Start: 2022-11-14 | End: 2022-11-15

## 2022-11-14 RX ORDER — NITROGLYCERIN 6.5 MG
0.4 CAPSULE, EXTENDED RELEASE ORAL ONCE
Refills: 0 | Status: COMPLETED | OUTPATIENT
Start: 2022-11-14 | End: 2022-11-14

## 2022-11-14 RX ORDER — ONDANSETRON 8 MG/1
4 TABLET, FILM COATED ORAL ONCE
Refills: 0 | Status: COMPLETED | OUTPATIENT
Start: 2022-11-14 | End: 2022-11-14

## 2022-11-14 RX ORDER — METOPROLOL TARTRATE 50 MG
50 TABLET ORAL ONCE
Refills: 0 | Status: COMPLETED | OUTPATIENT
Start: 2022-11-14 | End: 2022-11-14

## 2022-11-14 RX ORDER — ISOSORBIDE MONONITRATE 60 MG/1
30 TABLET, EXTENDED RELEASE ORAL DAILY
Refills: 0 | Status: DISCONTINUED | OUTPATIENT
Start: 2022-11-14 | End: 2022-11-15

## 2022-11-14 RX ORDER — VANCOMYCIN HCL 1 G
1000 VIAL (EA) INTRAVENOUS ONCE
Refills: 0 | Status: DISCONTINUED | OUTPATIENT
Start: 2022-11-15 | End: 2022-11-15

## 2022-11-14 RX ADMIN — SODIUM CHLORIDE 3 MILLILITER(S): 9 INJECTION INTRAMUSCULAR; INTRAVENOUS; SUBCUTANEOUS at 16:57

## 2022-11-14 RX ADMIN — Medication 1000 MILLIGRAM(S): at 17:00

## 2022-11-14 RX ADMIN — Medication 1 TABLET(S): at 11:53

## 2022-11-14 RX ADMIN — Medication 50 MILLIGRAM(S): at 02:16

## 2022-11-14 RX ADMIN — ENOXAPARIN SODIUM 90 MILLIGRAM(S): 100 INJECTION SUBCUTANEOUS at 05:13

## 2022-11-14 RX ADMIN — INSULIN GLARGINE 15 UNIT(S): 100 INJECTION, SOLUTION SUBCUTANEOUS at 22:16

## 2022-11-14 RX ADMIN — MORPHINE SULFATE 1 MILLIGRAM(S): 50 CAPSULE, EXTENDED RELEASE ORAL at 06:03

## 2022-11-14 RX ADMIN — Medication 1000 MILLIGRAM(S): at 17:30

## 2022-11-14 RX ADMIN — ONDANSETRON 4 MILLIGRAM(S): 8 TABLET, FILM COATED ORAL at 02:16

## 2022-11-14 RX ADMIN — CHLORHEXIDINE GLUCONATE 1 APPLICATION(S): 213 SOLUTION TOPICAL at 11:56

## 2022-11-14 RX ADMIN — Medication 2.5 MILLIGRAM(S): at 05:16

## 2022-11-14 RX ADMIN — Medication 1: at 17:02

## 2022-11-14 RX ADMIN — SODIUM CHLORIDE 3 MILLILITER(S): 9 INJECTION INTRAMUSCULAR; INTRAVENOUS; SUBCUTANEOUS at 20:17

## 2022-11-14 RX ADMIN — MORPHINE SULFATE 1 MILLIGRAM(S): 50 CAPSULE, EXTENDED RELEASE ORAL at 00:45

## 2022-11-14 RX ADMIN — ISOSORBIDE MONONITRATE 30 MILLIGRAM(S): 60 TABLET, EXTENDED RELEASE ORAL at 02:16

## 2022-11-14 RX ADMIN — ATORVASTATIN CALCIUM 40 MILLIGRAM(S): 80 TABLET, FILM COATED ORAL at 22:16

## 2022-11-14 RX ADMIN — Medication 100 MILLIGRAM(S): at 09:18

## 2022-11-14 RX ADMIN — SODIUM CHLORIDE 3 MILLILITER(S): 9 INJECTION INTRAMUSCULAR; INTRAVENOUS; SUBCUTANEOUS at 05:11

## 2022-11-14 RX ADMIN — Medication 1 TABLET(S): at 17:02

## 2022-11-14 RX ADMIN — Medication 0.4 MILLIGRAM(S): at 00:18

## 2022-11-14 RX ADMIN — Medication 81 MILLIGRAM(S): at 11:55

## 2022-11-14 NOTE — PROGRESS NOTE ADULT - SUBJECTIVE AND OBJECTIVE BOX
VITAL SIGNS    Telemetry:    Vital Signs Last 24 Hrs  T(C): 36.7 (22 @ 04:45), Max: 36.9 (22 @ 02:12)  T(F): 98 (22 @ 04:45), Max: 98.4 (22 @ 02:12)  HR: 71 (22 @ 09:16) (71 - 98)  BP: 118/72 (22 @ 09:16) (110/70 - 148/86)  RR: 18 (22 @ 09:16) (18 - 18)  SpO2: 98% (22 @ 09:16) (94% - 98%)             @ 07:01  -   @ 07:00  --------------------------------------------------------  IN: 1020 mL / OUT: 800 mL / NET: 220 mL       Daily     Daily Weight in k.2 (2022 08:45)  Admit Wt: Drug Dosing Weight  Height (cm): 165.1 (2022 02:00)  Weight (kg): 90.7 (2022 21:36)  BMI (kg/m2): 33.3 (2022 21:36)  BSA (m2): 1.98 (2022 21:36)    Bilirubin Total, Serum: 0.7 mg/dL ( @ 05:00)    CAPILLARY BLOOD GLUCOSE      POCT Blood Glucose.: 142 mg/dL (2022 09:09)  POCT Blood Glucose.: 240 mg/dL (2022 21:15)  POCT Blood Glucose.: 194 mg/dL (2022 16:33)  POCT Blood Glucose.: 175 mg/dL (2022 11:29)          aspirin enteric coated 81 milliGRAM(s) Oral daily  atorvastatin 40 milliGRAM(s) Oral at bedtime  chlorhexidine 4% Liquid 1 Application(s) Topical daily  insulin glargine Injectable (LANTUS) 15 Unit(s) SubCutaneous at bedtime  insulin lispro (ADMELOG) corrective regimen sliding scale   SubCutaneous three times a day before meals  insulin lispro (ADMELOG) corrective regimen sliding scale   SubCutaneous at bedtime  isosorbide   mononitrate ER Tablet (IMDUR) 30 milliGRAM(s) Oral daily  metoprolol succinate  milliGRAM(s) Oral daily  sodium chloride 0.9% lock flush 3 milliLiter(s) IV Push every 8 hours  trimethoprim  160 mG/sulfamethoxazole 800 mG 1 Tablet(s) Oral two times a day      PHYSICAL EXAM    Subjective: "Hi.   Neurology: alert and oriented x 3, nonfocal, no gross deficits  CV : tele:  RSR  Sternal Wound :  CDI with dressing , Stable  Lungs: clear. RR easy, unlabored   Abdomen: soft, nontender, nondistended, positive bowel sounds, bowel movement   Neg N/V/D   :  pt voiding without difficulty   Extremities:   ZAMAN; edema, neg calf tenderness.   PPP bilaterally      PW:  Chest tubes:                 VITAL SIGNS    Telemetry:  rsr 60-85  Vital Signs Last 24 Hrs  T(C): 36.7 (22 @ 04:45), Max: 36.9 (22 @ 02:12)  T(F): 98 (22 @ 04:45), Max: 98.4 (22 @ 02:12)  HR: 71 (22 @ 09:16) (71 - 98)  BP: 118/72 (22 @ 09:16) (110/70 - 148/86)  RR: 18 (22 @ 09:16) (18 - 18)  SpO2: 98% (22 @ 09:16) (94% - 98%)             @ 07:01  -   @ 07:00  --------------------------------------------------------  IN: 1020 mL / OUT: 800 mL / NET: 220 mL       Daily     Daily Weight in k.2 (2022 08:45)  Admit Wt: Drug Dosing Weight  Height (cm): 165.1 (2022 02:00)  Weight (kg): 90.7 (2022 21:36)  BMI (kg/m2): 33.3 (2022 21:36)  BSA (m2): 1.98 (2022 21:36)    Bilirubin Total, Serum: 0.7 mg/dL ( @ 05:00)    CAPILLARY BLOOD GLUCOSE      POCT Blood Glucose.: 142 mg/dL (2022 09:09)  POCT Blood Glucose.: 240 mg/dL (2022 21:15)  POCT Blood Glucose.: 194 mg/dL (2022 16:33)  POCT Blood Glucose.: 175 mg/dL (2022 11:29)          aspirin enteric coated 81 milliGRAM(s) Oral daily  atorvastatin 40 milliGRAM(s) Oral at bedtime  chlorhexidine 4% Liquid 1 Application(s) Topical daily  insulin glargine Injectable (LANTUS) 15 Unit(s) SubCutaneous at bedtime  insulin lispro (ADMELOG) corrective regimen sliding scale   SubCutaneous three times a day before meals  insulin lispro (ADMELOG) corrective regimen sliding scale   SubCutaneous at bedtime  isosorbide   mononitrate ER Tablet (IMDUR) 30 milliGRAM(s) Oral daily  metoprolol succinate  milliGRAM(s) Oral daily  sodium chloride 0.9% lock flush 3 milliLiter(s) IV Push every 8 hours  trimethoprim  160 mG/sulfamethoxazole 800 mG 1 Tablet(s) Oral two times a day      PHYSICAL EXAM    Subjective: "I want the surgery tomorrow."  Neurology: alert and oriented x 3, nonfocal, no gross deficits  CV : tele:  RSR 60-80  Lungs: clear. RR easy, unlabored   Abdomen: soft, nontender, nondistended, positive bowel sounds, + bowel movement   Neg N/V/D; obese   :  pt voiding without difficulty   Extremities:   ZAMAN; neg LE edema, neg calf tenderness.   PPP bilaterally; left radial cath site cdi zoila- neg hematoma/ bleeding

## 2022-11-14 NOTE — PROGRESS NOTE ADULT - PROBLEM SELECTOR PLAN 1
Pre-Cerda: LAD 70 (IFR 0.8), LCx 90, RCA 90  c/w ASA 81, Atorvastatin 40, Toprol 50  f/u P2Y12  Preop w/u in progress- Carotids, PFTs, TFTs, TTE, T&Sx2, MRSA  Weight based lovenox bid  Tentative OR Date: Friday, 11/18 Pre-Cerda: LAD 70 (IFR 0.8), LCx 90, RCA 90  c/w ASA 81, Atorvastatin 40, Toprol 50  continue imdur   f/u P2Y12- repeat 224   Preop w/u in progress- Carotids, PFTs, TFTs, TTE, T&Sx2, MRSA  Weight based lovenox bid- now d/c for OR 11/15  [facility] 11/15- npo after midnight  repeat covid pcr

## 2022-11-14 NOTE — PHARMACOTHERAPY INTERVENTION NOTE - COMMENTS
Confirmed home medications with patient and Surescripts record, updated in Outpatient Medication Review.     Frandy AllenD, [facility]  Clinical Pharmacy Specialist  [phone] or Teams

## 2022-11-14 NOTE — PROGRESS NOTE ADULT - ASSESSMENT
53 y/o F with pmhx of CAD with multiple stents (3, last one placed in 8/22), DM2, HTN, presented to the ED for new onset chest pain. The patient reported chest pain for 2 weeks, waxes and wanes. Chest pain is pressure like, mainly in the L side of the chest. Reports worsening chest pain with exertion even with minimal activity in the house. The patient recently had a stent placed on 8/22 in RCA. Reports chest pain similar to previous episode in april. The patient also endorsed palpitations during these episodes. No LOC. ROS otherwise negative. The patient still endorsing some chest pain at bedside however it is better than before, however still present. No palpitations at this time. Difficult to obtain hx due to pain. Patient  underwent cath showing LAD 70 (IFR 0.8), LCx 90, RCA 90; Patient was recommended for CTS CABG evaluation; last coronary stents placed in August 2022. Patient's Brilinta was discontinued as per Dr REINALDO [place], in anticipation for CABG evaluation. Patient underwent cath via left radial artery. L radial site is stable. No hematoma. Good cap refill. Patient is being transferred to [facility] for CABG eval. Hospitalist, Dr. Lawrence, on call notified.     Transferred to [facility] [address] for CABG evaluation with Dr. Mace. Preop w/u in progress, no CP, palpitation, SOB, dizziness at time of transfer. All labs and imaging reviewed with Dr. Mace.   11/13 P2y12 131, Continue with BBlocker asa, and statin. Weight based lovenox.  53 y/o F with pmhx of CAD with multiple stents (3, last one placed in 8/22), DM2, HTN, presented to the ED for new onset chest pain. The patient reported chest pain for 2 weeks, waxes and wanes. Chest pain is pressure like, mainly in the L side of the chest. Reports worsening chest pain with exertion even with minimal activity in the house. The patient recently had a stent placed on 8/22 in RCA. Reports chest pain similar to previous episode in april. The patient also endorsed palpitations during these episodes. No LOC. ROS otherwise negative. The patient still endorsing some chest pain at bedside however it is better than before, however still present. No palpitations at this time. Difficult to obtain hx due to pain. Patient  underwent cath showing LAD 70 (IFR 0.8), LCx 90, RCA 90; Patient was recommended for CTS CABG evaluation; last coronary stents placed in August 2022. Patient's Brilinta was discontinued as per Dr REINALDO [place], in anticipation for CABG evaluation. Patient underwent cath via left radial artery. L radial site is stable. No hematoma. Good cap refill. Patient is being transferred to [facility] for CABG eval. Hospitalist, Dr. Lawrence, on call notified.     Transferred to [facility] [address] for CABG evaluation with Dr. Mace. Preop w/u in progress, no CP, palpitation, SOB, dizziness at time of transfer. All labs and imaging reviewed with Dr. Mace.   11/13 P2y12 131, Continue with BBlocker asa, and statin. Weight based lovenox. CP overnight--morphine/ nitro/ imdur initiated ekg done- no changes; ck mb 1.2 troponin 58  11/14 VSS: p2y12 224 today; continue asa/ bb/ statin; d/c lovenox for [facility] in am 11/16- npo after midnight/ preop orders; vanco on call secondary to amox allergy; ck covid pcr; bactrim initiated for uti; ck urine cx - pt asymptomatic

## 2022-11-15 LAB
ALBUMIN SERPL ELPH-MCNC: 3.1 G/DL — LOW (ref 3.3–5)
ALP SERPL-CCNC: 54 U/L — SIGNIFICANT CHANGE UP (ref 40–120)
ALT FLD-CCNC: 33 U/L — SIGNIFICANT CHANGE UP (ref 10–45)
ANION GAP SERPL CALC-SCNC: 9 MMOL/L — SIGNIFICANT CHANGE UP (ref 5–17)
APTT BLD: 38.8 SEC — HIGH (ref 27.5–35.5)
AST SERPL-CCNC: 77 U/L — HIGH (ref 10–40)
BASE EXCESS BLDV CALC-SCNC: -0.3 MMOL/L — SIGNIFICANT CHANGE UP (ref -2–3)
BASOPHILS # BLD AUTO: 0.06 K/UL — SIGNIFICANT CHANGE UP (ref 0–0.2)
BASOPHILS NFR BLD AUTO: 0.4 % — SIGNIFICANT CHANGE UP (ref 0–2)
BILIRUB SERPL-MCNC: 1 MG/DL — SIGNIFICANT CHANGE UP (ref 0.2–1.2)
BLOOD GAS VENOUS - CREATININE: SIGNIFICANT CHANGE UP MG/DL (ref 0.5–1.3)
BUN SERPL-MCNC: 10 MG/DL — SIGNIFICANT CHANGE UP (ref 7–23)
CA-I SERPL-SCNC: 1.18 MMOL/L — SIGNIFICANT CHANGE UP (ref 1.15–1.33)
CALCIUM SERPL-MCNC: 8.1 MG/DL — LOW (ref 8.4–10.5)
CHLORIDE BLDV-SCNC: 106 MMOL/L — SIGNIFICANT CHANGE UP (ref 96–108)
CHLORIDE SERPL-SCNC: 105 MMOL/L — SIGNIFICANT CHANGE UP (ref 96–108)
CK MB BLD-MCNC: 6 % — HIGH (ref 0–3.5)
CK MB CFR SERPL CALC: 61.7 NG/ML — HIGH (ref 0–3.8)
CK SERPL-CCNC: 1027 U/L — HIGH (ref 25–170)
CO2 BLDV-SCNC: 28 MMOL/L — HIGH (ref 22–26)
CO2 SERPL-SCNC: 26 MMOL/L — SIGNIFICANT CHANGE UP (ref 22–31)
CREAT SERPL-MCNC: 0.66 MG/DL — SIGNIFICANT CHANGE UP (ref 0.5–1.3)
EGFR: 107 ML/MIN/1.73M2 — SIGNIFICANT CHANGE UP
EOSINOPHIL # BLD AUTO: 0.09 K/UL — SIGNIFICANT CHANGE UP (ref 0–0.5)
EOSINOPHIL NFR BLD AUTO: 0.5 % — SIGNIFICANT CHANGE UP (ref 0–6)
FIBRINOGEN PPP-MCNC: 307 MG/DL — SIGNIFICANT CHANGE UP (ref 200–445)
GAS PNL BLDA: SIGNIFICANT CHANGE UP
GAS PNL BLDV: 135 MMOL/L — LOW (ref 136–145)
GAS PNL BLDV: SIGNIFICANT CHANGE UP
GAS PNL BLDV: SIGNIFICANT CHANGE UP
GLUCOSE BLDV-MCNC: 154 MG/DL — HIGH (ref 70–99)
GLUCOSE SERPL-MCNC: 187 MG/DL — HIGH (ref 70–99)
HCG UR QL: NEGATIVE — SIGNIFICANT CHANGE UP
HCO3 BLDV-SCNC: 26 MMOL/L — SIGNIFICANT CHANGE UP (ref 22–29)
HCT VFR BLD CALC: 24.9 % — LOW (ref 34.5–45)
HCT VFR BLDA CALC: 24 % — LOW (ref 34.5–46.5)
HEPARINASE TEG R TIME: 14.2 MIN (ref 4.3–8.3)
HGB BLD CALC-MCNC: 8.1 G/DL — LOW (ref 11.7–16.1)
HGB BLD-MCNC: 8.5 G/DL — LOW (ref 11.5–15.5)
IMM GRANULOCYTES NFR BLD AUTO: 1.2 % — HIGH (ref 0–0.9)
INR BLD: 1.21 RATIO — HIGH (ref 0.88–1.16)
LACTATE BLDV-MCNC: 1.1 MMOL/L — SIGNIFICANT CHANGE UP (ref 0.5–2)
LYMPHOCYTES # BLD AUTO: 13.7 % — SIGNIFICANT CHANGE UP (ref 13–44)
LYMPHOCYTES # BLD AUTO: 2.29 K/UL — SIGNIFICANT CHANGE UP (ref 1–3.3)
MAGNESIUM SERPL-MCNC: 2.5 MG/DL — SIGNIFICANT CHANGE UP (ref 1.6–2.6)
MCHC RBC-ENTMCNC: 27.5 PG — SIGNIFICANT CHANGE UP (ref 27–34)
MCHC RBC-ENTMCNC: 34.1 GM/DL — SIGNIFICANT CHANGE UP (ref 32–36)
MCV RBC AUTO: 80.6 FL — SIGNIFICANT CHANGE UP (ref 80–100)
MONOCYTES # BLD AUTO: 1.35 K/UL — HIGH (ref 0–0.9)
MONOCYTES NFR BLD AUTO: 8.1 % — SIGNIFICANT CHANGE UP (ref 2–14)
NEUTROPHILS # BLD AUTO: 12.73 K/UL — HIGH (ref 1.8–7.4)
NEUTROPHILS NFR BLD AUTO: 76.1 % — SIGNIFICANT CHANGE UP (ref 43–77)
NRBC # BLD: 0 /100 WBCS — SIGNIFICANT CHANGE UP (ref 0–0)
PCO2 BLDV: 52 MMHG — HIGH (ref 39–42)
PH BLDV: 7.31 — LOW (ref 7.32–7.43)
PHOSPHATE SERPL-MCNC: 2.9 MG/DL — SIGNIFICANT CHANGE UP (ref 2.5–4.5)
PLATELET # BLD AUTO: 234 K/UL — SIGNIFICANT CHANGE UP (ref 150–400)
PO2 BLDV: 64 MMHG — HIGH (ref 25–45)
POTASSIUM BLDV-SCNC: 4.8 MMOL/L — SIGNIFICANT CHANGE UP (ref 3.5–5.1)
POTASSIUM SERPL-MCNC: 4.7 MMOL/L — SIGNIFICANT CHANGE UP (ref 3.5–5.3)
POTASSIUM SERPL-SCNC: 4.7 MMOL/L — SIGNIFICANT CHANGE UP (ref 3.5–5.3)
PROT SERPL-MCNC: 5.6 G/DL — LOW (ref 6–8.3)
PROTHROM AB SERPL-ACNC: 14.1 SEC — HIGH (ref 10.5–13.4)
RAPIDTEG MAXIMUM AMPLITUDE: 63.3 MM — SIGNIFICANT CHANGE UP (ref 52–70)
RBC # BLD: 3.09 M/UL — LOW (ref 3.8–5.2)
RBC # FLD: 13.9 % — SIGNIFICANT CHANGE UP (ref 10.3–14.5)
SAO2 % BLDV: 92.3 % — HIGH (ref 67–88)
SODIUM SERPL-SCNC: 140 MMOL/L — SIGNIFICANT CHANGE UP (ref 135–145)
TEG FUNCTIONAL FIBRINOGEN: 25 MM — SIGNIFICANT CHANGE UP (ref 15–32)
TEG MAXIMUM AMPLITUDE: 65 MM — SIGNIFICANT CHANGE UP (ref 52–69)
TEG REACTION TIME: 15.2 MIN (ref 4.6–9.1)
TROPONIN T, HIGH SENSITIVITY RESULT: 1065 NG/L — HIGH (ref 0–51)
WBC # BLD: 16.72 K/UL — HIGH (ref 3.8–10.5)
WBC # FLD AUTO: 16.72 K/UL — HIGH (ref 3.8–10.5)

## 2022-11-15 PROCEDURE — 33519 CABG ARTERY-VEIN THREE: CPT

## 2022-11-15 PROCEDURE — 33508 ENDOSCOPIC VEIN HARVEST: CPT | Mod: 59

## 2022-11-15 PROCEDURE — 71045 X-RAY EXAM CHEST 1 VIEW: CPT | Mod: 26

## 2022-11-15 PROCEDURE — 33533 CABG ARTERIAL SINGLE: CPT

## 2022-11-15 PROCEDURE — 99291 CRITICAL CARE FIRST HOUR: CPT

## 2022-11-15 DEVICE — LIGATING CLIPS WECK HORIZON MEDIUM (BLUE) 24: Type: IMPLANTABLE DEVICE | Status: FUNCTIONAL

## 2022-11-15 DEVICE — SURGICEL 2 X 14": Type: IMPLANTABLE DEVICE | Status: FUNCTIONAL

## 2022-11-15 DEVICE — CANNULA AORTIC ROOT 14G X 14CM FLANGED: Type: IMPLANTABLE DEVICE | Status: FUNCTIONAL

## 2022-11-15 DEVICE — CANNULA RCSP 15FR X 12".5" MANUAL-INFLATE CUFF WITH GUIDEWIRE STYLET: Type: IMPLANTABLE DEVICE | Status: FUNCTIONAL

## 2022-11-15 DEVICE — CANNULA VENOUS 2 STAGE LIGHTHOUSE TIP 28-38FR X 1/2" NON-VENTED: Type: IMPLANTABLE DEVICE | Status: FUNCTIONAL

## 2022-11-15 DEVICE — MEDIASTINAL CATH DRAIN 9MM: Type: IMPLANTABLE DEVICE | Status: FUNCTIONAL

## 2022-11-15 DEVICE — LIGATING CLIPS WECK HORIZON SMALL-WIDE (RED) 24: Type: IMPLANTABLE DEVICE | Status: FUNCTIONAL

## 2022-11-15 DEVICE — SHUNT FLO-THRU INTRALUMINAL1.5MM X 18MM: Type: IMPLANTABLE DEVICE | Status: FUNCTIONAL

## 2022-11-15 DEVICE — KIT A-LINE 1LUM 20G X 12CM SAFE KIT: Type: IMPLANTABLE DEVICE | Status: FUNCTIONAL

## 2022-11-15 DEVICE — SHUNT FLO-THRU INTRALUMINAL 1MM X 18MM: Type: IMPLANTABLE DEVICE | Status: FUNCTIONAL

## 2022-11-15 DEVICE — KIT CVC 2LUM MAC 9FR CHG: Type: IMPLANTABLE DEVICE | Status: FUNCTIONAL

## 2022-11-15 DEVICE — CANNULA AORTIC PERFUSION EZ GLIDE STRAIGHT 21FR X 35CM NON-VENTED: Type: IMPLANTABLE DEVICE | Status: FUNCTIONAL

## 2022-11-15 DEVICE — SURGICEL FIBRILLAR 2 X 4": Type: IMPLANTABLE DEVICE | Status: FUNCTIONAL

## 2022-11-15 DEVICE — CANNULA VESSEL 3MM BLUNT TIP CLEAR 1-WAY VALVE: Type: IMPLANTABLE DEVICE | Status: FUNCTIONAL

## 2022-11-15 DEVICE — CATH VENT L 2 LUM 18FRX20IN: Type: IMPLANTABLE DEVICE | Status: FUNCTIONAL

## 2022-11-15 DEVICE — PACING WIRE WHITE M-24 BAREWIRE 37MM X 89MM: Type: IMPLANTABLE DEVICE | Status: FUNCTIONAL

## 2022-11-15 RX ORDER — POTASSIUM CHLORIDE 20 MEQ
10 PACKET (EA) ORAL
Refills: 0 | Status: DISCONTINUED | OUTPATIENT
Start: 2022-11-16 | End: 2022-11-16

## 2022-11-15 RX ORDER — ONDANSETRON 8 MG/1
4 TABLET, FILM COATED ORAL ONCE
Refills: 0 | Status: COMPLETED | OUTPATIENT
Start: 2022-11-15 | End: 2022-11-15

## 2022-11-15 RX ORDER — CHLORHEXIDINE GLUCONATE 213 G/1000ML
1 SOLUTION TOPICAL DAILY
Refills: 0 | Status: COMPLETED | OUTPATIENT
Start: 2022-11-16 | End: 2022-11-20

## 2022-11-15 RX ORDER — FAMOTIDINE 10 MG/ML
20 INJECTION INTRAVENOUS DAILY
Refills: 0 | Status: DISCONTINUED | OUTPATIENT
Start: 2022-11-15 | End: 2022-11-16

## 2022-11-15 RX ORDER — HYDROMORPHONE HYDROCHLORIDE 2 MG/ML
0.5 INJECTION INTRAMUSCULAR; INTRAVENOUS; SUBCUTANEOUS ONCE
Refills: 0 | Status: DISCONTINUED | OUTPATIENT
Start: 2022-11-15 | End: 2022-11-15

## 2022-11-15 RX ORDER — ASPIRIN/CALCIUM CARB/MAGNESIUM 324 MG
300 TABLET ORAL ONCE
Refills: 0 | Status: DISCONTINUED | OUTPATIENT
Start: 2022-11-16 | End: 2022-11-19

## 2022-11-15 RX ORDER — DEXTROSE 50 % IN WATER 50 %
25 SYRINGE (ML) INTRAVENOUS
Refills: 0 | Status: DISCONTINUED | OUTPATIENT
Start: 2022-11-16 | End: 2022-11-21

## 2022-11-15 RX ORDER — SODIUM CHLORIDE 9 MG/ML
500 INJECTION, SOLUTION INTRAVENOUS ONCE
Refills: 0 | Status: COMPLETED | OUTPATIENT
Start: 2022-11-15 | End: 2022-11-15

## 2022-11-15 RX ORDER — POLYETHYLENE GLYCOL 3350 17 G/17G
17 POWDER, FOR SOLUTION ORAL DAILY
Refills: 0 | Status: DISCONTINUED | OUTPATIENT
Start: 2022-11-17 | End: 2022-11-21

## 2022-11-15 RX ORDER — ACETAMINOPHEN 500 MG
650 TABLET ORAL EVERY 6 HOURS
Refills: 0 | Status: DISCONTINUED | OUTPATIENT
Start: 2022-11-19 | End: 2022-11-21

## 2022-11-15 RX ORDER — HYDROMORPHONE HYDROCHLORIDE 2 MG/ML
0.5 INJECTION INTRAMUSCULAR; INTRAVENOUS; SUBCUTANEOUS EVERY 6 HOURS
Refills: 0 | Status: DISCONTINUED | OUTPATIENT
Start: 2022-11-16 | End: 2022-11-17

## 2022-11-15 RX ORDER — CHLORHEXIDINE GLUCONATE 213 G/1000ML
5 SOLUTION TOPICAL
Refills: 0 | Status: DISCONTINUED | OUTPATIENT
Start: 2022-11-16 | End: 2022-11-21

## 2022-11-15 RX ORDER — OXYCODONE HYDROCHLORIDE 5 MG/1
5 TABLET ORAL EVERY 4 HOURS
Refills: 0 | Status: DISCONTINUED | OUTPATIENT
Start: 2022-11-16 | End: 2022-11-21

## 2022-11-15 RX ORDER — GABAPENTIN 400 MG/1
100 CAPSULE ORAL EVERY 8 HOURS
Refills: 0 | Status: DISCONTINUED | OUTPATIENT
Start: 2022-11-16 | End: 2022-11-20

## 2022-11-15 RX ORDER — MEPERIDINE HYDROCHLORIDE 50 MG/ML
25 INJECTION INTRAMUSCULAR; INTRAVENOUS; SUBCUTANEOUS ONCE
Refills: 0 | Status: DISCONTINUED | OUTPATIENT
Start: 2022-11-16 | End: 2022-11-16

## 2022-11-15 RX ORDER — DEXTROSE 50 % IN WATER 50 %
50 SYRINGE (ML) INTRAVENOUS
Refills: 0 | Status: DISCONTINUED | OUTPATIENT
Start: 2022-11-16 | End: 2022-11-21

## 2022-11-15 RX ORDER — ACETAMINOPHEN 500 MG
1000 TABLET ORAL ONCE
Refills: 0 | Status: COMPLETED | OUTPATIENT
Start: 2022-11-15 | End: 2022-11-15

## 2022-11-15 RX ORDER — CALCIUM GLUCONATE 100 MG/ML
1 VIAL (ML) INTRAVENOUS ONCE
Refills: 0 | Status: COMPLETED | OUTPATIENT
Start: 2022-11-15 | End: 2022-11-15

## 2022-11-15 RX ORDER — CHLORHEXIDINE GLUCONATE 213 G/1000ML
1 SOLUTION TOPICAL DAILY
Refills: 0 | Status: DISCONTINUED | OUTPATIENT
Start: 2022-11-16 | End: 2022-11-21

## 2022-11-15 RX ORDER — ASCORBIC ACID 60 MG
500 TABLET,CHEWABLE ORAL
Refills: 0 | Status: COMPLETED | OUTPATIENT
Start: 2022-11-16 | End: 2022-11-20

## 2022-11-15 RX ORDER — SODIUM CHLORIDE 9 MG/ML
1000 INJECTION INTRAMUSCULAR; INTRAVENOUS; SUBCUTANEOUS
Refills: 0 | Status: DISCONTINUED | OUTPATIENT
Start: 2022-11-16 | End: 2022-11-21

## 2022-11-15 RX ORDER — FAMOTIDINE 10 MG/ML
20 INJECTION INTRAVENOUS DAILY
Refills: 0 | Status: DISCONTINUED | OUTPATIENT
Start: 2022-11-15 | End: 2022-11-15

## 2022-11-15 RX ORDER — SENNA PLUS 8.6 MG/1
2 TABLET ORAL AT BEDTIME
Refills: 0 | Status: DISCONTINUED | OUTPATIENT
Start: 2022-11-17 | End: 2022-11-21

## 2022-11-15 RX ORDER — ALBUMIN HUMAN 25 %
100 VIAL (ML) INTRAVENOUS ONCE
Refills: 0 | Status: COMPLETED | OUTPATIENT
Start: 2022-11-15 | End: 2022-11-15

## 2022-11-15 RX ORDER — AMIODARONE HYDROCHLORIDE 400 MG/1
400 TABLET ORAL
Refills: 0 | Status: COMPLETED | OUTPATIENT
Start: 2022-11-16 | End: 2022-11-18

## 2022-11-15 RX ORDER — INSULIN HUMAN 100 [IU]/ML
3 INJECTION, SOLUTION SUBCUTANEOUS
Qty: 100 | Refills: 0 | Status: DISCONTINUED | OUTPATIENT
Start: 2022-11-16 | End: 2022-11-17

## 2022-11-15 RX ORDER — NOREPINEPHRINE BITARTRATE/D5W 8 MG/250ML
0.01 PLASTIC BAG, INJECTION (ML) INTRAVENOUS
Qty: 8 | Refills: 0 | Status: DISCONTINUED | OUTPATIENT
Start: 2022-11-15 | End: 2022-11-16

## 2022-11-15 RX ORDER — OXYCODONE HYDROCHLORIDE 5 MG/1
10 TABLET ORAL EVERY 4 HOURS
Refills: 0 | Status: DISCONTINUED | OUTPATIENT
Start: 2022-11-16 | End: 2022-11-21

## 2022-11-15 RX ORDER — DEXTROSE 50 % IN WATER 50 %
50 SYRINGE (ML) INTRAVENOUS
Refills: 0 | Status: DISCONTINUED | OUTPATIENT
Start: 2022-11-15 | End: 2022-11-21

## 2022-11-15 RX ORDER — INSULIN HUMAN 100 [IU]/ML
2 INJECTION, SOLUTION SUBCUTANEOUS
Qty: 100 | Refills: 0 | Status: DISCONTINUED | OUTPATIENT
Start: 2022-11-15 | End: 2022-11-16

## 2022-11-15 RX ORDER — SODIUM CHLORIDE 9 MG/ML
250 INJECTION, SOLUTION INTRAVENOUS ONCE
Refills: 0 | Status: COMPLETED | OUTPATIENT
Start: 2022-11-15 | End: 2022-11-15

## 2022-11-15 RX ORDER — AZTREONAM 2 G
1000 VIAL (EA) INJECTION EVERY 8 HOURS
Refills: 0 | Status: COMPLETED | OUTPATIENT
Start: 2022-11-15 | End: 2022-11-17

## 2022-11-15 RX ORDER — ACETAMINOPHEN 500 MG
650 TABLET ORAL EVERY 6 HOURS
Refills: 0 | Status: COMPLETED | OUTPATIENT
Start: 2022-11-16 | End: 2022-11-19

## 2022-11-15 RX ORDER — VANCOMYCIN HCL 1 G
1000 VIAL (EA) INTRAVENOUS EVERY 12 HOURS
Refills: 0 | Status: COMPLETED | OUTPATIENT
Start: 2022-11-15 | End: 2022-11-16

## 2022-11-15 RX ADMIN — Medication 250 MILLIGRAM(S): at 21:08

## 2022-11-15 RX ADMIN — SODIUM CHLORIDE 3000 MILLILITER(S): 9 INJECTION, SOLUTION INTRAVENOUS at 18:00

## 2022-11-15 RX ADMIN — Medication 2000 MILLIGRAM(S): at 06:18

## 2022-11-15 RX ADMIN — Medication 400 MILLIGRAM(S): at 21:20

## 2022-11-15 RX ADMIN — Medication 100 MILLIGRAM(S): at 05:10

## 2022-11-15 RX ADMIN — GABAPENTIN 300 MILLIGRAM(S): 400 CAPSULE ORAL at 06:19

## 2022-11-15 RX ADMIN — Medication 50 MILLIGRAM(S): at 21:30

## 2022-11-15 RX ADMIN — Medication 1 TABLET(S): at 05:07

## 2022-11-15 RX ADMIN — SODIUM CHLORIDE 3 MILLILITER(S): 9 INJECTION INTRAMUSCULAR; INTRAVENOUS; SUBCUTANEOUS at 05:13

## 2022-11-15 RX ADMIN — HYDROMORPHONE HYDROCHLORIDE 0.5 MILLIGRAM(S): 2 INJECTION INTRAMUSCULAR; INTRAVENOUS; SUBCUTANEOUS at 20:15

## 2022-11-15 RX ADMIN — ONDANSETRON 4 MILLIGRAM(S): 8 TABLET, FILM COATED ORAL at 23:55

## 2022-11-15 RX ADMIN — SODIUM CHLORIDE 3000 MILLILITER(S): 9 INJECTION, SOLUTION INTRAVENOUS at 20:21

## 2022-11-15 RX ADMIN — Medication 1000 MILLIGRAM(S): at 21:50

## 2022-11-15 RX ADMIN — SODIUM CHLORIDE 1500 MILLILITER(S): 9 INJECTION, SOLUTION INTRAVENOUS at 22:00

## 2022-11-15 RX ADMIN — HYDROMORPHONE HYDROCHLORIDE 0.5 MILLIGRAM(S): 2 INJECTION INTRAMUSCULAR; INTRAVENOUS; SUBCUTANEOUS at 20:00

## 2022-11-15 RX ADMIN — INSULIN HUMAN 2 UNIT(S)/HR: 100 INJECTION, SOLUTION SUBCUTANEOUS at 20:00

## 2022-11-15 RX ADMIN — Medication 1.7 MICROGRAM(S)/KG/MIN: at 21:09

## 2022-11-15 NOTE — PRE-ANESTHESIA EVALUATION ADULT - NSANTHPMHFT_GEN_ALL_CORE
52F pmhx CAD with multiple stents (3, last one placed in 8/22), DM2, HTN, presented to the ED for new onset chest pain. Cardiac catheterization revealed multivessel disease, pt is now scheduled for CABG with Dr. Mace. 52F pmhx CAD with multiple stents (3, last one placed in 8/22), DM2, HTN, presented to the ED for new onset chest pain. Cardiac catheterization revealed multivessel disease, pt is now scheduled for CABG with Dr. Mace.  No current CP, no CHF symptoms, able to lie flat, on RA.  ROS otherwise neg

## 2022-11-15 NOTE — AIRWAY REMOVAL NOTE  ADULT & PEDS - ARTIFICAL AIRWAY REMOVAL COMMENTS
Written order for extubation verified. The patient was identified by full name and birth date compared to the identification band. Present during the procedure was Maribeth Martins RN.

## 2022-11-15 NOTE — PRE-ANESTHESIA EVALUATION ADULT - NSANTHOSAYNRD_GEN_A_CORE
No. MAULIK screening performed.  STOP BANG Legend: 0-2 = LOW Risk; 3-4 = INTERMEDIATE Risk; 5-8 = HIGH Risk

## 2022-11-15 NOTE — PRE-OP CHECKLIST - SELECT TESTS ORDERED
BMP/CBC/PT/PTT/INR/Hepatic Function/Spirometry/Type and Screen/Urinalysis/EKG/CXR/POCT Blood Glucose/COVID-19

## 2022-11-15 NOTE — BRIEF OPERATIVE NOTE - OPERATION/FINDINGS
Aortic XClamp: 96    |    Total CPB: 166  Procedure:  LIMA-D | SVG-LAD | SVG-PDA | SVG-rPL    Left greater saphenous vein harvested endoscopically by MELANY Tiwari / CLEM OLIVA

## 2022-11-15 NOTE — PROGRESS NOTE ADULT - SUBJECTIVE AND OBJECTIVE BOX
HPI:  51 y/o F with pmhx of CAD with multiple stents (3, last one placed in 8/22), DM2, HTN, presented to the ED for new onset chest pain. The patient reported chest pain for 2 weeks, waxes and wanes. Chest pain is pressure like, mainly in the L side of the chest. Reports worsening chest pain with exertion even with minimal activity in the house. The patient recently had a stent placed on 8/22 in RCA. Reports chest pain similar to previous episode in april. The patient also endorsed palpitations during these episodes. No LOC. ROS otherwise negative. The patient still endorsing some chest pain at bedside however it is better than before, however still present. No palpitations at this time. Difficult to obtain hx due to pain. Patient  underwent cath showing LAD 70 (IFR 0.8), LCx 90, RCA 90; Patient was recommended for CTS CABG evaluation; last coronary stents placed in August 2022. Patient's Brilinta was discontinued as per Dr REINALDO Montana, in anticipation for CABG evaluation. Patient underwent cath via left radial artery. L radial site is stable. No hematoma. Good cap refill. Patient is being transferred to [facility] for CABG eval. Hospitalist, Dr. Lawrence, on call notified.     Transferred to [facility] for CABG evaluation with Dr. Mace. Preop w/u in progress, no CP, palpitation, SOB, dizziness at time of transfer. All labs and imaging reviewed with Dr. Mace.      (11 Nov 2022 23:50)      Patient seen and examined at the bedside.    Remained critically ill on continuous ICU monitoring.    OBJECTIVE:  Vital Signs Last 24 Hrs  T(C): 36.7 (15 Nov 2022 20:00), Max: 36.8 (15 Nov 2022 04:11)  T(F): 98.1 (15 Nov 2022 20:00), Max: 98.2 (15 Nov 2022 04:11)  HR: 75 (15 Nov 2022 20:00) (65 - 87)  BP: 125/68 (15 Nov 2022 07:53) (125/68 - 125/68)  BP(mean): --  RR: 13 (15 Nov 2022 20:00) (12 - 20)  SpO2: 100% (15 Nov 2022 20:00) (98% - 100%)    Parameters below as of 15 Nov 2022 20:00  Patient On (Oxygen Delivery Method): ventilator    O2 Concentration (%): 50    Physical Exam:   General: Intubated   Neurology: nonfocal   Eyes: bilateral pupils equal and reactive   ENT/Neck: Neck supple, trachea midline, No JVD   Respiratory: Clear bilaterally   CV: S1S2, no murmurs        [x] Sternal dressing, [x] Mediastinal CT x2, [x] L Pleural CT        [x] Sinus rhythm, [x] Temporary pacing - VVI @80   Abdominal: Soft, NT, ND +BS  Extremities: 1-2+ pedal edema noted, + peripheral pulses   Skin: No Rashes, Hematoma, Ecchymosis                         Assessment:  51 y/o F with pmhx of CAD with multiple stents (3, last one placed in 8/22), DM2, HTN, presented to the ED for new onset chest pain. The patient reported chest pain for 2 weeks, waxes and wanes. Chest pain is pressure like, mainly in the L side of the chest. Reports worsening chest pain with exertion even with minimal activity in the house. The patient recently had a stent placed on 8/22 in RCA. Reports chest pain similar to previous episode in april. The patient also endorsed palpitations during these episodes. No LOC. ROS otherwise negative. The patient still endorsing some chest pain at bedside however it is better than before, however still present. No palpitations at this time. Difficult to obtain hx due to pain. Patient  underwent cath showing LAD 70 (IFR 0.8), LCx 90, RCA 90; Patient was recommended for CTS CABG evaluation; last coronary stents placed in August 2022. Patient's Brilinta was discontinued as per Dr REINALDO Montana, in anticipation for CABG evaluation. Patient underwent cath via left radial artery. L radial site is stable. No hematoma. Good cap refill. Patient is being transferred to [facility] for CABG eval. Hospitalist, Dr. Lawrence, on call notified.     CAD s/p C4L 11/15    Hemodynamic instability   Hypovolemia     Plan:   ***Neuro***  Continue close monitoring of neurological status. Patient is on full vent support.     ***Cardiovascular***  Patient admitted with coronary artery disease s/p C4L on 11/15. Invasive hemodynamic monitoring with a central venous catheter & an A-line were required for the continuous central venous and MAP/BP monitoring to ensure adequate cardiovascular support. Temporary epicardial pacing wires in place.      ***Pulmonary***  Respiratory status required full ventilatory support, close monitoring of respiratory rate and breathing pattern, the following of ABG’s with A-line monitoring, continuous pulse oximetry monitoring.    Mode: AC/ CMV (Assist Control/ Continuous Mandatory Ventilation)  RR (machine): 12  TV (machine): 600  FiO2: 50  PEEP: 5  ITime: 1  MAP: 12  PIP: 28               ***Heme***  Monitor hemoglobin and hematocrit levels.     ***GI***  NPO diet.     ***Renal***  Optimize renal perfusion with adequate volume resuscitation and continued monitoring of urine output, fluid balance, electrolytes, and BUN/Creatinine.      ***ID***  Afebrile, white blood count elevated to 16.72. Continue trending white blood count and monitoring fever curve. Continue with Aztreonam IVPB for bridger-op prophylaxis. Vancomycin  IVPB post operative coverage.     ***Endocrine***  Metabolic stability, history of diabetes mellitus type two required an IV regular Insulin drip while following serial glucose levels to help achieve and maintain euglycemia.          Patient requires continuous monitoring with bedside rhythm monitoring, pulse oximetry monitoring, and continuous central venous and arterial pressure monitoring; and intermittent blood gas analysis. Care plan discussed with the ICU care team.   Patient remained critical, at risk for life threatening decompensation.    I have spent 30 minutes providing critical care management to this patient.    By signing my name below, I, Gemma Cedillo, attest that this documentation has been prepared under the direction and in the presence of Tyra Powers MD   Electronically signed: Kurt Tony, 11-15-22 @ 20:31    I, Tyra Powers, personally performed the services described in this documentation. all medical record entries made by the scribe were at my direction and in my presence. I have reviewed the chart and agree that the record reflects my personal performance and is accurate and complete  Electronically signed: Tyra Powers MD  HPI:  51 y/o F with pmhx of CAD with multiple stents (3, last one placed in 8/22), DM2, HTN, presented to the ED for new onset chest pain. The patient reported chest pain for 2 weeks, waxes and wanes. Chest pain is pressure like, mainly in the L side of the chest. Reports worsening chest pain with exertion even with minimal activity in the house. The patient recently had a stent placed on 8/22 in RCA. Reports chest pain similar to previous episode in april. The patient also endorsed palpitations during these episodes. No LOC. ROS otherwise negative. The patient still endorsing some chest pain at bedside however it is better than before, however still present. No palpitations at this time. Difficult to obtain hx due to pain. Patient  underwent cath showing LAD 70 (IFR 0.8), LCx 90, RCA 90; Patient was recommended for CTS CABG evaluation; last coronary stents placed in August 2022. Patient's Brilinta was discontinued as per Dr REINALDO Montana, in anticipation for CABG evaluation. Patient underwent cath via left radial artery. L radial site is stable. No hematoma. Good cap refill. Patient is being transferred to [facility] for CABG eval. Hospitalist, Dr. Lawrence, on call notified.     Transferred to [facility] for CABG evaluation with Dr. Mace. Preop w/u in progress, no CP, palpitation, SOB, dizziness at time of transfer. All labs and imaging reviewed with Dr. Mace.      (11 Nov 2022 23:50)      Patient seen and examined at the bedside.    Remained critically ill on continuous ICU monitoring.    OBJECTIVE:  Vital Signs Last 24 Hrs  T(C): 36.7 (15 Nov 2022 20:00), Max: 36.8 (15 Nov 2022 04:11)  T(F): 98.1 (15 Nov 2022 20:00), Max: 98.2 (15 Nov 2022 04:11)  HR: 75 (15 Nov 2022 20:00) (65 - 87)  BP: 125/68 (15 Nov 2022 07:53) (125/68 - 125/68)  BP(mean): --  RR: 13 (15 Nov 2022 20:00) (12 - 20)  SpO2: 100% (15 Nov 2022 20:00) (98% - 100%)    Parameters below as of 15 Nov 2022 20:00  Patient On (Oxygen Delivery Method): ventilator    O2 Concentration (%): 50    Physical Exam:   General: Obese female of short stature, intubated  Neurology: nonfocal   Eyes: bilateral pupils equal and reactive   ENT/Neck: Neck supple, trachea midline, No JVD   Respiratory: Clear bilaterally   CV: S1S2, no murmurs        [x] Sternal dressing, [x] Mediastinal CT x2, [x] L Pleural CT        [x] Sinus rhythm, [x] Temporary pacing - VVI @80   Abdominal: Soft, NT, ND +BS  Extremities: No pedal edema noted, + peripheral pulses   Skin: No Rashes, Hematoma, Ecchymosis                         Assessment:  51 y/o F with pmhx of CAD with multiple stents (3, last one placed in 8/22), DM2, HTN, presented to the ED for new onset chest pain. The patient reported chest pain for 2 weeks, waxes and wanes. Chest pain is pressure like, mainly in the L side of the chest. Reports worsening chest pain with exertion even with minimal activity in the house. The patient recently had a stent placed on 8/22 in RCA. Reports chest pain similar to previous episode in april. The patient also endorsed palpitations during these episodes. No LOC. ROS otherwise negative. The patient still endorsing some chest pain at bedside however it is better than before, however still present. No palpitations at this time. Difficult to obtain hx due to pain. Patient  underwent cath showing LAD 70 (IFR 0.8), LCx 90, RCA 90; Patient was recommended for CTS CABG evaluation; last coronary stents placed in August 2022. Patient's Brilinta was discontinued as per Dr REINALDO Montana, in anticipation for CABG evaluation. Patient underwent cath via left radial artery. L radial site is stable. No hematoma. Good cap refill. Patient is being transferred to [facility] for CABG eval. Hospitalist, Dr. Lawrence, on call notified.     CAD s/p C4L 11/15    Hemodynamic instability   Hypovolemia     Plan:   ***Neuro***  Pain medication ordered as needed. Patient is drowsy, but without focal deficit. Continue close monitoring of neurological status.      ***Cardiovascular***  Patient admitted with coronary artery disease s/p C4L on 11/15. Patient has required volume resuscitation and an IV norepinephrine infusion for hypovolemic shock. Invasive hemodynamic monitoring with a central venous catheter & an A-line were required for the continuous central venous and MAP/BP monitoring to ensure adequate cardiovascular support. Temporary epicardial pacing wires in place.      ***Pulmonary***  Respiratory status required full ventilatory support, close monitoring of respiratory rate and breathing pattern, the following of ABG’s with A-line monitoring, continuous pulse oximetry monitoring. Pressure support wean initiated, but patient returned to full ventilator support due to acute drop in blood pressure while weaning, plant to reevaluate when more alert.    Mode: AC/ CMV (Assist Control/ Continuous Mandatory Ventilation)  RR (machine): 12  TV (machine): 600  FiO2: 50  PEEP: 5  ITime: 1  MAP: 12  PIP: 28               ***Heme***  Anemia due to acute blood loss. Once unit of packed red blood cells ordered. Monitor hemoglobin and hematocrit levels.     ***GI***  Pepcid ordered for stress ulcer prophylaxis. NPO diet.     ***Renal***  Optimize renal perfusion with adequate volume resuscitation and continued monitoring of urine output, fluid balance, electrolytes, and BUN/Creatinine.      ***ID***  Afebrile, white blood count elevated to 16.72. Continue trending white blood count and monitoring fever curve. Continue with Aztreonam IVPB for bridger-op prophylaxis. Vancomycin  IVPB post operative coverage.     ***Endocrine***  Metabolic stability, history of diabetes mellitus type two required an IV regular Insulin drip while following serial glucose levels to help achieve and maintain euglycemia.          Patient requires continuous monitoring with bedside rhythm monitoring, pulse oximetry monitoring, and continuous central venous and arterial pressure monitoring; and intermittent blood gas analysis. Care plan discussed with the ICU care team.   Patient remained critical, at risk for life threatening decompensation.    I have spent 40 minutes providing critical care management to this patient.    By signing my name below, I, Gemma Cedillo, attest that this documentation has been prepared under the direction and in the presence of Tyra Powers MD   Electronically signed: Kurt Tony, 11-15-22 @ 20:31    I, Tyra Powers, personally performed the services described in this documentation. all medical record entries made by the scribe were at my direction and in my presence. I have reviewed the chart and agree that the record reflects my personal performance and is accurate and complete  Electronically signed: Tyra Powers MD  HPI:  53 y/o F with pmhx of CAD with multiple stents (3, last one placed in 8/22), DM2, HTN, presented to the ED for new onset chest pain. The patient reported chest pain for 2 weeks, waxes and wanes. Chest pain is pressure like, mainly in the L side of the chest. Reports worsening chest pain with exertion even with minimal activity in the house. The patient recently had a stent placed on 8/22 in RCA. Reports chest pain similar to previous episode in april. The patient also endorsed palpitations during these episodes. No LOC. ROS otherwise negative. The patient still endorsing some chest pain at bedside however it is better than before, however still present. No palpitations at this time. Difficult to obtain hx due to pain. Patient  underwent cath showing LAD 70 (IFR 0.8), LCx 90, RCA 90; Patient was recommended for CTS CABG evaluation; last coronary stents placed in August 2022. Patient's Brilinta was discontinued as per Dr REINALDO Montana, in anticipation for CABG evaluation. Patient underwent cath via left radial artery. L radial site is stable. No hematoma. Good cap refill. Patient is being transferred to [facility] for CABG eval. Hospitalist, Dr. Lawrence, on call notified.     Transferred to [facility] [address] for CABG evaluation with Dr. Mace. Preop w/u in progress, no CP, palpitation, SOB, dizziness at time of transfer. All labs and imaging reviewed with Dr. Mace.      (11 Nov 2022 23:50)      Patient seen and examined at the bedside.    Remained critically ill on continuous ICU monitoring.    OBJECTIVE:  Vital Signs Last 24 Hrs  T(C): 36.7 (15 Nov 2022 20:00), Max: 36.8 (15 Nov 2022 04:11)  T(F): 98.1 (15 Nov 2022 20:00), Max: 98.2 (15 Nov 2022 04:11)  HR: 75 (15 Nov 2022 20:00) (65 - 87)  BP: 125/68 (15 Nov 2022 07:53) (125/68 - 125/68)  BP(mean): --  RR: 13 (15 Nov 2022 20:00) (12 - 20)  SpO2: 100% (15 Nov 2022 20:00) (98% - 100%)    Parameters below as of 15 Nov 2022 20:00  Patient On (Oxygen Delivery Method): ventilator    O2 Concentration (%): 50    Physical Exam:   General: Obese female of short stature, intubated  Neurology: nonfocal   Eyes: bilateral pupils equal and reactive   ENT/Neck: Neck supple, trachea midline, No JVD   Respiratory: Clear bilaterally   CV: S1S2, no murmurs        [x] Sternal dressing, [x] Mediastinal CT x2, [x] L Pleural CT        [x] Sinus rhythm, [x] Temporary pacing - VVI @80   Abdominal: Soft, NT, ND +BS  Extremities: No pedal edema noted, + peripheral pulses   Skin: No Rashes, Hematoma, Ecchymosis                         Assessment:  53 y/o F with pmhx of CAD with multiple stents (3, last one placed in 8/22), DM2, HTN, presented to the ED for new onset chest pain. The patient reported chest pain for 2 weeks, waxes and wanes. Chest pain is pressure like, mainly in the L side of the chest. Reports worsening chest pain with exertion even with minimal activity in the house. The patient recently had a stent placed on 8/22 in RCA. Reports chest pain similar to previous episode in april. The patient also endorsed palpitations during these episodes. No LOC. ROS otherwise negative. The patient still endorsing some chest pain at bedside however it is better than before, however still present. No palpitations at this time. Difficult to obtain hx due to pain. Patient  underwent cath showing LAD 70 (IFR 0.8), LCx 90, RCA 90; Patient was recommended for CTS CABG evaluation; last coronary stents placed in August 2022. Patient's Brilinta was discontinued as per Dr REINALDO Montana, in anticipation for CABG evaluation. Patient underwent cath via left radial artery. L radial site is stable. No hematoma. Good cap refill. Patient is being transferred to [facility] for CABG eval. Hospitalist, Dr. Lawrence, on call notified.     CAD s/p C4L 11/15    Hemodynamic instability   Hypovolemia     Plan:   ***Neuro***  Pain medication ordered as needed. Patient is drowsy, but without focal deficit. Continue close monitoring of neurological status.      ***Cardiovascular***  Patient admitted with coronary artery disease s/p C4L on 11/15. Patient has required volume resuscitation and an IV norepinephrine infusion for hypovolemic shock. A pacing initiated due to symptomatic bradycardia. Invasive hemodynamic monitoring with a central venous catheter & an A-line were required for the continuous central venous and MAP/BP monitoring to ensure adequate cardiovascular support. Temporary epicardial pacing wires in place.      ***Pulmonary***  Respiratory status required full ventilatory support, close monitoring of respiratory rate and breathing pattern, the following of ABG’s with A-line monitoring, continuous pulse oximetry monitoring. Pressure support wean initiated, but patient returned to full ventilator support due to acute drop in blood pressure while weaning, plant to reevaluate when more alert.    Mode: AC/ CMV (Assist Control/ Continuous Mandatory Ventilation)  RR (machine): 12  TV (machine): 600  FiO2: 50  PEEP: 5  ITime: 1  MAP: 12  PIP: 28               ***Heme***  Anemia due to acute blood loss. Once unit of packed red blood cells ordered. Monitor hemoglobin and hematocrit levels.     ***GI***  Pepcid ordered for stress ulcer prophylaxis. NPO diet.     ***Renal***  Optimize renal perfusion with adequate volume resuscitation and continued monitoring of urine output, fluid balance, electrolytes, and BUN/Creatinine.      ***ID***  Afebrile, white blood count elevated to 16.72. Continue trending white blood count and monitoring fever curve. Continue with Aztreonam IVPB for bridger-op prophylaxis. Vancomycin  IVPB post operative coverage.     ***Endocrine***  Metabolic stability, history of diabetes mellitus type two required an IV regular Insulin drip while following serial glucose levels to help achieve and maintain euglycemia.          Patient requires continuous monitoring with bedside rhythm monitoring, pulse oximetry monitoring, and continuous central venous and arterial pressure monitoring; and intermittent blood gas analysis. Care plan discussed with the ICU care team.   Patient remained critical, at risk for life threatening decompensation.    I have spent 40 minutes providing critical care management to this patient.    By signing my name below, I, Gemma Cedillo, attest that this documentation has been prepared under the direction and in the presence of Tyra Powers MD   Electronically signed: Kurt Tony, 11-15-22 @ 20:31    I, Tyra Powers, personally performed the services described in this documentation. all medical record entries made by the yamilkaibpatito were at my direction and in my presence. I have reviewed the chart and agree that the record reflects my personal performance and is accurate and complete  Electronically signed: Tyra Powers MD

## 2022-11-15 NOTE — BRIEF OPERATIVE NOTE - BRIEF OP NOTE DRAINS
1 substernal mediastinal chest tube  1 diaphragmatic mediastinal chest tube  1 left pleural chest tube

## 2022-11-16 LAB
ALBUMIN SERPL ELPH-MCNC: 3 G/DL — LOW (ref 3.3–5)
ALP SERPL-CCNC: 54 U/L — SIGNIFICANT CHANGE UP (ref 40–120)
ALT FLD-CCNC: 46 U/L — HIGH (ref 10–45)
ANION GAP SERPL CALC-SCNC: 8 MMOL/L — SIGNIFICANT CHANGE UP (ref 5–17)
AST SERPL-CCNC: 96 U/L — HIGH (ref 10–40)
BASE EXCESS BLDV CALC-SCNC: -3 MMOL/L — LOW (ref -2–3)
BASE EXCESS BLDV CALC-SCNC: 1 MMOL/L — SIGNIFICANT CHANGE UP (ref -2–3)
BILIRUB SERPL-MCNC: 0.9 MG/DL — SIGNIFICANT CHANGE UP (ref 0.2–1.2)
BLOOD GAS VENOUS - CREATININE: SIGNIFICANT CHANGE UP MG/DL (ref 0.5–1.3)
BLOOD GAS VENOUS - CREATININE: SIGNIFICANT CHANGE UP MG/DL (ref 0.5–1.3)
BUN SERPL-MCNC: 11 MG/DL — SIGNIFICANT CHANGE UP (ref 7–23)
CA-I SERPL-SCNC: 1.15 MMOL/L — SIGNIFICANT CHANGE UP (ref 1.15–1.33)
CA-I SERPL-SCNC: 1.16 MMOL/L — SIGNIFICANT CHANGE UP (ref 1.15–1.33)
CALCIUM SERPL-MCNC: 8 MG/DL — LOW (ref 8.4–10.5)
CHLORIDE BLDV-SCNC: 104 MMOL/L — SIGNIFICANT CHANGE UP (ref 96–108)
CHLORIDE BLDV-SCNC: 105 MMOL/L — SIGNIFICANT CHANGE UP (ref 96–108)
CHLORIDE SERPL-SCNC: 108 MMOL/L — SIGNIFICANT CHANGE UP (ref 96–108)
CO2 BLDV-SCNC: 25 MMOL/L — SIGNIFICANT CHANGE UP (ref 22–26)
CO2 BLDV-SCNC: 29 MMOL/L — HIGH (ref 22–26)
CO2 SERPL-SCNC: 23 MMOL/L — SIGNIFICANT CHANGE UP (ref 22–31)
CREAT SERPL-MCNC: 0.68 MG/DL — SIGNIFICANT CHANGE UP (ref 0.5–1.3)
EGFR: 106 ML/MIN/1.73M2 — SIGNIFICANT CHANGE UP
GAS PNL BLDA: SIGNIFICANT CHANGE UP
GAS PNL BLDV: 133 MMOL/L — LOW (ref 136–145)
GAS PNL BLDV: 137 MMOL/L — SIGNIFICANT CHANGE UP (ref 136–145)
GAS PNL BLDV: SIGNIFICANT CHANGE UP
GLUCOSE BLDV-MCNC: 104 MG/DL — HIGH (ref 70–99)
GLUCOSE BLDV-MCNC: 152 MG/DL — HIGH (ref 70–99)
GLUCOSE SERPL-MCNC: 144 MG/DL — HIGH (ref 70–99)
HCO3 BLDV-SCNC: 23 MMOL/L — SIGNIFICANT CHANGE UP (ref 22–29)
HCO3 BLDV-SCNC: 27 MMOL/L — SIGNIFICANT CHANGE UP (ref 22–29)
HCT VFR BLD CALC: 28 % — LOW (ref 34.5–45)
HCT VFR BLDA CALC: 27 % — LOW (ref 34.5–46.5)
HCT VFR BLDA CALC: 27 % — LOW (ref 34.5–46.5)
HGB BLD CALC-MCNC: 8.9 G/DL — LOW (ref 11.7–16.1)
HGB BLD CALC-MCNC: 8.9 G/DL — LOW (ref 11.7–16.1)
HGB BLD-MCNC: 9.3 G/DL — LOW (ref 11.5–15.5)
HOROWITZ INDEX BLDV+IHG-RTO: 32 — SIGNIFICANT CHANGE UP
HOROWITZ INDEX BLDV+IHG-RTO: 32 — SIGNIFICANT CHANGE UP
LACTATE BLDV-MCNC: 0.8 MMOL/L — SIGNIFICANT CHANGE UP (ref 0.5–2)
LACTATE BLDV-MCNC: 1.3 MMOL/L — SIGNIFICANT CHANGE UP (ref 0.5–2)
MAGNESIUM SERPL-MCNC: 2 MG/DL — SIGNIFICANT CHANGE UP (ref 1.6–2.6)
MCHC RBC-ENTMCNC: 26.9 PG — LOW (ref 27–34)
MCHC RBC-ENTMCNC: 33.2 GM/DL — SIGNIFICANT CHANGE UP (ref 32–36)
MCV RBC AUTO: 80.9 FL — SIGNIFICANT CHANGE UP (ref 80–100)
NRBC # BLD: 0 /100 WBCS — SIGNIFICANT CHANGE UP (ref 0–0)
PCO2 BLDV: 46 MMHG — HIGH (ref 39–42)
PCO2 BLDV: 52 MMHG — HIGH (ref 39–42)
PH BLDV: 7.31 — LOW (ref 7.32–7.43)
PH BLDV: 7.33 — SIGNIFICANT CHANGE UP (ref 7.32–7.43)
PHOSPHATE SERPL-MCNC: 2.2 MG/DL — LOW (ref 2.5–4.5)
PLATELET # BLD AUTO: 219 K/UL — SIGNIFICANT CHANGE UP (ref 150–400)
PO2 BLDV: 36 MMHG — SIGNIFICANT CHANGE UP (ref 25–45)
PO2 BLDV: 39 MMHG — SIGNIFICANT CHANGE UP (ref 25–45)
POTASSIUM BLDV-SCNC: 3.5 MMOL/L — SIGNIFICANT CHANGE UP (ref 3.5–5.1)
POTASSIUM BLDV-SCNC: 4.7 MMOL/L — SIGNIFICANT CHANGE UP (ref 3.5–5.1)
POTASSIUM SERPL-MCNC: 4.2 MMOL/L — SIGNIFICANT CHANGE UP (ref 3.5–5.3)
POTASSIUM SERPL-SCNC: 4.2 MMOL/L — SIGNIFICANT CHANGE UP (ref 3.5–5.3)
PROT SERPL-MCNC: 5.6 G/DL — LOW (ref 6–8.3)
RBC # BLD: 3.46 M/UL — LOW (ref 3.8–5.2)
RBC # FLD: 14 % — SIGNIFICANT CHANGE UP (ref 10.3–14.5)
SAO2 % BLDV: 62.2 % — LOW (ref 67–88)
SAO2 % BLDV: 67.7 % — SIGNIFICANT CHANGE UP (ref 67–88)
SODIUM SERPL-SCNC: 139 MMOL/L — SIGNIFICANT CHANGE UP (ref 135–145)
VANCOMYCIN TROUGH SERPL-MCNC: 4 UG/ML — LOW (ref 10–20)
WBC # BLD: 9.26 K/UL — SIGNIFICANT CHANGE UP (ref 3.8–10.5)
WBC # FLD AUTO: 9.26 K/UL — SIGNIFICANT CHANGE UP (ref 3.8–10.5)

## 2022-11-16 PROCEDURE — 71045 X-RAY EXAM CHEST 1 VIEW: CPT | Mod: 26

## 2022-11-16 PROCEDURE — 99291 CRITICAL CARE FIRST HOUR: CPT

## 2022-11-16 RX ORDER — ASPIRIN/CALCIUM CARB/MAGNESIUM 324 MG
81 TABLET ORAL DAILY
Refills: 0 | Status: DISCONTINUED | OUTPATIENT
Start: 2022-11-16 | End: 2022-11-21

## 2022-11-16 RX ORDER — HYDROMORPHONE HYDROCHLORIDE 2 MG/ML
0.5 INJECTION INTRAMUSCULAR; INTRAVENOUS; SUBCUTANEOUS ONCE
Refills: 0 | Status: DISCONTINUED | OUTPATIENT
Start: 2022-11-16 | End: 2022-11-16

## 2022-11-16 RX ORDER — FUROSEMIDE 40 MG
20 TABLET ORAL ONCE
Refills: 0 | Status: COMPLETED | OUTPATIENT
Start: 2022-11-16 | End: 2022-11-16

## 2022-11-16 RX ORDER — MAGNESIUM SULFATE 500 MG/ML
2 VIAL (ML) INJECTION ONCE
Refills: 0 | Status: COMPLETED | OUTPATIENT
Start: 2022-11-16 | End: 2022-11-16

## 2022-11-16 RX ORDER — ACETAMINOPHEN 500 MG
1000 TABLET ORAL ONCE
Refills: 0 | Status: COMPLETED | OUTPATIENT
Start: 2022-11-16 | End: 2022-11-16

## 2022-11-16 RX ORDER — PANTOPRAZOLE SODIUM 20 MG/1
40 TABLET, DELAYED RELEASE ORAL DAILY
Refills: 0 | Status: DISCONTINUED | OUTPATIENT
Start: 2022-11-16 | End: 2022-11-16

## 2022-11-16 RX ORDER — ASPIRIN/CALCIUM CARB/MAGNESIUM 324 MG
81 TABLET ORAL DAILY
Refills: 0 | Status: DISCONTINUED | OUTPATIENT
Start: 2022-11-16 | End: 2022-11-16

## 2022-11-16 RX ORDER — CHLORHEXIDINE GLUCONATE 213 G/1000ML
15 SOLUTION TOPICAL EVERY 12 HOURS
Refills: 0 | Status: DISCONTINUED | OUTPATIENT
Start: 2022-11-16 | End: 2022-11-16

## 2022-11-16 RX ORDER — INSULIN GLARGINE 100 [IU]/ML
12 INJECTION, SOLUTION SUBCUTANEOUS AT BEDTIME
Refills: 0 | Status: DISCONTINUED | OUTPATIENT
Start: 2022-11-16 | End: 2022-11-18

## 2022-11-16 RX ORDER — ATORVASTATIN CALCIUM 80 MG/1
80 TABLET, FILM COATED ORAL DAILY
Refills: 0 | Status: DISCONTINUED | OUTPATIENT
Start: 2022-11-16 | End: 2022-11-16

## 2022-11-16 RX ORDER — ATORVASTATIN CALCIUM 80 MG/1
80 TABLET, FILM COATED ORAL AT BEDTIME
Refills: 0 | Status: DISCONTINUED | OUTPATIENT
Start: 2022-11-16 | End: 2022-11-21

## 2022-11-16 RX ORDER — SODIUM CHLORIDE 9 MG/ML
1000 INJECTION, SOLUTION INTRAVENOUS
Refills: 0 | Status: DISCONTINUED | OUTPATIENT
Start: 2022-11-16 | End: 2022-11-21

## 2022-11-16 RX ORDER — METOCLOPRAMIDE HCL 10 MG
10 TABLET ORAL EVERY 8 HOURS
Refills: 0 | Status: COMPLETED | OUTPATIENT
Start: 2022-11-16 | End: 2022-11-17

## 2022-11-16 RX ORDER — PANTOPRAZOLE SODIUM 20 MG/1
40 TABLET, DELAYED RELEASE ORAL
Refills: 0 | Status: DISCONTINUED | OUTPATIENT
Start: 2022-11-16 | End: 2022-11-21

## 2022-11-16 RX ORDER — ENOXAPARIN SODIUM 100 MG/ML
40 INJECTION SUBCUTANEOUS ONCE
Refills: 0 | Status: COMPLETED | OUTPATIENT
Start: 2022-11-16 | End: 2022-11-16

## 2022-11-16 RX ORDER — INSULIN LISPRO 100/ML
VIAL (ML) SUBCUTANEOUS
Refills: 0 | Status: DISCONTINUED | OUTPATIENT
Start: 2022-11-16 | End: 2022-11-21

## 2022-11-16 RX ORDER — ALBUMIN HUMAN 25 %
50 VIAL (ML) INTRAVENOUS ONCE
Refills: 0 | Status: COMPLETED | OUTPATIENT
Start: 2022-11-16 | End: 2022-11-16

## 2022-11-16 RX ORDER — INSULIN GLARGINE 100 [IU]/ML
12 INJECTION, SOLUTION SUBCUTANEOUS ONCE
Refills: 0 | Status: COMPLETED | OUTPATIENT
Start: 2022-11-16 | End: 2022-11-16

## 2022-11-16 RX ORDER — POTASSIUM CHLORIDE 20 MEQ
10 PACKET (EA) ORAL
Refills: 0 | Status: COMPLETED | OUTPATIENT
Start: 2022-11-16 | End: 2022-11-16

## 2022-11-16 RX ORDER — INSULIN LISPRO 100/ML
VIAL (ML) SUBCUTANEOUS AT BEDTIME
Refills: 0 | Status: DISCONTINUED | OUTPATIENT
Start: 2022-11-16 | End: 2022-11-21

## 2022-11-16 RX ORDER — ENOXAPARIN SODIUM 100 MG/ML
40 INJECTION SUBCUTANEOUS EVERY 24 HOURS
Refills: 0 | Status: DISCONTINUED | OUTPATIENT
Start: 2022-11-16 | End: 2022-11-21

## 2022-11-16 RX ORDER — DEXTROSE 50 % IN WATER 50 %
15 SYRINGE (ML) INTRAVENOUS ONCE
Refills: 0 | Status: DISCONTINUED | OUTPATIENT
Start: 2022-11-16 | End: 2022-11-21

## 2022-11-16 RX ORDER — GLUCAGON INJECTION, SOLUTION 0.5 MG/.1ML
1 INJECTION, SOLUTION SUBCUTANEOUS ONCE
Refills: 0 | Status: DISCONTINUED | OUTPATIENT
Start: 2022-11-16 | End: 2022-11-21

## 2022-11-16 RX ADMIN — CHLORHEXIDINE GLUCONATE 1 APPLICATION(S): 213 SOLUTION TOPICAL at 22:12

## 2022-11-16 RX ADMIN — Medication 650 MILLIGRAM(S): at 17:36

## 2022-11-16 RX ADMIN — Medication 100 GRAM(S): at 00:09

## 2022-11-16 RX ADMIN — Medication 500 MILLIGRAM(S): at 17:37

## 2022-11-16 RX ADMIN — HYDROMORPHONE HYDROCHLORIDE 0.5 MILLIGRAM(S): 2 INJECTION INTRAMUSCULAR; INTRAVENOUS; SUBCUTANEOUS at 05:45

## 2022-11-16 RX ADMIN — Medication 50 MILLIGRAM(S): at 05:30

## 2022-11-16 RX ADMIN — Medication 1000 MILLIGRAM(S): at 12:45

## 2022-11-16 RX ADMIN — Medication 10 MILLIGRAM(S): at 17:37

## 2022-11-16 RX ADMIN — Medication 10 MILLIGRAM(S): at 22:29

## 2022-11-16 RX ADMIN — HYDROMORPHONE HYDROCHLORIDE 0.5 MILLIGRAM(S): 2 INJECTION INTRAMUSCULAR; INTRAVENOUS; SUBCUTANEOUS at 10:48

## 2022-11-16 RX ADMIN — ATORVASTATIN CALCIUM 80 MILLIGRAM(S): 80 TABLET, FILM COATED ORAL at 22:28

## 2022-11-16 RX ADMIN — Medication 50 MILLIEQUIVALENT(S): at 17:36

## 2022-11-16 RX ADMIN — AMIODARONE HYDROCHLORIDE 400 MILLIGRAM(S): 400 TABLET ORAL at 06:00

## 2022-11-16 RX ADMIN — Medication 650 MILLIGRAM(S): at 06:00

## 2022-11-16 RX ADMIN — Medication 85 MILLIMOLE(S): at 03:00

## 2022-11-16 RX ADMIN — HYDROMORPHONE HYDROCHLORIDE 0.5 MILLIGRAM(S): 2 INJECTION INTRAMUSCULAR; INTRAVENOUS; SUBCUTANEOUS at 02:15

## 2022-11-16 RX ADMIN — ENOXAPARIN SODIUM 40 MILLIGRAM(S): 100 INJECTION SUBCUTANEOUS at 10:25

## 2022-11-16 RX ADMIN — HYDROMORPHONE HYDROCHLORIDE 0.5 MILLIGRAM(S): 2 INJECTION INTRAMUSCULAR; INTRAVENOUS; SUBCUTANEOUS at 22:52

## 2022-11-16 RX ADMIN — Medication 81 MILLIGRAM(S): at 05:00

## 2022-11-16 RX ADMIN — Medication 50 MILLIGRAM(S): at 21:25

## 2022-11-16 RX ADMIN — HYDROMORPHONE HYDROCHLORIDE 0.5 MILLIGRAM(S): 2 INJECTION INTRAMUSCULAR; INTRAVENOUS; SUBCUTANEOUS at 13:48

## 2022-11-16 RX ADMIN — ATORVASTATIN CALCIUM 80 MILLIGRAM(S): 80 TABLET, FILM COATED ORAL at 01:58

## 2022-11-16 RX ADMIN — Medication 50 MILLIGRAM(S): at 13:23

## 2022-11-16 RX ADMIN — Medication 20 MILLIGRAM(S): at 13:33

## 2022-11-16 RX ADMIN — Medication 650 MILLIGRAM(S): at 18:00

## 2022-11-16 RX ADMIN — Medication 400 MILLIGRAM(S): at 12:23

## 2022-11-16 RX ADMIN — AMIODARONE HYDROCHLORIDE 400 MILLIGRAM(S): 400 TABLET ORAL at 17:36

## 2022-11-16 RX ADMIN — Medication 25 GRAM(S): at 01:38

## 2022-11-16 RX ADMIN — HYDROMORPHONE HYDROCHLORIDE 0.5 MILLIGRAM(S): 2 INJECTION INTRAMUSCULAR; INTRAVENOUS; SUBCUTANEOUS at 13:33

## 2022-11-16 RX ADMIN — CHLORHEXIDINE GLUCONATE 1 APPLICATION(S): 213 SOLUTION TOPICAL at 22:11

## 2022-11-16 RX ADMIN — GABAPENTIN 100 MILLIGRAM(S): 400 CAPSULE ORAL at 06:00

## 2022-11-16 RX ADMIN — GABAPENTIN 100 MILLIGRAM(S): 400 CAPSULE ORAL at 13:25

## 2022-11-16 RX ADMIN — Medication 10 MILLIGRAM(S): at 10:24

## 2022-11-16 RX ADMIN — HYDROMORPHONE HYDROCHLORIDE 0.5 MILLIGRAM(S): 2 INJECTION INTRAMUSCULAR; INTRAVENOUS; SUBCUTANEOUS at 22:37

## 2022-11-16 RX ADMIN — Medication 50 MILLILITER(S): at 11:13

## 2022-11-16 RX ADMIN — Medication 250 MILLIGRAM(S): at 22:17

## 2022-11-16 RX ADMIN — Medication 50 MILLIEQUIVALENT(S): at 18:59

## 2022-11-16 RX ADMIN — GABAPENTIN 100 MILLIGRAM(S): 400 CAPSULE ORAL at 22:28

## 2022-11-16 RX ADMIN — HYDROMORPHONE HYDROCHLORIDE 0.5 MILLIGRAM(S): 2 INJECTION INTRAMUSCULAR; INTRAVENOUS; SUBCUTANEOUS at 11:15

## 2022-11-16 RX ADMIN — HYDROMORPHONE HYDROCHLORIDE 0.5 MILLIGRAM(S): 2 INJECTION INTRAMUSCULAR; INTRAVENOUS; SUBCUTANEOUS at 05:30

## 2022-11-16 RX ADMIN — Medication 250 MILLIGRAM(S): at 10:25

## 2022-11-16 RX ADMIN — INSULIN HUMAN 3 UNIT(S)/HR: 100 INJECTION, SOLUTION SUBCUTANEOUS at 20:18

## 2022-11-16 RX ADMIN — Medication 500 MILLIGRAM(S): at 06:00

## 2022-11-16 RX ADMIN — Medication 650 MILLIGRAM(S): at 06:45

## 2022-11-16 RX ADMIN — Medication 50 MILLIEQUIVALENT(S): at 17:51

## 2022-11-16 RX ADMIN — HYDROMORPHONE HYDROCHLORIDE 0.5 MILLIGRAM(S): 2 INJECTION INTRAMUSCULAR; INTRAVENOUS; SUBCUTANEOUS at 02:00

## 2022-11-16 RX ADMIN — SODIUM CHLORIDE 10 MILLILITER(S): 9 INJECTION INTRAMUSCULAR; INTRAVENOUS; SUBCUTANEOUS at 17:53

## 2022-11-16 RX ADMIN — CHLORHEXIDINE GLUCONATE 5 MILLILITER(S): 213 SOLUTION TOPICAL at 06:00

## 2022-11-16 RX ADMIN — PANTOPRAZOLE SODIUM 40 MILLIGRAM(S): 20 TABLET, DELAYED RELEASE ORAL at 06:02

## 2022-11-16 RX ADMIN — INSULIN HUMAN 3 UNIT(S)/HR: 100 INJECTION, SOLUTION SUBCUTANEOUS at 17:52

## 2022-11-16 NOTE — PHYSICAL THERAPY INITIAL EVALUATION ADULT - PLANNED THERAPY INTERVENTIONS, PT EVAL
LTG 1: Stairs - Pt will be independent with negotiation of 1 flight of steps with handrail within 4 weeks./balance training/bed mobility training/gait training/transfer training

## 2022-11-16 NOTE — PROGRESS NOTE ADULT - SUBJECTIVE AND OBJECTIVE BOX
CRITICAL CARE ATTENDING - [facility]ICU    MEDICATIONS  (STANDING):  acetaminophen     Tablet .. 650 milliGRAM(s) Oral every 6 hours  albumin human 25% IVPB 100 milliLiter(s) IV Intermittent once  aMIOdarone    Tablet 400 milliGRAM(s) Oral two times a day  ascorbic acid 500 milliGRAM(s) Oral two times a day  aspirin enteric coated 81 milliGRAM(s) Oral daily  aspirin Suppository 300 milliGRAM(s) Rectal once  atorvastatin 80 milliGRAM(s) Oral at bedtime  aztreonam  IVPB 1000 milliGRAM(s) IV Intermittent every 8 hours  chlorhexidine 0.12% Liquid 5 milliLiter(s) Oral Mucosa two times a day  chlorhexidine 0.12% Liquid 15 milliLiter(s) Oral Mucosa every 12 hours  chlorhexidine 2% Cloths 1 Application(s) Topical daily  chlorhexidine 4% Liquid 1 Application(s) Topical daily  dextrose 50% Injectable 50 milliLiter(s) IV Push every 15 minutes  dextrose 50% Injectable 25 milliLiter(s) IV Push every 15 minutes  dextrose 50% Injectable 50 milliLiter(s) IV Push every 15 minutes  gabapentin 100 milliGRAM(s) Oral every 8 hours  insulin regular Infusion 3 Unit(s)/Hr (3 mL/Hr) IV Continuous <Continuous>  norepinephrine Infusion 0.01 MICROgram(s)/kG/Min (1.7 mL/Hr) IV Continuous <Continuous>  pantoprazole    Tablet 40 milliGRAM(s) Oral before breakfast  potassium chloride  10 mEq/50 mL IVPB 10 milliEquivalent(s) IV Intermittent every 1 hour  potassium chloride  10 mEq/50 mL IVPB 10 milliEquivalent(s) IV Intermittent every 1 hour  potassium chloride  10 mEq/50 mL IVPB 10 milliEquivalent(s) IV Intermittent every 1 hour  sodium chloride 0.9%. 1000 milliLiter(s) (10 mL/Hr) IV Continuous <Continuous>  vancomycin  IVPB 1000 milliGRAM(s) IV Intermittent every 12 hours                                    9.3    9.26  )-----------( 219      ( 2022 00:38 )             28.0       11-16    139  |  108  |  11  ----------------------------<  144<H>  4.2   |  23  |  0.68    Ca    8.0<L>      2022 00:38  Phos  2.2       Mg     2.0         TPro  5.6<L>  /  Alb  3.0<L>  /  TBili  0.9  /  DBili  x   /  AST  96<H>  /  ALT  46<H>  /  AlkPhos  54        PT/INR - ( 15 Nov 2022 16:36 )   PT: 14.1 sec;   INR: 1.21 ratio         PTT - ( 15 Nov 2022 16:36 )  PTT:38.8 sec    Mode: CPAP with PS  FiO2: 50  PEEP: 5  PS: 5  MAP: 8  PIP: 18      Daily Height in cm: 165.1 (15 Nov 2022 07:53)    Daily Weight in k (2022 00:00)       @ 07:  -  11-15 @ 07:00  --------------------------------------------------------  IN: 600 mL / OUT: 300 mL / NET: 300 mL    11-15 @ 07:01  -   @ 06:35  --------------------------------------------------------  IN: 3846.2 mL / OUT: 2215 mL / NET: 1631.2 mL        Critically Ill patient  : [ ] preoperative ,   [x ] post operative    Requires :  [x ] Arterial Line   [x ] Central Line  [x ] PA catheter  [ ] IABP  [ ] ECMO  [ ] LVAD  [x ] Ventilator  [x ] pacemaker [ ] Impella.                      [ x] ABG's     [ x] Pulse Oxymetry Monitoring  Bedside evaluation , monitoring , treatment of hemodynamics , fluids , IVP/ IVCD meds.        Diagnosis:     POD 1- C4L    Hypovolemia     Hypocalcemia     Hemodynamic lability,  instability. Requires IVCD [ ] vasopressors [ ] inotropes  [ ] vasodilator  [x ]IVSS fluid  to maintain MAP, perfusion, C.I.     Ventilator Management:  [x ]AC-rest    [ ]CPAP-PS Wean    [ ]Trach Collar     [ ]Extubate    [ ] T-Piece  [ ]peep>5     Requires  [ ]DDD  [ ]VVI    [ ]AII  temporary pacing at      to maintain HR, MAP, CI, and perfusion.     Chest Tube Drainage/ Management     Requires chest PT, pulmonary toilet, ambu bagging, suctioning to maintain SaO2,  patent airway and treat atelectasis.     CHF- acute [ ]   chronic [ ]    systolic [ ]   diatolic [ ]          - Echo- EF -  60%           [ ] RV dysfunction          - Cxr-cardiomegally, edema          - Clinical-  [ ]inotropes   [ ]pressors   [ ]diuresis   [ ]IABP   [ ]ECMO   [ ]LVAD   [x ]Respiratory Failure.     ASA/ Statin    DM2- IVCD Insulin           I, Kian Chapa, personally performed the services described in this documentation. All medical record entries made by the scribe were at my direction and in my presence. I have reviewed the chart and agree that the record reflects my personal performance and is accurate and complete.   Kian Chapa MD.       By signing my name below, I, Koby Oden, attest that this documentation has been prepared under the direction and in the presence of Kian Chapa MD.   Electronically Signed: Kurt Cain 22 @ 06:35        Discussed with CT surgeon, Physician Assistant - Nurse Practitioner- Critical care medicine team.   Dicussed at  AM / PM rounds.   Chart, labs , films reviewed.    Total Time:  CRITICAL CARE ATTENDING - [facility]    MEDICATIONS  (STANDING):  acetaminophen     Tablet .. 650 milliGRAM(s) Oral every 6 hours  albumin human 25% IVPB 100 milliLiter(s) IV Intermittent once  aMIOdarone    Tablet 400 milliGRAM(s) Oral two times a day  ascorbic acid 500 milliGRAM(s) Oral two times a day  aspirin enteric coated 81 milliGRAM(s) Oral daily  aspirin Suppository 300 milliGRAM(s) Rectal once  atorvastatin 80 milliGRAM(s) Oral at bedtime  aztreonam  IVPB 1000 milliGRAM(s) IV Intermittent every 8 hours  chlorhexidine 0.12% Liquid 5 milliLiter(s) Oral Mucosa two times a day  chlorhexidine 0.12% Liquid 15 milliLiter(s) Oral Mucosa every 12 hours  chlorhexidine 2% Cloths 1 Application(s) Topical daily  chlorhexidine 4% Liquid 1 Application(s) Topical daily  dextrose 50% Injectable 50 milliLiter(s) IV Push every 15 minutes  dextrose 50% Injectable 25 milliLiter(s) IV Push every 15 minutes  dextrose 50% Injectable 50 milliLiter(s) IV Push every 15 minutes  gabapentin 100 milliGRAM(s) Oral every 8 hours  insulin regular Infusion 3 Unit(s)/Hr (3 mL/Hr) IV Continuous <Continuous>  norepinephrine Infusion 0.01 MICROgram(s)/kG/Min (1.7 mL/Hr) IV Continuous <Continuous>  pantoprazole    Tablet 40 milliGRAM(s) Oral before breakfast  potassium chloride  10 mEq/50 mL IVPB 10 milliEquivalent(s) IV Intermittent every 1 hour  potassium chloride  10 mEq/50 mL IVPB 10 milliEquivalent(s) IV Intermittent every 1 hour  potassium chloride  10 mEq/50 mL IVPB 10 milliEquivalent(s) IV Intermittent every 1 hour  sodium chloride 0.9%. 1000 milliLiter(s) (10 mL/Hr) IV Continuous <Continuous>  vancomycin  IVPB 1000 milliGRAM(s) IV Intermittent every 12 hours                                    9.3    9.26  )-----------( 219      ( 2022 00:38 )             28.0       11-16    139  |  108  |  11  ----------------------------<  144<H>  4.2   |  23  |  0.68    Ca    8.0<L>      2022 00:38  Phos  2.2       Mg     2.0         TPro  5.6<L>  /  Alb  3.0<L>  /  TBili  0.9  /  DBili  x   /  AST  96<H>  /  ALT  46<H>  /  AlkPhos  54        PT/INR - ( 15 Nov 2022 16:36 )   PT: 14.1 sec;   INR: 1.21 ratio         PTT - ( 15 Nov 2022 16:36 )  PTT:38.8 sec    Mode: CPAP with PS  FiO2: 50  PEEP: 5  PS: 5  MAP: 8  PIP: 18      Daily Height in cm: 165.1 (15 Nov 2022 07:53)    Daily Weight in k (2022 00:00)       @ 07:  -  11-15 @ 07:00  --------------------------------------------------------  IN: 600 mL / OUT: 300 mL / NET: 300 mL    11-15 @ 07:01  -   @ 06:35  --------------------------------------------------------  IN: 3846.2 mL / OUT: 2215 mL / NET: 1631.2 mL        Critically Ill patient  : [ ] preoperative ,   [x ] post operative    Requires :  [x ] Arterial Line   [x ] Central Line  [x ] PA catheter  [ ] IABP  [ ] ECMO  [ ] LVAD  [ ] Ventilator  [x ] pacemaker [ ] Impella.                      [ x] ABG's     [ x] Pulse Oxymetry Monitoring  Bedside evaluation , monitoring , treatment of hemodynamics , fluids , IVP/ IVCD meds.        Diagnosis:     POD 1- C4L    Hypovolemia     Hypotension      Hypocalcemia     Hemodynamic lability,  instability. Requires IVCD [x ] vasopressors [ ] inotropes  [ ] vasodilator  [x ]IVSS fluid  to maintain MAP, perfusion, C.I.     Ventilator Management:  [x ]AC-rest    [ ]CPAP-PS Wean    [ ]Trach Collar     [ x]Extubate    [ ] T-Piece  [ ]peep>5     Temporary pacemaker (TPM) interrogation and setting.     Chest Tube Drainage/ Management     Requires chest PT, pulmonary toilet, ambu bagging, suctioning to maintain SaO2,  patent airway and treat atelectasis.     ASA/ Statin    DM2- IVCD Insulin     Requires bedside physical therapy, mobilization and total group home care.     Chest pain     Splinting          I, Kian Chapa, personally performed the services described in this documentation. All medical record entries made by the scribe were at my direction and in my presence. I have reviewed the chart and agree that the record reflects my personal performance and is accurate and complete.   Kian Chapa MD.       By signing my name below, I, Koby Oden, attest that this documentation has been prepared under the direction and in the presence of Kian Chapa MD.   Electronically Signed: Kurt Cain 22 @ 06:35        Discussed with CT surgeon, Physician Assistant - Nurse Practitioner- Critical care medicine team.   Dicussed at  AM / PM rounds.   Chart, labs , films reviewed.    Total Time:  30 min

## 2022-11-16 NOTE — PHYSICAL THERAPY INITIAL EVALUATION ADULT - PERTINENT HX OF CURRENT PROBLEM, REHAB EVAL
51 y/o F with pmhx of CAD with multiple stents (3, last one placed in 8/22), DM2, HTN, presented to the ED for new onset chest pain.  Patient underwent cath via left radial artery. now s/p C3L on 10/15/22.

## 2022-11-17 DIAGNOSIS — E11.65 TYPE 2 DIABETES MELLITUS WITH HYPERGLYCEMIA: ICD-10-CM

## 2022-11-17 DIAGNOSIS — Z95.1 PRESENCE OF AORTOCORONARY BYPASS GRAFT: ICD-10-CM

## 2022-11-17 LAB
ALBUMIN SERPL ELPH-MCNC: 3.2 G/DL — LOW (ref 3.3–5)
ALP SERPL-CCNC: 52 U/L — SIGNIFICANT CHANGE UP (ref 40–120)
ALT FLD-CCNC: 44 U/L — SIGNIFICANT CHANGE UP (ref 10–45)
ANION GAP SERPL CALC-SCNC: 7 MMOL/L — SIGNIFICANT CHANGE UP (ref 5–17)
AST SERPL-CCNC: 61 U/L — HIGH (ref 10–40)
BASE EXCESS BLDV CALC-SCNC: 0.3 MMOL/L — SIGNIFICANT CHANGE UP (ref -2–3)
BILIRUB SERPL-MCNC: 0.6 MG/DL — SIGNIFICANT CHANGE UP (ref 0.2–1.2)
BUN SERPL-MCNC: 11 MG/DL — SIGNIFICANT CHANGE UP (ref 7–23)
CALCIUM SERPL-MCNC: 8.1 MG/DL — LOW (ref 8.4–10.5)
CHLORIDE SERPL-SCNC: 105 MMOL/L — SIGNIFICANT CHANGE UP (ref 96–108)
CO2 BLDV-SCNC: 28 MMOL/L — HIGH (ref 22–26)
CO2 SERPL-SCNC: 25 MMOL/L — SIGNIFICANT CHANGE UP (ref 22–31)
CREAT SERPL-MCNC: 0.67 MG/DL — SIGNIFICANT CHANGE UP (ref 0.5–1.3)
EGFR: 106 ML/MIN/1.73M2 — SIGNIFICANT CHANGE UP
GLUCOSE SERPL-MCNC: 122 MG/DL — HIGH (ref 70–99)
HCO3 BLDV-SCNC: 26 MMOL/L — SIGNIFICANT CHANGE UP (ref 22–29)
HCT VFR BLD CALC: 25.4 % — LOW (ref 34.5–45)
HGB BLD-MCNC: 8.4 G/DL — LOW (ref 11.5–15.5)
HOROWITZ INDEX BLDV+IHG-RTO: 32 — SIGNIFICANT CHANGE UP
MAGNESIUM SERPL-MCNC: 2 MG/DL — SIGNIFICANT CHANGE UP (ref 1.6–2.6)
MCHC RBC-ENTMCNC: 27 PG — SIGNIFICANT CHANGE UP (ref 27–34)
MCHC RBC-ENTMCNC: 33.1 GM/DL — SIGNIFICANT CHANGE UP (ref 32–36)
MCV RBC AUTO: 81.7 FL — SIGNIFICANT CHANGE UP (ref 80–100)
NRBC # BLD: 0 /100 WBCS — SIGNIFICANT CHANGE UP (ref 0–0)
PCO2 BLDV: 48 MMHG — HIGH (ref 39–42)
PH BLDV: 7.35 — SIGNIFICANT CHANGE UP (ref 7.32–7.43)
PHOSPHATE SERPL-MCNC: 2.4 MG/DL — LOW (ref 2.5–4.5)
PLATELET # BLD AUTO: 201 K/UL — SIGNIFICANT CHANGE UP (ref 150–400)
PO2 BLDV: 41 MMHG — SIGNIFICANT CHANGE UP (ref 25–45)
POTASSIUM BLDV-SCNC: 3.8 MMOL/L — SIGNIFICANT CHANGE UP (ref 3.5–5.1)
POTASSIUM SERPL-MCNC: 4.1 MMOL/L — SIGNIFICANT CHANGE UP (ref 3.5–5.3)
POTASSIUM SERPL-SCNC: 4.1 MMOL/L — SIGNIFICANT CHANGE UP (ref 3.5–5.3)
PROT SERPL-MCNC: 5.6 G/DL — LOW (ref 6–8.3)
RBC # BLD: 3.11 M/UL — LOW (ref 3.8–5.2)
RBC # FLD: 15.2 % — HIGH (ref 10.3–14.5)
SAO2 % BLDV: 71.6 % — SIGNIFICANT CHANGE UP (ref 67–88)
SODIUM SERPL-SCNC: 137 MMOL/L — SIGNIFICANT CHANGE UP (ref 135–145)
WBC # BLD: 12.24 K/UL — HIGH (ref 3.8–10.5)
WBC # FLD AUTO: 12.24 K/UL — HIGH (ref 3.8–10.5)

## 2022-11-17 PROCEDURE — 93010 ELECTROCARDIOGRAM REPORT: CPT

## 2022-11-17 PROCEDURE — 71045 X-RAY EXAM CHEST 1 VIEW: CPT | Mod: 26

## 2022-11-17 PROCEDURE — 99291 CRITICAL CARE FIRST HOUR: CPT

## 2022-11-17 RX ORDER — POTASSIUM CHLORIDE 20 MEQ
40 PACKET (EA) ORAL ONCE
Refills: 0 | Status: COMPLETED | OUTPATIENT
Start: 2022-11-17 | End: 2022-11-17

## 2022-11-17 RX ORDER — SPIRONOLACTONE 25 MG/1
25 TABLET, FILM COATED ORAL DAILY
Refills: 0 | Status: DISCONTINUED | OUTPATIENT
Start: 2022-11-17 | End: 2022-11-18

## 2022-11-17 RX ORDER — FUROSEMIDE 40 MG
40 TABLET ORAL DAILY
Refills: 0 | Status: DISCONTINUED | OUTPATIENT
Start: 2022-11-17 | End: 2022-11-18

## 2022-11-17 RX ORDER — INSULIN HUMAN 100 [IU]/ML
4 INJECTION, SOLUTION SUBCUTANEOUS ONCE
Refills: 0 | Status: COMPLETED | OUTPATIENT
Start: 2022-11-17 | End: 2022-11-17

## 2022-11-17 RX ORDER — MAGNESIUM SULFATE 500 MG/ML
2 VIAL (ML) INJECTION ONCE
Refills: 0 | Status: COMPLETED | OUTPATIENT
Start: 2022-11-17 | End: 2022-11-17

## 2022-11-17 RX ORDER — METOPROLOL TARTRATE 50 MG
25 TABLET ORAL EVERY 12 HOURS
Refills: 0 | Status: DISCONTINUED | OUTPATIENT
Start: 2022-11-17 | End: 2022-11-19

## 2022-11-17 RX ORDER — FUROSEMIDE 40 MG
40 TABLET ORAL ONCE
Refills: 0 | Status: COMPLETED | OUTPATIENT
Start: 2022-11-17 | End: 2022-11-17

## 2022-11-17 RX ORDER — SIMETHICONE 80 MG/1
80 TABLET, CHEWABLE ORAL ONCE
Refills: 0 | Status: COMPLETED | OUTPATIENT
Start: 2022-11-17 | End: 2022-11-17

## 2022-11-17 RX ORDER — INSULIN HUMAN 100 [IU]/ML
3 INJECTION, SOLUTION SUBCUTANEOUS
Qty: 100 | Refills: 0 | Status: DISCONTINUED | OUTPATIENT
Start: 2022-11-17 | End: 2022-11-18

## 2022-11-17 RX ADMIN — OXYCODONE HYDROCHLORIDE 10 MILLIGRAM(S): 5 TABLET ORAL at 21:22

## 2022-11-17 RX ADMIN — ENOXAPARIN SODIUM 40 MILLIGRAM(S): 100 INJECTION SUBCUTANEOUS at 14:50

## 2022-11-17 RX ADMIN — Medication 81 MILLIGRAM(S): at 11:15

## 2022-11-17 RX ADMIN — INSULIN HUMAN 3 UNIT(S)/HR: 100 INJECTION, SOLUTION SUBCUTANEOUS at 17:15

## 2022-11-17 RX ADMIN — Medication 650 MILLIGRAM(S): at 11:15

## 2022-11-17 RX ADMIN — SENNA PLUS 2 TABLET(S): 8.6 TABLET ORAL at 21:09

## 2022-11-17 RX ADMIN — GABAPENTIN 100 MILLIGRAM(S): 400 CAPSULE ORAL at 06:52

## 2022-11-17 RX ADMIN — Medication 40 MILLIEQUIVALENT(S): at 14:16

## 2022-11-17 RX ADMIN — ATORVASTATIN CALCIUM 80 MILLIGRAM(S): 80 TABLET, FILM COATED ORAL at 21:09

## 2022-11-17 RX ADMIN — GABAPENTIN 100 MILLIGRAM(S): 400 CAPSULE ORAL at 21:09

## 2022-11-17 RX ADMIN — SIMETHICONE 80 MILLIGRAM(S): 80 TABLET, CHEWABLE ORAL at 19:34

## 2022-11-17 RX ADMIN — Medication 10 MILLIGRAM(S): at 21:16

## 2022-11-17 RX ADMIN — HYDROMORPHONE HYDROCHLORIDE 0.5 MILLIGRAM(S): 2 INJECTION INTRAMUSCULAR; INTRAVENOUS; SUBCUTANEOUS at 05:41

## 2022-11-17 RX ADMIN — Medication 500 MILLIGRAM(S): at 17:16

## 2022-11-17 RX ADMIN — Medication 25 MILLIGRAM(S): at 17:15

## 2022-11-17 RX ADMIN — Medication 40 MILLIGRAM(S): at 08:46

## 2022-11-17 RX ADMIN — Medication 25 MILLIGRAM(S): at 06:54

## 2022-11-17 RX ADMIN — CHLORHEXIDINE GLUCONATE 1 APPLICATION(S): 213 SOLUTION TOPICAL at 11:15

## 2022-11-17 RX ADMIN — INSULIN GLARGINE 12 UNIT(S): 100 INJECTION, SOLUTION SUBCUTANEOUS at 22:30

## 2022-11-17 RX ADMIN — OXYCODONE HYDROCHLORIDE 10 MILLIGRAM(S): 5 TABLET ORAL at 21:52

## 2022-11-17 RX ADMIN — POLYETHYLENE GLYCOL 3350 17 GRAM(S): 17 POWDER, FOR SOLUTION ORAL at 11:15

## 2022-11-17 RX ADMIN — SPIRONOLACTONE 25 MILLIGRAM(S): 25 TABLET, FILM COATED ORAL at 08:47

## 2022-11-17 RX ADMIN — Medication 650 MILLIGRAM(S): at 00:28

## 2022-11-17 RX ADMIN — Medication 50 MILLIGRAM(S): at 05:52

## 2022-11-17 RX ADMIN — Medication 650 MILLIGRAM(S): at 17:42

## 2022-11-17 RX ADMIN — CHLORHEXIDINE GLUCONATE 5 MILLILITER(S): 213 SOLUTION TOPICAL at 06:51

## 2022-11-17 RX ADMIN — Medication 4: at 11:13

## 2022-11-17 RX ADMIN — PANTOPRAZOLE SODIUM 40 MILLIGRAM(S): 20 TABLET, DELAYED RELEASE ORAL at 06:52

## 2022-11-17 RX ADMIN — Medication 650 MILLIGRAM(S): at 23:43

## 2022-11-17 RX ADMIN — INSULIN HUMAN 4 UNIT(S): 100 INJECTION, SOLUTION SUBCUTANEOUS at 16:20

## 2022-11-17 RX ADMIN — Medication 650 MILLIGRAM(S): at 06:52

## 2022-11-17 RX ADMIN — HYDROMORPHONE HYDROCHLORIDE 0.5 MILLIGRAM(S): 2 INJECTION INTRAMUSCULAR; INTRAVENOUS; SUBCUTANEOUS at 05:24

## 2022-11-17 RX ADMIN — Medication 650 MILLIGRAM(S): at 17:12

## 2022-11-17 RX ADMIN — Medication 25 GRAM(S): at 02:25

## 2022-11-17 RX ADMIN — Medication 85 MILLIMOLE(S): at 04:11

## 2022-11-17 RX ADMIN — Medication 10 MILLIGRAM(S): at 14:03

## 2022-11-17 RX ADMIN — AMIODARONE HYDROCHLORIDE 400 MILLIGRAM(S): 400 TABLET ORAL at 17:16

## 2022-11-17 RX ADMIN — INSULIN GLARGINE 12 UNIT(S): 100 INJECTION, SOLUTION SUBCUTANEOUS at 00:58

## 2022-11-17 RX ADMIN — GABAPENTIN 100 MILLIGRAM(S): 400 CAPSULE ORAL at 14:03

## 2022-11-17 RX ADMIN — AMIODARONE HYDROCHLORIDE 400 MILLIGRAM(S): 400 TABLET ORAL at 06:51

## 2022-11-17 RX ADMIN — Medication 650 MILLIGRAM(S): at 12:25

## 2022-11-17 RX ADMIN — Medication 40 MILLIGRAM(S): at 11:59

## 2022-11-17 RX ADMIN — Medication 500 MILLIGRAM(S): at 06:51

## 2022-11-17 RX ADMIN — Medication 10 MILLIGRAM(S): at 06:51

## 2022-11-17 NOTE — PROGRESS NOTE ADULT - SUBJECTIVE AND OBJECTIVE BOX
CRITICAL CARE ATTENDING - CTICU    MEDICATIONS  (STANDING):  acetaminophen     Tablet .. 650 milliGRAM(s) Oral every 6 hours  aMIOdarone    Tablet 400 milliGRAM(s) Oral two times a day  ascorbic acid 500 milliGRAM(s) Oral two times a day  aspirin enteric coated 81 milliGRAM(s) Oral daily  aspirin Suppository 300 milliGRAM(s) Rectal once  atorvastatin 80 milliGRAM(s) Oral at bedtime  aztreonam  IVPB 1000 milliGRAM(s) IV Intermittent every 8 hours  chlorhexidine 0.12% Liquid 5 milliLiter(s) Oral Mucosa two times a day  chlorhexidine 2% Cloths 1 Application(s) Topical daily  chlorhexidine 4% Liquid 1 Application(s) Topical daily  dextrose 5%. 1000 milliLiter(s) (50 mL/Hr) IV Continuous <Continuous>  dextrose 5%. 1000 milliLiter(s) (100 mL/Hr) IV Continuous <Continuous>  dextrose 50% Injectable 50 milliLiter(s) IV Push every 15 minutes  dextrose 50% Injectable 25 milliLiter(s) IV Push every 15 minutes  dextrose 50% Injectable 50 milliLiter(s) IV Push every 15 minutes  enoxaparin Injectable 40 milliGRAM(s) SubCutaneous every 24 hours  gabapentin 100 milliGRAM(s) Oral every 8 hours  glucagon  Injectable 1 milliGRAM(s) IntraMuscular once  insulin glargine Injectable (LANTUS) 12 Unit(s) SubCutaneous at bedtime  insulin lispro (ADMELOG) corrective regimen sliding scale   SubCutaneous three times a day before meals  insulin lispro (ADMELOG) corrective regimen sliding scale   SubCutaneous at bedtime  metoclopramide Injectable 10 milliGRAM(s) IV Push every 8 hours  metoprolol tartrate 25 milliGRAM(s) Oral every 12 hours  pantoprazole    Tablet 40 milliGRAM(s) Oral before breakfast  polyethylene glycol 3350 17 Gram(s) Oral daily  senna 2 Tablet(s) Oral at bedtime  sodium chloride 0.9%. 1000 milliLiter(s) (10 mL/Hr) IV Continuous <Continuous>                                    8.4    12.24 )-----------( 201      ( 2022 00:37 )             25.4       11-17    137  |  105  |  11  ----------------------------<  122<H>  4.1   |  25  |  0.67    Ca    8.1<L>      2022 00:37  Phos  2.4       Mg     2.0         TPro  5.6<L>  /  Alb  3.2<L>  /  TBili  0.6  /  DBili  x   /  AST  61<H>  /  ALT  44  /  AlkPhos  52        PT/INR - ( 15 Nov 2022 16:36 )   PT: 14.1 sec;   INR: 1.21 ratio         PTT - ( 15 Nov 2022 16:36 )  PTT:38.8 sec        Daily     Daily Weight in k.7 (2022 00:00)      11-15 @ 07:  -   @ 07:00  --------------------------------------------------------  IN: 3935.2 mL / OUT: 2240 mL / NET: 1695.2 mL     @ 07:  -   @ 06:40  --------------------------------------------------------  IN: 1602.9 mL / OUT: 1940 mL / NET: -337.1 mL        Critically Ill patient  : [ ] preoperative ,   [x ] post operative    Requires :  [x ] Arterial Line   [x ] Central Line  [x ] PA catheter  [ ] IABP  [ ] ECMO  [ ] LVAD  [ ] Ventilator  [x ] pacemaker [ ] Impella.                      [ x] ABG's     [ x] Pulse Oxymetry Monitoring  Bedside evaluation , monitoring , treatment of hemodynamics , fluids , IVP/ IVCD meds.        Diagnosis:     POD 2- C4L    Hypovolemia     Hypotension      Hypocalcemia     Hemodynamic lability,  instability. Requires IVCD [ ] vasopressors [ ] inotropes  [ ] vasodilator  [x ]IVSS fluid  to maintain MAP, perfusion, C.I.     Temporary pacemaker (TPM) interrogation and setting.     Chest Tube Drainage/ Management     Requires chest PT, pulmonary toilet, suctioning to maintain SaO2,  patent airway and treat atelectasis.     ASA/ Statin    DM2- Lantus + ISS    Leukocytosis     Requires bedside physical therapy, mobilization and total senior care care.     Chest pain     Splinting            I, Kian Chapa, personally performed the services described in this documentation. All medical record entries made by the scribe were at my direction and in my presence. I have reviewed the chart and agree that the record reflects my personal performance and is accurate and complete.   Kian Chapa MD.       By signing my name below, I, Koby Oden, attest that this documentation has been prepared under the direction and in the presence of Kian Chapa MD.   Electronically Signed: Kurt Cain 22 @ 06:40        Discussed with CT surgeon, Physician Assistant - Nurse Practitioner- Critical care medicine team.   Dicussed at  AM / PM rounds.   Chart, labs , films reviewed.    Total Time:  CRITICAL CARE ATTENDING - [facility]    MEDICATIONS  (STANDING):  acetaminophen     Tablet .. 650 milliGRAM(s) Oral every 6 hours  aMIOdarone    Tablet 400 milliGRAM(s) Oral two times a day  ascorbic acid 500 milliGRAM(s) Oral two times a day  aspirin enteric coated 81 milliGRAM(s) Oral daily  aspirin Suppository 300 milliGRAM(s) Rectal once  atorvastatin 80 milliGRAM(s) Oral at bedtime  aztreonam  IVPB 1000 milliGRAM(s) IV Intermittent every 8 hours  chlorhexidine 0.12% Liquid 5 milliLiter(s) Oral Mucosa two times a day  chlorhexidine 2% Cloths 1 Application(s) Topical daily  chlorhexidine 4% Liquid 1 Application(s) Topical daily  dextrose 5%. 1000 milliLiter(s) (50 mL/Hr) IV Continuous <Continuous>  dextrose 5%. 1000 milliLiter(s) (100 mL/Hr) IV Continuous <Continuous>  dextrose 50% Injectable 50 milliLiter(s) IV Push every 15 minutes  dextrose 50% Injectable 25 milliLiter(s) IV Push every 15 minutes  dextrose 50% Injectable 50 milliLiter(s) IV Push every 15 minutes  enoxaparin Injectable 40 milliGRAM(s) SubCutaneous every 24 hours  gabapentin 100 milliGRAM(s) Oral every 8 hours  glucagon  Injectable 1 milliGRAM(s) IntraMuscular once  insulin glargine Injectable (LANTUS) 12 Unit(s) SubCutaneous at bedtime  insulin lispro (ADMELOG) corrective regimen sliding scale   SubCutaneous three times a day before meals  insulin lispro (ADMELOG) corrective regimen sliding scale   SubCutaneous at bedtime  metoclopramide Injectable 10 milliGRAM(s) IV Push every 8 hours  metoprolol tartrate 25 milliGRAM(s) Oral every 12 hours  pantoprazole    Tablet 40 milliGRAM(s) Oral before breakfast  polyethylene glycol 3350 17 Gram(s) Oral daily  senna 2 Tablet(s) Oral at bedtime  sodium chloride 0.9%. 1000 milliLiter(s) (10 mL/Hr) IV Continuous <Continuous>                                    8.4    12.24 )-----------( 201      ( 2022 00:37 )             25.4       11-17    137  |  105  |  11  ----------------------------<  122<H>  4.1   |  25  |  0.67    Ca    8.1<L>      2022 00:37  Phos  2.4       Mg     2.0         TPro  5.6<L>  /  Alb  3.2<L>  /  TBili  0.6  /  DBili  x   /  AST  61<H>  /  ALT  44  /  AlkPhos  52        PT/INR - ( 15 Nov 2022 16:36 )   PT: 14.1 sec;   INR: 1.21 ratio         PTT - ( 15 Nov 2022 16:36 )  PTT:38.8 sec        Daily     Daily Weight in k.7 (2022 00:00)      11-15 @ 07:  -   @ 07:00  --------------------------------------------------------  IN: 3935.2 mL / OUT: 2240 mL / NET: 1695.2 mL     @ 07:  -   @ 06:40  --------------------------------------------------------  IN: 1602.9 mL / OUT: 1940 mL / NET: -337.1 mL        Critically Ill patient  : [ ] preoperative ,   [x ] post operative    Requires :  [x ] Arterial Line   [x ] Central Line  [x ] PA catheter  [ ] IABP  [ ] ECMO  [ ] LVAD  [ ] Ventilator  [x ] pacemaker [ ] Impella.                      [ x] ABG's     [ x] Pulse Oxymetry Monitoring  Bedside evaluation , monitoring , treatment of hemodynamics , fluids , IVP/ IVCD meds.        Diagnosis:     POD 2- C4L    Hypovolemia     Hypotension      Hypocalcemia     Hemodynamic lability,  instability. Requires IVCD [ ] vasopressors [ ] inotropes  [ ] vasodilator  [x ]IVSS fluid  to maintain MAP, perfusion, C.I.     Temporary pacemaker (TPM) interrogation and setting.     Chest Tube Drainage/ Management     Requires chest PT, pulmonary toilet, suctioning to maintain SaO2,  patent airway and treat atelectasis.     ASA/ Lopressor     DM2- IVCD Insulin converted to Sliding Scale and Lantus     Leukocytosis     Requires bedside physical therapy, mobilization and total senior care care.     Chest pain     Splinting            I, Kian Chapa, personally performed the services described in this documentation. All medical record entries made by the scribe were at my direction and in my presence. I have reviewed the chart and agree that the record reflects my personal performance and is accurate and complete.   Kian Chapa MD.       By signing my name below, I, Koby Oden, attest that this documentation has been prepared under the direction and in the presence of Kian Chapa MD.   Electronically Signed: Kurt Cain 22 @ 06:40        Discussed with CT surgeon, Physician Assistant - Nurse Practitioner- Critical care medicine team.   Dicussed at  AM / PM rounds.   Chart, labs , films reviewed.    Total Time:  CRITICAL CARE ATTENDING - [facility]    MEDICATIONS  (STANDING):  acetaminophen     Tablet .. 650 milliGRAM(s) Oral every 6 hours  aMIOdarone    Tablet 400 milliGRAM(s) Oral two times a day  ascorbic acid 500 milliGRAM(s) Oral two times a day  aspirin enteric coated 81 milliGRAM(s) Oral daily  aspirin Suppository 300 milliGRAM(s) Rectal once  atorvastatin 80 milliGRAM(s) Oral at bedtime  aztreonam  IVPB 1000 milliGRAM(s) IV Intermittent every 8 hours  chlorhexidine 0.12% Liquid 5 milliLiter(s) Oral Mucosa two times a day  chlorhexidine 2% Cloths 1 Application(s) Topical daily  chlorhexidine 4% Liquid 1 Application(s) Topical daily  dextrose 5%. 1000 milliLiter(s) (50 mL/Hr) IV Continuous <Continuous>  dextrose 5%. 1000 milliLiter(s) (100 mL/Hr) IV Continuous <Continuous>  dextrose 50% Injectable 50 milliLiter(s) IV Push every 15 minutes  dextrose 50% Injectable 25 milliLiter(s) IV Push every 15 minutes  dextrose 50% Injectable 50 milliLiter(s) IV Push every 15 minutes  enoxaparin Injectable 40 milliGRAM(s) SubCutaneous every 24 hours  gabapentin 100 milliGRAM(s) Oral every 8 hours  glucagon  Injectable 1 milliGRAM(s) IntraMuscular once  insulin glargine Injectable (LANTUS) 12 Unit(s) SubCutaneous at bedtime  insulin lispro (ADMELOG) corrective regimen sliding scale   SubCutaneous three times a day before meals  insulin lispro (ADMELOG) corrective regimen sliding scale   SubCutaneous at bedtime  metoclopramide Injectable 10 milliGRAM(s) IV Push every 8 hours  metoprolol tartrate 25 milliGRAM(s) Oral every 12 hours  pantoprazole    Tablet 40 milliGRAM(s) Oral before breakfast  polyethylene glycol 3350 17 Gram(s) Oral daily  senna 2 Tablet(s) Oral at bedtime  sodium chloride 0.9%. 1000 milliLiter(s) (10 mL/Hr) IV Continuous <Continuous>                                    8.4    12.24 )-----------( 201      ( 2022 00:37 )             25.4       11-17    137  |  105  |  11  ----------------------------<  122<H>  4.1   |  25  |  0.67    Ca    8.1<L>      2022 00:37  Phos  2.4       Mg     2.0         TPro  5.6<L>  /  Alb  3.2<L>  /  TBili  0.6  /  DBili  x   /  AST  61<H>  /  ALT  44  /  AlkPhos  52        PT/INR - ( 15 Nov 2022 16:36 )   PT: 14.1 sec;   INR: 1.21 ratio         PTT - ( 15 Nov 2022 16:36 )  PTT:38.8 sec        Daily     Daily Weight in k.7 (2022 00:00)      11-15 @ 07:  -   @ 07:00  --------------------------------------------------------  IN: 3935.2 mL / OUT: 2240 mL / NET: 1695.2 mL     @ 07:  -   @ 06:40  --------------------------------------------------------  IN: 1602.9 mL / OUT: 1940 mL / NET: -337.1 mL        Critically Ill patient  : [ ] preoperative ,   [x ] post operative    Requires :  [x ] Arterial Line   [x ] Central Line  [ ] PA catheter  [ ] IABP  [ ] ECMO  [ ] LVAD  [ ] Ventilator  [x ] pacemaker - TPM  [ ] Impella.                      [ x] ABG's     [ x] Pulse Oxymetry Monitoring  Bedside evaluation , monitoring , treatment of hemodynamics , fluids , IVP/ IVCD meds.        Diagnosis:     POD 2- C4L    Hypovolemia     Hypotension      Hypocalcemia     Hemodynamic lability,  instability. Requires IVCD [ ] vasopressors [ ] inotropes  [ ] vasodilator  [x ]IVSS fluid  to maintain MAP, perfusion, C.I.     Temporary pacemaker (TPM) interrogation and setting.     Chest Tube Drainage/ Management     Requires chest PT, pulmonary toilet, suctioning to maintain SaO2,  patent airway and treat atelectasis.     ASA/ Lopressor     DM2- IVCD Insulin converted to Sliding Scale and Lantus     Leukocytosis     Requires bedside physical therapy, mobilization and total detention care.     Chest pain     Splinting            I, Kian Chapa, personally performed the services described in this documentation. All medical record entries made by the scribe were at my direction and in my presence. I have reviewed the chart and agree that the record reflects my personal performance and is accurate and complete.   Kian Chapa MD.       By signing my name below, I, Koby Oden, attest that this documentation has been prepared under the direction and in the presence of Kian Chapa MD.   Electronically Signed: Kurt Cain 22 @ 06:40        Discussed with CT surgeon, Physician Assistant - Nurse Practitioner- Critical care medicine team.   Dicussed at  AM / PM rounds.   Chart, labs , films reviewed.    Total Time:  30 min

## 2022-11-17 NOTE — PROGRESS NOTE ADULT - SUBJECTIVE AND OBJECTIVE BOX
VITAL SIGNS    Telemetry:  sr 80    Vital Signs Last 24 Hrs  T(C): 36.8 (22 @ 16:47), Max: 37.8 (22 @ 20:00)  T(F): 98.3 (22 @ 16:47), Max: 100 (22 @ 20:00)  HR: 77 (22 @ 16:47) (66 - 84)  BP: 135/63 (22 @ 16:47) (113/62 - 142/66)  RR: 18 (22 @ 16:47) (5 - 21)  SpO2: 97% (22 @ 16:47) (96% - 100%)                    @ 07:01  -   @ 07:00  --------------------------------------------------------  IN: 1746.2 mL / OUT: 1940 mL / NET: -193.8 mL     @ 07:01  -   @ 17:38  --------------------------------------------------------  IN: 683.9 mL / OUT: 1950 mL / NET: -1266.1 mL          Daily     Daily Weight in k.7 (2022 00:00)            CAPILLARY BLOOD GLUCOSE  147 (2022 07:00)  119 (2022 02:00)  119 (2022 01:00)  121 (2022 00:00)  138 (2022 23:00)  132 (2022 22:00)  134 (2022 21:00)  142 (2022 20:00)  152 (2022 19:00)  123 (2022 18:00)      POCT Blood Glucose.: 250 mg/dL (2022 15:33)  POCT Blood Glucose.: 227 mg/dL (2022 11:02)  POCT Blood Glucose.: 147 mg/dL (2022 06:57)  POCT Blood Glucose.: 147 mg/dL (2022 03:58)  POCT Blood Glucose.: 119 mg/dL (2022 01:53)  POCT Blood Glucose.: 119 mg/dL (2022 00:56)  POCT Blood Glucose.: 121 mg/dL (2022 23:57)  POCT Blood Glucose.: 138 mg/dL (2022 22:55)  POCT Blood Glucose.: 132 mg/dL (2022 22:15)  POCT Blood Glucose.: 134 mg/dL (2022 20:59)  POCT Blood Glucose.: 142 mg/dL (2022 20:15)  POCT Blood Glucose.: 123 mg/dL (2022 17:59)            Drains:         Pacing Wires        [ x ]   Settings: vvi                                 Isolated  [  ]    Coumadin    [ ] YES          [ x ]      NO                                   PHYSICAL EXAM        Neurology: alert and oriented x 3, nonfocal, no gross deficits  CV : s1 s2 RRR  r ij  Sternal Wound :  CDI , Stable  Lungs: cta  Abdomen: soft, nontender, nondistended, positive bowel sounds, last bowel movement                        :    voiding       Extremities:    trace  edema   /  -   calve tenderness ,    L leg    incisions cdi          acetaminophen     Tablet .. 650 milliGRAM(s) Oral every 6 hours  aMIOdarone    Tablet 400 milliGRAM(s) Oral two times a day  ascorbic acid 500 milliGRAM(s) Oral two times a day  aspirin enteric coated 81 milliGRAM(s) Oral daily  aspirin Suppository 300 milliGRAM(s) Rectal once  atorvastatin 80 milliGRAM(s) Oral at bedtime  chlorhexidine 0.12% Liquid 5 milliLiter(s) Oral Mucosa two times a day  chlorhexidine 2% Cloths 1 Application(s) Topical daily  chlorhexidine 4% Liquid 1 Application(s) Topical daily  dextrose 5%. 1000 milliLiter(s) IV Continuous <Continuous>  dextrose 5%. 1000 milliLiter(s) IV Continuous <Continuous>  dextrose 50% Injectable 50 milliLiter(s) IV Push every 15 minutes  dextrose 50% Injectable 25 milliLiter(s) IV Push every 15 minutes  dextrose 50% Injectable 50 milliLiter(s) IV Push every 15 minutes  dextrose Oral Gel 15 Gram(s) Oral once PRN  enoxaparin Injectable 40 milliGRAM(s) SubCutaneous every 24 hours  furosemide    Tablet 40 milliGRAM(s) Oral daily  gabapentin 100 milliGRAM(s) Oral every 8 hours  glucagon  Injectable 1 milliGRAM(s) IntraMuscular once  HYDROmorphone  Injectable 0.5 milliGRAM(s) IV Push every 6 hours PRN  insulin glargine Injectable (LANTUS) 12 Unit(s) SubCutaneous at bedtime  insulin lispro (ADMELOG) corrective regimen sliding scale   SubCutaneous three times a day before meals  insulin lispro (ADMELOG) corrective regimen sliding scale   SubCutaneous at bedtime  insulin regular Infusion 3 Unit(s)/Hr IV Continuous <Continuous>  metoclopramide Injectable 10 milliGRAM(s) IV Push every 8 hours  metoprolol tartrate 25 milliGRAM(s) Oral every 12 hours  oxyCODONE    IR 5 milliGRAM(s) Oral every 4 hours PRN  oxyCODONE    IR 10 milliGRAM(s) Oral every 4 hours PRN  pantoprazole    Tablet 40 milliGRAM(s) Oral before breakfast  polyethylene glycol 3350 17 Gram(s) Oral daily  senna 2 Tablet(s) Oral at bedtime  sodium chloride 0.9%. 1000 milliLiter(s) IV Continuous <Continuous>  spironolactone 25 milliGRAM(s) Oral daily                    Physical Therapy Rec:   Home  [  ]   Home w/ PT  [  ]  Rehab  [  ]  Discussed with Cardiothoracic Team at AM rounds.

## 2022-11-17 NOTE — PROGRESS NOTE ADULT - ASSESSMENT
51 y/o F with pmhx of CAD with multiple stents (3, last one placed in 8/22), DM2, HTN, presented to the ED for new onset chest pain. The patient reported chest pain for 2 weeks, waxes and wanes. Chest pain is pressure like, mainly in the L side of the chest. Reports worsening chest pain with exertion even with minimal activity in the house. The patient recently had a stent placed on 8/22 in RCA. Reports chest pain similar to previous episode in april. The patient also endorsed palpitations during these episodes. No LOC. ROS otherwise negative. The patient still endorsing some chest pain at bedside however it is better than before, however still present. No palpitations at this time. Difficult to obtain hx due to pain. Patient  underwent cath showing LAD 70 (IFR 0.8), LCx 90, RCA 90; Patient was recommended for CTS CABG evaluation; last coronary stents placed in August 2022. Patient's Brilinta was discontinued as per Dr REINALDO Montana, in anticipation for CABG evaluation. Patient underwent cath via left radial artery. L radial site is stable. No hematoma. Good cap refill. Patient is being transferred to [facility] for CABG eval. Hospitalist, Dr. Lawrence, on call notified.   11/15/22 s/p cabg x 4 LIMA-D | SVG-LAD | SVG-PDA | SVG-rPL  Left greater saphenous vein harvested endoscopically  Insulin infusion, endo consulted  11/17 transferred to [facility]

## 2022-11-18 DIAGNOSIS — E78.5 HYPERLIPIDEMIA, UNSPECIFIED: ICD-10-CM

## 2022-11-18 LAB
ALBUMIN SERPL ELPH-MCNC: 3 G/DL — LOW (ref 3.3–5)
ALP SERPL-CCNC: 58 U/L — SIGNIFICANT CHANGE UP (ref 40–120)
ALT FLD-CCNC: 47 U/L — HIGH (ref 10–45)
ANION GAP SERPL CALC-SCNC: 8 MMOL/L — SIGNIFICANT CHANGE UP (ref 5–17)
AST SERPL-CCNC: 47 U/L — HIGH (ref 10–40)
BILIRUB SERPL-MCNC: 0.7 MG/DL — SIGNIFICANT CHANGE UP (ref 0.2–1.2)
BUN SERPL-MCNC: 15 MG/DL — SIGNIFICANT CHANGE UP (ref 7–23)
CALCIUM SERPL-MCNC: 8.5 MG/DL — SIGNIFICANT CHANGE UP (ref 8.4–10.5)
CHLORIDE SERPL-SCNC: 103 MMOL/L — SIGNIFICANT CHANGE UP (ref 96–108)
CO2 SERPL-SCNC: 29 MMOL/L — SIGNIFICANT CHANGE UP (ref 22–31)
CREAT SERPL-MCNC: 0.66 MG/DL — SIGNIFICANT CHANGE UP (ref 0.5–1.3)
EGFR: 107 ML/MIN/1.73M2 — SIGNIFICANT CHANGE UP
GLUCOSE SERPL-MCNC: 83 MG/DL — SIGNIFICANT CHANGE UP (ref 70–99)
HCT VFR BLD CALC: 25.5 % — LOW (ref 34.5–45)
HGB BLD-MCNC: 8.3 G/DL — LOW (ref 11.5–15.5)
MAGNESIUM SERPL-MCNC: 2 MG/DL — SIGNIFICANT CHANGE UP (ref 1.6–2.6)
MCHC RBC-ENTMCNC: 26.9 PG — LOW (ref 27–34)
MCHC RBC-ENTMCNC: 32.5 GM/DL — SIGNIFICANT CHANGE UP (ref 32–36)
MCV RBC AUTO: 82.5 FL — SIGNIFICANT CHANGE UP (ref 80–100)
NRBC # BLD: 0 /100 WBCS — SIGNIFICANT CHANGE UP (ref 0–0)
PHOSPHATE SERPL-MCNC: 1.9 MG/DL — LOW (ref 2.5–4.5)
PLATELET # BLD AUTO: 229 K/UL — SIGNIFICANT CHANGE UP (ref 150–400)
POTASSIUM SERPL-MCNC: 3.8 MMOL/L — SIGNIFICANT CHANGE UP (ref 3.5–5.3)
POTASSIUM SERPL-SCNC: 3.8 MMOL/L — SIGNIFICANT CHANGE UP (ref 3.5–5.3)
PROT SERPL-MCNC: 5.9 G/DL — LOW (ref 6–8.3)
RBC # BLD: 3.09 M/UL — LOW (ref 3.8–5.2)
RBC # FLD: 15.2 % — HIGH (ref 10.3–14.5)
SODIUM SERPL-SCNC: 140 MMOL/L — SIGNIFICANT CHANGE UP (ref 135–145)
WBC # BLD: 13.33 K/UL — HIGH (ref 3.8–10.5)
WBC # FLD AUTO: 13.33 K/UL — HIGH (ref 3.8–10.5)

## 2022-11-18 PROCEDURE — 71045 X-RAY EXAM CHEST 1 VIEW: CPT | Mod: 26

## 2022-11-18 PROCEDURE — 99254 IP/OBS CNSLTJ NEW/EST MOD 60: CPT

## 2022-11-18 RX ORDER — POTASSIUM CHLORIDE 20 MEQ
20 PACKET (EA) ORAL ONCE
Refills: 0 | Status: COMPLETED | OUTPATIENT
Start: 2022-11-18 | End: 2022-11-18

## 2022-11-18 RX ORDER — FUROSEMIDE 40 MG
40 TABLET ORAL ONCE
Refills: 0 | Status: COMPLETED | OUTPATIENT
Start: 2022-11-18 | End: 2022-11-18

## 2022-11-18 RX ORDER — SPIRONOLACTONE 25 MG/1
50 TABLET, FILM COATED ORAL
Refills: 0 | Status: DISCONTINUED | OUTPATIENT
Start: 2022-11-19 | End: 2022-11-21

## 2022-11-18 RX ORDER — INSULIN GLARGINE 100 [IU]/ML
25 INJECTION, SOLUTION SUBCUTANEOUS AT BEDTIME
Refills: 0 | Status: DISCONTINUED | OUTPATIENT
Start: 2022-11-18 | End: 2022-11-20

## 2022-11-18 RX ORDER — ONDANSETRON 8 MG/1
4 TABLET, FILM COATED ORAL ONCE
Refills: 0 | Status: COMPLETED | OUTPATIENT
Start: 2022-11-18 | End: 2022-11-18

## 2022-11-18 RX ORDER — INSULIN LISPRO 100/ML
8 VIAL (ML) SUBCUTANEOUS
Refills: 0 | Status: DISCONTINUED | OUTPATIENT
Start: 2022-11-18 | End: 2022-11-19

## 2022-11-18 RX ORDER — SPIRONOLACTONE 25 MG/1
25 TABLET, FILM COATED ORAL ONCE
Refills: 0 | Status: COMPLETED | OUTPATIENT
Start: 2022-11-18 | End: 2022-11-18

## 2022-11-18 RX ORDER — INSULIN LISPRO 100/ML
4 VIAL (ML) SUBCUTANEOUS
Refills: 0 | Status: DISCONTINUED | OUTPATIENT
Start: 2022-11-18 | End: 2022-11-18

## 2022-11-18 RX ADMIN — Medication 500 MILLIGRAM(S): at 17:16

## 2022-11-18 RX ADMIN — Medication 4: at 11:55

## 2022-11-18 RX ADMIN — OXYCODONE HYDROCHLORIDE 5 MILLIGRAM(S): 5 TABLET ORAL at 13:43

## 2022-11-18 RX ADMIN — Medication 4: at 17:14

## 2022-11-18 RX ADMIN — INSULIN GLARGINE 25 UNIT(S): 100 INJECTION, SOLUTION SUBCUTANEOUS at 21:01

## 2022-11-18 RX ADMIN — Medication 25 MILLIGRAM(S): at 17:16

## 2022-11-18 RX ADMIN — Medication 650 MILLIGRAM(S): at 13:43

## 2022-11-18 RX ADMIN — CHLORHEXIDINE GLUCONATE 5 MILLILITER(S): 213 SOLUTION TOPICAL at 05:20

## 2022-11-18 RX ADMIN — Medication 650 MILLIGRAM(S): at 17:16

## 2022-11-18 RX ADMIN — Medication 650 MILLIGRAM(S): at 00:13

## 2022-11-18 RX ADMIN — Medication 650 MILLIGRAM(S): at 17:46

## 2022-11-18 RX ADMIN — ONDANSETRON 4 MILLIGRAM(S): 8 TABLET, FILM COATED ORAL at 10:42

## 2022-11-18 RX ADMIN — AMIODARONE HYDROCHLORIDE 400 MILLIGRAM(S): 400 TABLET ORAL at 05:23

## 2022-11-18 RX ADMIN — OXYCODONE HYDROCHLORIDE 5 MILLIGRAM(S): 5 TABLET ORAL at 06:23

## 2022-11-18 RX ADMIN — Medication 20 MILLIEQUIVALENT(S): at 13:13

## 2022-11-18 RX ADMIN — OXYCODONE HYDROCHLORIDE 5 MILLIGRAM(S): 5 TABLET ORAL at 05:53

## 2022-11-18 RX ADMIN — GABAPENTIN 100 MILLIGRAM(S): 400 CAPSULE ORAL at 05:20

## 2022-11-18 RX ADMIN — Medication 40 MILLIGRAM(S): at 13:13

## 2022-11-18 RX ADMIN — Medication 650 MILLIGRAM(S): at 13:13

## 2022-11-18 RX ADMIN — PANTOPRAZOLE SODIUM 40 MILLIGRAM(S): 20 TABLET, DELAYED RELEASE ORAL at 05:24

## 2022-11-18 RX ADMIN — GABAPENTIN 100 MILLIGRAM(S): 400 CAPSULE ORAL at 21:01

## 2022-11-18 RX ADMIN — Medication 650 MILLIGRAM(S): at 05:18

## 2022-11-18 RX ADMIN — SPIRONOLACTONE 25 MILLIGRAM(S): 25 TABLET, FILM COATED ORAL at 05:24

## 2022-11-18 RX ADMIN — Medication 81 MILLIGRAM(S): at 13:13

## 2022-11-18 RX ADMIN — OXYCODONE HYDROCHLORIDE 5 MILLIGRAM(S): 5 TABLET ORAL at 13:13

## 2022-11-18 RX ADMIN — SENNA PLUS 2 TABLET(S): 8.6 TABLET ORAL at 21:01

## 2022-11-18 RX ADMIN — Medication 650 MILLIGRAM(S): at 07:00

## 2022-11-18 RX ADMIN — Medication 500 MILLIGRAM(S): at 05:20

## 2022-11-18 RX ADMIN — GABAPENTIN 100 MILLIGRAM(S): 400 CAPSULE ORAL at 13:14

## 2022-11-18 RX ADMIN — CHLORHEXIDINE GLUCONATE 1 APPLICATION(S): 213 SOLUTION TOPICAL at 11:49

## 2022-11-18 RX ADMIN — ENOXAPARIN SODIUM 40 MILLIGRAM(S): 100 INJECTION SUBCUTANEOUS at 13:37

## 2022-11-18 RX ADMIN — Medication 25 MILLIGRAM(S): at 05:24

## 2022-11-18 RX ADMIN — ATORVASTATIN CALCIUM 80 MILLIGRAM(S): 80 TABLET, FILM COATED ORAL at 21:01

## 2022-11-18 RX ADMIN — Medication 40 MILLIGRAM(S): at 05:25

## 2022-11-18 RX ADMIN — AMIODARONE HYDROCHLORIDE 400 MILLIGRAM(S): 400 TABLET ORAL at 17:16

## 2022-11-18 RX ADMIN — Medication 4 UNIT(S): at 17:15

## 2022-11-18 RX ADMIN — SPIRONOLACTONE 25 MILLIGRAM(S): 25 TABLET, FILM COATED ORAL at 13:13

## 2022-11-18 RX ADMIN — Medication 2: at 21:02

## 2022-11-18 RX ADMIN — POLYETHYLENE GLYCOL 3350 17 GRAM(S): 17 POWDER, FOR SOLUTION ORAL at 13:12

## 2022-11-18 NOTE — CONSULT NOTE ADULT - PROBLEM SELECTOR RECOMMENDATION 9
While inpatient CHO diet and FS AC and HS  Goal Glucose 100-180  Patient was on insulin gtt which was removed last night. Has poor appetite.  Recommend Lantus 25 Units HS and Admelog 8 Units TIDAC plus low scale before meals and bedtime  Patient is on high doses of insulin at home but not requiring as much insulin while inpatient  For discharge: recommend Toujeo (dose TBD), metformin and jardiance  Add GLP-1 agonist-please check if Ozempic or Trulicity are covered by insurance  Follow up with Dr Fish at discharge

## 2022-11-18 NOTE — PROGRESS NOTE ADULT - ASSESSMENT
51 y/o F with pmhx of CAD with multiple stents (3, last one placed in 8/22), DM2, HTN, presented to the ED for new onset chest pain. The patient reported chest pain for 2 weeks, waxes and wanes. Chest pain is pressure like, mainly in the L side of the chest. Reports worsening chest pain with exertion even with minimal activity in the house. The patient recently had a stent placed on 8/22 in RCA. Reports chest pain similar to previous episode in april. The patient also endorsed palpitations during these episodes. No LOC. ROS otherwise negative. The patient still endorsing some chest pain at bedside however it is better than before, however still present. No palpitations at this time. Difficult to obtain hx due to pain. Patient  underwent cath showing LAD 70 (IFR 0.8), LCx 90, RCA 90; Patient was recommended for CTS CABG evaluation; last coronary stents placed in August 2022. Patient's Brilinta was discontinued as per Dr REINALDO Montana, in anticipation for CABG evaluation. Patient underwent cath via left radial artery. L radial site is stable. No hematoma. Good cap refill. Patient is being transferred to [facility] for CABG eval. Hospitalist, Dr. Lawrence, on call notified.   11/15/22 s/p cabg x 4 LIMA-D | SVG-LAD | SVG-PDA | SVG-rPL  Left greater saphenous vein harvested endoscopically  Insulin infusion, endo consulted  11/17 transferred to [facility]  11/18  d/c intro , d/c rubi  Ambulate.  ACE wrap LE ,  increase diuresis

## 2022-11-18 NOTE — CONSULT NOTE ADULT - SUBJECTIVE AND OBJECTIVE BOX
HPI:  53 y/o F with pmhx of CAD with multiple stents (3, last one placed in 8/22), DM2, HTN, presented to the ED for new onset chest pain. The patient reported chest pain for 2 weeks, waxes and wanes. Chest pain is pressure like, mainly in the L side of the chest. Reports worsening chest pain with exertion even with minimal activity in the house. The patient recently had a stent placed on 8/22 in RCA. Reports chest pain similar to previous episode in april. The patient also endorsed palpitations during these episodes. No LOC. ROS otherwise negative. The patient still endorsing some chest pain at bedside however it is better than before, however still present. No palpitations at this time. Difficult to obtain hx due to pain. Patient  underwent cath showing LAD 70 (IFR 0.8), LCx 90, RCA 90; Patient was recommended for CTS CABG evaluation; last coronary stents placed in August 2022. Patient's Brilinta was discontinued as per Dr REINALDO Montana, in anticipation for CABG evaluation. Patient underwent cath via left radial artery. L radial site is stable. No hematoma. Good cap refill. Patient is being transferred to [facility] for CABG eval. Hospitalist, Dr. Lawrence, on call notified.     Transferred to [facility] for CABG evaluation with Dr. Mace. Preop w/u in progress, no CP, palpitation, SOB, dizziness at time of transfer. All labs and imaging reviewed with Dr. Mace.     Endocrine history:  Patient is now s/p CABG on November 15th. She was diagnosed with Type 2 DM at the age of 18. She follows with endocrinologist Dr Fish. A1C is 8.0. Takes Toujeo 100u at night, metformin 500mg BID and jardiance 25mg daily. Uses [employer] [facility] for glucose monitoring. Her DM is complicated by CAD and neuropathy. Denies retinopathy. Hx of DM in Father. Moderates her intake of carbs. At home, her FS in AM are 70-90 and post meals 200s. Has frequent hypoglycemia.      PAST MEDICAL & SURGICAL HISTORY:  Diabetes mellitus      Essential hypertension      Hyperlipidemia      CAD (coronary artery disease)  3/10/22 stent to midRCA and RPDA      S/P coronary artery stent placement          FAMILY HISTORY:  Father (DM)        Social History: No illicit drug use    Outpatient Medications: See outpt med rec    MEDICATIONS  (STANDING):  acetaminophen     Tablet .. 650 milliGRAM(s) Oral every 6 hours  ascorbic acid 500 milliGRAM(s) Oral two times a day  aspirin enteric coated 81 milliGRAM(s) Oral daily  aspirin Suppository 300 milliGRAM(s) Rectal once  atorvastatin 80 milliGRAM(s) Oral at bedtime  bisacodyl Suppository 10 milliGRAM(s) Rectal once  chlorhexidine 0.12% Liquid 5 milliLiter(s) Oral Mucosa two times a day  chlorhexidine 2% Cloths 1 Application(s) Topical daily  chlorhexidine 4% Liquid 1 Application(s) Topical daily  dextrose 5%. 1000 milliLiter(s) (50 mL/Hr) IV Continuous <Continuous>  dextrose 5%. 1000 milliLiter(s) (100 mL/Hr) IV Continuous <Continuous>  dextrose 50% Injectable 50 milliLiter(s) IV Push every 15 minutes  dextrose 50% Injectable 25 milliLiter(s) IV Push every 15 minutes  dextrose 50% Injectable 50 milliLiter(s) IV Push every 15 minutes  enoxaparin Injectable 40 milliGRAM(s) SubCutaneous every 24 hours  gabapentin 100 milliGRAM(s) Oral every 8 hours  glucagon  Injectable 1 milliGRAM(s) IntraMuscular once  insulin glargine Injectable (LANTUS) 12 Unit(s) SubCutaneous at bedtime  insulin lispro (ADMELOG) corrective regimen sliding scale   SubCutaneous three times a day before meals  insulin lispro (ADMELOG) corrective regimen sliding scale   SubCutaneous at bedtime  insulin lispro Injectable (ADMELOG) 4 Unit(s) SubCutaneous three times a day before meals  insulin regular Infusion 3 Unit(s)/Hr (3 mL/Hr) IV Continuous <Continuous>  metoprolol tartrate 25 milliGRAM(s) Oral every 12 hours  pantoprazole    Tablet 40 milliGRAM(s) Oral before breakfast  polyethylene glycol 3350 17 Gram(s) Oral daily  senna 2 Tablet(s) Oral at bedtime  sodium chloride 0.9%. 1000 milliLiter(s) (10 mL/Hr) IV Continuous <Continuous>    MEDICATIONS  (PRN):  dextrose Oral Gel 15 Gram(s) Oral once PRN Blood Glucose LESS THAN 70 milliGRAM(s)/deciliter  oxyCODONE    IR 5 milliGRAM(s) Oral every 4 hours PRN Moderate Pain (4 - 6)  oxyCODONE    IR 10 milliGRAM(s) Oral every 4 hours PRN Severe Pain (7 - 10)      Allergies    amoxicillin (Unknown)  Cheese (Unknown)  eggs (Unknown)  Milk (Diarrhea)    Intolerances    Soy (Diarrhea)    Review of Systems:  Constitutional: No fever  Eyes: No blurry vision  Neuro: No tremors  HEENT: No pain  Cardiovascular: No chest pain, palpitations  Respiratory: No SOB, no cough  GI: No nausea, vomiting, abdominal pain  : No dysuria  Skin: no rash  Psych: no depression  Endocrine: no polyuria, polydipsia      ALL OTHER SYSTEMS REVIEWED AND NEGATIVE        PHYSICAL EXAM:  VITALS: T(C): 36.7 (11-18-22 @ 14:28)  T(F): 98 (11-18-22 @ 14:28), Max: 99.4 (11-17-22 @ 19:31)  HR: 80 (11-18-22 @ 14:28) (66 - 80)  BP: 120/56 (11-18-22 @ 14:28) (106/58 - 137/65)  RR:  (14 - 18)  SpO2:  (87% - 96%)  Wt(kg): --  GENERAL: NAD, well-groomed, well-developed  EYES: No proptosis, no lid lag, anicteric  HEENT:  Atraumatic, Normocephalic, moist mucous membranes  RESPIRATORY: Clear to auscultation bilaterally  CARDIOVASCULAR: Regular rate and rhythm  GI: Soft, nontender, non distended, normal bowel sounds  SKIN: Dry, intact, No rashes or lesions  PSYCH: Alert and oriented x 3, normal affect, normal mood      POCT Blood Glucose.: 230 mg/dL (11-18-22 @ 16:32)  POCT Blood Glucose.: 246 mg/dL (11-18-22 @ 11:33)  POCT Blood Glucose.: 131 mg/dL (11-18-22 @ 07:30)  POCT Blood Glucose.: 93 mg/dL (11-18-22 @ 05:29)  POCT Blood Glucose.: 101 mg/dL (11-18-22 @ 04:27)  POCT Blood Glucose.: 112 mg/dL (11-18-22 @ 03:31)  POCT Blood Glucose.: 126 mg/dL (11-18-22 @ 02:34)  POCT Blood Glucose.: 168 mg/dL (11-18-22 @ 01:01)  POCT Blood Glucose.: 156 mg/dL (11-18-22 @ 00:05)  POCT Blood Glucose.: 215 mg/dL (11-17-22 @ 23:17)  POCT Blood Glucose.: 180 mg/dL (11-17-22 @ 21:56)  POCT Blood Glucose.: 243 mg/dL (11-17-22 @ 20:54)  POCT Blood Glucose.: 256 mg/dL (11-17-22 @ 19:58)  POCT Blood Glucose.: 260 mg/dL (11-17-22 @ 18:59)  POCT Blood Glucose.: 224 mg/dL (11-17-22 @ 17:49)  POCT Blood Glucose.: 250 mg/dL (11-17-22 @ 15:33)  POCT Blood Glucose.: 227 mg/dL (11-17-22 @ 11:02)  POCT Blood Glucose.: 147 mg/dL (11-17-22 @ 06:57)  POCT Blood Glucose.: 147 mg/dL (11-17-22 @ 03:58)  POCT Blood Glucose.: 119 mg/dL (11-17-22 @ 01:53)  POCT Blood Glucose.: 119 mg/dL (11-17-22 @ 00:56)  POCT Blood Glucose.: 121 mg/dL (11-16-22 @ 23:57)  POCT Blood Glucose.: 138 mg/dL (11-16-22 @ 22:55)  POCT Blood Glucose.: 132 mg/dL (11-16-22 @ 22:15)  POCT Blood Glucose.: 134 mg/dL (11-16-22 @ 20:59)  POCT Blood Glucose.: 142 mg/dL (11-16-22 @ 20:15)  POCT Blood Glucose.: 123 mg/dL (11-16-22 @ 17:59)  POCT Blood Glucose.: 104 mg/dL (11-16-22 @ 16:53)  POCT Blood Glucose.: 81 mg/dL (11-16-22 @ 15:54)  POCT Blood Glucose.: 109 mg/dL (11-16-22 @ 15:20)  POCT Blood Glucose.: 101 mg/dL (11-16-22 @ 14:16)  POCT Blood Glucose.: 111 mg/dL (11-16-22 @ 13:04)  POCT Blood Glucose.: 101 mg/dL (11-16-22 @ 12:06)  POCT Blood Glucose.: 116 mg/dL (11-16-22 @ 11:09)  POCT Blood Glucose.: 104 mg/dL (11-16-22 @ 10:05)  POCT Blood Glucose.: 121 mg/dL (11-16-22 @ 08:58)  POCT Blood Glucose.: 128 mg/dL (11-16-22 @ 08:04)  POCT Blood Glucose.: 116 mg/dL (11-16-22 @ 06:57)  POCT Blood Glucose.: 132 mg/dL (11-16-22 @ 06:04)  POCT Blood Glucose.: 120 mg/dL (11-16-22 @ 05:00)  POCT Blood Glucose.: 114 mg/dL (11-16-22 @ 04:10)  POCT Blood Glucose.: 122 mg/dL (11-16-22 @ 03:05)  POCT Blood Glucose.: 113 mg/dL (11-16-22 @ 02:03)  POCT Blood Glucose.: 115 mg/dL (11-16-22 @ 01:28)  POCT Blood Glucose.: 136 mg/dL (11-16-22 @ 00:26)  POCT Blood Glucose.: 157 mg/dL (11-15-22 @ 22:46)  POCT Blood Glucose.: 149 mg/dL (11-15-22 @ 21:42)  POCT Blood Glucose.: 151 mg/dL (11-15-22 @ 21:04)  POCT Blood Glucose.: 163 mg/dL (11-15-22 @ 20:02)  POCT Blood Glucose.: 186 mg/dL (11-15-22 @ 19:01)  POCT Blood Glucose.: 168 mg/dL (11-15-22 @ 18:09)                            8.3    13.33 )-----------( 229      ( 18 Nov 2022 07:33 )             25.5       11-18    140  |  103  |  15  ----------------------------<  83  3.8   |  29  |  0.66    eGFR: 107    Ca    8.5      11-18  Mg     2.0     11-18  Phos  1.9     11-18    TPro  5.9<L>  /  Alb  3.0<L>  /  TBili  0.7  /  DBili  x   /  AST  47<H>  /  ALT  47<H>  /  AlkPhos  58  11-18      Thyroid Function Tests:  11-12 @ 06:58 TSH 2.95 FreeT4 1.1 T3 -- Anti TPO -- Anti Thyroglobulin Ab -- TSI --  11-11 @ 01:30 TSH 2.51 FreeT4 -- T3 -- Anti TPO -- Anti Thyroglobulin Ab -- TSI --          11-11 Chol 134 Direct LDL -- LDL calculated 64 HDL 39<L> Trig 157<H>

## 2022-11-18 NOTE — OCCUPATIONAL THERAPY INITIAL EVALUATION ADULT - ADDITIONAL COMMENTS
pt reports lives with family in private home, walk-in shower. prior to admission Ind with ADLs/ambulation. Owns cane, does not use.

## 2022-11-18 NOTE — OCCUPATIONAL THERAPY INITIAL EVALUATION ADULT - PERTINENT HX OF CURRENT PROBLEM, REHAB EVAL
53 y/o F with pmhx of CAD with multiple stents (3, last one placed in 8/22), DM2, HTN, presented to the ED for new onset chest pain. Patient underwent cath via left radial artery. now s/p CABG, with MAGALY 15-Nov-2022.

## 2022-11-18 NOTE — PROGRESS NOTE ADULT - SUBJECTIVE AND OBJECTIVE BOX
VITAL SIGNS    Telemetry:      Vital Signs Last 24 Hrs  T(C): 36.8 (22 @ 09:00), Max: 37.4 (22 @ 19:31)  T(F): 98.3 (22 @ 09:00), Max: 99.4 (22 @ 19:31)  HR: 72 (22 @ 09:00) (66 - 77)  BP: 106/58 (22 @ 09:00) (106/58 - 136/66)  RR: 14 (22 @ 09:00) (10 - 21)  SpO2: 95% (22 @ 09:00) (95% - 100%)                    07:01  -   07:00  --------------------------------------------------------  IN: 1242.4 mL / OUT: 2400 mL / NET: -1157.6 mL     07:01  -   @ 09:31  --------------------------------------------------------  IN: 120 mL / OUT: 0 mL / NET: 120 mL          Daily     Daily Weight in k.8 (2022 06:02)            CAPILLARY BLOOD GLUCOSE      POCT Blood Glucose.: 131 mg/dL (2022 07:30)  POCT Blood Glucose.: 93 mg/dL (2022 05:29)  POCT Blood Glucose.: 101 mg/dL (2022 04:27)  POCT Blood Glucose.: 112 mg/dL (2022 03:31)  POCT Blood Glucose.: 126 mg/dL (2022 02:34)  POCT Blood Glucose.: 168 mg/dL (2022 01:01)  POCT Blood Glucose.: 156 mg/dL (2022 00:05)  POCT Blood Glucose.: 215 mg/dL (2022 23:17)  POCT Blood Glucose.: 180 mg/dL (2022 21:56)  POCT Blood Glucose.: 243 mg/dL (2022 20:54)  POCT Blood Glucose.: 256 mg/dL (2022 19:58)  POCT Blood Glucose.: 260 mg/dL (2022 18:59)  POCT Blood Glucose.: 224 mg/dL (2022 17:49)  POCT Blood Glucose.: 250 mg/dL (2022 15:33)  POCT Blood Glucose.: 227 mg/dL (2022 11:02)            Drains:     MS         [  ] Drainage:                 L Pleural  [  ]  Drainage:                R Pleural  [  ]  Drainage:    Pacing Wires        [  ]   Settings:                                  Isolated  [  ]    Coumadin    [ ] YES          [  ]      NO                                   PHYSICAL EXAM        Neurology: alert and oriented x 3, nonfocal, no gross deficits  CV : s1 s2 RRR  Sternal Wound :  CDI , Stable  Lungs: cta  Abdomen: soft, nontender, nondistended, positive bowel sounds, last bowel movement                       chest tubes  :    voiding / rubi - sbd         Extremities:      edema   /  -   calve tenderness ,    L leg  /  R leg  incisions cdi          acetaminophen     Tablet .. 650 milliGRAM(s) Oral every 6 hours  aMIOdarone    Tablet 400 milliGRAM(s) Oral two times a day  ascorbic acid 500 milliGRAM(s) Oral two times a day  aspirin enteric coated 81 milliGRAM(s) Oral daily  aspirin Suppository 300 milliGRAM(s) Rectal once  atorvastatin 80 milliGRAM(s) Oral at bedtime  bisacodyl Suppository 10 milliGRAM(s) Rectal once  chlorhexidine 0.12% Liquid 5 milliLiter(s) Oral Mucosa two times a day  chlorhexidine 2% Cloths 1 Application(s) Topical daily  chlorhexidine 4% Liquid 1 Application(s) Topical daily  dextrose 5%. 1000 milliLiter(s) IV Continuous <Continuous>  dextrose 5%. 1000 milliLiter(s) IV Continuous <Continuous>  dextrose 50% Injectable 50 milliLiter(s) IV Push every 15 minutes  dextrose 50% Injectable 25 milliLiter(s) IV Push every 15 minutes  dextrose 50% Injectable 50 milliLiter(s) IV Push every 15 minutes  dextrose Oral Gel 15 Gram(s) Oral once PRN  enoxaparin Injectable 40 milliGRAM(s) SubCutaneous every 24 hours  furosemide    Tablet 40 milliGRAM(s) Oral once  gabapentin 100 milliGRAM(s) Oral every 8 hours  glucagon  Injectable 1 milliGRAM(s) IntraMuscular once  insulin glargine Injectable (LANTUS) 12 Unit(s) SubCutaneous at bedtime  insulin lispro (ADMELOG) corrective regimen sliding scale   SubCutaneous three times a day before meals  insulin lispro (ADMELOG) corrective regimen sliding scale   SubCutaneous at bedtime  insulin regular Infusion 3 Unit(s)/Hr IV Continuous <Continuous>  metoprolol tartrate 25 milliGRAM(s) Oral every 12 hours  oxyCODONE    IR 5 milliGRAM(s) Oral every 4 hours PRN  oxyCODONE    IR 10 milliGRAM(s) Oral every 4 hours PRN  pantoprazole    Tablet 40 milliGRAM(s) Oral before breakfast  polyethylene glycol 3350 17 Gram(s) Oral daily  senna 2 Tablet(s) Oral at bedtime  sodium chloride 0.9%. 1000 milliLiter(s) IV Continuous <Continuous>  spironolactone 25 milliGRAM(s) Oral once                    Physical Therapy Rec:   Home  [  ]   Home w/ PT  [  ]  Rehab  [  ]  Discussed with Cardiothoracic Team at AM rounds.     VITAL SIGNS    Telemetry:  nsr 60    Vital Signs Last 24 Hrs  T(C): 36.8 (22 @ 09:00), Max: 37.4 (22 @ 19:31)  T(F): 98.3 (22 @ 09:00), Max: 99.4 (22 @ 19:31)  HR: 72 (22 @ 09:00) (66 - 77)  BP: 106/58 (22 @ 09:00) (106/58 - 136/66)  RR: 14 (22 @ 09:00) (10 - 21)  SpO2: 95% (22 @ 09:00) (95% - 100%)                    07:01  -   07:00  --------------------------------------------------------  IN: 1242.4 mL / OUT: 2400 mL / NET: -1157.6 mL     07:01  -   @ 09:31  --------------------------------------------------------  IN: 120 mL / OUT: 0 mL / NET: 120 mL          Daily     Daily Weight in k.8 (2022 06:02)            CAPILLARY BLOOD GLUCOSE      POCT Blood Glucose.: 131 mg/dL (2022 07:30)  POCT Blood Glucose.: 93 mg/dL (2022 05:29)  POCT Blood Glucose.: 101 mg/dL (2022 04:27)  POCT Blood Glucose.: 112 mg/dL (2022 03:31)  POCT Blood Glucose.: 126 mg/dL (2022 02:34)  POCT Blood Glucose.: 168 mg/dL (2022 01:01)  POCT Blood Glucose.: 156 mg/dL (2022 00:05)  POCT Blood Glucose.: 215 mg/dL (2022 23:17)  POCT Blood Glucose.: 180 mg/dL (2022 21:56)  POCT Blood Glucose.: 243 mg/dL (2022 20:54)  POCT Blood Glucose.: 256 mg/dL (2022 19:58)  POCT Blood Glucose.: 260 mg/dL (2022 18:59)  POCT Blood Glucose.: 224 mg/dL (2022 17:49)  POCT Blood Glucose.: 250 mg/dL (2022 15:33)  POCT Blood Glucose.: 227 mg/dL (2022 11:02)                Pacing Wires        [x  ]   Settings:       vvi                           Isolated  [  ]    Coumadin    [ ] YES          [x  ]      NO                                   PHYSICAL EXAM        Neurology: alert and oriented x 3, nonfocal, no gross deficits  CV : s1 s2 RRR  Sternal Wound :  CDI , Stable  Lungs: cta  Abdomen: soft, nontender, nondistended, positive bowel sounds,                      :    voiding       Extremities:     - edema   /  -   calve tenderness ,    L leg    incisions cdi          acetaminophen     Tablet .. 650 milliGRAM(s) Oral every 6 hours  aMIOdarone    Tablet 400 milliGRAM(s) Oral two times a day  ascorbic acid 500 milliGRAM(s) Oral two times a day  aspirin enteric coated 81 milliGRAM(s) Oral daily  aspirin Suppository 300 milliGRAM(s) Rectal once  atorvastatin 80 milliGRAM(s) Oral at bedtime  bisacodyl Suppository 10 milliGRAM(s) Rectal once  chlorhexidine 0.12% Liquid 5 milliLiter(s) Oral Mucosa two times a day  chlorhexidine 2% Cloths 1 Application(s) Topical daily  chlorhexidine 4% Liquid 1 Application(s) Topical daily  dextrose 5%. 1000 milliLiter(s) IV Continuous <Continuous>  dextrose 5%. 1000 milliLiter(s) IV Continuous <Continuous>  dextrose 50% Injectable 50 milliLiter(s) IV Push every 15 minutes  dextrose 50% Injectable 25 milliLiter(s) IV Push every 15 minutes  dextrose 50% Injectable 50 milliLiter(s) IV Push every 15 minutes  dextrose Oral Gel 15 Gram(s) Oral once PRN  enoxaparin Injectable 40 milliGRAM(s) SubCutaneous every 24 hours  furosemide    Tablet 40 milliGRAM(s) Oral once  gabapentin 100 milliGRAM(s) Oral every 8 hours  glucagon  Injectable 1 milliGRAM(s) IntraMuscular once  insulin glargine Injectable (LANTUS) 12 Unit(s) SubCutaneous at bedtime  insulin lispro (ADMELOG) corrective regimen sliding scale   SubCutaneous three times a day before meals  insulin lispro (ADMELOG) corrective regimen sliding scale   SubCutaneous at bedtime  insulin regular Infusion 3 Unit(s)/Hr IV Continuous <Continuous>  metoprolol tartrate 25 milliGRAM(s) Oral every 12 hours  oxyCODONE    IR 5 milliGRAM(s) Oral every 4 hours PRN  oxyCODONE    IR 10 milliGRAM(s) Oral every 4 hours PRN  pantoprazole    Tablet 40 milliGRAM(s) Oral before breakfast  polyethylene glycol 3350 17 Gram(s) Oral daily  senna 2 Tablet(s) Oral at bedtime  sodium chloride 0.9%. 1000 milliLiter(s) IV Continuous <Continuous>  spironolactone 25 milliGRAM(s) Oral once                    Physical Therapy Rec:   Home  [  ]   Home w/ PT  [  ]  Rehab  [  ]  Discussed with Cardiothoracic Team at AM rounds.

## 2022-11-19 LAB
ALBUMIN SERPL ELPH-MCNC: 3.2 G/DL — LOW (ref 3.3–5)
ALP SERPL-CCNC: 65 U/L — SIGNIFICANT CHANGE UP (ref 40–120)
ALT FLD-CCNC: 43 U/L — SIGNIFICANT CHANGE UP (ref 10–45)
ANION GAP SERPL CALC-SCNC: 9 MMOL/L — SIGNIFICANT CHANGE UP (ref 5–17)
AST SERPL-CCNC: 35 U/L — SIGNIFICANT CHANGE UP (ref 10–40)
BILIRUB SERPL-MCNC: 0.8 MG/DL — SIGNIFICANT CHANGE UP (ref 0.2–1.2)
BUN SERPL-MCNC: 14 MG/DL — SIGNIFICANT CHANGE UP (ref 7–23)
CALCIUM SERPL-MCNC: 8.7 MG/DL — SIGNIFICANT CHANGE UP (ref 8.4–10.5)
CHLORIDE SERPL-SCNC: 100 MMOL/L — SIGNIFICANT CHANGE UP (ref 96–108)
CO2 SERPL-SCNC: 31 MMOL/L — SIGNIFICANT CHANGE UP (ref 22–31)
CREAT SERPL-MCNC: 0.64 MG/DL — SIGNIFICANT CHANGE UP (ref 0.5–1.3)
EGFR: 108 ML/MIN/1.73M2 — SIGNIFICANT CHANGE UP
GLUCOSE SERPL-MCNC: 179 MG/DL — HIGH (ref 70–99)
HCT VFR BLD CALC: 26.9 % — LOW (ref 34.5–45)
HGB BLD-MCNC: 8.9 G/DL — LOW (ref 11.5–15.5)
MAGNESIUM SERPL-MCNC: 1.8 MG/DL — SIGNIFICANT CHANGE UP (ref 1.6–2.6)
MCHC RBC-ENTMCNC: 27.5 PG — SIGNIFICANT CHANGE UP (ref 27–34)
MCHC RBC-ENTMCNC: 33.1 GM/DL — SIGNIFICANT CHANGE UP (ref 32–36)
MCV RBC AUTO: 83 FL — SIGNIFICANT CHANGE UP (ref 80–100)
NRBC # BLD: 0 /100 WBCS — SIGNIFICANT CHANGE UP (ref 0–0)
PHOSPHATE SERPL-MCNC: 2.3 MG/DL — LOW (ref 2.5–4.5)
PLATELET # BLD AUTO: 249 K/UL — SIGNIFICANT CHANGE UP (ref 150–400)
POTASSIUM SERPL-MCNC: 3.9 MMOL/L — SIGNIFICANT CHANGE UP (ref 3.5–5.3)
POTASSIUM SERPL-SCNC: 3.9 MMOL/L — SIGNIFICANT CHANGE UP (ref 3.5–5.3)
PROT SERPL-MCNC: 6.3 G/DL — SIGNIFICANT CHANGE UP (ref 6–8.3)
RBC # BLD: 3.24 M/UL — LOW (ref 3.8–5.2)
RBC # FLD: 14.8 % — HIGH (ref 10.3–14.5)
SODIUM SERPL-SCNC: 140 MMOL/L — SIGNIFICANT CHANGE UP (ref 135–145)
WBC # BLD: 12.47 K/UL — HIGH (ref 3.8–10.5)
WBC # FLD AUTO: 12.47 K/UL — HIGH (ref 3.8–10.5)

## 2022-11-19 RX ORDER — INSULIN LISPRO 100/ML
10 VIAL (ML) SUBCUTANEOUS
Refills: 0 | Status: DISCONTINUED | OUTPATIENT
Start: 2022-11-19 | End: 2022-11-20

## 2022-11-19 RX ORDER — METOPROLOL TARTRATE 50 MG
50 TABLET ORAL
Refills: 0 | Status: DISCONTINUED | OUTPATIENT
Start: 2022-11-19 | End: 2022-11-21

## 2022-11-19 RX ADMIN — Medication 500 MILLIGRAM(S): at 05:15

## 2022-11-19 RX ADMIN — SPIRONOLACTONE 50 MILLIGRAM(S): 25 TABLET, FILM COATED ORAL at 05:16

## 2022-11-19 RX ADMIN — Medication 25 MILLIGRAM(S): at 05:15

## 2022-11-19 RX ADMIN — SPIRONOLACTONE 50 MILLIGRAM(S): 25 TABLET, FILM COATED ORAL at 17:11

## 2022-11-19 RX ADMIN — Medication 500 MILLIGRAM(S): at 17:11

## 2022-11-19 RX ADMIN — Medication 25 MILLIGRAM(S): at 17:11

## 2022-11-19 RX ADMIN — Medication 50 MILLIGRAM(S): at 18:21

## 2022-11-19 RX ADMIN — GABAPENTIN 100 MILLIGRAM(S): 400 CAPSULE ORAL at 21:07

## 2022-11-19 RX ADMIN — ATORVASTATIN CALCIUM 80 MILLIGRAM(S): 80 TABLET, FILM COATED ORAL at 21:07

## 2022-11-19 RX ADMIN — Medication 6: at 11:48

## 2022-11-19 RX ADMIN — POLYETHYLENE GLYCOL 3350 17 GRAM(S): 17 POWDER, FOR SOLUTION ORAL at 11:16

## 2022-11-19 RX ADMIN — GABAPENTIN 100 MILLIGRAM(S): 400 CAPSULE ORAL at 13:54

## 2022-11-19 RX ADMIN — Medication 20 MILLIGRAM(S): at 05:15

## 2022-11-19 RX ADMIN — GABAPENTIN 100 MILLIGRAM(S): 400 CAPSULE ORAL at 05:15

## 2022-11-19 RX ADMIN — CHLORHEXIDINE GLUCONATE 5 MILLILITER(S): 213 SOLUTION TOPICAL at 17:11

## 2022-11-19 RX ADMIN — Medication 8 UNIT(S): at 07:55

## 2022-11-19 RX ADMIN — Medication 6: at 16:48

## 2022-11-19 RX ADMIN — OXYCODONE HYDROCHLORIDE 10 MILLIGRAM(S): 5 TABLET ORAL at 18:45

## 2022-11-19 RX ADMIN — INSULIN GLARGINE 25 UNIT(S): 100 INJECTION, SOLUTION SUBCUTANEOUS at 21:31

## 2022-11-19 RX ADMIN — Medication 8 UNIT(S): at 11:49

## 2022-11-19 RX ADMIN — ENOXAPARIN SODIUM 40 MILLIGRAM(S): 100 INJECTION SUBCUTANEOUS at 13:53

## 2022-11-19 RX ADMIN — Medication 10 UNIT(S): at 16:48

## 2022-11-19 RX ADMIN — Medication 81 MILLIGRAM(S): at 11:17

## 2022-11-19 RX ADMIN — CHLORHEXIDINE GLUCONATE 5 MILLILITER(S): 213 SOLUTION TOPICAL at 05:15

## 2022-11-19 RX ADMIN — Medication 2: at 07:55

## 2022-11-19 RX ADMIN — PANTOPRAZOLE SODIUM 40 MILLIGRAM(S): 20 TABLET, DELAYED RELEASE ORAL at 05:20

## 2022-11-19 RX ADMIN — CHLORHEXIDINE GLUCONATE 1 APPLICATION(S): 213 SOLUTION TOPICAL at 11:17

## 2022-11-19 RX ADMIN — SENNA PLUS 2 TABLET(S): 8.6 TABLET ORAL at 21:07

## 2022-11-19 RX ADMIN — OXYCODONE HYDROCHLORIDE 10 MILLIGRAM(S): 5 TABLET ORAL at 18:16

## 2022-11-19 NOTE — PROGRESS NOTE ADULT - ASSESSMENT
53 y/o F with pmhx of CAD with multiple stents (3, last one placed in 8/22), DM2, HTN, presented to the ED for new onset chest pain. The patient reported chest pain for 2 weeks, waxes and wanes. Chest pain is pressure like, mainly in the L side of the chest. Reports worsening chest pain with exertion even with minimal activity in the house. The patient recently had a stent placed on 8/22 in RCA. Reports chest pain similar to previous episode in april. The patient also endorsed palpitations during these episodes. No LOC. ROS otherwise negative. The patient still endorsing some chest pain at bedside however it is better than before, however still present. No palpitations at this time. Difficult to obtain hx due to pain. Patient  underwent cath showing LAD 70 (IFR 0.8), LCx 90, RCA 90; Patient was recommended for CTS CABG evaluation; last coronary stents placed in August 2022. Patient's Brilinta was discontinued as per Dr REINALDO Montana, in anticipation for CABG evaluation. Patient underwent cath via left radial artery. L radial site is stable. No hematoma. Good cap refill. Patient is being transferred to [facility] for CABG eval. Hospitalist, Dr. Lawrence, on call notified.   11/15/22 s/p cabg x 4 LIMA-D | SVG-LAD | SVG-PDA | SVG-rPL  Left greater saphenous vein harvested endoscopically  Insulin infusion, endo consulted  11/17 transferred to sdu  11/18  d/c intro , d/c rubi  Ambulate.  ACE wrap LE ,  increase diuresis    11/19 VSS - pt ambulated 2 times around hallway this am - will isolate pw. House endo following - insulin gtt d/c'd this am will monitor sugars  d/c plan home

## 2022-11-19 NOTE — PROGRESS NOTE ADULT - SUBJECTIVE AND OBJECTIVE BOX
VITAL SIGNS-Telemetry:  SR 70-80  Vital Signs Last 24 Hrs  T(C): 37.1 (11-19-22 @ 07:06), Max: 37.1 (11-19-22 @ 07:06)  T(F): 98.8 (11-19-22 @ 07:06), Max: 98.8 (11-19-22 @ 07:06)  HR: 72 (11-19-22 @ 07:06) (68 - 80)  BP: 117/60 (11-19-22 @ 07:06) (117/59 - 126/60)  RR: 18 (11-19-22 @ 07:06) (18 - 18)  SpO2: 97% (11-19-22 @ 07:06) (91% - 98%)         11-18 @ 07:01  -  11-19 @ 07:00  --------------------------------------------------------  IN: 440 mL / OUT: 2000 mL / NET: -1560 mL  Daily     Daily     CAPILLARY BLOOD GLUCOSE  POCT Blood Glucose.: 194 mg/dL (19 Nov 2022 07:34)  POCT Blood Glucose.: 257 mg/dL (18 Nov 2022 20:40)  POCT Blood Glucose.: 230 mg/dL (18 Nov 2022 16:32)  POCT Blood Glucose.: 246 mg/dL (18 Nov 2022 11:33)       Pacing Wires        [  ]   Settings:                                  Isolated  [ x ]      PHYSICAL EXAM:  Neurology: alert and oriented x 3, nonfocal, no gross deficits  CV : S1S2  Sternal Wound :  CDI , Stable  Lungs: cta   Abdomen: soft, nontender, nondistended, positive bowel sounds, last bowel movement  preop       Extremities:   b/l leg inc - b/l ace wraps - +edema.  no calf tenderness      acetaminophen     Tablet .. 650 milliGRAM(s) Oral every 6 hours PRN  ascorbic acid 500 milliGRAM(s) Oral two times a day  aspirin enteric coated 81 milliGRAM(s) Oral daily  aspirin Suppository 300 milliGRAM(s) Rectal once  atorvastatin 80 milliGRAM(s) Oral at bedtime  bisacodyl Suppository 10 milliGRAM(s) Rectal once  chlorhexidine 0.12% Liquid 5 milliLiter(s) Oral Mucosa two times a day  chlorhexidine 2% Cloths 1 Application(s) Topical daily  chlorhexidine 4% Liquid 1 Application(s) Topical daily  dextrose 5%. 1000 milliLiter(s) IV Continuous <Continuous>  dextrose 5%. 1000 milliLiter(s) IV Continuous <Continuous>  dextrose 50% Injectable 50 milliLiter(s) IV Push every 15 minutes  dextrose 50% Injectable 25 milliLiter(s) IV Push every 15 minutes  dextrose 50% Injectable 50 milliLiter(s) IV Push every 15 minutes  dextrose Oral Gel 15 Gram(s) Oral once PRN  enoxaparin Injectable 40 milliGRAM(s) SubCutaneous every 24 hours  gabapentin 100 milliGRAM(s) Oral every 8 hours  glucagon  Injectable 1 milliGRAM(s) IntraMuscular once  insulin glargine Injectable (LANTUS) 25 Unit(s) SubCutaneous at bedtime  insulin lispro (ADMELOG) corrective regimen sliding scale   SubCutaneous three times a day before meals  insulin lispro (ADMELOG) corrective regimen sliding scale   SubCutaneous at bedtime  insulin lispro Injectable (ADMELOG) 8 Unit(s) SubCutaneous three times a day before meals  metoprolol tartrate 25 milliGRAM(s) Oral every 12 hours  oxyCODONE    IR 5 milliGRAM(s) Oral every 4 hours PRN  oxyCODONE    IR 10 milliGRAM(s) Oral every 4 hours PRN  pantoprazole    Tablet 40 milliGRAM(s) Oral before breakfast  polyethylene glycol 3350 17 Gram(s) Oral daily  senna 2 Tablet(s) Oral at bedtime  sodium chloride 0.9%. 1000 milliLiter(s) IV Continuous <Continuous>  spironolactone 50 milliGRAM(s) Oral two times a day  torsemide 20 milliGRAM(s) Oral daily      Physical Therapy Rec:   Home  [ x ]   Home w/ PT  [  ]  Rehab  [  ]  Discussed with Cardiothoracic Team at AM rounds.

## 2022-11-20 LAB
ALBUMIN SERPL ELPH-MCNC: 3.1 G/DL — LOW (ref 3.3–5)
ALP SERPL-CCNC: 65 U/L — SIGNIFICANT CHANGE UP (ref 40–120)
ALT FLD-CCNC: 38 U/L — SIGNIFICANT CHANGE UP (ref 10–45)
ANION GAP SERPL CALC-SCNC: 10 MMOL/L — SIGNIFICANT CHANGE UP (ref 5–17)
AST SERPL-CCNC: 29 U/L — SIGNIFICANT CHANGE UP (ref 10–40)
BILIRUB SERPL-MCNC: 0.8 MG/DL — SIGNIFICANT CHANGE UP (ref 0.2–1.2)
BUN SERPL-MCNC: 15 MG/DL — SIGNIFICANT CHANGE UP (ref 7–23)
CALCIUM SERPL-MCNC: 8.9 MG/DL — SIGNIFICANT CHANGE UP (ref 8.4–10.5)
CHLORIDE SERPL-SCNC: 98 MMOL/L — SIGNIFICANT CHANGE UP (ref 96–108)
CO2 SERPL-SCNC: 31 MMOL/L — SIGNIFICANT CHANGE UP (ref 22–31)
CREAT SERPL-MCNC: 0.74 MG/DL — SIGNIFICANT CHANGE UP (ref 0.5–1.3)
EGFR: 98 ML/MIN/1.73M2 — SIGNIFICANT CHANGE UP
GLUCOSE SERPL-MCNC: 177 MG/DL — HIGH (ref 70–99)
HCT VFR BLD CALC: 27.7 % — LOW (ref 34.5–45)
HGB BLD-MCNC: 8.8 G/DL — LOW (ref 11.5–15.5)
MAGNESIUM SERPL-MCNC: 1.6 MG/DL — SIGNIFICANT CHANGE UP (ref 1.6–2.6)
MCHC RBC-ENTMCNC: 26.5 PG — LOW (ref 27–34)
MCHC RBC-ENTMCNC: 31.8 GM/DL — LOW (ref 32–36)
MCV RBC AUTO: 83.4 FL — SIGNIFICANT CHANGE UP (ref 80–100)
NRBC # BLD: 0 /100 WBCS — SIGNIFICANT CHANGE UP (ref 0–0)
PHOSPHATE SERPL-MCNC: 3.3 MG/DL — SIGNIFICANT CHANGE UP (ref 2.5–4.5)
PLATELET # BLD AUTO: 345 K/UL — SIGNIFICANT CHANGE UP (ref 150–400)
POTASSIUM SERPL-MCNC: 3.6 MMOL/L — SIGNIFICANT CHANGE UP (ref 3.5–5.3)
POTASSIUM SERPL-MCNC: 4.2 MMOL/L — SIGNIFICANT CHANGE UP (ref 3.5–5.3)
POTASSIUM SERPL-SCNC: 3.6 MMOL/L — SIGNIFICANT CHANGE UP (ref 3.5–5.3)
POTASSIUM SERPL-SCNC: 4.2 MMOL/L — SIGNIFICANT CHANGE UP (ref 3.5–5.3)
PROT SERPL-MCNC: 6 G/DL — SIGNIFICANT CHANGE UP (ref 6–8.3)
RBC # BLD: 3.32 M/UL — LOW (ref 3.8–5.2)
RBC # FLD: 14.7 % — HIGH (ref 10.3–14.5)
SODIUM SERPL-SCNC: 139 MMOL/L — SIGNIFICANT CHANGE UP (ref 135–145)
WBC # BLD: 13.37 K/UL — HIGH (ref 3.8–10.5)
WBC # FLD AUTO: 13.37 K/UL — HIGH (ref 3.8–10.5)

## 2022-11-20 RX ORDER — POTASSIUM CHLORIDE 20 MEQ
20 PACKET (EA) ORAL
Refills: 0 | Status: COMPLETED | OUTPATIENT
Start: 2022-11-20 | End: 2022-11-20

## 2022-11-20 RX ORDER — SORBITOL SOLUTION 70 %
15 SOLUTION, ORAL MISCELLANEOUS ONCE
Refills: 0 | Status: COMPLETED | OUTPATIENT
Start: 2022-11-20 | End: 2022-11-20

## 2022-11-20 RX ORDER — INSULIN LISPRO 100/ML
14 VIAL (ML) SUBCUTANEOUS
Refills: 0 | Status: DISCONTINUED | OUTPATIENT
Start: 2022-11-20 | End: 2022-11-21

## 2022-11-20 RX ORDER — INSULIN GLARGINE 100 [IU]/ML
28 INJECTION, SOLUTION SUBCUTANEOUS AT BEDTIME
Refills: 0 | Status: DISCONTINUED | OUTPATIENT
Start: 2022-11-20 | End: 2022-11-20

## 2022-11-20 RX ORDER — INSULIN GLARGINE 100 [IU]/ML
27 INJECTION, SOLUTION SUBCUTANEOUS AT BEDTIME
Refills: 0 | Status: DISCONTINUED | OUTPATIENT
Start: 2022-11-20 | End: 2022-11-21

## 2022-11-20 RX ORDER — INSULIN LISPRO 100/ML
12 VIAL (ML) SUBCUTANEOUS
Refills: 0 | Status: DISCONTINUED | OUTPATIENT
Start: 2022-11-20 | End: 2022-11-20

## 2022-11-20 RX ORDER — ONDANSETRON 8 MG/1
4 TABLET, FILM COATED ORAL ONCE
Refills: 0 | Status: COMPLETED | OUTPATIENT
Start: 2022-11-20 | End: 2022-11-20

## 2022-11-20 RX ADMIN — SPIRONOLACTONE 50 MILLIGRAM(S): 25 TABLET, FILM COATED ORAL at 17:20

## 2022-11-20 RX ADMIN — PANTOPRAZOLE SODIUM 40 MILLIGRAM(S): 20 TABLET, DELAYED RELEASE ORAL at 05:06

## 2022-11-20 RX ADMIN — GABAPENTIN 100 MILLIGRAM(S): 400 CAPSULE ORAL at 05:06

## 2022-11-20 RX ADMIN — Medication 81 MILLIGRAM(S): at 12:29

## 2022-11-20 RX ADMIN — Medication 14 UNIT(S): at 16:54

## 2022-11-20 RX ADMIN — Medication 20 MILLIEQUIVALENT(S): at 12:30

## 2022-11-20 RX ADMIN — SPIRONOLACTONE 50 MILLIGRAM(S): 25 TABLET, FILM COATED ORAL at 05:07

## 2022-11-20 RX ADMIN — Medication 50 MILLIGRAM(S): at 17:20

## 2022-11-20 RX ADMIN — Medication 500 MILLIGRAM(S): at 05:06

## 2022-11-20 RX ADMIN — SENNA PLUS 2 TABLET(S): 8.6 TABLET ORAL at 21:23

## 2022-11-20 RX ADMIN — Medication 20 MILLIGRAM(S): at 05:07

## 2022-11-20 RX ADMIN — Medication 6: at 12:22

## 2022-11-20 RX ADMIN — ATORVASTATIN CALCIUM 80 MILLIGRAM(S): 80 TABLET, FILM COATED ORAL at 21:23

## 2022-11-20 RX ADMIN — POLYETHYLENE GLYCOL 3350 17 GRAM(S): 17 POWDER, FOR SOLUTION ORAL at 12:24

## 2022-11-20 RX ADMIN — CHLORHEXIDINE GLUCONATE 1 APPLICATION(S): 213 SOLUTION TOPICAL at 11:56

## 2022-11-20 RX ADMIN — CHLORHEXIDINE GLUCONATE 5 MILLILITER(S): 213 SOLUTION TOPICAL at 05:27

## 2022-11-20 RX ADMIN — Medication 10 UNIT(S): at 07:59

## 2022-11-20 RX ADMIN — Medication 12 UNIT(S): at 12:23

## 2022-11-20 RX ADMIN — Medication 50 MILLIGRAM(S): at 05:07

## 2022-11-20 RX ADMIN — Medication 15 MILLILITER(S): at 11:56

## 2022-11-20 RX ADMIN — Medication 4: at 16:54

## 2022-11-20 RX ADMIN — Medication 4: at 07:58

## 2022-11-20 RX ADMIN — Medication 500 MILLIGRAM(S): at 17:19

## 2022-11-20 RX ADMIN — INSULIN GLARGINE 27 UNIT(S): 100 INJECTION, SOLUTION SUBCUTANEOUS at 21:23

## 2022-11-20 RX ADMIN — Medication 15 MILLILITER(S): at 21:23

## 2022-11-20 RX ADMIN — OXYCODONE HYDROCHLORIDE 10 MILLIGRAM(S): 5 TABLET ORAL at 14:57

## 2022-11-20 RX ADMIN — Medication 2: at 21:23

## 2022-11-20 RX ADMIN — OXYCODONE HYDROCHLORIDE 10 MILLIGRAM(S): 5 TABLET ORAL at 14:20

## 2022-11-20 RX ADMIN — ONDANSETRON 4 MILLIGRAM(S): 8 TABLET, FILM COATED ORAL at 14:20

## 2022-11-20 RX ADMIN — Medication 20 MILLIEQUIVALENT(S): at 14:20

## 2022-11-20 RX ADMIN — ENOXAPARIN SODIUM 40 MILLIGRAM(S): 100 INJECTION SUBCUTANEOUS at 14:20

## 2022-11-20 NOTE — PROGRESS NOTE ADULT - SUBJECTIVE AND OBJECTIVE BOX
VITAL SIGNS-Telemetry:  SR 70-90  Vital Signs Last 24 Hrs  T(C): 36.9 (22 @ 06:56), Max: 37.2 (22 @ 19:07)  T(F): 98.4 (22 @ 06:56), Max: 99 (22 @ 19:07)  HR: 85 (22 @ 06:56) (72 - 90)  BP: 137/63 (22 @ 06:56) (102/53 - 137/63)  RR: 18 (22 @ 06:56) (18 - 18)  SpO2: 94% (22 @ 06:56) (90% - 99%)          @ 07:01  -   @ 07:00  --------------------------------------------------------  IN: 940 mL / OUT: 3050 mL / NET: -2110 mL    Daily     Daily Weight in k.3 (2022 06:30)    CAPILLARY BLOOD GLUCOSE  POCT Blood Glucose.: 243 mg/dL (2022 07:34)  POCT Blood Glucose.: 209 mg/dL (2022 20:53)  POCT Blood Glucose.: 265 mg/dL (2022 16:44)  POCT Blood Glucose.: 285 mg/dL (2022 11:42)    PHYSICAL EXAM:  Neurology: alert and oriented x 3, nonfocal, no gross deficits  CV : S1S2  Sternal Wound :  CDI , Stable  Lungs: cta  Abdomen: soft, nontender, nondistended, positive bowel sounds, last bowel movement       preop  Extremities:    b/l leg inc w/ ace wraps - tr. edema  no calf tenderness    acetaminophen     Tablet .. 650 milliGRAM(s) Oral every 6 hours PRN  ascorbic acid 500 milliGRAM(s) Oral two times a day  aspirin enteric coated 81 milliGRAM(s) Oral daily  atorvastatin 80 milliGRAM(s) Oral at bedtime  bisacodyl Suppository 10 milliGRAM(s) Rectal once  chlorhexidine 0.12% Liquid 5 milliLiter(s) Oral Mucosa two times a day  chlorhexidine 2% Cloths 1 Application(s) Topical daily  chlorhexidine 4% Liquid 1 Application(s) Topical daily  dextrose 5%. 1000 milliLiter(s) IV Continuous <Continuous>  dextrose 5%. 1000 milliLiter(s) IV Continuous <Continuous>  dextrose 50% Injectable 50 milliLiter(s) IV Push every 15 minutes  dextrose 50% Injectable 25 milliLiter(s) IV Push every 15 minutes  dextrose 50% Injectable 50 milliLiter(s) IV Push every 15 minutes  dextrose Oral Gel 15 Gram(s) Oral once PRN  enoxaparin Injectable 40 milliGRAM(s) SubCutaneous every 24 hours  gabapentin 100 milliGRAM(s) Oral every 8 hours  glucagon  Injectable 1 milliGRAM(s) IntraMuscular once  insulin glargine Injectable (LANTUS) 28 Unit(s) SubCutaneous at bedtime  insulin lispro (ADMELOG) corrective regimen sliding scale   SubCutaneous three times a day before meals  insulin lispro (ADMELOG) corrective regimen sliding scale   SubCutaneous at bedtime  insulin lispro Injectable (ADMELOG) 12 Unit(s) SubCutaneous three times a day before meals  metoprolol tartrate 50 milliGRAM(s) Oral two times a day  oxyCODONE    IR 5 milliGRAM(s) Oral every 4 hours PRN  oxyCODONE    IR 10 milliGRAM(s) Oral every 4 hours PRN  pantoprazole    Tablet 40 milliGRAM(s) Oral before breakfast  polyethylene glycol 3350 17 Gram(s) Oral daily  senna 2 Tablet(s) Oral at bedtime  sodium chloride 0.9%. 1000 milliLiter(s) IV Continuous <Continuous>  spironolactone 50 milliGRAM(s) Oral two times a day  torsemide 20 milliGRAM(s) Oral daily    Physical Therapy Rec:   Home  [ x ]   Home w/ PT  [  ]  Rehab  [  ]  Discussed with Cardiothoracic Team at AM rounds.

## 2022-11-20 NOTE — PROGRESS NOTE ADULT - ASSESSMENT
51 y/o F with pmhx of CAD with multiple stents (3, last one placed in 8/22), DM2, HTN, presented to the ED for new onset chest pain. The patient reported chest pain for 2 weeks, waxes and wanes. Chest pain is pressure like, mainly in the L side of the chest. Reports worsening chest pain with exertion even with minimal activity in the house. The patient recently had a stent placed on 8/22 in RCA. Reports chest pain similar to previous episode in april. The patient also endorsed palpitations during these episodes. No LOC. ROS otherwise negative. The patient still endorsing some chest pain at bedside however it is better than before, however still present. No palpitations at this time. Difficult to obtain hx due to pain. Patient  underwent cath showing LAD 70 (IFR 0.8), LCx 90, RCA 90; Patient was recommended for CTS CABG evaluation; last coronary stents placed in August 2022. Patient's Brilinta was discontinued as per Dr REINALDO Montana, in anticipation for CABG evaluation. Patient underwent cath via left radial artery. L radial site is stable. No hematoma. Good cap refill. Patient is being transferred to [facility] for [facility]G eval. Hospitalist, Dr. Lawrence, on call notified.   11/15/22 s/p cabg x 4 LIMA-D | SVG-LAD | SVG-PDA | SVG-rPL  Left greater saphenous vein harvested endoscopically  Insulin infusion, endo consulted  11/17 transferred to sdu  11/18  d/c intro , d/c rubi  Ambulate.  ACE wrap LE ,  increase diuresis    11/19 VSS - pt ambulated 2 times around hallway this am - will isolate pw. House endo following - insulin gtt d/c'd this am will monitor sugars  d/c plan home  11/20 VSS- pws d/c'd nausea - gabapentin d/c'd - rounds with dR. chen - pt can transfer to floor - lantus & admelog increased to excessive hyperglycemia with glucose > 200 x 36 hrs.

## 2022-11-20 NOTE — CHART NOTE - NSCHARTNOTEFT_GEN_A_CORE
CAPILLARY BLOOD GLUCOSE      POCT Blood Glucose.: 269 mg/dL (20 Nov 2022 12:18)  POCT Blood Glucose.: 243 mg/dL (20 Nov 2022 07:34)  POCT Blood Glucose.: 209 mg/dL (19 Nov 2022 20:53)  POCT Blood Glucose.: 265 mg/dL (19 Nov 2022 16:44)      Chart reviewed.     Increase Lantus to 27 units qHS  Increase Admelog to 14 units qAC    Jennifer Jesus MD  Endocrine Fellow  Can be reached via teams.     For all urgent matters, can call answering service at [phone] (weekdays); [phone] (nights/weekends)  Any non-urgent consults or questions can be emailed to [email or web address] or [email or web address]
CAPILLARY BLOOD GLUCOSE      POCT Blood Glucose.: 285 mg/dL (19 Nov 2022 11:42)  POCT Blood Glucose.: 194 mg/dL (19 Nov 2022 07:34)  POCT Blood Glucose.: 257 mg/dL (18 Nov 2022 20:40)  POCT Blood Glucose.: 230 mg/dL (18 Nov 2022 16:32)      Chart reviewed.   Would continue Lantus 25 units qHS  Increase Admelog to 10 units qAC    Jennifer Jesus MD  Endocrine Fellow  Can be reached via teams.     For all urgent matters, can call answering service at [phone] (weekdays); [phone] (nights/weekends)  Any non-urgent consults or questions can be emailed to [email or web address] or [email or web address]
This is a 51 yo M /w a PMH of DM2 post-op for CABG on 11/15. He has been on an insulin drip and FSG up to 250 this afternoon    Recommend titrating insulin drip to goal -180 and will reassess insulin requirements for transition off the insulin drip tomorrow    Recommendations were discussed with the primary team    Raleigh Recio MD  Endocrinology Fellow

## 2022-11-21 ENCOUNTER — TRANSCRIPTION ENCOUNTER (OUTPATIENT)
Age: 52
End: 2022-11-21

## 2022-11-21 VITALS
SYSTOLIC BLOOD PRESSURE: 109 MMHG | OXYGEN SATURATION: 95 % | RESPIRATION RATE: 18 BRPM | HEART RATE: 78 BPM | TEMPERATURE: 99 F | DIASTOLIC BLOOD PRESSURE: 62 MMHG

## 2022-11-21 LAB
ANION GAP SERPL CALC-SCNC: 12 MMOL/L — SIGNIFICANT CHANGE UP (ref 5–17)
BUN SERPL-MCNC: 16 MG/DL — SIGNIFICANT CHANGE UP (ref 7–23)
CALCIUM SERPL-MCNC: 9.4 MG/DL — SIGNIFICANT CHANGE UP (ref 8.4–10.5)
CHLORIDE SERPL-SCNC: 95 MMOL/L — LOW (ref 96–108)
CO2 SERPL-SCNC: 26 MMOL/L — SIGNIFICANT CHANGE UP (ref 22–31)
CREAT SERPL-MCNC: 0.81 MG/DL — SIGNIFICANT CHANGE UP (ref 0.5–1.3)
EGFR: 88 ML/MIN/1.73M2 — SIGNIFICANT CHANGE UP
GLUCOSE SERPL-MCNC: 240 MG/DL — HIGH (ref 70–99)
HCT VFR BLD CALC: 27.6 % — LOW (ref 34.5–45)
HGB BLD-MCNC: 8.7 G/DL — LOW (ref 11.5–15.5)
MCHC RBC-ENTMCNC: 26.5 PG — LOW (ref 27–34)
MCHC RBC-ENTMCNC: 31.5 GM/DL — LOW (ref 32–36)
MCV RBC AUTO: 84.1 FL — SIGNIFICANT CHANGE UP (ref 80–100)
NRBC # BLD: 0 /100 WBCS — SIGNIFICANT CHANGE UP (ref 0–0)
PLATELET # BLD AUTO: 395 K/UL — SIGNIFICANT CHANGE UP (ref 150–400)
POTASSIUM SERPL-MCNC: 4.6 MMOL/L — SIGNIFICANT CHANGE UP (ref 3.5–5.3)
POTASSIUM SERPL-SCNC: 4.6 MMOL/L — SIGNIFICANT CHANGE UP (ref 3.5–5.3)
RBC # BLD: 3.28 M/UL — LOW (ref 3.8–5.2)
RBC # FLD: 14.6 % — HIGH (ref 10.3–14.5)
SODIUM SERPL-SCNC: 133 MMOL/L — LOW (ref 135–145)
WBC # BLD: 13.98 K/UL — HIGH (ref 3.8–10.5)
WBC # FLD AUTO: 13.98 K/UL — HIGH (ref 3.8–10.5)

## 2022-11-21 PROCEDURE — 87641 MR-STAPH DNA AMP PROBE: CPT

## 2022-11-21 PROCEDURE — 94010 BREATHING CAPACITY TEST: CPT

## 2022-11-21 PROCEDURE — 85384 FIBRINOGEN ACTIVITY: CPT

## 2022-11-21 PROCEDURE — 80048 BASIC METABOLIC PNL TOTAL CA: CPT

## 2022-11-21 PROCEDURE — 86965 POOLING BLOOD PLATELETS: CPT

## 2022-11-21 PROCEDURE — 97116 GAIT TRAINING THERAPY: CPT

## 2022-11-21 PROCEDURE — U0005: CPT

## 2022-11-21 PROCEDURE — 86985 SPLIT BLOOD OR PRODUCTS: CPT

## 2022-11-21 PROCEDURE — 84443 ASSAY THYROID STIM HORMONE: CPT

## 2022-11-21 PROCEDURE — 84295 ASSAY OF SERUM SODIUM: CPT

## 2022-11-21 PROCEDURE — 86901 BLOOD TYPING SEROLOGIC RH(D): CPT

## 2022-11-21 PROCEDURE — 84132 ASSAY OF SERUM POTASSIUM: CPT

## 2022-11-21 PROCEDURE — 85730 THROMBOPLASTIN TIME PARTIAL: CPT

## 2022-11-21 PROCEDURE — 82962 GLUCOSE BLOOD TEST: CPT

## 2022-11-21 PROCEDURE — P9012: CPT

## 2022-11-21 PROCEDURE — P9047: CPT

## 2022-11-21 PROCEDURE — 80202 ASSAY OF VANCOMYCIN: CPT

## 2022-11-21 PROCEDURE — 85396 CLOTTING ASSAY WHOLE BLOOD: CPT

## 2022-11-21 PROCEDURE — 84100 ASSAY OF PHOSPHORUS: CPT

## 2022-11-21 PROCEDURE — 80053 COMPREHEN METABOLIC PANEL: CPT

## 2022-11-21 PROCEDURE — P9016: CPT

## 2022-11-21 PROCEDURE — 99232 SBSQ HOSP IP/OBS MODERATE 35: CPT

## 2022-11-21 PROCEDURE — 87086 URINE CULTURE/COLONY COUNT: CPT

## 2022-11-21 PROCEDURE — 97530 THERAPEUTIC ACTIVITIES: CPT

## 2022-11-21 PROCEDURE — 85576 BLOOD PLATELET AGGREGATION: CPT

## 2022-11-21 PROCEDURE — 36415 COLL VENOUS BLD VENIPUNCTURE: CPT

## 2022-11-21 PROCEDURE — 85018 HEMOGLOBIN: CPT

## 2022-11-21 PROCEDURE — U0003: CPT

## 2022-11-21 PROCEDURE — 82550 ASSAY OF CK (CPK): CPT

## 2022-11-21 PROCEDURE — 36430 TRANSFUSION BLD/BLD COMPNT: CPT

## 2022-11-21 PROCEDURE — 82565 ASSAY OF CREATININE: CPT

## 2022-11-21 PROCEDURE — 97166 OT EVAL MOD COMPLEX 45 MIN: CPT

## 2022-11-21 PROCEDURE — 93005 ELECTROCARDIOGRAM TRACING: CPT

## 2022-11-21 PROCEDURE — 82947 ASSAY GLUCOSE BLOOD QUANT: CPT

## 2022-11-21 PROCEDURE — 83036 HEMOGLOBIN GLYCOSYLATED A1C: CPT

## 2022-11-21 PROCEDURE — 83735 ASSAY OF MAGNESIUM: CPT

## 2022-11-21 PROCEDURE — 85014 HEMATOCRIT: CPT

## 2022-11-21 PROCEDURE — 81025 URINE PREGNANCY TEST: CPT

## 2022-11-21 PROCEDURE — C1889: CPT

## 2022-11-21 PROCEDURE — 83880 ASSAY OF NATRIURETIC PEPTIDE: CPT

## 2022-11-21 PROCEDURE — C8929: CPT

## 2022-11-21 PROCEDURE — 93880 EXTRACRANIAL BILAT STUDY: CPT

## 2022-11-21 PROCEDURE — 81001 URINALYSIS AUTO W/SCOPE: CPT

## 2022-11-21 PROCEDURE — 85520 HEPARIN ASSAY: CPT

## 2022-11-21 PROCEDURE — 84484 ASSAY OF TROPONIN QUANT: CPT

## 2022-11-21 PROCEDURE — P9037: CPT

## 2022-11-21 PROCEDURE — C1769: CPT

## 2022-11-21 PROCEDURE — 97161 PT EVAL LOW COMPLEX 20 MIN: CPT

## 2022-11-21 PROCEDURE — 85025 COMPLETE CBC W/AUTO DIFF WBC: CPT

## 2022-11-21 PROCEDURE — 87640 STAPH A DNA AMP PROBE: CPT

## 2022-11-21 PROCEDURE — 86850 RBC ANTIBODY SCREEN: CPT

## 2022-11-21 PROCEDURE — 86891 AUTOLOGOUS BLOOD OP SALVAGE: CPT

## 2022-11-21 PROCEDURE — 84439 ASSAY OF FREE THYROXINE: CPT

## 2022-11-21 PROCEDURE — 83605 ASSAY OF LACTIC ACID: CPT

## 2022-11-21 PROCEDURE — 82435 ASSAY OF BLOOD CHLORIDE: CPT

## 2022-11-21 PROCEDURE — 86900 BLOOD TYPING SEROLOGIC ABO: CPT

## 2022-11-21 PROCEDURE — 71045 X-RAY EXAM CHEST 1 VIEW: CPT

## 2022-11-21 PROCEDURE — 86923 COMPATIBILITY TEST ELECTRIC: CPT

## 2022-11-21 PROCEDURE — 82553 CREATINE MB FRACTION: CPT

## 2022-11-21 PROCEDURE — C1751: CPT

## 2022-11-21 PROCEDURE — 85610 PROTHROMBIN TIME: CPT

## 2022-11-21 PROCEDURE — P9059: CPT

## 2022-11-21 PROCEDURE — P9100: CPT

## 2022-11-21 PROCEDURE — 82803 BLOOD GASES ANY COMBINATION: CPT

## 2022-11-21 PROCEDURE — 82330 ASSAY OF CALCIUM: CPT

## 2022-11-21 PROCEDURE — 94002 VENT MGMT INPAT INIT DAY: CPT

## 2022-11-21 PROCEDURE — P9011: CPT

## 2022-11-21 PROCEDURE — 85027 COMPLETE CBC AUTOMATED: CPT

## 2022-11-21 RX ORDER — ORAL SEMAGLUTIDE 14 MG/1
2 TABLET ORAL
Qty: 2.5 | Refills: 0 | DISCHARGE
Start: 2022-11-21 | End: 2022-12-20

## 2022-11-21 RX ORDER — SEMAGLUTIDE 0.68 MG/ML
0.5 INJECTION, SOLUTION SUBCUTANEOUS
Qty: 2.5 | Refills: 0
Start: 2022-11-21 | End: 2022-12-20

## 2022-11-21 RX ORDER — FUROSEMIDE 40 MG
1 TABLET ORAL
Qty: 14 | Refills: 0
Start: 2022-11-21 | End: 2022-12-04

## 2022-11-21 RX ORDER — INSULIN GLARGINE 100 [IU]/ML
100 INJECTION, SOLUTION SUBCUTANEOUS
Qty: 0 | Refills: 0 | DISCHARGE

## 2022-11-21 RX ORDER — DULAGLUTIDE 4.5 MG/.5ML
0.75 INJECTION, SOLUTION SUBCUTANEOUS
Qty: 4 | Refills: 0
Start: 2022-11-21 | End: 2022-12-20

## 2022-11-21 RX ORDER — OXYCODONE HYDROCHLORIDE 5 MG/1
1 TABLET ORAL
Qty: 20 | Refills: 0
Start: 2022-11-21 | End: 2022-11-25

## 2022-11-21 RX ORDER — INSULIN GLARGINE 100 [IU]/ML
50 INJECTION, SOLUTION SUBCUTANEOUS
Qty: 1 | Refills: 0
Start: 2022-11-21 | End: 2022-12-20

## 2022-11-21 RX ORDER — ASPIRIN/CALCIUM CARB/MAGNESIUM 324 MG
1 TABLET ORAL
Qty: 30 | Refills: 0
Start: 2022-11-21 | End: 2022-12-20

## 2022-11-21 RX ORDER — METOPROLOL TARTRATE 50 MG
1 TABLET ORAL
Qty: 60 | Refills: 0
Start: 2022-11-21 | End: 2022-12-20

## 2022-11-21 RX ORDER — SEMAGLUTIDE 0.68 MG/ML
0.25 INJECTION, SOLUTION SUBCUTANEOUS
Qty: 4 | Refills: 0
Start: 2022-11-21 | End: 2022-12-20

## 2022-11-21 RX ORDER — ATORVASTATIN CALCIUM 80 MG/1
1 TABLET, FILM COATED ORAL
Qty: 30 | Refills: 0
Start: 2022-11-21 | End: 2022-12-20

## 2022-11-21 RX ORDER — ACETAMINOPHEN 500 MG
2 TABLET ORAL
Qty: 0 | Refills: 0 | DISCHARGE
Start: 2022-11-21

## 2022-11-21 RX ORDER — PANTOPRAZOLE SODIUM 20 MG/1
1 TABLET, DELAYED RELEASE ORAL
Qty: 30 | Refills: 0
Start: 2022-11-21 | End: 2022-12-20

## 2022-11-21 RX ORDER — SENNA PLUS 8.6 MG/1
2 TABLET ORAL
Qty: 20 | Refills: 0
Start: 2022-11-21 | End: 2022-11-30

## 2022-11-21 RX ORDER — ATORVASTATIN CALCIUM 80 MG/1
1 TABLET, FILM COATED ORAL
Qty: 0 | Refills: 0 | DISCHARGE

## 2022-11-21 RX ORDER — TICAGRELOR 90 MG/1
1 TABLET ORAL
Qty: 0 | Refills: 0 | DISCHARGE

## 2022-11-21 RX ORDER — METOPROLOL TARTRATE 50 MG
1 TABLET ORAL
Qty: 0 | Refills: 0 | DISCHARGE

## 2022-11-21 RX ORDER — INSULIN GLARGINE 100 [IU]/ML
35 INJECTION, SOLUTION SUBCUTANEOUS
Qty: 1 | Refills: 0 | DISCHARGE
Start: 2022-11-21 | End: 2022-12-20

## 2022-11-21 RX ORDER — LOSARTAN POTASSIUM 100 MG/1
1 TABLET, FILM COATED ORAL
Qty: 0 | Refills: 0 | DISCHARGE

## 2022-11-21 RX ORDER — INSULIN GLARGINE 100 [IU]/ML
100 INJECTION, SOLUTION SUBCUTANEOUS
Qty: 1 | Refills: 0
Start: 2022-11-21

## 2022-11-21 RX ORDER — SPIRONOLACTONE 25 MG/1
2 TABLET, FILM COATED ORAL
Qty: 28 | Refills: 0
Start: 2022-11-21 | End: 2022-12-04

## 2022-11-21 RX ADMIN — OXYCODONE HYDROCHLORIDE 5 MILLIGRAM(S): 5 TABLET ORAL at 08:16

## 2022-11-21 RX ADMIN — Medication 81 MILLIGRAM(S): at 11:41

## 2022-11-21 RX ADMIN — OXYCODONE HYDROCHLORIDE 5 MILLIGRAM(S): 5 TABLET ORAL at 08:46

## 2022-11-21 RX ADMIN — PANTOPRAZOLE SODIUM 40 MILLIGRAM(S): 20 TABLET, DELAYED RELEASE ORAL at 05:17

## 2022-11-21 RX ADMIN — Medication 14 UNIT(S): at 08:17

## 2022-11-21 RX ADMIN — Medication 20 MILLIGRAM(S): at 05:17

## 2022-11-21 RX ADMIN — SPIRONOLACTONE 50 MILLIGRAM(S): 25 TABLET, FILM COATED ORAL at 05:17

## 2022-11-21 RX ADMIN — Medication 14 UNIT(S): at 11:40

## 2022-11-21 RX ADMIN — Medication 50 MILLIGRAM(S): at 05:17

## 2022-11-21 RX ADMIN — Medication 4: at 08:16

## 2022-11-21 RX ADMIN — Medication 6: at 11:40

## 2022-11-21 NOTE — PROGRESS NOTE ADULT - PROBLEM SELECTOR PROBLEM 1
Triple vessel coronary artery disease
S/P CABG x 4
Triple vessel coronary artery disease
DM (diabetes mellitus)
Triple vessel coronary artery disease

## 2022-11-21 NOTE — DIETITIAN INITIAL EVALUATION ADULT - PERTINENT LABORATORY DATA
11-21    133<L>  |  95<L>  |  16  ----------------------------<  240<H>  4.6   |  26  |  0.81    Ca    9.4      21 Nov 2022 05:58  Phos  3.3     11-20  Mg     1.6     11-20    TPro  6.0  /  Alb  3.1<L>  /  TBili  0.8  /  DBili  x   /  AST  29  /  ALT  38  /  AlkPhos  65  11-20  POCT Blood Glucose.: 218 mg/dL (11-21-22 @ 07:22)  A1C with Estimated Average Glucose Result: 8.0 % (11-12-22 @ 06:58)  A1C with Estimated Average Glucose Result: 7.6 % (11-11-22 @ 01:30)  A1C with Estimated Average Glucose Result: 7.4 % (03-11-22 @ 15:50)

## 2022-11-21 NOTE — DIETITIAN INITIAL EVALUATION ADULT - ORAL INTAKE PTA/DIET HISTORY
visited pt at bedside this morning. states to be very sleepy. breakfast PTA is typically 10 saltless crackers with hummus and a banana. lunch is leftovers and dinner is usually something homemade. did not elaborate. cheese, egg, milk allergy per chart. diarrhea with milk. soy intolerance, also diarrhea. checks blood glucose levels daily. jardiance and metformin noted per outpatient medications.

## 2022-11-21 NOTE — DIETITIAN INITIAL EVALUATION ADULT - PERTINENT MEDS FT
MEDICATIONS  (STANDING):  aspirin enteric coated 81 milliGRAM(s) Oral daily  atorvastatin 80 milliGRAM(s) Oral at bedtime  bisacodyl Suppository 10 milliGRAM(s) Rectal once  chlorhexidine 0.12% Liquid 5 milliLiter(s) Oral Mucosa two times a day  chlorhexidine 4% Liquid 1 Application(s) Topical daily  dextrose 5%. 1000 milliLiter(s) (50 mL/Hr) IV Continuous <Continuous>  dextrose 5%. 1000 milliLiter(s) (100 mL/Hr) IV Continuous <Continuous>  dextrose 50% Injectable 50 milliLiter(s) IV Push every 15 minutes  dextrose 50% Injectable 50 milliLiter(s) IV Push every 15 minutes  dextrose 50% Injectable 25 milliLiter(s) IV Push every 15 minutes  enoxaparin Injectable 40 milliGRAM(s) SubCutaneous every 24 hours  glucagon  Injectable 1 milliGRAM(s) IntraMuscular once  insulin glargine Injectable (LANTUS) 27 Unit(s) SubCutaneous at bedtime  insulin lispro (ADMELOG) corrective regimen sliding scale   SubCutaneous three times a day before meals  insulin lispro (ADMELOG) corrective regimen sliding scale   SubCutaneous at bedtime  insulin lispro Injectable (ADMELOG) 14 Unit(s) SubCutaneous three times a day before meals  metoprolol tartrate 50 milliGRAM(s) Oral two times a day  pantoprazole    Tablet 40 milliGRAM(s) Oral before breakfast  polyethylene glycol 3350 17 Gram(s) Oral daily  senna 2 Tablet(s) Oral at bedtime  sodium chloride 0.9%. 1000 milliLiter(s) (10 mL/Hr) IV Continuous <Continuous>  spironolactone 50 milliGRAM(s) Oral two times a day  torsemide 20 milliGRAM(s) Oral daily    MEDICATIONS  (PRN):  acetaminophen     Tablet .. 650 milliGRAM(s) Oral every 6 hours PRN Mild Pain (1 - 3)  dextrose Oral Gel 15 Gram(s) Oral once PRN Blood Glucose LESS THAN 70 milliGRAM(s)/deciliter  oxyCODONE    IR 5 milliGRAM(s) Oral every 4 hours PRN Moderate Pain (4 - 6)  oxyCODONE    IR 10 milliGRAM(s) Oral every 4 hours PRN Severe Pain (7 - 10)

## 2022-11-21 NOTE — DISCHARGE NOTE NURSING/CASE MANAGEMENT/SOCIAL WORK - NSDCPEFALRISK_GEN_ALL_CORE
For information on Fall & Injury Prevention, visit: [email or web address] OR  [email or web address] OR  https://www.cdc.gov/steadi/patient.html

## 2022-11-21 NOTE — DISCHARGE NOTE NURSING/CASE MANAGEMENT/SOCIAL WORK - PATIENT PORTAL LINK FT
You can access the FollowMyHealth Patient Portal offered by [facility] by registering at the following website: [email or web address]. By joining [employer]’s FollowMyHealth portal, you will also be able to view your health information using other applications (apps) compatible with our system.

## 2022-11-21 NOTE — PROGRESS NOTE ADULT - SUBJECTIVE AND OBJECTIVE BOX
Chief Complaint: DM2    History: No acute events. Plan for patient to be discharged today.     MEDICATIONS  (STANDING):  aspirin enteric coated 81 milliGRAM(s) Oral daily  atorvastatin 80 milliGRAM(s) Oral at bedtime  bisacodyl Suppository 10 milliGRAM(s) Rectal once  chlorhexidine 0.12% Liquid 5 milliLiter(s) Oral Mucosa two times a day  chlorhexidine 4% Liquid 1 Application(s) Topical daily  dextrose 5%. 1000 milliLiter(s) (50 mL/Hr) IV Continuous <Continuous>  dextrose 5%. 1000 milliLiter(s) (100 mL/Hr) IV Continuous <Continuous>  dextrose 50% Injectable 50 milliLiter(s) IV Push every 15 minutes  dextrose 50% Injectable 50 milliLiter(s) IV Push every 15 minutes  dextrose 50% Injectable 25 milliLiter(s) IV Push every 15 minutes  enoxaparin Injectable 40 milliGRAM(s) SubCutaneous every 24 hours  glucagon  Injectable 1 milliGRAM(s) IntraMuscular once  insulin glargine Injectable (LANTUS) 27 Unit(s) SubCutaneous at bedtime  insulin lispro (ADMELOG) corrective regimen sliding scale   SubCutaneous three times a day before meals  insulin lispro (ADMELOG) corrective regimen sliding scale   SubCutaneous at bedtime  insulin lispro Injectable (ADMELOG) 14 Unit(s) SubCutaneous three times a day before meals  metoprolol tartrate 50 milliGRAM(s) Oral two times a day  pantoprazole    Tablet 40 milliGRAM(s) Oral before breakfast  polyethylene glycol 3350 17 Gram(s) Oral daily  senna 2 Tablet(s) Oral at bedtime  sodium chloride 0.9%. 1000 milliLiter(s) (10 mL/Hr) IV Continuous <Continuous>  spironolactone 50 milliGRAM(s) Oral two times a day  torsemide 20 milliGRAM(s) Oral daily    MEDICATIONS  (PRN):  acetaminophen     Tablet .. 650 milliGRAM(s) Oral every 6 hours PRN Mild Pain (1 - 3)  dextrose Oral Gel 15 Gram(s) Oral once PRN Blood Glucose LESS THAN 70 milliGRAM(s)/deciliter  oxyCODONE    IR 5 milliGRAM(s) Oral every 4 hours PRN Moderate Pain (4 - 6)  oxyCODONE    IR 10 milliGRAM(s) Oral every 4 hours PRN Severe Pain (7 - 10)      Allergies    amoxicillin (Unknown)  Cheese (Unknown)  eggs (Unknown)  Milk (Diarrhea)    Intolerances    Soy (Diarrhea)    Review of Systems:  Constitutional: No fever  Eyes: No blurry vision  Neuro: No tremors  HEENT: No pain  Cardiovascular: No chest pain, palpitations  Respiratory: No SOB, no cough  GI: No nausea, vomiting, abdominal pain  : No dysuria  Skin: no rash  Psych: no depression  Endocrine: no polyuria, polydipsia  Hem/lymph: no swelling  Osteoporosis: no fractures    ALL OTHER SYSTEMS REVIEWED AND NEGATIVE        PHYSICAL EXAM:  VITALS: T(C): 37.3 (11-21-22 @ 04:13)  T(F): 99.1 (11-21-22 @ 04:13), Max: 99.5 (11-20-22 @ 15:06)  HR: 86 (11-21-22 @ 04:13) (75 - 94)  BP: 120/63 (11-21-22 @ 04:13) (96/64 - 145/80)  RR:  (18 - 18)  SpO2:  (90% - 95%)  Wt(kg): --  GENERAL: NAD, well-groomed, well-developed  EYES: No proptosis, no lid lag, anicteric  HEENT:  Atraumatic, Normocephalic, moist mucous membranes  RESPIRATORY: Clear to auscultation bilaterally  CARDIOVASCULAR: Regular rate and rhythm  GI: Soft, nontender, non distended, normal bowel sounds  MUSCULOSKELETAL: Full range of motion, normal strength  NEURO: sensation intact, extraocular movements intact, no tremor, normal reflexes  PSYCH: Alert and oriented x 3, normal affect, normal mood      POCT Blood Glucose.: 252 mg/dL (11-21-22 @ 11:27)  POCT Blood Glucose.: 218 mg/dL (11-21-22 @ 07:22)  POCT Blood Glucose.: 266 mg/dL (11-21-22 @ 03:22)  POCT Blood Glucose.: 293 mg/dL (11-20-22 @ 21:02)  POCT Blood Glucose.: 301 mg/dL (11-20-22 @ 21:01)  POCT Blood Glucose.: 231 mg/dL (11-20-22 @ 16:49)  POCT Blood Glucose.: 269 mg/dL (11-20-22 @ 12:18)  POCT Blood Glucose.: 243 mg/dL (11-20-22 @ 07:34)  POCT Blood Glucose.: 209 mg/dL (11-19-22 @ 20:53)  POCT Blood Glucose.: 265 mg/dL (11-19-22 @ 16:44)  POCT Blood Glucose.: 285 mg/dL (11-19-22 @ 11:42)  POCT Blood Glucose.: 194 mg/dL (11-19-22 @ 07:34)  POCT Blood Glucose.: 257 mg/dL (11-18-22 @ 20:40)  POCT Blood Glucose.: 230 mg/dL (11-18-22 @ 16:32)      11-21    133<L>  |  95<L>  |  16  ----------------------------<  240<H>  4.6   |  26  |  0.81    eGFR: 88    Ca    9.4      11-21  Mg     1.6     11-20  Phos  3.3     11-20    TPro  6.0  /  Alb  3.1<L>  /  TBili  0.8  /  DBili  x   /  AST  29  /  ALT  38  /  AlkPhos  65  11-20          Thyroid Function Tests:  11-12 @ 06:58 TSH 2.95 FreeT4 1.1 T3 -- Anti TPO -- Anti Thyroglobulin Ab -- TSI --  11-11 @ 01:30 TSH 2.51 FreeT4 -- T3 -- Anti TPO -- Anti Thyroglobulin Ab -- TSI --

## 2022-11-21 NOTE — DISCHARGE NOTE PROVIDER - CARE PROVIDER_API CALL
Michael Mace)  Surgery; Surgical Critical Care; Thoracic and Cardiac Surgery  [address] [place] [address]  Phone: [phone]  Fax: [phone]  Established Patient  Scheduled Appointment: 12/05/2022 03:15 PM

## 2022-11-21 NOTE — DISCHARGE NOTE PROVIDER - NSDCCPCAREPLAN_GEN_ALL_CORE_FT
PRINCIPAL DISCHARGE DIAGNOSIS  Diagnosis: S/P CABG x 4  Assessment and Plan of Treatment: Refer to your Cardiac Surgery Do's & Dont's fact sheet  shower daily & wash your incisions with mild soap and water  weigh yourself daily & record - report weight gain > 2.5 pounds overnight to alejo VARELA  take medications as prescribed  Folloow up appt. with DR. Michael Mace on Monday, 12/5/22 @ 3:15pm  follow up appt with your Endocrinologist, Cardiologist and/or Primary Care MD in 3-4 weeks  ambulate 4-5 times a day

## 2022-11-21 NOTE — DISCHARGE NOTE PROVIDER - NSDCMRMEDTOKEN_GEN_ALL_CORE_FT
acetaminophen: 2 tab(s) orally every 6 hours, As Needed  for mild pain and/or headache  aspirin 81 mg oral delayed release tablet: 1 tab(s) orally once a day  atorvastatin 80 mg oral tablet: 1 tab(s) orally once a day (at bedtime)  Iron:   Jardiance 25 mg oral tablet: 1 tab(s) orally once a day  Lasix 40 mg oral tablet: 1 tab(s) orally once a day   metFORMIN 500 mg oral tablet, extended release: 1 tab(s) orally 2 times a day  metoprolol tartrate 50 mg oral tablet: 1 tab(s) orally 2 times a day  oxyCODONE 5 mg oral tablet: 1 tab(s) orally every 6 hours, As Needed -Moderate Pain (4 - 6) MDD:4  pantoprazole 40 mg oral delayed release tablet: 1 tab(s) orally once a day (before a meal)  senna leaf extract oral tablet: 2 tab(s) orally once a day (at bedtime)  spironolactone 25 mg oral tablet: 2 tab(s) orally once a day   Lissa Menodza 300 units/mL subcutaneous solution: 100 unit(s) subcutaneous once a day (at bedtime)  Vitamin B12:   Vitamin C:    acetaminophen: 2 tab(s) orally every 6 hours, As Needed  for mild pain and/or headache  aspirin 81 mg oral delayed release tablet: 1 tab(s) orally once a day  atorvastatin 80 mg oral tablet: 1 tab(s) orally once a day (at bedtime)  Insulin Pen Needles, 4mm: 1 application subcutaneously 4 times a day. ** Use with insulin pen **   Iron:   Jardiance 25 mg oral tablet: 1 tab(s) orally once a day  Lasix 40 mg oral tablet: 1 tab(s) orally once a day   metFORMIN 500 mg oral tablet, extended release: 1 tab(s) orally 2 times a day  metoprolol tartrate 50 mg oral tablet: 1 tab(s) orally 2 times a day  oxyCODONE 5 mg oral tablet: 1 tab(s) orally every 6 hours, As Needed -Moderate Pain (4 - 6) MDD:4  Ozempic 2 mg/1.5 mL (0.25 mg or 0.5 mg dose) subcutaneous solution: 0.5 milligram(s) subcutaneously once a week   pantoprazole 40 mg oral delayed release tablet: 1 tab(s) orally once a day (before a meal)  senna leaf extract oral tablet: 2 tab(s) orally once a day (at bedtime)  spironolactone 25 mg oral tablet: 2 tab(s) orally once a day   Toujeo SoloStar 300 units/mL subcutaneous solution: 50 unit(s) subcutaneous once a day (at bedtime)   Vitamin B12:   Vitamin C:    acetaminophen: 2 tab(s) orally every 6 hours, As Needed  for mild pain and/or headache  aspirin 81 mg oral delayed release tablet: 1 tab(s) orally once a day  atorvastatin 80 mg oral tablet: 1 tab(s) orally once a day (at bedtime)  Insulin Pen Needles, 4mm: 1 application subcutaneously 4 times a day. ** Use with insulin pen **   Iron:   Jardiance 25 mg oral tablet: 1 tab(s) orally once a day  Lasix 40 mg oral tablet: 1 tab(s) orally once a day   metFORMIN 500 mg oral tablet, extended release: 1 tab(s) orally 2 times a day  metoprolol tartrate 50 mg oral tablet: 1 tab(s) orally 2 times a day  oxyCODONE 5 mg oral tablet: 1 tab(s) orally every 6 hours, As Needed -Moderate Pain (4 - 6) MDD:4  Ozempic 2 mg/1.5 mL (0.25 mg or 0.5 mg dose) subcutaneous solution: 0.5 milligram(s) subcutaneously once a week   pantoprazole 40 mg oral delayed release tablet: 1 tab(s) orally once a day (before a meal)  senna leaf extract oral tablet: 2 tab(s) orally once a day (at bedtime)  spironolactone 25 mg oral tablet: 2 tab(s) orally once a day   ticagrelor 90 mg oral tablet: 1 tab(s) orally 2 times a day  Toujeo SoloStar 300 units/mL subcutaneous solution: 50 unit(s) subcutaneous once a day (at bedtime)   Vitamin B12:   Vitamin C:

## 2022-11-21 NOTE — PROGRESS NOTE ADULT - PROBLEM SELECTOR PROBLEM 2
DM (diabetes mellitus)
Diabetes mellitus with hyperglycemia
Diabetes mellitus with hyperglycemia
HTN (hypertension)
DM (diabetes mellitus)
DM (diabetes mellitus)
Diabetes mellitus with hyperglycemia
Diabetes mellitus with hyperglycemia

## 2022-11-21 NOTE — PROGRESS NOTE ADULT - PROBLEM SELECTOR PROBLEM 3
Hyperlipidemia
Urinary tract infection with hematuria

## 2022-11-21 NOTE — DISCHARGE NOTE PROVIDER - NSDCPNSUBOBJ_GEN_ALL_CORE
PHYSICAL EXAM:  Neurology: alert and oriented x 3, nonfocal, no gross deficits  CV : S1S2  Sternal Wound :  CDI , Stable  Lungs: cta  Abdomen: soft, nontender, nondistended, positive bowel sounds, last bowel movement    +bm  Extremities:    b/l leg inc w/ ace wraps - no. edema  no calf tenderness

## 2022-11-21 NOTE — PROGRESS NOTE ADULT - PROBLEM SELECTOR PLAN 1
CHO diet and FS AC and HS  Recommend Lantus 32 Units HS and Admelog 18 Units TIDAC plus mod scale before meals and bedtime  For discharge: recommend Toujeo 50 units HS, metformin 500 mg BID and jardiance 25mg daily as well as addition of GLP-1  Add GLP-1 agonist-please check if Ozempic 0.25mg subq weekly or Trulicity 0.75mg subq weekly are covered by insurance  Follow up with Dr Fish at discharge.

## 2022-11-21 NOTE — DIETITIAN INITIAL EVALUATION ADULT - REASON INDICATOR FOR ASSESSMENT
seen for length of stay. information obtained from pt, team during interdisciplinary rounds and EMR.

## 2022-11-21 NOTE — DIETITIAN INITIAL EVALUATION ADULT - PERSON TAUGHT/METHOD
RD reviewed nutrition therapy for s/p CABG with midsternal incision. Emphasized importance of adequate lean protein intake and optimal blood glucose levels for wound healing. Advised pt to limit concentrated sweets, portion control. Also discussed importance of limiting sodium intake as well as heart healthy food options. Provided education on Carbohydrate Consistent diet including sources of carbohydrates, portion sizes, pairing protein with carbohydrates, and the importance of consistent eating pattern to help optimize glycemic control. Provided pt with the following packet:  Carbohydrate Counting for People with DM./verbal instruction/written material/patient instructed

## 2022-11-21 NOTE — DISCHARGE NOTE PROVIDER - PROVIDER TOKENS
PROVIDER:[TOKEN:[[id number]],SCHEDULEDAPPT:[12/05/2022],SCHEDULEDAPPTTIME:[03:15 PM],ESTABLISHEDPATIENT:[T]]

## 2022-11-21 NOTE — PROGRESS NOTE ADULT - REASON FOR ADMISSION
3VD preop CABG

## 2022-11-21 NOTE — DISCHARGE NOTE PROVIDER - HOSPITAL COURSE
51 y/o F with pmhx of CAD with multiple stents (3, last one placed in 8/22), DM2, HTN, presented to the ED for new onset chest pain. The patient reported chest pain for 2 weeks, waxes and wanes. Chest pain is pressure like, mainly in the L side of the chest. Reports worsening chest pain with exertion even with minimal activity in the house. The patient recently had a stent placed on 8/22 in RCA. Reports chest pain similar to previous episode in april. The patient also endorsed palpitations during these episodes. No LOC. ROS otherwise negative. The patient still endorsing some chest pain at bedside however it is better than before, however still present. No palpitations at this time. Difficult to obtain hx due to pain. Patient  underwent cath showing LAD 70 (IFR 0.8), LCx 90, RCA 90; Patient was recommended for CTS CABG evaluation; last coronary stents placed in August 2022. Patient's Brilinta was discontinued as per Dr REINALDO Montana, in anticipation for CABG evaluation. Patient underwent cath via left radial artery. L radial site is stable. No hematoma. Good cap refill. Patient is being transferred to [facility] for CABG eval. Hospitalist, Dr. Lawrence, on call notified.   11/15/22 s/p cabg x 4 LIMA-D | SVG-LAD | SVG-PDA | SVG-rPL  Left greater saphenous vein harvested endoscopically  Insulin infusion, endo consulted  11/17 transferred to sdu  11/18  d/c intro , d/c rubi  Ambulate.  ACE wrap LE ,  increase diuresis    11/19 VSS - pt ambulated 2 times around hallway this am - will isolate pw. House endo following - insulin gtt d/c'd this am will monitor sugars  d/c plan home  11/20 VSS- pws d/c'd nausea - gabapentin d/c'd - rounds with dR. chen - pt can transfer to floor - lantus & admelog increased to excessive hyperglycemia with glucose > 200 x 36 hrs.    11/21 VSS pw out - a1c 8 - will d/c home today on preop diabetic regime.

## 2022-11-21 NOTE — PROGRESS NOTE ADULT - PROBLEM SELECTOR PLAN 2
A1C 7.6  FS qAC and qHS  Diabetic Diet  c/w Lantus 15u qHS and ISS qAC/qHS
Insulin infusion, d/c   diane liu
mgmt per primary team
Insulin infusion, would like to transition off  endo following
A1C 7.6  FS qAC and qHS  Diabetic Diet  c/w Lantus 15u qHS and ISS qAC/qHS
Insulin infusion, d/c   diane liu
Insulin infusion, would like to transition off  endo following
A1C 7.6  FS qAC and qHS  Diabetic Diet  c/w Lantus 15u qHS and ISS qAC/qHS

## 2022-11-21 NOTE — PROGRESS NOTE ADULT - PROVIDER SPECIALTY LIST ADULT
Critical Care
CT Surgery
Endocrinology
CT Surgery

## 2022-11-21 NOTE — DIETITIAN INITIAL EVALUATION ADULT - ADD RECOMMEND
1) Will continue to monitor PO intake, weight, labs, skin, GI status and diet 2) RD to add No Sugar Added Mighty Shake supplement daily to aid in increased nutrient needs for incision healing, will provide an addition 7g protein.

## 2022-11-21 NOTE — DIETITIAN INITIAL EVALUATION ADULT - REASON FOR ADMISSION
Atherosclerosis of native coronary artery without angina pectoris  .  11/15/22 s/p cabg x 4 LIMA-D | SVG-LAD | SVG-PDA | SVG-rPL

## 2022-11-21 NOTE — DISCHARGE NOTE PROVIDER - NSDCFUSCHEDAPPT_GEN_ALL_CORE_FT
Charity Villalobos  [facility]  CTS[facility]  Scheduled Appointment: 12/05/2022

## 2022-11-22 ENCOUNTER — APPOINTMENT (OUTPATIENT)
Dept: CARE COORDINATION | Facility: HOME HEALTH | Age: 52
End: 2022-11-22

## 2022-11-22 VITALS — BODY MASS INDEX: 34.95 KG/M2 | WEIGHT: 210 LBS

## 2022-11-22 DIAGNOSIS — Z95.1 PRESENCE OF AORTOCORONARY BYPASS GRAFT: ICD-10-CM

## 2022-11-22 PROCEDURE — 99024 POSTOP FOLLOW-UP VISIT: CPT

## 2022-11-22 RX ORDER — INSULIN GLARGINE 300 U/ML
300 INJECTION, SOLUTION SUBCUTANEOUS
Refills: 0 | Status: DISCONTINUED | COMMUNITY
End: 2022-11-22

## 2022-11-22 RX ORDER — LANCETS
EACH MISCELLANEOUS
Qty: 300 | Refills: 0 | Status: DISCONTINUED | COMMUNITY
Start: 2019-01-29 | End: 2022-11-22

## 2022-11-22 RX ORDER — FAMOTIDINE 20 MG/1
20 TABLET, FILM COATED ORAL
Qty: 180 | Refills: 1 | Status: DISCONTINUED | COMMUNITY
Start: 2021-01-11 | End: 2022-11-22

## 2022-11-22 RX ORDER — LEVOFLOXACIN 500 MG/1
500 TABLET, FILM COATED ORAL
Qty: 10 | Refills: 0 | Status: DISCONTINUED | COMMUNITY
Start: 2022-02-22 | End: 2022-11-22

## 2022-11-22 RX ORDER — CYCLOSPORINE 0.5 MG/ML
0.05 EMULSION OPHTHALMIC
Qty: 180 | Refills: 0 | Status: DISCONTINUED | COMMUNITY
Start: 2022-03-25 | End: 2022-11-22

## 2022-11-22 RX ORDER — EMPAGLIFLOZIN 10 MG/1
10 TABLET, FILM COATED ORAL DAILY
Refills: 0 | Status: DISCONTINUED | COMMUNITY
End: 2022-11-22

## 2022-11-22 RX ORDER — METOPROLOL SUCCINATE 50 MG/1
50 TABLET, EXTENDED RELEASE ORAL
Qty: 90 | Refills: 0 | Status: DISCONTINUED | COMMUNITY
Start: 2018-11-07 | End: 2022-11-22

## 2022-11-22 RX ORDER — TICAGRELOR 90 MG/1
90 TABLET ORAL TWICE DAILY
Refills: 0 | Status: DISCONTINUED | COMMUNITY
End: 2022-11-22

## 2022-11-22 RX ORDER — LIRAGLUTIDE 6 MG/ML
18 INJECTION, SOLUTION SUBCUTANEOUS
Refills: 0 | Status: DISCONTINUED | COMMUNITY
End: 2022-11-22

## 2022-11-22 RX ORDER — LOSARTAN POTASSIUM 25 MG/1
25 TABLET, FILM COATED ORAL DAILY
Qty: 90 | Refills: 1 | Status: DISCONTINUED | COMMUNITY
Start: 2022-03-28 | End: 2022-11-22

## 2022-11-22 RX ORDER — ATORVASTATIN CALCIUM 40 MG/1
40 TABLET, FILM COATED ORAL DAILY
Refills: 0 | Status: DISCONTINUED | COMMUNITY
End: 2022-11-22

## 2022-11-22 RX ORDER — ISOSORBIDE MONONITRATE 30 MG/1
30 TABLET, EXTENDED RELEASE ORAL DAILY
Qty: 90 | Refills: 1 | Status: DISCONTINUED | COMMUNITY
Start: 2022-07-22 | End: 2022-11-22

## 2022-11-22 RX ORDER — IBUPROFEN 400 MG/1
400 TABLET, FILM COATED ORAL
Qty: 30 | Refills: 0 | Status: DISCONTINUED | COMMUNITY
Start: 2022-06-29 | End: 2022-11-22

## 2022-11-22 RX ORDER — FLASH GLUCOSE SENSOR
KIT MISCELLANEOUS
Qty: 6 | Refills: 0 | Status: DISCONTINUED | COMMUNITY
Start: 2020-11-19 | End: 2022-11-22

## 2022-11-22 RX ORDER — FLASH GLUCOSE SCANNING READER
EACH MISCELLANEOUS
Qty: 1 | Refills: 0 | Status: DISCONTINUED | COMMUNITY
Start: 2022-01-13 | End: 2022-11-22

## 2022-11-22 RX ORDER — INSULIN GLARGINE 300 U/ML
300 INJECTION, SOLUTION SUBCUTANEOUS
Refills: 0 | Status: DISCONTINUED | COMMUNITY
Start: 2019-01-29 | End: 2022-11-22

## 2022-11-22 RX ORDER — BLOOD SUGAR DIAGNOSTIC
STRIP MISCELLANEOUS
Qty: 300 | Refills: 0 | Status: DISCONTINUED | COMMUNITY
Start: 2019-01-29 | End: 2022-11-22

## 2022-11-22 RX ORDER — FLUTICASONE PROPIONATE 50 UG/1
50 SPRAY, METERED NASAL
Qty: 16 | Refills: 0 | Status: DISCONTINUED | COMMUNITY
Start: 2021-01-04 | End: 2022-11-22

## 2022-11-22 RX ORDER — BACLOFEN 20 MG/1
20 TABLET ORAL
Qty: 30 | Refills: 0 | Status: DISCONTINUED | COMMUNITY
Start: 2020-09-24 | End: 2022-11-22

## 2022-11-22 RX ORDER — AMMONIUM LACTATE 12 %
12 CREAM (GRAM) TOPICAL
Qty: 385 | Refills: 0 | Status: DISCONTINUED | COMMUNITY
Start: 2020-03-31 | End: 2022-11-22

## 2022-11-22 RX ORDER — PEN NEEDLE, DIABETIC 29 G X1/2"
31G X 5 MM NEEDLE, DISPOSABLE MISCELLANEOUS
Qty: 180 | Refills: 0 | Status: DISCONTINUED | COMMUNITY
Start: 2020-12-15 | End: 2022-11-22

## 2022-11-22 RX ORDER — SEMAGLUTIDE 1.34 MG/ML
2 INJECTION, SOLUTION SUBCUTANEOUS
Qty: 1 | Refills: 0 | Status: ACTIVE | COMMUNITY
Start: 2022-11-21

## 2022-11-22 NOTE — ASSESSMENT
[FreeTextEntry1] : Pt recovering well at home s/p cabg . Reviewed all medications and dosages with pt understanding. Pt dispenses meds appropriately into med dispenser each week. Pt has all medications in home and is taking as prescribed. Denies pain at this time. No new symptoms, issues or concerns to report at this time. Appt schedule reviewed with pt verbalizing understanding.\par

## 2022-11-22 NOTE — PHYSICAL EXAM
[] : no respiratory distress [FreeTextEntry1] : MSI and CT sites without erythema, drainage or warmth, with edges well approximated. Sternum stable\par \par   [Abnormal Walk] : normal gait [Cranial Nerves] : cranial nerves 2-12 were intact [No Focal Deficits] : no focal deficits [Oriented To Time, Place, And Person] : oriented to person, place, and time

## 2022-12-01 PROBLEM — Z09 POSTOPERATIVE FOLLOW-UP: Status: ACTIVE | Noted: 2022-12-01

## 2022-12-01 PROBLEM — Z95.1 S/P CABG X 4: Status: ACTIVE | Noted: 2022-12-01

## 2022-12-01 RX ORDER — SENNOSIDES 8.6 MG TABLETS 8.6 MG/1
8.6 TABLET ORAL
Qty: 60 | Refills: 0 | Status: ACTIVE | COMMUNITY

## 2022-12-01 RX ORDER — OXYCODONE 5 MG/1
5 TABLET ORAL EVERY 6 HOURS
Qty: 20 | Refills: 0 | Status: ACTIVE | COMMUNITY
Start: 2022-11-21

## 2022-12-01 RX ORDER — PEN NEEDLE, DIABETIC 31 G X1/4"
32G X 4 MM NEEDLE, DISPOSABLE MISCELLANEOUS
Qty: 100 | Refills: 0 | Status: COMPLETED | COMMUNITY
Start: 2022-11-21 | End: 2022-12-01

## 2022-12-01 RX ORDER — ATORVASTATIN CALCIUM 80 MG/1
80 TABLET, FILM COATED ORAL
Qty: 30 | Refills: 0 | Status: ACTIVE | COMMUNITY
Start: 2022-11-21

## 2022-12-05 ENCOUNTER — OUTPATIENT (OUTPATIENT)
Dept: OUTPATIENT SERVICES | Facility: HOSPITAL | Age: 52
LOS: 1 days | End: 2022-12-05
Payer: COMMERCIAL

## 2022-12-05 ENCOUNTER — APPOINTMENT (OUTPATIENT)
Dept: CARDIOTHORACIC SURGERY | Facility: CLINIC | Age: 52
End: 2022-12-05

## 2022-12-05 VITALS
BODY MASS INDEX: 30.49 KG/M2 | HEART RATE: 86 BPM | OXYGEN SATURATION: 100 % | SYSTOLIC BLOOD PRESSURE: 120 MMHG | TEMPERATURE: 97.5 F | RESPIRATION RATE: 15 BRPM | WEIGHT: 183 LBS | HEIGHT: 65 IN | DIASTOLIC BLOOD PRESSURE: 81 MMHG

## 2022-12-05 DIAGNOSIS — Z09 ENCOUNTER FOR FOLLOW-UP EXAMINATION AFTER COMPLETED TREATMENT FOR CONDITIONS OTHER THAN MALIGNANT NEOPLASM: ICD-10-CM

## 2022-12-05 DIAGNOSIS — Z95.5 PRESENCE OF CORONARY ANGIOPLASTY IMPLANT AND GRAFT: Chronic | ICD-10-CM

## 2022-12-05 DIAGNOSIS — Z95.1 PRESENCE OF AORTOCORONARY BYPASS GRAFT: ICD-10-CM

## 2022-12-05 PROCEDURE — 99024 POSTOP FOLLOW-UP VISIT: CPT

## 2022-12-05 PROCEDURE — 71046 X-RAY EXAM CHEST 2 VIEWS: CPT | Mod: 26

## 2022-12-05 PROCEDURE — 71046 X-RAY EXAM CHEST 2 VIEWS: CPT

## 2022-12-05 RX ORDER — FUROSEMIDE 40 MG/1
40 TABLET ORAL DAILY
Qty: 7 | Refills: 0 | Status: COMPLETED | COMMUNITY
Start: 2022-11-21 | End: 2022-12-05

## 2022-12-05 NOTE — CONSULT LETTER
[Dear  ___] : Dear  [unfilled], [Courtesy Letter:] : I had the pleasure of seeing your patient, [unfilled], in my office today. [Please see my note below.] : Please see my note below. [Consult Closing:] : Thank you very much for allowing me to participate in the care of this patient.  If you have any questions, please do not hesitate to contact me. [Sincerely,] : Sincerely, [FreeTextEntry2] : Dr.Avinash Reveles/ Vianca De La Rosa  [FreeTextEntry3] : Michael Mace MD\par  & \par \par Cardiovascular & Thoracic Surgery\par [facility] \par [address]\par [facility] [address]\par \par

## 2022-12-05 NOTE — DISCUSSION/SUMMARY
[Slow Progress] : is progressing slowly [Fair Pain Control] : has fair pain control [No Sign of Infection] : is showing no signs of infection [7] : 7 [Coumadin] : the patient is not on Coumadin [Remove Sutures/Staples] : removed sutures/staples [de-identified] : MSI pain

## 2022-12-05 NOTE — REASON FOR VISIT
[Family Member] : family member [de-identified] : CABG x 4 LIMA-D SVG-LAD SVG-PDA SVG-rPL  [de-identified] : 11/15/22

## 2022-12-05 NOTE — ASSESSMENT
[FreeTextEntry1] : Ms. MONTEMAYOR is a 52 year old female with  past medical history of CAD with multiple stents (3, last one placed in 8/22), DM2, HTN, presented to the ED for new onset chest pain. The patient reported chest pain for 2 weeks, waxes and wanes. Chest pain is pressure like, mainly in the L side of the chest. Reports worsening chest pain with exertion even with minimal activity in the house. The patient recently had a stent placed on 8/22 in RCA. Reports chest pain similar to previous episode in april. The patient still endorsing some chest pain at bedside however it is better than before, however still present. No palpitations at this time. Difficult to obtain hx due to pain. Patient  underwent cath showing LAD 70 (IFR 0.8), LCx 90, RCA 90; Patient was recommended for CTS CABG evaluation; last coronary stents placed in August 2022. Patient's Brilinta was discontinued as per Dr REINALDO Montana, in anticipation for CABG evaluation. Patient underwent cath via left radial artery. L radial site is stable. No hematoma. Good cap refill. Patient is being transferred to [facility] for CABG eval. Hospitalist, Dr. Lawrence, on call notified.\par \par \par She is S/P  CABG x 4 LIMA-D SVG-LAD SVG-PDA SVG-rPL on 11/15/22. Post op course unremarkable. She is here for post op visit. She is here for post op visit. \par \par \par This visit, she complaints of nausea and dizziness. She has occasional shortness of breath when climbing stairs . She lost almost 15 lbs since surgery. Denies any chest pain, palpitations, dizziness or pedal edema. \par \par Today on exam patient's lungs clear bilaterally, normal sinus rhythm, sternum stable, incision clean, dry and intact. LT SVG site is clean, dry and intact.  No peripheral edema noted. Sutures  removed today. Instructed patient on importance of optimal glycemic control, daily showering, daily weights, any signs of fever (temperature greater than 101F, chills,  incentive spirometer use, and increase ambulation as tolerated. Instructed to call office with any signs or symptoms of infection or weight gain of 2 or more pounds in 1 day or 3 or more pounds in 1 week.  \par \par \par Plan:\par 1) Continue current medication regimen\par 2) Follow up with cardiologist ( Dr.Radha eD La Rosa) and PCP \par 3) Follow up in 2 weeks \par 4) Discontinue Diuretics and weigh daily \par 5) Ambulate as tolerated \par 6) Continue to increase activity and walk daily as tolerated. Continue to use incentive spirometer. \par 7) Keep legs elevated above heart when resting/sitting/sleeping. \par 8) Call MD if you experience fever, fatigue, dizziness, confusion, syncope, shortness of breath, chest pain not relieved with analgesics, increased redness/drainage from the surgical  incision site\par 9) Advise to call if the weight is going up more than 3 lbs/ day or worsening SOB or any leg swelling \par

## 2022-12-15 ENCOUNTER — APPOINTMENT (OUTPATIENT)
Dept: CARDIOTHORACIC SURGERY | Facility: CLINIC | Age: 52
End: 2022-12-15

## 2022-12-15 VITALS
HEART RATE: 87 BPM | OXYGEN SATURATION: 100 % | DIASTOLIC BLOOD PRESSURE: 73 MMHG | RESPIRATION RATE: 15 BRPM | SYSTOLIC BLOOD PRESSURE: 115 MMHG | TEMPERATURE: 98 F

## 2022-12-15 VITALS — BODY MASS INDEX: 31.65 KG/M2 | HEIGHT: 65 IN | WEIGHT: 190 LBS

## 2022-12-15 PROCEDURE — 99024 POSTOP FOLLOW-UP VISIT: CPT

## 2022-12-15 RX ORDER — PANTOPRAZOLE 40 MG/1
40 TABLET, DELAYED RELEASE ORAL DAILY
Qty: 30 | Refills: 0 | Status: COMPLETED | COMMUNITY
Start: 2022-11-21 | End: 2022-12-15

## 2022-12-15 RX ORDER — METFORMIN ER 500 MG 500 MG/1
500 TABLET ORAL
Refills: 0 | Status: ACTIVE | COMMUNITY
Start: 2022-03-18

## 2022-12-15 RX ORDER — INSULIN GLARGINE 300 U/ML
300 INJECTION, SOLUTION SUBCUTANEOUS AT BEDTIME
Refills: 0 | Status: ACTIVE | COMMUNITY

## 2022-12-15 RX ORDER — EMPAGLIFLOZIN 25 MG/1
25 TABLET, FILM COATED ORAL
Qty: 90 | Refills: 1 | Status: COMPLETED | COMMUNITY
Start: 2022-09-08 | End: 2022-12-15

## 2022-12-15 RX ORDER — SPIRONOLACTONE 25 MG/1
25 TABLET ORAL DAILY
Qty: 7 | Refills: 0 | Status: COMPLETED | COMMUNITY
Start: 2022-11-21 | End: 2022-12-15

## 2022-12-15 NOTE — ASSESSMENT
[FreeTextEntry1] : Ms. MONTEMAYOR is a 52 year old female with  past medical history of CAD with multiple stents (3, last one placed in 8/22), DM2, HTN, presented to the ED for new onset chest pain. The patient reported chest pain for 2 weeks, waxes and wanes. Chest pain is pressure like, mainly in the L side of the chest. Reports worsening chest pain with exertion even with minimal activity in the house. The patient recently had a stent placed on 8/22 in RCA. Reports chest pain similar to previous episode in april. The patient still endorsing some chest pain at bedside however it is better than before, however still present. No palpitations at this time. Difficult to obtain hx due to pain. Patient  underwent cath showing LAD 70 (IFR 0.8), LCx 90, RCA 90; Patient was recommended for CTS CABG evaluation; last coronary stents placed in August 2022. Patient's Brilinta was discontinued as per Dr REINALDO Montana, in anticipation for CABG evaluation. Patient underwent cath via left radial artery. L radial site is stable. No hematoma. Good cap refill. Patient is being transferred to [facility] for CABG eval. Hospitalist, Dr. Lawrence, on call notified.\par \par She is status post CABG x 4 LIMA-D SVG-LAD SVG-PDA SVG-rPL on 11/15/22. Post op course unremarkable. She is here for post op visit. She is here for post op visit. \par \par Today on exam patient's lungs clear bilaterally, normal sinus rhythm, sternum stable, incision clean, dry and intact. Three small scabs noted. Instructed patient to continue local cleansing to the area. \par \par LT SVG site is clean, dry and intact.  No peripheral edema noted. \par \par Instructed patient on importance of optimal glycemic control, daily showering, daily weights, any signs of fever (temperature greater than 101F, chills,  incentive spirometer use, and increase ambulation as tolerated. Instructed to call office with any signs or symptoms of infection or weight gain of 2 or more pounds in 1 day or 3 or more pounds in 1 week.  \par \par \par

## 2022-12-15 NOTE — REASON FOR VISIT
[Family Member] : family member [de-identified] : CABG x 4 LIMA-D SVG-LAD SVG-PDA SVG-rPL  [de-identified] : 11/15/22

## 2022-12-15 NOTE — DISCUSSION/SUMMARY
[Doing Well] : is doing well [Excellent Pain Control] : has excellent pain control [No Sign of Infection] : is showing no signs of infection [7] : 7 [Remove Sutures/Staples] : removed sutures/staples [Coumadin] : the patient is not on Coumadin

## 2022-12-15 NOTE — CONSULT LETTER
[Dear  ___] : Dear  [unfilled], [Courtesy Letter:] : I had the pleasure of seeing your patient, [unfilled], in my office today. [Please see my note below.] : Please see my note below. [Consult Closing:] : Thank you very much for allowing me to participate in the care of this patient.  If you have any questions, please do not hesitate to contact me. [Sincerely,] : Sincerely, [FreeTextEntry2] : Dr.Avinash Reveles/ Vianca De La Rosa  [FreeTextEntry3] : Michael Mace MD\par  & \par \par Cardiovascular & Thoracic Surgery\par [facility] \par [address]\par [facility] [address]\par \par

## 2022-12-15 NOTE — PHYSICAL EXAM
[] : no respiratory distress [Respiration, Rhythm And Depth] : normal respiratory rhythm and effort [Heart Rate And Rhythm] : heart rate was normal and rhythm regular [Clean] : clean [Dry] : dry [Healing Well] : healing well [No Edema] : no edema

## 2022-12-21 ENCOUNTER — TRANSCRIPTION ENCOUNTER (OUTPATIENT)
Age: 52
End: 2022-12-21

## 2022-12-21 ENCOUNTER — APPOINTMENT (OUTPATIENT)
Dept: CARDIOLOGY | Facility: CLINIC | Age: 52
End: 2022-12-21

## 2022-12-21 VITALS
BODY MASS INDEX: 31.65 KG/M2 | WEIGHT: 190 LBS | HEIGHT: 65 IN | OXYGEN SATURATION: 99 % | HEART RATE: 87 BPM | DIASTOLIC BLOOD PRESSURE: 84 MMHG | SYSTOLIC BLOOD PRESSURE: 126 MMHG

## 2022-12-21 PROCEDURE — 99214 OFFICE O/P EST MOD 30 MIN: CPT

## 2022-12-21 RX ORDER — FUROSEMIDE 40 MG/1
40 TABLET ORAL
Refills: 0 | Status: ACTIVE | COMMUNITY

## 2022-12-21 RX ORDER — METOPROLOL SUCCINATE 50 MG/1
50 TABLET, EXTENDED RELEASE ORAL TWICE DAILY
Refills: 0 | Status: ACTIVE | COMMUNITY
Start: 2022-12-05

## 2022-12-21 NOTE — PHYSICAL EXAM
[Normal S1, S2] : normal S1, S2 [No Murmur] : no murmur [Soft] : abdomen soft [Non Tender] : non-tender [Normal Bowel Sounds] : normal bowel sounds [Normal] : alert and oriented, normal memory [de-identified] : healed sternotomy scar.

## 2022-12-21 NOTE — HISTORY OF PRESENT ILLNESS
[FreeTextEntry1] : Joselyn Escobedo is a 52 year old female with  inferior STEMI ( 3/2022) admitted to [facility] and multiple coronary stents , ischemic cardiomyopathy , Hypertension, hypercholesterolemia and Diabetes comes for follow up visit. 11/15/2022 she had CABG for recurrent chest pains and cath findings. Recovering from surgery. Denies any chest pains or palpitations.    Shortness of breath on exertion improving slowly. Still having fatigue. Complaint to medications and diet.

## 2022-12-21 NOTE — DISCUSSION/SUMMARY
[FreeTextEntry1] : In a summary Joselyn Escobedo is a middle aged female with CAD s/p CABG,   continue Aspirin.  Hypertension and cardiomyopathy, continue Metoprolol .   Losartan was discontinued in the hospital.  Hypercholesterolemia, continue Atorvastatin and low cholesterol diet. LDL goal less than 70 g/dL. Diabetes, on medications and low Carb diet.  Healthy lifestyle. Follow up in 6 weeks.

## 2022-12-21 NOTE — REVIEW OF SYSTEMS
[Feeling Fatigued] : feeling fatigued [Dyspnea on exertion] : dyspnea during exertion [Joint Pain] : joint pain [Negative] : Respiratory [Fever] : no fever [Headache] : no headache [Chills] : no chills [Blurry Vision] : no blurred vision [Chest Discomfort] : no chest discomfort [Lower Ext Edema] : no extremity edema [Palpitations] : no palpitations [Orthopnea] : no orthopnea [PND] : no PND [Abdominal Pain] : no abdominal pain [Nausea] : no nausea [Vomiting] : no vomiting [Rash] : no rash [Itching] : no itching [Dizziness] : no dizziness [Tremor] : no tremor was seen [Numbness (Hypoesthesia)] : no numbness [Tingling (Paresthesia)] : no tingling [Confusion] : no confusion was observed [Anxiety] : no anxiety [Under Stress] : not under stress [Easy Bleeding] : no tendency for easy bleeding [Easy Bruising] : no tendency for easy bruising

## 2023-01-20 ENCOUNTER — APPOINTMENT (OUTPATIENT)
Dept: CARDIOTHORACIC SURGERY | Facility: CLINIC | Age: 53
End: 2023-01-20
Payer: COMMERCIAL

## 2023-01-20 PROCEDURE — 97802 MEDICAL NUTRITION INDIV IN: CPT

## 2023-02-01 ENCOUNTER — APPOINTMENT (OUTPATIENT)
Dept: CARDIOLOGY | Facility: CLINIC | Age: 53
End: 2023-02-01
Payer: COMMERCIAL

## 2023-02-01 VITALS
BODY MASS INDEX: 32.49 KG/M2 | OXYGEN SATURATION: 96 % | HEART RATE: 83 BPM | HEIGHT: 65 IN | WEIGHT: 195 LBS | SYSTOLIC BLOOD PRESSURE: 112 MMHG | DIASTOLIC BLOOD PRESSURE: 68 MMHG

## 2023-02-01 PROCEDURE — 99214 OFFICE O/P EST MOD 30 MIN: CPT

## 2023-02-01 NOTE — DISCUSSION/SUMMARY
[FreeTextEntry1] : In a summary Joselyn Escobedo is a middle aged female with CAD s/p CABG,   continue Aspirin.  Hypertension and cardiomyopathy, continue Metoprolol . Losartan was discontinued in the hospital. Reason not known. Hypercholesterolemia, continue Atorvastatin and low cholesterol diet. LDL goal less than 70 g/dL. Diabetes, on medications and low Carb diet.  Healthy lifestyle. Follow up in 6 weeks.

## 2023-02-01 NOTE — PHYSICAL EXAM
[Normal S1, S2] : normal S1, S2 [No Murmur] : no murmur [Soft] : abdomen soft [Non Tender] : non-tender [Normal Bowel Sounds] : normal bowel sounds [Normal] : alert and oriented, normal memory [de-identified] : healed sternotomy scar.

## 2023-02-01 NOTE — HISTORY OF PRESENT ILLNESS
[FreeTextEntry1] : Joselyn Escobedo is a 52 year old female with  inferior STEMI ( 3/2022) admitted to [facility] and multiple coronary stents , ischemic cardiomyopathy , Hypertension, hypercholesterolemia and Diabetes comes for follow up visit. 11/15/2022 she had CABG for recurrent chest pains and cath findings. Recovering from surgery. Denies any chest pains or palpitations.    Shortness of breath on exertion improving slowly. Still having fatigue. Complaint to medications and diet. Losartan was discontinued in the hospital. Reason not known.

## 2023-03-15 ENCOUNTER — APPOINTMENT (OUTPATIENT)
Dept: CARDIOLOGY | Facility: CLINIC | Age: 53
End: 2023-03-15
Payer: COMMERCIAL

## 2023-03-15 ENCOUNTER — NON-APPOINTMENT (OUTPATIENT)
Age: 53
End: 2023-03-15

## 2023-03-15 VITALS
HEIGHT: 65 IN | HEART RATE: 79 BPM | BODY MASS INDEX: 31.32 KG/M2 | OXYGEN SATURATION: 98 % | WEIGHT: 188 LBS | SYSTOLIC BLOOD PRESSURE: 134 MMHG | DIASTOLIC BLOOD PRESSURE: 86 MMHG

## 2023-03-15 DIAGNOSIS — R00.2 PALPITATIONS: ICD-10-CM

## 2023-03-15 PROCEDURE — 93000 ELECTROCARDIOGRAM COMPLETE: CPT

## 2023-03-15 PROCEDURE — 99214 OFFICE O/P EST MOD 30 MIN: CPT | Mod: 25

## 2023-03-15 PROCEDURE — 93306 TTE W/DOPPLER COMPLETE: CPT

## 2023-03-15 NOTE — PHYSICAL EXAM
[Normal S1, S2] : normal S1, S2 [No Murmur] : no murmur [Soft] : abdomen soft [Non Tender] : non-tender [Normal Bowel Sounds] : normal bowel sounds [Normal] : alert and oriented, normal memory [de-identified] : healed sternotomy scar.

## 2023-03-15 NOTE — DISCUSSION/SUMMARY
[FreeTextEntry1] : In a summary Joselyn Escobedo is a middle aged female with CAD s/p CABG,   continue Aspirin.  Hypertension and cardiomyopathy, continue Metoprolol . Losartan was discontinued in the hospital. Reason not known. Echo done showed Systolic function improved to normal. Echo results explained. Hypercholesterolemia, continue Atorvastatin and low cholesterol diet. LDL goal less than 70 g/dL. Diabetes, on medications and low Carb diet.  Healthy lifestyle. Follow up in 6 weeks.

## 2023-03-15 NOTE — HISTORY OF PRESENT ILLNESS
[FreeTextEntry1] : Joselyn Escobedo is a 53 year old female with  inferior STEMI ( 3/2022) admitted to [facility] and multiple coronary stents , ischemic cardiomyopathy , Hypertension, hypercholesterolemia and Diabetes comes for follow up visit. 11/15/2022 she had CABG for recurrent chest pains and cath findings. Recovering from surgery. Denies any chest pains . On and off palpitations.palpitations.    Shortness of breath on exertion improving slowly. Still having fatigue. Complaint to medications and diet. Losartan was discontinued in the hospital. Reason not known.

## 2023-04-14 ENCOUNTER — APPOINTMENT (OUTPATIENT)
Dept: CARDIOLOGY | Facility: CLINIC | Age: 53
End: 2023-04-14
Payer: COMMERCIAL

## 2023-04-14 VITALS
SYSTOLIC BLOOD PRESSURE: 100 MMHG | DIASTOLIC BLOOD PRESSURE: 64 MMHG | OXYGEN SATURATION: 98 % | HEIGHT: 65 IN | BODY MASS INDEX: 31.65 KG/M2 | WEIGHT: 190 LBS | HEART RATE: 80 BPM

## 2023-04-14 DIAGNOSIS — R06.09 OTHER FORMS OF DYSPNEA: ICD-10-CM

## 2023-04-14 PROCEDURE — 99214 OFFICE O/P EST MOD 30 MIN: CPT

## 2023-04-14 NOTE — HISTORY OF PRESENT ILLNESS
[FreeTextEntry1] : Joselyn Escobedo is a 53 year old female with  inferior STEMI ( 3/2022) admitted to [facility] and multiple coronary stents  , Hypertension, hypercholesterolemia and Diabetes comes for follow up visit. 11/15/2022 she had CABG for recurrent chest pains and cath findings. Recovering from surgery. Denies any chest pains . On and off palpitations.  Shortness of breath on exertion improving slowly. Still having fatigue. Complaint to medications and diet. Losartan was discontinued in the hospital. Reason not known.

## 2023-04-14 NOTE — DISCUSSION/SUMMARY
[FreeTextEntry1] : In a summary Joselyn Escobedo is a middle aged female with CAD s/p CABG,   continue Aspirin.  Hypertension ,  Decrease  Metoprolol  to once daily.  Hypercholesterolemia, continue Atorvastatin and low cholesterol diet. LDL goal less than 70 g/dL. Diabetes, on medications and low Carb diet.  Healthy lifestyle. Follow up in 1 month.

## 2023-04-14 NOTE — PHYSICAL EXAM
[Normal S1, S2] : normal S1, S2 [No Murmur] : no murmur [Soft] : abdomen soft [Non Tender] : non-tender [Normal Bowel Sounds] : normal bowel sounds [Normal] : alert and oriented, normal memory [de-identified] : healed sternotomy scar.

## 2023-05-16 ENCOUNTER — APPOINTMENT (OUTPATIENT)
Dept: CARDIOLOGY | Facility: CLINIC | Age: 53
End: 2023-05-16
Payer: COMMERCIAL

## 2023-05-16 VITALS
SYSTOLIC BLOOD PRESSURE: 114 MMHG | TEMPERATURE: 97.2 F | HEIGHT: 65 IN | OXYGEN SATURATION: 97 % | WEIGHT: 195 LBS | BODY MASS INDEX: 32.49 KG/M2 | HEART RATE: 79 BPM | DIASTOLIC BLOOD PRESSURE: 76 MMHG

## 2023-05-16 DIAGNOSIS — I10 ESSENTIAL (PRIMARY) HYPERTENSION: ICD-10-CM

## 2023-05-16 DIAGNOSIS — I25.10 ATHEROSCLEROTIC HEART DISEASE OF NATIVE CORONARY ARTERY W/OUT ANGINA PECTORIS: ICD-10-CM

## 2023-05-16 DIAGNOSIS — E78.00 PURE HYPERCHOLESTEROLEMIA, UNSPECIFIED: ICD-10-CM

## 2023-05-16 PROCEDURE — 99214 OFFICE O/P EST MOD 30 MIN: CPT

## 2023-05-16 NOTE — DISCUSSION/SUMMARY
[FreeTextEntry1] : In a summary Joselyn Escobedo is a middle aged female with CAD s/p CABG,   continue Aspirin.  Hypertension , continue  Metoprolol  to once daily.  Hypercholesterolemia, continue Atorvastatin and low cholesterol diet. LDL goal less than 70 g/dL. Diabetes, on medications and low Carb diet.  Healthy lifestyle. Follow up in 3 months.  Disability forms completed.

## 2023-05-16 NOTE — HISTORY OF PRESENT ILLNESS
[FreeTextEntry1] : Joselyn Escobedo is a 53 year old female with  inferior STEMI ( 3/2022) admitted to [facility] and multiple coronary stents  , Hypertension, hypercholesterolemia and Diabetes comes for follow up visit. 11/15/2022 she had CABG for recurrent chest pains and cath findings. Recovering from surgery. Denies any chest pains . On and off palpitations.  Shortness of breath on exertion improving slowly. Still having fatigue. Complaint to medications and diet. Losartan was discontinued in the hospital. Reason not known.

## 2023-05-16 NOTE — PHYSICAL EXAM
[Normal S1, S2] : normal S1, S2 [No Murmur] : no murmur [Soft] : abdomen soft [Non Tender] : non-tender [Normal Bowel Sounds] : normal bowel sounds [Normal] : alert and oriented, normal memory [de-identified] : healed sternotomy scar.

## 2023-06-05 ENCOUNTER — NON-APPOINTMENT (OUTPATIENT)
Age: 53
End: 2023-06-05

## 2023-06-19 NOTE — PATIENT PROFILE ADULT - FUNCTIONAL ASSESSMENT - DAILY ACTIVITY 2.
Left detailed message on voice mail for nurse Desai regarding Orestes Medley PA-C note.    Criss Huber RN    4 = No assist / stand by assistance

## 2023-08-24 ENCOUNTER — APPOINTMENT (OUTPATIENT)
Dept: CARDIOLOGY | Facility: CLINIC | Age: 53
End: 2023-08-24

## 2023-10-03 NOTE — PRE-OP CHECKLIST - BP NONINVASIVE DIASTOLIC (MM HG)
68 Cheilitis Aggressive Treatment: I recommended application of Vaseline or Aquaphor numerous times a day (as often as every hour) and before going to bed. I also prescribed a topical steroid for twice daily use.

## 2023-10-19 NOTE — ED ADULT NURSE NOTE - CHIEF COMPLAINT QUOTE
Return in about 6 weeks (around 10/23/2023) for Annual exam, Labs today.      Ok for refill on sucralfate?   
-- DO NOT REPLY / DO NOT REPLY ALL --  -- Message is from Engagement Center Operations (ECO) --    General Patient Message:     Breanne with the [facility] called stating they sent in a request for the medication Sucralfate. Breanne said this medication was prescribed by a hospitalist and is wanting to know if PCP can address. Also Breanne said a request was made for the medications Simvastatin and also Losartan and said they did get the scripts on 09/11 but said due to it being either a new dosing or new medication for patient only a quantity of 22 an 21 were sent of each medication on 09/11 and a quantity of 28 for each on 09/26. Breanne said the 10 remaining on fie is not enough for the minimum quantity for a months bubble packaging and is needing PCP to send in refills. Breanne said if any questions to please call her at [phone]. Breanne said if sending new scripts a call back is not required.Please send or call Pharmacy back to discuss and advise.     Caller Information       Type Contact Phone/Fax    10/19/2023 01:43 PM CDT Interface (Incoming) [facility] [place] [facility] [phone]    10/19/2023 01:48 PM CDT Phone (Incoming) [facility] [place] [address] (Pharmacy) [phone]     Breanne        Alternative phone number: NA    Can a detailed message be left? Yes    Message Turnaround: [place]:    Refer to site's KB page for routing instructions    Please give this turnaround time to the caller:   \"You can expect to receive a response 2-3 business days after your provider's clinical team reviews the message\"              
p/t living with DM type2, MI, 3 cardiac stents, c/o of chest discomfort since this afternoon radiating to back, p/t c/o of sob on exertion as well, [id number], zofran 4mg IVP given by EMS

## 2023-12-06 ENCOUNTER — INPATIENT (INPATIENT)
Facility: HOSPITAL | Age: 53
LOS: 2 days | Discharge: HOME | End: 2023-12-09
Attending: STUDENT IN AN ORGANIZED HEALTH CARE EDUCATION/TRAINING PROGRAM | Admitting: STUDENT IN AN ORGANIZED HEALTH CARE EDUCATION/TRAINING PROGRAM
Payer: COMMERCIAL

## 2023-12-06 VITALS
SYSTOLIC BLOOD PRESSURE: 140 MMHG | TEMPERATURE: 98 F | OXYGEN SATURATION: 98 % | HEART RATE: 68 BPM | DIASTOLIC BLOOD PRESSURE: 85 MMHG | RESPIRATION RATE: 18 BRPM

## 2023-12-06 DIAGNOSIS — R07.9 CHEST PAIN, UNSPECIFIED: ICD-10-CM

## 2023-12-06 DIAGNOSIS — Z95.5 PRESENCE OF CORONARY ANGIOPLASTY IMPLANT AND GRAFT: Chronic | ICD-10-CM

## 2023-12-06 LAB
ALBUMIN SERPL ELPH-MCNC: 3.7 G/DL — SIGNIFICANT CHANGE UP (ref 3.3–5)
ALBUMIN SERPL ELPH-MCNC: 3.7 G/DL — SIGNIFICANT CHANGE UP (ref 3.3–5)
ALP SERPL-CCNC: 82 U/L — SIGNIFICANT CHANGE UP (ref 40–120)
ALP SERPL-CCNC: 82 U/L — SIGNIFICANT CHANGE UP (ref 40–120)
ALT FLD-CCNC: 25 U/L — SIGNIFICANT CHANGE UP (ref 4–33)
ALT FLD-CCNC: 25 U/L — SIGNIFICANT CHANGE UP (ref 4–33)
ANION GAP SERPL CALC-SCNC: 7 MMOL/L — SIGNIFICANT CHANGE UP (ref 7–14)
ANION GAP SERPL CALC-SCNC: 7 MMOL/L — SIGNIFICANT CHANGE UP (ref 7–14)
APTT BLD: 38.3 SEC — HIGH (ref 24.5–35.6)
APTT BLD: 38.3 SEC — HIGH (ref 24.5–35.6)
AST SERPL-CCNC: 22 U/L — SIGNIFICANT CHANGE UP (ref 4–32)
AST SERPL-CCNC: 22 U/L — SIGNIFICANT CHANGE UP (ref 4–32)
BASOPHILS # BLD AUTO: 0.05 K/UL — SIGNIFICANT CHANGE UP (ref 0–0.2)
BASOPHILS # BLD AUTO: 0.05 K/UL — SIGNIFICANT CHANGE UP (ref 0–0.2)
BASOPHILS NFR BLD AUTO: 0.5 % — SIGNIFICANT CHANGE UP (ref 0–2)
BASOPHILS NFR BLD AUTO: 0.5 % — SIGNIFICANT CHANGE UP (ref 0–2)
BILIRUB SERPL-MCNC: 0.6 MG/DL — SIGNIFICANT CHANGE UP (ref 0.2–1.2)
BILIRUB SERPL-MCNC: 0.6 MG/DL — SIGNIFICANT CHANGE UP (ref 0.2–1.2)
BUN SERPL-MCNC: 12 MG/DL — SIGNIFICANT CHANGE UP (ref 7–23)
BUN SERPL-MCNC: 12 MG/DL — SIGNIFICANT CHANGE UP (ref 7–23)
CALCIUM SERPL-MCNC: 9.6 MG/DL — SIGNIFICANT CHANGE UP (ref 8.4–10.5)
CALCIUM SERPL-MCNC: 9.6 MG/DL — SIGNIFICANT CHANGE UP (ref 8.4–10.5)
CHLORIDE SERPL-SCNC: 105 MMOL/L — SIGNIFICANT CHANGE UP (ref 98–107)
CHLORIDE SERPL-SCNC: 105 MMOL/L — SIGNIFICANT CHANGE UP (ref 98–107)
CO2 SERPL-SCNC: 27 MMOL/L — SIGNIFICANT CHANGE UP (ref 22–31)
CO2 SERPL-SCNC: 27 MMOL/L — SIGNIFICANT CHANGE UP (ref 22–31)
CREAT SERPL-MCNC: 0.77 MG/DL — SIGNIFICANT CHANGE UP (ref 0.5–1.3)
CREAT SERPL-MCNC: 0.77 MG/DL — SIGNIFICANT CHANGE UP (ref 0.5–1.3)
EGFR: 93 ML/MIN/1.73M2 — SIGNIFICANT CHANGE UP
EGFR: 93 ML/MIN/1.73M2 — SIGNIFICANT CHANGE UP
EOSINOPHIL # BLD AUTO: 0.37 K/UL — SIGNIFICANT CHANGE UP (ref 0–0.5)
EOSINOPHIL # BLD AUTO: 0.37 K/UL — SIGNIFICANT CHANGE UP (ref 0–0.5)
EOSINOPHIL NFR BLD AUTO: 3.5 % — SIGNIFICANT CHANGE UP (ref 0–6)
EOSINOPHIL NFR BLD AUTO: 3.5 % — SIGNIFICANT CHANGE UP (ref 0–6)
GLUCOSE SERPL-MCNC: 123 MG/DL — HIGH (ref 70–99)
GLUCOSE SERPL-MCNC: 123 MG/DL — HIGH (ref 70–99)
HCG SERPL-ACNC: <1 MIU/ML — SIGNIFICANT CHANGE UP
HCG SERPL-ACNC: <1 MIU/ML — SIGNIFICANT CHANGE UP
HCT VFR BLD CALC: 39.3 % — SIGNIFICANT CHANGE UP (ref 34.5–45)
HCT VFR BLD CALC: 39.3 % — SIGNIFICANT CHANGE UP (ref 34.5–45)
HGB BLD-MCNC: 13 G/DL — SIGNIFICANT CHANGE UP (ref 11.5–15.5)
HGB BLD-MCNC: 13 G/DL — SIGNIFICANT CHANGE UP (ref 11.5–15.5)
IANC: 6.06 K/UL — SIGNIFICANT CHANGE UP (ref 1.8–7.4)
IANC: 6.06 K/UL — SIGNIFICANT CHANGE UP (ref 1.8–7.4)
IMM GRANULOCYTES NFR BLD AUTO: 0.2 % — SIGNIFICANT CHANGE UP (ref 0–0.9)
IMM GRANULOCYTES NFR BLD AUTO: 0.2 % — SIGNIFICANT CHANGE UP (ref 0–0.9)
INR BLD: 1.04 RATIO — SIGNIFICANT CHANGE UP (ref 0.85–1.18)
INR BLD: 1.04 RATIO — SIGNIFICANT CHANGE UP (ref 0.85–1.18)
LYMPHOCYTES # BLD AUTO: 3.4 K/UL — HIGH (ref 1–3.3)
LYMPHOCYTES # BLD AUTO: 3.4 K/UL — HIGH (ref 1–3.3)
LYMPHOCYTES # BLD AUTO: 32.3 % — SIGNIFICANT CHANGE UP (ref 13–44)
LYMPHOCYTES # BLD AUTO: 32.3 % — SIGNIFICANT CHANGE UP (ref 13–44)
MAGNESIUM SERPL-MCNC: 1.6 MG/DL — SIGNIFICANT CHANGE UP (ref 1.6–2.6)
MAGNESIUM SERPL-MCNC: 1.6 MG/DL — SIGNIFICANT CHANGE UP (ref 1.6–2.6)
MCHC RBC-ENTMCNC: 26.7 PG — LOW (ref 27–34)
MCHC RBC-ENTMCNC: 26.7 PG — LOW (ref 27–34)
MCHC RBC-ENTMCNC: 33.1 GM/DL — SIGNIFICANT CHANGE UP (ref 32–36)
MCHC RBC-ENTMCNC: 33.1 GM/DL — SIGNIFICANT CHANGE UP (ref 32–36)
MCV RBC AUTO: 80.9 FL — SIGNIFICANT CHANGE UP (ref 80–100)
MCV RBC AUTO: 80.9 FL — SIGNIFICANT CHANGE UP (ref 80–100)
MONOCYTES # BLD AUTO: 0.64 K/UL — SIGNIFICANT CHANGE UP (ref 0–0.9)
MONOCYTES # BLD AUTO: 0.64 K/UL — SIGNIFICANT CHANGE UP (ref 0–0.9)
MONOCYTES NFR BLD AUTO: 6.1 % — SIGNIFICANT CHANGE UP (ref 2–14)
MONOCYTES NFR BLD AUTO: 6.1 % — SIGNIFICANT CHANGE UP (ref 2–14)
NEUTROPHILS # BLD AUTO: 6.06 K/UL — SIGNIFICANT CHANGE UP (ref 1.8–7.4)
NEUTROPHILS # BLD AUTO: 6.06 K/UL — SIGNIFICANT CHANGE UP (ref 1.8–7.4)
NEUTROPHILS NFR BLD AUTO: 57.4 % — SIGNIFICANT CHANGE UP (ref 43–77)
NEUTROPHILS NFR BLD AUTO: 57.4 % — SIGNIFICANT CHANGE UP (ref 43–77)
NRBC # BLD: 0 /100 WBCS — SIGNIFICANT CHANGE UP (ref 0–0)
NRBC # BLD: 0 /100 WBCS — SIGNIFICANT CHANGE UP (ref 0–0)
NRBC # FLD: 0 K/UL — SIGNIFICANT CHANGE UP (ref 0–0)
NRBC # FLD: 0 K/UL — SIGNIFICANT CHANGE UP (ref 0–0)
NT-PROBNP SERPL-SCNC: 295 PG/ML — SIGNIFICANT CHANGE UP
NT-PROBNP SERPL-SCNC: 295 PG/ML — SIGNIFICANT CHANGE UP
PLATELET # BLD AUTO: 266 K/UL — SIGNIFICANT CHANGE UP (ref 150–400)
PLATELET # BLD AUTO: 266 K/UL — SIGNIFICANT CHANGE UP (ref 150–400)
POTASSIUM SERPL-MCNC: 3.8 MMOL/L — SIGNIFICANT CHANGE UP (ref 3.5–5.3)
POTASSIUM SERPL-MCNC: 3.8 MMOL/L — SIGNIFICANT CHANGE UP (ref 3.5–5.3)
POTASSIUM SERPL-SCNC: 3.8 MMOL/L — SIGNIFICANT CHANGE UP (ref 3.5–5.3)
POTASSIUM SERPL-SCNC: 3.8 MMOL/L — SIGNIFICANT CHANGE UP (ref 3.5–5.3)
PROT SERPL-MCNC: 6.4 G/DL — SIGNIFICANT CHANGE UP (ref 6–8.3)
PROT SERPL-MCNC: 6.4 G/DL — SIGNIFICANT CHANGE UP (ref 6–8.3)
PROTHROM AB SERPL-ACNC: 11.6 SEC — SIGNIFICANT CHANGE UP (ref 9.5–13)
PROTHROM AB SERPL-ACNC: 11.6 SEC — SIGNIFICANT CHANGE UP (ref 9.5–13)
RBC # BLD: 4.86 M/UL — SIGNIFICANT CHANGE UP (ref 3.8–5.2)
RBC # BLD: 4.86 M/UL — SIGNIFICANT CHANGE UP (ref 3.8–5.2)
RBC # FLD: 15.1 % — HIGH (ref 10.3–14.5)
RBC # FLD: 15.1 % — HIGH (ref 10.3–14.5)
SODIUM SERPL-SCNC: 139 MMOL/L — SIGNIFICANT CHANGE UP (ref 135–145)
SODIUM SERPL-SCNC: 139 MMOL/L — SIGNIFICANT CHANGE UP (ref 135–145)
TROPONIN T, HIGH SENSITIVITY RESULT: 9 NG/L — SIGNIFICANT CHANGE UP
WBC # BLD: 10.54 K/UL — HIGH (ref 3.8–10.5)
WBC # BLD: 10.54 K/UL — HIGH (ref 3.8–10.5)
WBC # FLD AUTO: 10.54 K/UL — HIGH (ref 3.8–10.5)
WBC # FLD AUTO: 10.54 K/UL — HIGH (ref 3.8–10.5)

## 2023-12-06 PROCEDURE — 71045 X-RAY EXAM CHEST 1 VIEW: CPT | Mod: 26

## 2023-12-06 PROCEDURE — 99285 EMERGENCY DEPT VISIT HI MDM: CPT

## 2023-12-06 PROCEDURE — 99223 1ST HOSP IP/OBS HIGH 75: CPT

## 2023-12-06 RX ORDER — DEXTROSE 50 % IN WATER 50 %
25 SYRINGE (ML) INTRAVENOUS ONCE
Refills: 0 | Status: DISCONTINUED | OUTPATIENT
Start: 2023-12-06 | End: 2023-12-09

## 2023-12-06 RX ORDER — EMPAGLIFLOZIN 10 MG/1
1 TABLET, FILM COATED ORAL
Qty: 0 | Refills: 0 | DISCHARGE

## 2023-12-06 RX ORDER — MECLIZINE HCL 12.5 MG
25 TABLET ORAL THREE TIMES A DAY
Refills: 0 | Status: DISCONTINUED | OUTPATIENT
Start: 2023-12-06 | End: 2023-12-09

## 2023-12-06 RX ORDER — ASPIRIN/CALCIUM CARB/MAGNESIUM 324 MG
162 TABLET ORAL ONCE
Refills: 0 | Status: COMPLETED | OUTPATIENT
Start: 2023-12-06 | End: 2023-12-06

## 2023-12-06 RX ORDER — LANOLIN ALCOHOL/MO/W.PET/CERES
3 CREAM (GRAM) TOPICAL AT BEDTIME
Refills: 0 | Status: DISCONTINUED | OUTPATIENT
Start: 2023-12-06 | End: 2023-12-09

## 2023-12-06 RX ORDER — FERROUS SULFATE 325(65) MG
0 TABLET ORAL
Qty: 0 | Refills: 0 | DISCHARGE

## 2023-12-06 RX ORDER — TICAGRELOR 90 MG/1
1 TABLET ORAL
Qty: 0 | Refills: 0 | DISCHARGE

## 2023-12-06 RX ORDER — METOPROLOL TARTRATE 50 MG
25 TABLET ORAL
Refills: 0 | Status: DISCONTINUED | OUTPATIENT
Start: 2023-12-06 | End: 2023-12-07

## 2023-12-06 RX ORDER — GLUCAGON INJECTION, SOLUTION 0.5 MG/.1ML
1 INJECTION, SOLUTION SUBCUTANEOUS ONCE
Refills: 0 | Status: DISCONTINUED | OUTPATIENT
Start: 2023-12-06 | End: 2023-12-09

## 2023-12-06 RX ORDER — ATORVASTATIN CALCIUM 80 MG/1
1 TABLET, FILM COATED ORAL
Refills: 0 | DISCHARGE

## 2023-12-06 RX ORDER — ACETAMINOPHEN 500 MG
650 TABLET ORAL EVERY 6 HOURS
Refills: 0 | Status: DISCONTINUED | OUTPATIENT
Start: 2023-12-06 | End: 2023-12-09

## 2023-12-06 RX ORDER — SODIUM CHLORIDE 9 MG/ML
1000 INJECTION, SOLUTION INTRAVENOUS
Refills: 0 | Status: DISCONTINUED | OUTPATIENT
Start: 2023-12-06 | End: 2023-12-09

## 2023-12-06 RX ORDER — INSULIN LISPRO 100/ML
VIAL (ML) SUBCUTANEOUS
Refills: 0 | Status: DISCONTINUED | OUTPATIENT
Start: 2023-12-06 | End: 2023-12-09

## 2023-12-06 RX ORDER — ONDANSETRON 8 MG/1
4 TABLET, FILM COATED ORAL EVERY 8 HOURS
Refills: 0 | Status: DISCONTINUED | OUTPATIENT
Start: 2023-12-06 | End: 2023-12-09

## 2023-12-06 RX ORDER — ASCORBIC ACID 60 MG
0 TABLET,CHEWABLE ORAL
Qty: 0 | Refills: 0 | DISCHARGE

## 2023-12-06 RX ORDER — MECLIZINE HCL 12.5 MG
1 TABLET ORAL
Refills: 0 | DISCHARGE

## 2023-12-06 RX ORDER — FUROSEMIDE 40 MG
40 TABLET ORAL DAILY
Refills: 0 | Status: DISCONTINUED | OUTPATIENT
Start: 2023-12-06 | End: 2023-12-09

## 2023-12-06 RX ORDER — INSULIN LISPRO 100/ML
VIAL (ML) SUBCUTANEOUS AT BEDTIME
Refills: 0 | Status: DISCONTINUED | OUTPATIENT
Start: 2023-12-06 | End: 2023-12-09

## 2023-12-06 RX ORDER — ATORVASTATIN CALCIUM 80 MG/1
40 TABLET, FILM COATED ORAL AT BEDTIME
Refills: 0 | Status: DISCONTINUED | OUTPATIENT
Start: 2023-12-06 | End: 2023-12-09

## 2023-12-06 RX ORDER — ENOXAPARIN SODIUM 100 MG/ML
40 INJECTION SUBCUTANEOUS EVERY 24 HOURS
Refills: 0 | Status: DISCONTINUED | OUTPATIENT
Start: 2023-12-06 | End: 2023-12-09

## 2023-12-06 RX ORDER — ASPIRIN/CALCIUM CARB/MAGNESIUM 324 MG
81 TABLET ORAL DAILY
Refills: 0 | Status: DISCONTINUED | OUTPATIENT
Start: 2023-12-06 | End: 2023-12-09

## 2023-12-06 RX ORDER — DEXTROSE 50 % IN WATER 50 %
15 SYRINGE (ML) INTRAVENOUS ONCE
Refills: 0 | Status: DISCONTINUED | OUTPATIENT
Start: 2023-12-06 | End: 2023-12-09

## 2023-12-06 RX ORDER — DEXTROSE 50 % IN WATER 50 %
12.5 SYRINGE (ML) INTRAVENOUS ONCE
Refills: 0 | Status: DISCONTINUED | OUTPATIENT
Start: 2023-12-06 | End: 2023-12-09

## 2023-12-06 RX ORDER — PREGABALIN 225 MG/1
0 CAPSULE ORAL
Qty: 0 | Refills: 0 | DISCHARGE

## 2023-12-06 RX ORDER — INSULIN GLARGINE 100 [IU]/ML
50 INJECTION, SOLUTION SUBCUTANEOUS AT BEDTIME
Refills: 0 | Status: DISCONTINUED | OUTPATIENT
Start: 2023-12-06 | End: 2023-12-07

## 2023-12-06 RX ADMIN — Medication 162 MILLIGRAM(S): at 13:41

## 2023-12-06 RX ADMIN — ATORVASTATIN CALCIUM 40 MILLIGRAM(S): 80 TABLET, FILM COATED ORAL at 22:13

## 2023-12-06 RX ADMIN — ENOXAPARIN SODIUM 40 MILLIGRAM(S): 100 INJECTION SUBCUTANEOUS at 22:13

## 2023-12-06 NOTE — ED ADULT NURSE REASSESSMENT NOTE - NS ED NURSE REASSESS COMMENT FT1
Break coverage RN: Received patient in 10B, admitted to telemetry awaiting bed assignment. Patient resting in stretcher, no distress noted. Side rails up and safety maintained.

## 2023-12-06 NOTE — H&P ADULT - PROBLEM SELECTOR PLAN 2
-as above, eval for cardiac etiology of SOB  -trops flat, ST depression in lead aVL is resolved on repeat EKG likely reflecting lead placement issue. Low c/f ACS at this time. Appreciate cards recs, no need for hep gtt/Brilinta load   -TTE pending to eval for new WMA  -f/u cards recs regarding stress test vs cath  -c/w aspirin, metoprolol, atorva

## 2023-12-06 NOTE — ED PROVIDER NOTE - CLINICAL SUMMARY MEDICAL DECISION MAKING FREE TEXT BOX
Kvng Vick, DO PGY-3: 53-year-old female past medical history of CAD status post 4 stents and CABG, hypertension, hyperlipidemia, diabetes presents ED complaining of 3 days of exertional, nonradiating, nonpleuritic, nonpositional substernal chest pain associated with shortness of breath, mild lightheadedness.  Denies nausea, vomiting, diaphoresis, abdominal pain, dysuria, diarrhea, fever. Patient uncomfortable appearing, hemodynamically stable with nonfocal exam.  Differential includes not limited to high risk ACS, lower suspicion for PE or aortic pathology as patient has no PE risk factors.  Plan for cardiac work-up, serial EKGs, chest x-ray, telemetry.  Admission

## 2023-12-06 NOTE — H&P ADULT - NSHPPHYSICALEXAM_GEN_ALL_CORE
GENERAL APPEARANCE: Well developed, alert and cooperative. NAD.   HEENT:  PERRL, EOMI. External auditory canals normal, hearing grossly intact.  NECK: Neck supple, non-tender   CARDIAC: Normal S1 and S2. No S3, S4 or murmurs. Rhythm is regular. Chest pain reproducible on palpation   LUNGS: Clear to auscultation and percussion without rales, rhonchi, wheezing or diminished breath sounds.  ABDOMEN: Positive bowel sounds. Soft, nondistended, nontender. No guarding or rebound.   MUSCULOSKELETAL: ROM intact spine and extremities. No joint erythema or tenderness.   BACK: normal posture, no spinal deformity, symmetry of spinal muscles, without tenderness, decreased range of motion.  EXTREMITIES: No significant deformity or joint abnormality. No edema. Peripheral pulses intact.   NEUROLOGICAL: CN II-XII intact. Strength and sensation symmetric and intact throughout.   SKIN: Skin normal color, texture and turgor with no lesions or eruptions.  PSYCHIATRIC: AOx3.Normal affect and behavior.

## 2023-12-06 NOTE — H&P ADULT - HISTORY OF PRESENT ILLNESS
53-year-old female past medical history of CAD status post 4 stents and CABG, hypertension, hyperlipidemia, diabetes presents to ED with worsening dyspnea on exertion. Pt reports on and off sharp chest pain since CABG. Reports chest pain worsens at night, when lying flat. Denies radiation of pain. Reports substernal chest pain currently, worse on palpation of chest. Reports dyspnea on exertion ongoing for last month but acutely worse in the last few days.  Denies nausea, vomiting, diaphoresis, abdominal pain, dysuria, diarrhea, fever. Previous MAGALY in the system from 2022 is grossly normal.

## 2023-12-06 NOTE — ED PROVIDER NOTE - PHYSICAL EXAMINATION
GEN: Patient awake alert NAD.   HEENT: normocephalic, atraumatic, EOMI, no scleral icterus, moist MM  CARDIAC: RRR, S1, S2, no murmur. Midline sternotomy scar well-healed  PULM: CTA B/L no wheeze, rhonchi, rales.   ABD: soft NT, ND, no rebound no guarding, no CVA tenderness.   MSK: Moving all extremities, no edema.   NEURO: A&Ox3, no focal neurological deficits  SKIN: warm, dry, no rash. 2+ pulses in all extremities

## 2023-12-06 NOTE — H&P ADULT - NSHPREVIEWOFSYSTEMS_GEN_ALL_CORE
CONSTITUTIONAL:  No weight loss, fever, chills, weakness or fatigue.  HEENT:  Eyes:  No visual loss, blurred vision, double vision or yellow sclerae. Ears, Nose, Throat:  No hearing loss, sneezing, congestion, runny nose or sore throat.  SKIN:  No rash or itching.  CARDIOVASCULAR: +reproducible chest pain No palpitations or edema.  RESPIRATORY:  +LANDRY No shortness of breath, cough or sputum.  GASTROINTESTINAL:  No anorexia, nausea, vomiting or diarrhea. No abdominal pain or blood.  GENITOURINARY:  Denies hematuria, dysuria.   NEUROLOGICAL:  No headache, dizziness, syncope, paralysis, ataxia, numbness or tingling in the extremities. No change in bowel or bladder control.  MUSCULOSKELETAL:  No muscle, back pain, joint pain or stiffness.  HEMATOLOGIC:  No anemia, bleeding or bruising.  PSYCHIATRIC:  No history of depression or anxiety.  ENDOCRINOLOGIC:  No reports of sweating, cold or heat intolerance. No polyuria or polydipsia.  ALLERGIES:  No history of asthma, hives, eczema or rhinitis.

## 2023-12-06 NOTE — H&P ADULT - PROBLEM SELECTOR PLAN 3
-reportedly on 88 units of lantus and metformin   -reduce to 35 units of lantus due to low FS  -low ISS before meals and at bedtime  -f/u [id number]   -says no longer taking farxiga due to pruritis

## 2023-12-06 NOTE — ED ADULT NURSE NOTE - NSFALLUNIVINTERV_ED_ALL_ED
Bed/Stretcher in lowest position, wheels locked, appropriate side rails in place/Call bell, personal items and telephone in reach/Instruct patient to call for assistance before getting out of bed/chair/stretcher/Non-slip footwear applied when patient is off stretcher/[place] to call system/Physically safe environment - no spills, clutter or unnecessary equipment/Purposeful proactive rounding/Room/bathroom lighting operational, light cord in reach Bed/Stretcher in lowest position, wheels locked, appropriate side rails in place/Call bell, personal items and telephone in reach/Instruct patient to call for assistance before getting out of bed/chair/stretcher/Non-slip footwear applied when patient is off stretcher/[place] to call system/Physically safe environment - no spills, clutter or unnecessary equipment/Purposeful proactive rounding/Room/bathroom lighting operational, light cord in reach

## 2023-12-06 NOTE — ED ADULT TRIAGE NOTE - CHIEF COMPLAINT QUOTE
pt living with DM type2, CABG, c/o of chest discomfort abd sob for 3 days, pt denies any nausea or vomiting

## 2023-12-06 NOTE — ED PROVIDER NOTE - ATTENDING CONTRIBUTION TO CARE
Patient with a prior history of CABG, MI patient states she had been on Brilinta however it was stopped this year because she was having symptoms with it.  Patient states she has had a few days of feeling very fatigued accompanied with chest pain and shortness of breath.  She states she even experiences the shortness of breath at rest.  Patient states this feels similar to prior cardiac episodes.  She denies any cough cold or any fever any nausea any vomiting any abdominal pain.  Family history of cardiac disease patient is a non-smoker.  EKG nonactionable x 2 however with significant family history as well as personal history believe patient would benefit from inpatient stay for further cardiac workup.  General: Well appearing, well nourished, in no distress  Head: Normocephalic, atraumatic  Eyes: Conjunctiva clear  Mouth: Mucous membranes moist  Neck: Supple  Chest: vertical scar   Heart: Regular rate and rhythm, no murmur or gallop  Lungs: Clear to auscultation  Abdomen: soft, no tenderness  Extremities: No amputations or deformities, cyanosis, edema  Musculoskeletal: ZAMAN, pulses intact  Neurologic: No gross deficits   Psychiatric: Oriented X3, normal mood and affect  Skin: Warm,dry.

## 2023-12-06 NOTE — H&P ADULT - ASSESSMENT
53-year-old female past medical history of CAD status post 4 stents and CABG, hypertension, hyperlipidemia, diabetes presents to ED with worsening dyspnea on exertion admitted for r/o ACS

## 2023-12-06 NOTE — ED PROVIDER NOTE - OBJECTIVE STATEMENT
53-year-old female past medical history of CAD status post 4 stents and CABG, hypertension, hyperlipidemia, diabetes presents ED complaining of 3 days of exertional, nonradiating, nonpleuritic, nonpositional substernal chest pain associated with shortness of breath, mild lightheadedness.  Denies nausea, vomiting, diaphoresis, abdominal pain, dysuria, diarrhea, fever. Previous MAGALY in the system from 2022 is grossly normal.

## 2023-12-06 NOTE — H&P ADULT - PROBLEM SELECTOR PLAN 1
-pt reports 1 month history of progressive LANDRY that acutely worsened in the last few days   -trops flat, ST depression in lead aVL is resolved on repeat EKG likely reflecting lead placement issue. Low c/f ACS at this time  -CXR clear, no physical exam findings c/f ADHF. Probnp not elevated   -d-dimer negative, low c/f PE  -f/u RVP to r/o infectious etiology  -TTE pending to eval for new WMA  -f/u cards recs regarding stress test vs cath

## 2023-12-06 NOTE — ED ADULT NURSE NOTE - PAIN RATING/NUMBER SCALE (0-10): ACTIVITY
Spoke to pt. Informed to bring derm records to f/u and may try rx sent to pharmacy. Verbalized understanding.    5 (moderate pain)

## 2023-12-06 NOTE — ED PROVIDER NOTE - PROGRESS NOTE DETAILS
Kvng Vick, DO PGY-3: Labs nonactionable, troponins negative x2, ST depression in lead aVL is resolved on repeat EKG likely reflecting lead placement issue.  Will admit patient for further cardiac work-up.

## 2023-12-06 NOTE — ED ADULT NURSE NOTE - OBJECTIVE STATEMENT
pt presents co epigastric pain x Monday. States it has not gotten better. Pt has hx cardiac bypass and stent placement. pt AAOx4, speaking in clear and complete sentences.

## 2023-12-06 NOTE — H&P ADULT - NSHPLABSRESULTS_GEN_ALL_CORE
(12-06 @ 13:54)                      13.0  10.54 )-----------( 266                 39.3    Neutrophils = 6.06 (57.4%)  Lymphocytes = 3.40 (32.3%)  Eosinophils = 0.37 (3.5%)  Basophils = 0.05 (0.5%)  Monocytes = 0.64 (6.1%)  Bands = --%    12-06    139  |  105  |  12  ----------------------------<  123<H>  3.8   |  27  |  0.77    Ca    9.6      06 Dec 2023 13:54  Mg     1.60     12-06    TPro  6.4  /  Alb  3.7  /  TBili  0.6  /  DBili  x   /  AST  22  /  ALT  25  /  AlkPhos  82  12-06    ( 06 Dec 2023 13:54 )   PT: 11.6 sec;   INR: 1.04 ratio;       PTT:38.3 sec  CARDIAC MARKERS ( 07 Dec 2023 02:35 )  Trop x     / CK 98 U/L / CKMB x       CARDIAC MARKERS ( 06 Dec 2023 14:47 )  Trop x     /  U/L / CKMB x           Urinalysis Basic - ( 06 Dec 2023 13:54 )    Color: x / Appearance: x / SG: x / pH: x  Gluc: 123 mg/dL / Ketone: x  / Bili: x / Urobili: x   Blood: x / Protein: x / Nitrite: x   Leuk Esterase: x / RBC: x / WBC x   Sq Epi: x / Non Sq Epi: x / Bacteria: x    < from: Xray Chest 1 View- PORTABLE-Urgent (12.06.23 @ 14:07) >          IMPRESSION: Clear lungs.    < end of copied text >        Labs personally reviewed  Imaging reviewed  EKG: @ 11:57  NSR with mild st depression in lead avl. T wave inversion in lead III (unchanged from prior). Repeat EKG @14:04 with resolved st depression, unchanged from baseline EKG

## 2023-12-07 DIAGNOSIS — Z29.9 ENCOUNTER FOR PROPHYLACTIC MEASURES, UNSPECIFIED: ICD-10-CM

## 2023-12-07 DIAGNOSIS — I10 ESSENTIAL (PRIMARY) HYPERTENSION: ICD-10-CM

## 2023-12-07 DIAGNOSIS — I25.10 ATHEROSCLEROTIC HEART DISEASE OF NATIVE CORONARY ARTERY WITHOUT ANGINA PECTORIS: ICD-10-CM

## 2023-12-07 DIAGNOSIS — E11.9 TYPE 2 DIABETES MELLITUS WITHOUT COMPLICATIONS: ICD-10-CM

## 2023-12-07 DIAGNOSIS — Z95.1 PRESENCE OF AORTOCORONARY BYPASS GRAFT: Chronic | ICD-10-CM

## 2023-12-07 DIAGNOSIS — R06.09 OTHER FORMS OF DYSPNEA: ICD-10-CM

## 2023-12-07 LAB
A1C WITH ESTIMATED AVERAGE GLUCOSE RESULT: 6.9 % — HIGH (ref 4–5.6)
A1C WITH ESTIMATED AVERAGE GLUCOSE RESULT: 6.9 % — HIGH (ref 4–5.6)
A1C WITH ESTIMATED AVERAGE GLUCOSE RESULT: 7.1 % — HIGH (ref 4–5.6)
A1C WITH ESTIMATED AVERAGE GLUCOSE RESULT: 7.1 % — HIGH (ref 4–5.6)
ANION GAP SERPL CALC-SCNC: 11 MMOL/L — SIGNIFICANT CHANGE UP (ref 7–14)
ANION GAP SERPL CALC-SCNC: 11 MMOL/L — SIGNIFICANT CHANGE UP (ref 7–14)
B PERT DNA SPEC QL NAA+PROBE: SIGNIFICANT CHANGE UP
B PERT DNA SPEC QL NAA+PROBE: SIGNIFICANT CHANGE UP
B PERT+PARAPERT DNA PNL SPEC NAA+PROBE: SIGNIFICANT CHANGE UP
B PERT+PARAPERT DNA PNL SPEC NAA+PROBE: SIGNIFICANT CHANGE UP
BORDETELLA PARAPERTUSSIS (RAPRVP): SIGNIFICANT CHANGE UP
BORDETELLA PARAPERTUSSIS (RAPRVP): SIGNIFICANT CHANGE UP
BUN SERPL-MCNC: 11 MG/DL — SIGNIFICANT CHANGE UP (ref 7–23)
BUN SERPL-MCNC: 11 MG/DL — SIGNIFICANT CHANGE UP (ref 7–23)
C PNEUM DNA SPEC QL NAA+PROBE: SIGNIFICANT CHANGE UP
C PNEUM DNA SPEC QL NAA+PROBE: SIGNIFICANT CHANGE UP
CALCIUM SERPL-MCNC: 9.2 MG/DL — SIGNIFICANT CHANGE UP (ref 8.4–10.5)
CALCIUM SERPL-MCNC: 9.2 MG/DL — SIGNIFICANT CHANGE UP (ref 8.4–10.5)
CHLORIDE SERPL-SCNC: 108 MMOL/L — HIGH (ref 98–107)
CHLORIDE SERPL-SCNC: 108 MMOL/L — HIGH (ref 98–107)
CHOLEST SERPL-MCNC: 121 MG/DL — SIGNIFICANT CHANGE UP
CHOLEST SERPL-MCNC: 121 MG/DL — SIGNIFICANT CHANGE UP
CK MB BLD-MCNC: 1.2 % — SIGNIFICANT CHANGE UP (ref 0–2.5)
CK MB BLD-MCNC: 1.2 % — SIGNIFICANT CHANGE UP (ref 0–2.5)
CK MB CFR SERPL CALC: 1.2 NG/ML — SIGNIFICANT CHANGE UP
CK MB CFR SERPL CALC: 1.2 NG/ML — SIGNIFICANT CHANGE UP
CK SERPL-CCNC: 98 U/L — SIGNIFICANT CHANGE UP (ref 25–170)
CK SERPL-CCNC: 98 U/L — SIGNIFICANT CHANGE UP (ref 25–170)
CO2 SERPL-SCNC: 23 MMOL/L — SIGNIFICANT CHANGE UP (ref 22–31)
CO2 SERPL-SCNC: 23 MMOL/L — SIGNIFICANT CHANGE UP (ref 22–31)
CREAT SERPL-MCNC: 0.7 MG/DL — SIGNIFICANT CHANGE UP (ref 0.5–1.3)
CREAT SERPL-MCNC: 0.7 MG/DL — SIGNIFICANT CHANGE UP (ref 0.5–1.3)
D DIMER BLD IA.RAPID-MCNC: <150 NG/ML DDU — SIGNIFICANT CHANGE UP
D DIMER BLD IA.RAPID-MCNC: <150 NG/ML DDU — SIGNIFICANT CHANGE UP
EGFR: 105 ML/MIN/1.73M2 — SIGNIFICANT CHANGE UP
EGFR: 105 ML/MIN/1.73M2 — SIGNIFICANT CHANGE UP
ESTIMATED AVERAGE GLUCOSE: 151 — SIGNIFICANT CHANGE UP
ESTIMATED AVERAGE GLUCOSE: 151 — SIGNIFICANT CHANGE UP
ESTIMATED AVERAGE GLUCOSE: 157 — SIGNIFICANT CHANGE UP
ESTIMATED AVERAGE GLUCOSE: 157 — SIGNIFICANT CHANGE UP
FLUAV SUBTYP SPEC NAA+PROBE: SIGNIFICANT CHANGE UP
FLUAV SUBTYP SPEC NAA+PROBE: SIGNIFICANT CHANGE UP
FLUBV RNA SPEC QL NAA+PROBE: SIGNIFICANT CHANGE UP
FLUBV RNA SPEC QL NAA+PROBE: SIGNIFICANT CHANGE UP
GLUCOSE BLDC GLUCOMTR-MCNC: 119 MG/DL — HIGH (ref 70–99)
GLUCOSE BLDC GLUCOMTR-MCNC: 119 MG/DL — HIGH (ref 70–99)
GLUCOSE BLDC GLUCOMTR-MCNC: 133 MG/DL — HIGH (ref 70–99)
GLUCOSE BLDC GLUCOMTR-MCNC: 133 MG/DL — HIGH (ref 70–99)
GLUCOSE BLDC GLUCOMTR-MCNC: 157 MG/DL — HIGH (ref 70–99)
GLUCOSE BLDC GLUCOMTR-MCNC: 157 MG/DL — HIGH (ref 70–99)
GLUCOSE BLDC GLUCOMTR-MCNC: 161 MG/DL — HIGH (ref 70–99)
GLUCOSE BLDC GLUCOMTR-MCNC: 161 MG/DL — HIGH (ref 70–99)
GLUCOSE BLDC GLUCOMTR-MCNC: 183 MG/DL — HIGH (ref 70–99)
GLUCOSE BLDC GLUCOMTR-MCNC: 183 MG/DL — HIGH (ref 70–99)
GLUCOSE BLDC GLUCOMTR-MCNC: 64 MG/DL — LOW (ref 70–99)
GLUCOSE BLDC GLUCOMTR-MCNC: 64 MG/DL — LOW (ref 70–99)
GLUCOSE SERPL-MCNC: 79 MG/DL — SIGNIFICANT CHANGE UP (ref 70–99)
GLUCOSE SERPL-MCNC: 79 MG/DL — SIGNIFICANT CHANGE UP (ref 70–99)
HADV DNA SPEC QL NAA+PROBE: SIGNIFICANT CHANGE UP
HADV DNA SPEC QL NAA+PROBE: SIGNIFICANT CHANGE UP
HCOV 229E RNA SPEC QL NAA+PROBE: SIGNIFICANT CHANGE UP
HCOV 229E RNA SPEC QL NAA+PROBE: SIGNIFICANT CHANGE UP
HCOV HKU1 RNA SPEC QL NAA+PROBE: SIGNIFICANT CHANGE UP
HCOV HKU1 RNA SPEC QL NAA+PROBE: SIGNIFICANT CHANGE UP
HCOV NL63 RNA SPEC QL NAA+PROBE: SIGNIFICANT CHANGE UP
HCOV NL63 RNA SPEC QL NAA+PROBE: SIGNIFICANT CHANGE UP
HCOV OC43 RNA SPEC QL NAA+PROBE: SIGNIFICANT CHANGE UP
HCOV OC43 RNA SPEC QL NAA+PROBE: SIGNIFICANT CHANGE UP
HCT VFR BLD CALC: 39.2 % — SIGNIFICANT CHANGE UP (ref 34.5–45)
HCT VFR BLD CALC: 39.2 % — SIGNIFICANT CHANGE UP (ref 34.5–45)
HDLC SERPL-MCNC: 36 MG/DL — LOW
HDLC SERPL-MCNC: 36 MG/DL — LOW
HGB BLD-MCNC: 13.2 G/DL — SIGNIFICANT CHANGE UP (ref 11.5–15.5)
HGB BLD-MCNC: 13.2 G/DL — SIGNIFICANT CHANGE UP (ref 11.5–15.5)
HMPV RNA SPEC QL NAA+PROBE: SIGNIFICANT CHANGE UP
HMPV RNA SPEC QL NAA+PROBE: SIGNIFICANT CHANGE UP
HPIV1 RNA SPEC QL NAA+PROBE: SIGNIFICANT CHANGE UP
HPIV1 RNA SPEC QL NAA+PROBE: SIGNIFICANT CHANGE UP
HPIV2 RNA SPEC QL NAA+PROBE: SIGNIFICANT CHANGE UP
HPIV2 RNA SPEC QL NAA+PROBE: SIGNIFICANT CHANGE UP
HPIV3 RNA SPEC QL NAA+PROBE: SIGNIFICANT CHANGE UP
HPIV3 RNA SPEC QL NAA+PROBE: SIGNIFICANT CHANGE UP
HPIV4 RNA SPEC QL NAA+PROBE: SIGNIFICANT CHANGE UP
HPIV4 RNA SPEC QL NAA+PROBE: SIGNIFICANT CHANGE UP
LACTATE SERPL-SCNC: 1 MMOL/L — SIGNIFICANT CHANGE UP (ref 0.5–2)
LACTATE SERPL-SCNC: 1 MMOL/L — SIGNIFICANT CHANGE UP (ref 0.5–2)
LIPID PNL WITH DIRECT LDL SERPL: 52 MG/DL — SIGNIFICANT CHANGE UP
LIPID PNL WITH DIRECT LDL SERPL: 52 MG/DL — SIGNIFICANT CHANGE UP
M PNEUMO DNA SPEC QL NAA+PROBE: SIGNIFICANT CHANGE UP
M PNEUMO DNA SPEC QL NAA+PROBE: SIGNIFICANT CHANGE UP
MAGNESIUM SERPL-MCNC: 1.6 MG/DL — SIGNIFICANT CHANGE UP (ref 1.6–2.6)
MAGNESIUM SERPL-MCNC: 1.6 MG/DL — SIGNIFICANT CHANGE UP (ref 1.6–2.6)
MCHC RBC-ENTMCNC: 26.8 PG — LOW (ref 27–34)
MCHC RBC-ENTMCNC: 26.8 PG — LOW (ref 27–34)
MCHC RBC-ENTMCNC: 33.7 GM/DL — SIGNIFICANT CHANGE UP (ref 32–36)
MCHC RBC-ENTMCNC: 33.7 GM/DL — SIGNIFICANT CHANGE UP (ref 32–36)
MCV RBC AUTO: 79.5 FL — LOW (ref 80–100)
MCV RBC AUTO: 79.5 FL — LOW (ref 80–100)
NON HDL CHOLESTEROL: 85 MG/DL — SIGNIFICANT CHANGE UP
NON HDL CHOLESTEROL: 85 MG/DL — SIGNIFICANT CHANGE UP
NRBC # BLD: 0 /100 WBCS — SIGNIFICANT CHANGE UP (ref 0–0)
NRBC # BLD: 0 /100 WBCS — SIGNIFICANT CHANGE UP (ref 0–0)
NRBC # FLD: 0 K/UL — SIGNIFICANT CHANGE UP (ref 0–0)
NRBC # FLD: 0 K/UL — SIGNIFICANT CHANGE UP (ref 0–0)
PHOSPHATE SERPL-MCNC: 4.1 MG/DL — SIGNIFICANT CHANGE UP (ref 2.5–4.5)
PHOSPHATE SERPL-MCNC: 4.1 MG/DL — SIGNIFICANT CHANGE UP (ref 2.5–4.5)
PLATELET # BLD AUTO: 283 K/UL — SIGNIFICANT CHANGE UP (ref 150–400)
PLATELET # BLD AUTO: 283 K/UL — SIGNIFICANT CHANGE UP (ref 150–400)
POTASSIUM SERPL-MCNC: 3.7 MMOL/L — SIGNIFICANT CHANGE UP (ref 3.5–5.3)
POTASSIUM SERPL-MCNC: 3.7 MMOL/L — SIGNIFICANT CHANGE UP (ref 3.5–5.3)
POTASSIUM SERPL-SCNC: 3.7 MMOL/L — SIGNIFICANT CHANGE UP (ref 3.5–5.3)
POTASSIUM SERPL-SCNC: 3.7 MMOL/L — SIGNIFICANT CHANGE UP (ref 3.5–5.3)
RAPID RVP RESULT: SIGNIFICANT CHANGE UP
RAPID RVP RESULT: SIGNIFICANT CHANGE UP
RBC # BLD: 4.93 M/UL — SIGNIFICANT CHANGE UP (ref 3.8–5.2)
RBC # BLD: 4.93 M/UL — SIGNIFICANT CHANGE UP (ref 3.8–5.2)
RBC # FLD: 15 % — HIGH (ref 10.3–14.5)
RBC # FLD: 15 % — HIGH (ref 10.3–14.5)
RSV RNA SPEC QL NAA+PROBE: SIGNIFICANT CHANGE UP
RSV RNA SPEC QL NAA+PROBE: SIGNIFICANT CHANGE UP
RV+EV RNA SPEC QL NAA+PROBE: SIGNIFICANT CHANGE UP
RV+EV RNA SPEC QL NAA+PROBE: SIGNIFICANT CHANGE UP
SARS-COV-2 RNA SPEC QL NAA+PROBE: SIGNIFICANT CHANGE UP
SARS-COV-2 RNA SPEC QL NAA+PROBE: SIGNIFICANT CHANGE UP
SODIUM SERPL-SCNC: 142 MMOL/L — SIGNIFICANT CHANGE UP (ref 135–145)
SODIUM SERPL-SCNC: 142 MMOL/L — SIGNIFICANT CHANGE UP (ref 135–145)
TRIGL SERPL-MCNC: 166 MG/DL — HIGH
TRIGL SERPL-MCNC: 166 MG/DL — HIGH
TROPONIN T, HIGH SENSITIVITY RESULT: 8 NG/L — SIGNIFICANT CHANGE UP
TROPONIN T, HIGH SENSITIVITY RESULT: 8 NG/L — SIGNIFICANT CHANGE UP
WBC # BLD: 10.35 K/UL — SIGNIFICANT CHANGE UP (ref 3.8–10.5)
WBC # BLD: 10.35 K/UL — SIGNIFICANT CHANGE UP (ref 3.8–10.5)
WBC # FLD AUTO: 10.35 K/UL — SIGNIFICANT CHANGE UP (ref 3.8–10.5)
WBC # FLD AUTO: 10.35 K/UL — SIGNIFICANT CHANGE UP (ref 3.8–10.5)

## 2023-12-07 PROCEDURE — 99221 1ST HOSP IP/OBS SF/LOW 40: CPT | Mod: GC

## 2023-12-07 PROCEDURE — 93567 NJX CAR CTH SPRVLV AORTGRPHY: CPT

## 2023-12-07 PROCEDURE — 99152 MOD SED SAME PHYS/QHP 5/>YRS: CPT

## 2023-12-07 PROCEDURE — 99233 SBSQ HOSP IP/OBS HIGH 50: CPT

## 2023-12-07 PROCEDURE — 93459 L HRT ART/GRFT ANGIO: CPT | Mod: 26

## 2023-12-07 RX ORDER — ISOSORBIDE MONONITRATE 60 MG/1
30 TABLET, EXTENDED RELEASE ORAL DAILY
Refills: 0 | Status: DISCONTINUED | OUTPATIENT
Start: 2023-12-07 | End: 2023-12-09

## 2023-12-07 RX ORDER — TICAGRELOR 90 MG/1
90 TABLET ORAL EVERY 12 HOURS
Refills: 0 | Status: DISCONTINUED | OUTPATIENT
Start: 2023-12-08 | End: 2023-12-09

## 2023-12-07 RX ORDER — INSULIN GLARGINE 100 [IU]/ML
35 INJECTION, SOLUTION SUBCUTANEOUS AT BEDTIME
Refills: 0 | Status: DISCONTINUED | OUTPATIENT
Start: 2023-12-06 | End: 2023-12-09

## 2023-12-07 RX ORDER — SODIUM CHLORIDE 9 MG/ML
1000 INJECTION INTRAMUSCULAR; INTRAVENOUS; SUBCUTANEOUS
Refills: 0 | Status: DISCONTINUED | OUTPATIENT
Start: 2023-12-07 | End: 2023-12-09

## 2023-12-07 RX ORDER — DEXTROSE 50 % IN WATER 50 %
12.5 SYRINGE (ML) INTRAVENOUS ONCE
Refills: 0 | Status: COMPLETED | OUTPATIENT
Start: 2023-12-07 | End: 2023-12-07

## 2023-12-07 RX ORDER — METOPROLOL TARTRATE 50 MG
100 TABLET ORAL DAILY
Refills: 0 | Status: DISCONTINUED | OUTPATIENT
Start: 2023-12-07 | End: 2023-12-09

## 2023-12-07 RX ORDER — TICAGRELOR 90 MG/1
180 TABLET ORAL ONCE
Refills: 0 | Status: COMPLETED | OUTPATIENT
Start: 2023-12-07 | End: 2023-12-07

## 2023-12-07 RX ADMIN — TICAGRELOR 180 MILLIGRAM(S): 90 TABLET ORAL at 17:37

## 2023-12-07 RX ADMIN — Medication 81 MILLIGRAM(S): at 12:00

## 2023-12-07 RX ADMIN — Medication 12.5 GRAM(S): at 12:06

## 2023-12-07 RX ADMIN — INSULIN GLARGINE 35 UNIT(S): 100 INJECTION, SOLUTION SUBCUTANEOUS at 22:18

## 2023-12-07 RX ADMIN — Medication 40 MILLIGRAM(S): at 07:04

## 2023-12-07 RX ADMIN — Medication 650 MILLIGRAM(S): at 16:08

## 2023-12-07 RX ADMIN — SODIUM CHLORIDE 100 MILLILITER(S): 9 INJECTION INTRAMUSCULAR; INTRAVENOUS; SUBCUTANEOUS at 11:51

## 2023-12-07 RX ADMIN — Medication 650 MILLIGRAM(S): at 17:39

## 2023-12-07 RX ADMIN — INSULIN GLARGINE 35 UNIT(S): 100 INJECTION, SOLUTION SUBCUTANEOUS at 04:22

## 2023-12-07 RX ADMIN — ENOXAPARIN SODIUM 40 MILLIGRAM(S): 100 INJECTION SUBCUTANEOUS at 22:18

## 2023-12-07 RX ADMIN — ATORVASTATIN CALCIUM 40 MILLIGRAM(S): 80 TABLET, FILM COATED ORAL at 22:18

## 2023-12-07 RX ADMIN — Medication 25 MILLIGRAM(S): at 07:05

## 2023-12-07 NOTE — PROGRESS NOTE ADULT - ATTENDING COMMENTS
The patient was seen and examined with the Cardiology Consultation Teaching Service.     She is a 53-year-old woman with coronary artery disease, multiple PCI and subsequent 4-vessel CABG one year ago, who presents with progressive dyspnea and intermittent chest discomfort for two months, similar to her symptoms prior to CABG.     Based on her symptoms and history, we referred the patient for coronary and graft angiography. During the procedure, the LIMA-Diagonal and the SVG-RPL were patent. The other two SVGs seem to have closed. The native coronary circulation remains similar to her prior angiography, but that includes significant LAD and CX disease.     We discussed the findings with the patient, and ultimately decided that she would like to proceed with PCI of the LAD and CX.     PMH/PSH:  Coronary artery disease  Coronary artery bypass grafting, 11/2022    LIMA-Diagonal, SVG-LAD, SVG-PDA, SVG-RPL; OM heavily calcified  Percutaneous coronary intervention to the RCA, most recently in 3/2022  Diabetes  Hypertension  Dyslipidemia    Comfortable-appearing woman in no acute distress  Alert and oriented  Afebrile  Vital signs stable  Full examination was deferred as the patient was immediately post-cath.    Microcytosis without anemia  GFR 93    Hs-troponin 8, 9, 9  Pro-  D-dimer negative    CXR demonstrates clear lungs    ECG demonstrates sinus rhythm with possible LVH without ST-segment deviation or significant T wave changes    Echocardiography in 11/2022 demonstrated normal LV systolic function, mild concentric LVH, moderate AS, mild MR, normal LA size    Impression and Recommendations:   53-year-old woman with coronary artery disease, multiple PCI and subsequent 4-vessel CABG one year ago, who presents with progressive dyspnea and intermittent chest discomfort for two months, similar to her symptoms prior to CABG.     Angiography demonstrates that her SVG-LAD and SVG-RPDA are closed. Given her progressive symptoms and her unrevascularized LAD and CX disease, we will plan to proceed with revascularization tomorrow. Intravenous fluid per cath lab recommendations.     Continue aspirin and start ticagrelor with a loading dose.   Continue atorvastatin.     I agree that the patient's beta-blocker can be titrated for additional antianginal effects for a goal resting heart rate in the 50s. Long-acting nitrate can also be started as additional antianginal therapy.    Can consider the addition of cilostazol for the prevention of in-stent restenosis given her prior stenting history based on the CREST data. (Circulation. 2005;112:2623-3468.)    Chuck Teixeira MD [facility]  Cardiology  [phone] The patient was seen and examined with the Cardiology Consultation Teaching Service.     She is a 53-year-old woman with coronary artery disease, multiple PCI and subsequent 4-vessel CABG one year ago, who presents with progressive dyspnea and intermittent chest discomfort for two months, similar to her symptoms prior to CABG.     Based on her symptoms and history, we referred the patient for coronary and graft angiography. During the procedure, the LIMA-Diagonal and the SVG-RPL were patent. The other two SVGs seem to have closed. The native coronary circulation remains similar to her prior angiography, but that includes significant LAD and CX disease.     We discussed the findings with the patient, and ultimately decided that she would like to proceed with PCI of the LAD and CX.     PMH/PSH:  Coronary artery disease  Coronary artery bypass grafting, 11/2022    LIMA-Diagonal, SVG-LAD, SVG-PDA, SVG-RPL; OM heavily calcified  Percutaneous coronary intervention to the RCA, most recently in 3/2022  Diabetes  Hypertension  Dyslipidemia    Comfortable-appearing woman in no acute distress  Alert and oriented  Afebrile  Vital signs stable  Full examination was deferred as the patient was immediately post-cath.    Microcytosis without anemia  GFR 93    Hs-troponin 8, 9, 9  Pro-  D-dimer negative    CXR demonstrates clear lungs    ECG demonstrates sinus rhythm with possible LVH without ST-segment deviation or significant T wave changes    Echocardiography in 11/2022 demonstrated normal LV systolic function, mild concentric LVH, moderate AS, mild MR, normal LA size    Impression and Recommendations:   53-year-old woman with coronary artery disease, multiple PCI and subsequent 4-vessel CABG one year ago, who presents with progressive dyspnea and intermittent chest discomfort for two months, similar to her symptoms prior to CABG.     Angiography demonstrates that her SVG-LAD and SVG-RPDA are closed. Given her progressive symptoms and her unrevascularized LAD and CX disease, we will plan to proceed with revascularization tomorrow. Intravenous fluid per cath lab recommendations.     Continue aspirin and start ticagrelor with a loading dose.   Continue atorvastatin.     I agree that the patient's beta-blocker can be titrated for additional antianginal effects for a goal resting heart rate in the 50s. Long-acting nitrate can also be started as additional antianginal therapy.    Can consider the addition of cilostazol for the prevention of in-stent restenosis given her prior stenting history based on the CREST data. (Circulation. 2005;112:2771-4736.)    Chuck Teixeira MD [facility]  Cardiology  [phone]

## 2023-12-07 NOTE — PROGRESS NOTE ADULT - SUBJECTIVE AND OBJECTIVE BOX
[facility] Department of Hospital Medicine  Blanca Thompson MD  Available on MS Teams  Pager: [phone]    Patient is a 53y old  Female who presents with a chief complaint of LANDRY, chest pain (07 Dec 2023 12:09)    OVERNIGHT EVENTS: No acute events overnight.    SUBJECTIVE: Pt seen and examined at bedside this morning after cardiac cath. Patient having femoral site sheath removed. Reports that she feels well over. Endorses mild chest pressure. Denies any SOB, fever or abd pain.    ADDITIONAL REVIEW OF SYSTEMS: as above    MEDICATIONS  (STANDING):  aspirin enteric coated 81 milliGRAM(s) Oral daily  atorvastatin 40 milliGRAM(s) Oral at bedtime  dextrose 5%. 1000 milliLiter(s) (50 mL/Hr) IV Continuous <Continuous>  dextrose 5%. 1000 milliLiter(s) (100 mL/Hr) IV Continuous <Continuous>  dextrose 50% Injectable 25 Gram(s) IV Push once  dextrose 50% Injectable 25 Gram(s) IV Push once  dextrose 50% Injectable 12.5 Gram(s) IV Push once  enoxaparin Injectable 40 milliGRAM(s) SubCutaneous every 24 hours  furosemide    Tablet 40 milliGRAM(s) Oral daily  glucagon  Injectable 1 milliGRAM(s) IntraMuscular once  insulin glargine Injectable (LANTUS) 35 Unit(s) SubCutaneous at bedtime  insulin lispro (ADMELOG) corrective regimen sliding scale   SubCutaneous at bedtime  insulin lispro (ADMELOG) corrective regimen sliding scale   SubCutaneous three times a day before meals  isosorbide   mononitrate ER Tablet (IMDUR) 30 milliGRAM(s) Oral daily  metoprolol succinate  milliGRAM(s) Oral daily  sodium chloride 0.9%. 1000 milliLiter(s) (100 mL/Hr) IV Continuous <Continuous>  ticagrelor 180 milliGRAM(s) Oral once    MEDICATIONS  (PRN):  acetaminophen     Tablet .. 650 milliGRAM(s) Oral every 6 hours PRN Temp greater or equal to 38C (100.4F), Mild Pain (1 - 3)  aluminum hydroxide/magnesium hydroxide/simethicone Suspension 30 milliLiter(s) Oral every 4 hours PRN Dyspepsia  dextrose Oral Gel 15 Gram(s) Oral once PRN Blood Glucose LESS THAN 70 milliGRAM(s)/deciliter  meclizine 25 milliGRAM(s) Oral three times a day PRN for dizziness  melatonin 3 milliGRAM(s) Oral at bedtime PRN Insomnia  ondansetron Injectable 4 milliGRAM(s) IV Push every 8 hours PRN Nausea and/or Vomiting    CAPILLARY BLOOD GLUCOSE    POCT Blood Glucose.: 183 mg/dL (07 Dec 2023 15:29)  POCT Blood Glucose.: 133 mg/dL (07 Dec 2023 12:27)  POCT Blood Glucose.: 64 mg/dL (07 Dec 2023 11:59)  POCT Blood Glucose.: 80 mg/dL (07 Dec 2023 09:02)  POCT Blood Glucose.: 88 mg/dL (07 Dec 2023 00:20)    I&O's Summary    PHYSICAL EXAM:  Vital Signs Last 24 Hrs  T(C): 36.2 (07 Dec 2023 09:00), Max: 37.1 (07 Dec 2023 01:34)  T(F): 97.2 (07 Dec 2023 09:00), Max: 98.8 (07 Dec 2023 01:34)  HR: 64 (07 Dec 2023 09:00) (64 - 73)  BP: 118/57 (07 Dec 2023 06:58) (118/57 - 137/69)  BP(mean): --  RR: 18 (07 Dec 2023 06:58) (15 - 18)  SpO2: 97% (07 Dec 2023 06:58) (97% - 99%)    Parameters below as of 07 Dec 2023 06:58  Patient On (Oxygen Delivery Method): room air    CONSTITUTIONAL: NAD, well-developed  HEAD: Normocephalic, atraumatic  EYES: EOMI, PERRL  ENT: no rhinorrhea, no pharyngeal erythema  RESPIRATORY: No increased work of breathing, CTAB, no wheezes or crackles appreciated  CARDIOVASCULAR: RRR, S1 and S2 present, no m/r/g  ABDOMEN: soft, NT, ND, bowel sounds present  EXTREMITIES: No LE edema, R groin fem access site with dressing  MUSCULOSKELETAL: no joint swelling, no tenderness to palpation  NEURO: A&Ox3, moving all extremities    LABS:                        13.2   10.35 )-----------( 283      ( 07 Dec 2023 05:50 )             39.2     12-07    142  |  108<H>  |  11  ----------------------------<  79  3.7   |  23  |  0.70    Ca    9.2      07 Dec 2023 05:50  Phos  4.1     12-07  Mg     1.60     12-07    TPro  6.4  /  Alb  3.7  /  TBili  0.6  /  DBili  x   /  AST  22  /  ALT  25  /  AlkPhos  82  12-06    PT/INR - ( 06 Dec 2023 13:54 )   PT: 11.6 sec;   INR: 1.04 ratio      PTT - ( 06 Dec 2023 13:54 )  PTT:38.3 sec  CARDIAC MARKERS ( 07 Dec 2023 02:35 )  x     / x     / 98 U/L / x     / 1.2 ng/mL  CARDIAC MARKERS ( 06 Dec 2023 14:47 )  x     / x     / 113 U/L / x     / 1.6 ng/mL    Urinalysis Basic - ( 07 Dec 2023 05:50 )    Color: x / Appearance: x / SG: x / pH: x  Gluc: 79 mg/dL / Ketone: x  / Bili: x / Urobili: x   Blood: x / Protein: x / Nitrite: x   Leuk Esterase: x / RBC: x / WBC x   Sq Epi: x / Non Sq Epi: x / Bacteria: x    RADIOLOGY & ADDITIONAL TESTS:    Results Reviewed:   Imaging Personally Reviewed:  Electrocardiogram Personally Reviewed:    COORDINATION OF CARE:  Care Discussed with Consultants/Other Providers [Y/N]:  Prior or Outpatient Records Reviewed [Y/N]:   [facility] Department of Hospital Medicine  Blanca Thompson MD  Available on MS Teams  Pager: [phone]    Patient is a 53y old  Female who presents with a chief complaint of LANDRY, chest pain (07 Dec 2023 12:09)    OVERNIGHT EVENTS: No acute events overnight.    SUBJECTIVE: Pt seen and examined at bedside this morning after cardiac cath. Patient having femoral site sheath removed. Reports that she feels well over. Endorses mild chest pressure. Denies any SOB, fever or abd pain.    ADDITIONAL REVIEW OF SYSTEMS: as above    MEDICATIONS  (STANDING):  aspirin enteric coated 81 milliGRAM(s) Oral daily  atorvastatin 40 milliGRAM(s) Oral at bedtime  dextrose 5%. 1000 milliLiter(s) (50 mL/Hr) IV Continuous <Continuous>  dextrose 5%. 1000 milliLiter(s) (100 mL/Hr) IV Continuous <Continuous>  dextrose 50% Injectable 25 Gram(s) IV Push once  dextrose 50% Injectable 25 Gram(s) IV Push once  dextrose 50% Injectable 12.5 Gram(s) IV Push once  enoxaparin Injectable 40 milliGRAM(s) SubCutaneous every 24 hours  furosemide    Tablet 40 milliGRAM(s) Oral daily  glucagon  Injectable 1 milliGRAM(s) IntraMuscular once  insulin glargine Injectable (LANTUS) 35 Unit(s) SubCutaneous at bedtime  insulin lispro (ADMELOG) corrective regimen sliding scale   SubCutaneous at bedtime  insulin lispro (ADMELOG) corrective regimen sliding scale   SubCutaneous three times a day before meals  isosorbide   mononitrate ER Tablet (IMDUR) 30 milliGRAM(s) Oral daily  metoprolol succinate  milliGRAM(s) Oral daily  sodium chloride 0.9%. 1000 milliLiter(s) (100 mL/Hr) IV Continuous <Continuous>  ticagrelor 180 milliGRAM(s) Oral once    MEDICATIONS  (PRN):  acetaminophen     Tablet .. 650 milliGRAM(s) Oral every 6 hours PRN Temp greater or equal to 38C (100.4F), Mild Pain (1 - 3)  aluminum hydroxide/magnesium hydroxide/simethicone Suspension 30 milliLiter(s) Oral every 4 hours PRN Dyspepsia  dextrose Oral Gel 15 Gram(s) Oral once PRN Blood Glucose LESS THAN 70 milliGRAM(s)/deciliter  meclizine 25 milliGRAM(s) Oral three times a day PRN for dizziness  melatonin 3 milliGRAM(s) Oral at bedtime PRN Insomnia  ondansetron Injectable 4 milliGRAM(s) IV Push every 8 hours PRN Nausea and/or Vomiting    CAPILLARY BLOOD GLUCOSE    POCT Blood Glucose.: 183 mg/dL (07 Dec 2023 15:29)  POCT Blood Glucose.: 133 mg/dL (07 Dec 2023 12:27)  POCT Blood Glucose.: 64 mg/dL (07 Dec 2023 11:59)  POCT Blood Glucose.: 80 mg/dL (07 Dec 2023 09:02)  POCT Blood Glucose.: 88 mg/dL (07 Dec 2023 00:20)    I&O's Summary    PHYSICAL EXAM:  Vital Signs Last 24 Hrs  T(C): 36.2 (07 Dec 2023 09:00), Max: 37.1 (07 Dec 2023 01:34)  T(F): 97.2 (07 Dec 2023 09:00), Max: 98.8 (07 Dec 2023 01:34)  HR: 64 (07 Dec 2023 09:00) (64 - 73)  BP: 118/57 (07 Dec 2023 06:58) (118/57 - 137/69)  BP(mean): --  RR: 18 (07 Dec 2023 06:58) (15 - 18)  SpO2: 97% (07 Dec 2023 06:58) (97% - 99%)    Parameters below as of 07 Dec 2023 06:58  Patient On (Oxygen Delivery Method): room air    CONSTITUTIONAL: NAD, well-developed  HEAD: Normocephalic, atraumatic  EYES: EOMI, PERRL  ENT: no rhinorrhea, no pharyngeal erythema  RESPIRATORY: No increased work of breathing, CTAB, no wheezes or crackles appreciated  CARDIOVASCULAR: RRR, S1 and S2 present, no m/r/g  ABDOMEN: soft, NT, ND, bowel sounds present  EXTREMITIES: No LE edema, R groin fem access site with dressing  MUSCULOSKELETAL: no joint swelling, no tenderness to palpation  NEURO: A&Ox3, moving all extremities    LABS:                        13.2   10.35 )-----------( 283      ( 07 Dec 2023 05:50 )             39.2     12-07    142  |  108<H>  |  11  ----------------------------<  79  3.7   |  23  |  0.70    Ca    9.2      07 Dec 2023 05:50  Phos  4.1     12-07  Mg     1.60     12-07    TPro  6.4  /  Alb  3.7  /  TBili  0.6  /  DBili  x   /  AST  22  /  ALT  25  /  AlkPhos  82  12-06    PT/INR - ( 06 Dec 2023 13:54 )   PT: 11.6 sec;   INR: 1.04 ratio      PTT - ( 06 Dec 2023 13:54 )  PTT:38.3 sec  CARDIAC MARKERS ( 07 Dec 2023 02:35 )  x     / x     / 98 U/L / x     / 1.2 ng/mL  CARDIAC MARKERS ( 06 Dec 2023 14:47 )  x     / x     / 113 U/L / x     / 1.6 ng/mL    Urinalysis Basic - ( 07 Dec 2023 05:50 )    Color: x / Appearance: x / SG: x / pH: x  Gluc: 79 mg/dL / Ketone: x  / Bili: x / Urobili: x   Blood: x / Protein: x / Nitrite: x   Leuk Esterase: x / RBC: x / WBC x   Sq Epi: x / Non Sq Epi: x / Bacteria: x    RADIOLOGY & ADDITIONAL TESTS:    Results Reviewed:   Imaging Personally Reviewed:  Electrocardiogram Personally Reviewed:    COORDINATION OF CARE:  Care Discussed with Consultants/Other Providers [Y/N]:  Prior or Outpatient Records Reviewed [Y/N]:

## 2023-12-07 NOTE — CONSULT NOTE ADULT - SUBJECTIVE AND OBJECTIVE BOX
HPI: 53-year-old female past medical history of CAD status post 4 stents and CABG, hypertension, hyperlipidemia, diabetes presents ED complaining of 2 months of shortness of breath.  Worse when climbing stairs.  Today was at cardiologist office of Yomi Reveles MD for c/o of SOB when she was sent to the ED.  Currently denying chest pain, however reports intermittent chest heaviness when becomes SOB.  Currently denies CP, nausea, vomiting, diaphoresis, abdominal pain, dysuria, diarrhea, fever.      Allergies  Cheese (Unknown)  eggs (Unknown)  Milk (Diarrhea)  amoxicillin (Unknown)    Intolerances  Soy (Diarrhea)  	  MEDICATIONS:  aspirin enteric coated 81 milliGRAM(s) Oral daily  enoxaparin Injectable 40 milliGRAM(s) SubCutaneous every 24 hours  furosemide    Tablet 40 milliGRAM(s) Oral daily  metoprolol tartrate 25 milliGRAM(s) Oral two times a day  acetaminophen     Tablet .. 650 milliGRAM(s) Oral every 6 hours PRN  meclizine 25 milliGRAM(s) Oral three times a day PRN  melatonin 3 milliGRAM(s) Oral at bedtime PRN  ondansetron Injectable 4 milliGRAM(s) IV Push every 8 hours PRN  aluminum hydroxide/magnesium hydroxide/simethicone Suspension 30 milliLiter(s) Oral every 4 hours PRN  atorvastatin 40 milliGRAM(s) Oral at bedtime  dextrose 50% Injectable 12.5 Gram(s) IV Push once  dextrose 50% Injectable 25 Gram(s) IV Push once  dextrose 50% Injectable 25 Gram(s) IV Push once  dextrose Oral Gel 15 Gram(s) Oral once PRN  glucagon  Injectable 1 milliGRAM(s) IntraMuscular once  insulin glargine Injectable (LANTUS) 50 Unit(s) SubCutaneous at bedtime  insulin lispro (ADMELOG) corrective regimen sliding scale   SubCutaneous at bedtime  insulin lispro (ADMELOG) corrective regimen sliding scale   SubCutaneous three times a day before meals  dextrose 5%. 1000 milliLiter(s) IV Continuous <Continuous>  dextrose 5%. 1000 milliLiter(s) IV Continuous <Continuous>      PAST MEDICAL & SURGICAL HISTORY:  Diabetes mellitus  Essential hypertension  Hyperlipidemia  CAD (coronary artery disease)  3/10/22 stent to midRCA and RPDA  S/P coronary artery stent placement      FAMILY HISTORY:  No pertinent family history in first degree relatives      SUBSTANCE USE  Tobacco Usage:  denies  Alcohol Usage: denies  Recreational drugs: denies      REVIEW OF SYSTEMS:  CV: chest pain (-), palpitation (-), orthopnea (-), PND (-), edema (-)  PULM: SOB/LANDRY (+), cough (-), wheezing (-), hemoptysis (-).   CONST: fever (-), chills (-) or fatigue (-)  GI: abdominal distension (-), abdominal pain (-) , nausea/vomiting (-), hematemesis, (-), melena (-), hematochezia (-)  : dysuria (-), frequency (-), hematuria (-).   NEURO: numbness (-), weakness (-), dizziness (-)  SKIN: itching (-), rash (-)  HEENT:  visual changes (-); vertigo or throat pain (-);  neck stiffness (-)   All other review of systems is negative unless indicated above.      T(C): 36.9 (12-06-23 @ 16:39), Max: 36.9 (12-06-23 @ 16:39)  HR: 73 (12-06-23 @ 16:39) (68 - 73)  BP: 130/75 (12-06-23 @ 16:39) (130/75 - 140/85)  RR: 15 (12-06-23 @ 16:39) (15 - 18)  SpO2: 99% (12-06-23 @ 16:39) (98% - 99%)  Wt(kg): --  I&O's Summary      Physical Exam:  General: NAD  Cardiovascular: Normal S1 S2, No JVD, No murmurs, No edema  Respiratory: Lungs clear to auscultation	  Gastrointestinal:  Soft, Non-tender, + BS	  Skin: warm and dry, No rashes, No ecchymoses, No cyanosis	  Extremities:  No clubbing, cyanosis or edema  Vascular: Peripheral pulses palpable 2+ bilaterally    CBC Full  -  ( 06 Dec 2023 13:54 )  WBC Count : 10.54 K/uL  Hemoglobin : 13.0 g/dL  Hematocrit : 39.3 %  Platelet Count - Automated : 266 K/uL  Mean Cell Volume : 80.9 fL  Mean Cell Hemoglobin : 26.7 pg  Mean Cell Hemoglobin Concentration : 33.1 gm/dL  Auto Neutrophil # : 6.06 K/uL  Auto Lymphocyte # : 3.40 K/uL  Auto Monocyte # : 0.64 K/uL  Auto Eosinophil # : 0.37 K/uL  Auto Basophil # : 0.05 K/uL  Auto Neutrophil % : 57.4 %  Auto Lymphocyte % : 32.3 %  Auto Monocyte % : 6.1 %  Auto Eosinophil % : 3.5 %  Auto Basophil % : 0.5 %    12-06    139  |  105  |  12  ----------------------------<  123<H>  3.8   |  27  |  0.77    Ca    9.6      06 Dec 2023 13:54  Mg     1.60     12-06    TPro  6.4  /  Alb  3.7  /  TBili  0.6  /  DBili  x   /  AST  22  /  ALT  25  /  AlkPhos  82  12-06    proBNP: 295  Lipid Profile: --  HgA1c: --  TSH: --  CARDIAC MARKERS ( 06 Dec 2023 14:47 )  9 ng/L / x     / x     / 113 U/L / x     / 1.6 ng/mL  CARDIAC MARKERS ( 06 Dec 2023 13:54 )  9 ng/L / x     / x     / x     / x     / x          EKG:  normal sinus rhythm, Unchanged Q waves inferiorly, Unchanged T wave inversions in lead III and aVF, no STEMI or equivalent. ?? ST depression in lead aVL ??      Diagnostic testing:  cath:.  < from: Cardiac Catheterization (11.11.22 @ 13:50) >  LAD   Proximal left anterior descending: There is a 70 % in-stent restenosis  in the distal third portion of the segment. CLAU Flow 3.  First diagonal: There is a 70 % stenosis in the ostium portion of the  segment. CLAU Flow 3.    CX   Proximal circumflex: There is an 80 % stenosis. CLAU Flow 3.      RCA   Mid right coronary artery: There is a 70 % stenosis at the distal  margin of the stented segment. CLAU Flow 3. First right  posterolateral: There is a 90 % stenosis. CLAU Flow 3. Distal right  coronary artery: There is a 50 % in-stent restenosis.    < end of copied text >    echo:    < from: Intra-Operative Transesophageal Echo (11.15.22 @ 14:43) >  EF (Visual Estimate): 60 %  Doppler Peak Velocity (m/sec): AoV=1.3  ------------------------------------------------------------------------  Pre-Bypass Observations:  Mitral Valve: Normal mitral valve. Trace mitral  regurgitation. No mitral stenosis.  Aortic Valve/Aorta: Normal trileaflet aortic valve. Peak  transaortic valve gradient equals 7 mm Hg, mean transaortic  valve gradient equals 4 mm Hg, estimated aortic valve area  equals 1.6 sqcm (by continuity equation), aortic valve  velocity time integral equals 32 cm, consistent with mild  aortic stenosis. AV leaflets open unrestricted with no  significant amount of calcium seen. No aortic  insufficiency.  Normal aortic root.  Left Atrium: Normal left atrium. No thrombus seen in MICHAEL,  MICHAEL velocity 40  cm/s.  Left Ventricle: Normal left ventricular systolic function.  Basal inferoseptal and inferior, mid inferoseptal walls  mildly hypokinetic. Normal left ventricular internal  dimensions and wall thicknesses. Normal diastolic function  by normal E/A and normal PV tracing.  Right Heart: Normal right atrium. RV size mildly dilated,  normal RV function. Normal tricuspid valve. Trace tricuspid  regurgitation. Normal pulmonic valve.  Pericardium/Pleura: Normal pericardium with no pericardial  effusion.  Hemodynamic: Estimated right atrial pressure is 8 mm Hg.  Color Doppler demonstrates no evidence of a patent foramen  ovale.  ------------------------------------------------------------------------  Post-Bypass Observations:    Normal biventricular function, no new wall motion  abnormalities.  No new valvulopathies seen.  No effusions seen.  No dissection seen in any visible porion of theaorta.  ------------------------------------------------------------------------  Conclusions:  1. Normal mitral valve. Trace mitral regurgitation. No  mitral stenosis.  2. Normal trileaflet aortic valve. Peak transaortic valve  gradient equals 7 mm Hg, mean transaortic valve gradient  equals 4 mm Hg, estimated aortic valve area equals 1.6 sqcm  (by continuity equation), aortic valve velocity time  integral equals 32 cm, consistent with mild aortic  stenosis. AV leaflets open unrestricted with nosignificant  amount of calcium seen. No aortic insufficiency.  3. Normal aortic root.  4. Normal left atrium. No thrombus seen in MICHAEL, MICHAEL  velocity 40  cm/s.  5. Normal left ventricular internal dimensions and wall  thicknesses.  6. Normal left ventricular systolic function. Basal  inferoseptal and inferior, mid inferoseptal walls mildly  hypokinetic.  7. Normal diastolic function by normal E/A and normal PV  tracing.  8. Normal right atrium.  9. Color Doppler demonstrates no evidence of a patent  foramen ovale.  10. Normal pericardium with no pericardial effusion.    < end of copied text >      < from: Xray Chest 1 View- PORTABLE-Urgent (12.06.23 @ 14:07) >  ACC: [id number] EXAM:  XR CHEST PORTABLE URGENT 1V   ORDERED BY: DAVID DIETZ     PROCEDURE DATE:  12/06/2023          INTERPRETATION:  CLINICAL INFORMATION: Chest pain    TIME OF EXAMINATION: December 6, 2023 at 1:55 PM    EXAM: Portable chest    FINDINGS:  Sternotomy wires are present. The lungs are clear, the heart is not   enlarged and there are no effusions or congestion to indicate CHF.    The bones show no acute findings.        COMPARISON: November 18, 2022        IMPRESSION: Clear lungs.    --- End of Report ---      < end of copied text >   HPI: 53-year-old female past medical history of CAD status post 4 stents and CABG, hypertension, hyperlipidemia, diabetes presents ED complaining of 2 months of shortness of breath.  Worse when climbing stairs.  Today was at cardiologist office of Yomi Reveles MD for c/o of SOB when she was sent to the ED.  Currently denying chest pain, however reports intermittent chest heaviness when becomes SOB.  Currently denies CP, nausea, vomiting, diaphoresis, abdominal pain, dysuria, diarrhea, fever.      Allergies  Cheese (Unknown)  eggs (Unknown)  Milk (Diarrhea)  amoxicillin (Unknown)    Intolerances  Soy (Diarrhea)  	  MEDICATIONS:  aspirin enteric coated 81 milliGRAM(s) Oral daily  enoxaparin Injectable 40 milliGRAM(s) SubCutaneous every 24 hours  furosemide    Tablet 40 milliGRAM(s) Oral daily  metoprolol tartrate 25 milliGRAM(s) Oral two times a day  acetaminophen     Tablet .. 650 milliGRAM(s) Oral every 6 hours PRN  meclizine 25 milliGRAM(s) Oral three times a day PRN  melatonin 3 milliGRAM(s) Oral at bedtime PRN  ondansetron Injectable 4 milliGRAM(s) IV Push every 8 hours PRN  aluminum hydroxide/magnesium hydroxide/simethicone Suspension 30 milliLiter(s) Oral every 4 hours PRN  atorvastatin 40 milliGRAM(s) Oral at bedtime  dextrose 50% Injectable 12.5 Gram(s) IV Push once  dextrose 50% Injectable 25 Gram(s) IV Push once  dextrose 50% Injectable 25 Gram(s) IV Push once  dextrose Oral Gel 15 Gram(s) Oral once PRN  glucagon  Injectable 1 milliGRAM(s) IntraMuscular once  insulin glargine Injectable (LANTUS) 50 Unit(s) SubCutaneous at bedtime  insulin lispro (ADMELOG) corrective regimen sliding scale   SubCutaneous at bedtime  insulin lispro (ADMELOG) corrective regimen sliding scale   SubCutaneous three times a day before meals  dextrose 5%. 1000 milliLiter(s) IV Continuous <Continuous>  dextrose 5%. 1000 milliLiter(s) IV Continuous <Continuous>      PAST MEDICAL & SURGICAL HISTORY:  Diabetes mellitus  Essential hypertension  Hyperlipidemia  CAD (coronary artery disease)  3/10/22 stent to midRCA and RPDA  S/P coronary artery stent placement      FAMILY HISTORY:  No pertinent family history in first degree relatives      SUBSTANCE USE  Tobacco Usage:  denies  Alcohol Usage: denies  Recreational drugs: denies      REVIEW OF SYSTEMS:  CV: chest pain (-), palpitation (-), orthopnea (-), PND (-), edema (-)  PULM: SOB/LANDRY (+), cough (-), wheezing (-), hemoptysis (-).   CONST: fever (-), chills (-) or fatigue (-)  GI: abdominal distension (-), abdominal pain (-) , nausea/vomiting (-), hematemesis, (-), melena (-), hematochezia (-)  : dysuria (-), frequency (-), hematuria (-).   NEURO: numbness (-), weakness (-), dizziness (-)  SKIN: itching (-), rash (-)  HEENT:  visual changes (-); vertigo or throat pain (-);  neck stiffness (-)   All other review of systems is negative unless indicated above.      T(C): 36.9 (12-06-23 @ 16:39), Max: 36.9 (12-06-23 @ 16:39)  HR: 73 (12-06-23 @ 16:39) (68 - 73)  BP: 130/75 (12-06-23 @ 16:39) (130/75 - 140/85)  RR: 15 (12-06-23 @ 16:39) (15 - 18)  SpO2: 99% (12-06-23 @ 16:39) (98% - 99%)  Wt(kg): --  I&O's Summary      Physical Exam:  General: NAD  Cardiovascular: Normal S1 S2, No JVD, No murmurs, No edema  Respiratory: Lungs clear to auscultation	  Gastrointestinal:  Soft, Non-tender, + BS	  Skin: warm and dry, No rashes, No ecchymoses, No cyanosis	  Extremities:  No clubbing, cyanosis or edema  Vascular: Peripheral pulses palpable 2+ bilaterally    CBC Full  -  ( 06 Dec 2023 13:54 )  WBC Count : 10.54 K/uL  Hemoglobin : 13.0 g/dL  Hematocrit : 39.3 %  Platelet Count - Automated : 266 K/uL  Mean Cell Volume : 80.9 fL  Mean Cell Hemoglobin : 26.7 pg  Mean Cell Hemoglobin Concentration : 33.1 gm/dL  Auto Neutrophil # : 6.06 K/uL  Auto Lymphocyte # : 3.40 K/uL  Auto Monocyte # : 0.64 K/uL  Auto Eosinophil # : 0.37 K/uL  Auto Basophil # : 0.05 K/uL  Auto Neutrophil % : 57.4 %  Auto Lymphocyte % : 32.3 %  Auto Monocyte % : 6.1 %  Auto Eosinophil % : 3.5 %  Auto Basophil % : 0.5 %    12-06    139  |  105  |  12  ----------------------------<  123<H>  3.8   |  27  |  0.77    Ca    9.6      06 Dec 2023 13:54  Mg     1.60     12-06    TPro  6.4  /  Alb  3.7  /  TBili  0.6  /  DBili  x   /  AST  22  /  ALT  25  /  AlkPhos  82  12-06    proBNP: 295  Lipid Profile: --  HgA1c: --  TSH: --  CARDIAC MARKERS ( 06 Dec 2023 14:47 )  9 ng/L / x     / x     / 113 U/L / x     / 1.6 ng/mL  CARDIAC MARKERS ( 06 Dec 2023 13:54 )  9 ng/L / x     / x     / x     / x     / x          EKG:  normal sinus rhythm, Unchanged Q waves inferiorly, Unchanged T wave inversions in lead III and aVF, no STEMI or equivalent. ?? ST depression in lead aVL ??      Diagnostic testing:  cath:.  < from: Cardiac Catheterization (11.11.22 @ 13:50) >  LAD   Proximal left anterior descending: There is a 70 % in-stent restenosis  in the distal third portion of the segment. CLAU Flow 3.  First diagonal: There is a 70 % stenosis in the ostium portion of the  segment. CLAU Flow 3.    CX   Proximal circumflex: There is an 80 % stenosis. CLAU Flow 3.      RCA   Mid right coronary artery: There is a 70 % stenosis at the distal  margin of the stented segment. CLAU Flow 3. First right  posterolateral: There is a 90 % stenosis. CLAU Flow 3. Distal right  coronary artery: There is a 50 % in-stent restenosis.    < end of copied text >    echo:    < from: Intra-Operative Transesophageal Echo (11.15.22 @ 14:43) >  EF (Visual Estimate): 60 %  Doppler Peak Velocity (m/sec): AoV=1.3  ------------------------------------------------------------------------  Pre-Bypass Observations:  Mitral Valve: Normal mitral valve. Trace mitral  regurgitation. No mitral stenosis.  Aortic Valve/Aorta: Normal trileaflet aortic valve. Peak  transaortic valve gradient equals 7 mm Hg, mean transaortic  valve gradient equals 4 mm Hg, estimated aortic valve area  equals 1.6 sqcm (by continuity equation), aortic valve  velocity time integral equals 32 cm, consistent with mild  aortic stenosis. AV leaflets open unrestricted with no  significant amount of calcium seen. No aortic  insufficiency.  Normal aortic root.  Left Atrium: Normal left atrium. No thrombus seen in MICHAEL,  MICHAEL velocity 40  cm/s.  Left Ventricle: Normal left ventricular systolic function.  Basal inferoseptal and inferior, mid inferoseptal walls  mildly hypokinetic. Normal left ventricular internal  dimensions and wall thicknesses. Normal diastolic function  by normal E/A and normal PV tracing.  Right Heart: Normal right atrium. RV size mildly dilated,  normal RV function. Normal tricuspid valve. Trace tricuspid  regurgitation. Normal pulmonic valve.  Pericardium/Pleura: Normal pericardium with no pericardial  effusion.  Hemodynamic: Estimated right atrial pressure is 8 mm Hg.  Color Doppler demonstrates no evidence of a patent foramen  ovale.  ------------------------------------------------------------------------  Post-Bypass Observations:    Normal biventricular function, no new wall motion  abnormalities.  No new valvulopathies seen.  No effusions seen.  No dissection seen in any visible porion of theaorta.  ------------------------------------------------------------------------  Conclusions:  1. Normal mitral valve. Trace mitral regurgitation. No  mitral stenosis.  2. Normal trileaflet aortic valve. Peak transaortic valve  gradient equals 7 mm Hg, mean transaortic valve gradient  equals 4 mm Hg, estimated aortic valve area equals 1.6 sqcm  (by continuity equation), aortic valve velocity time  integral equals 32 cm, consistent with mild aortic  stenosis. AV leaflets open unrestricted with nosignificant  amount of calcium seen. No aortic insufficiency.  3. Normal aortic root.  4. Normal left atrium. No thrombus seen in MICHAEL, MICHAEL  velocity 40  cm/s.  5. Normal left ventricular internal dimensions and wall  thicknesses.  6. Normal left ventricular systolic function. Basal  inferoseptal and inferior, mid inferoseptal walls mildly  hypokinetic.  7. Normal diastolic function by normal E/A and normal PV  tracing.  8. Normal right atrium.  9. Color Doppler demonstrates no evidence of a patent  foramen ovale.  10. Normal pericardium with no pericardial effusion.    < end of copied text >      < from: Xray Chest 1 View- PORTABLE-Urgent (12.06.23 @ 14:07) >  ACC: [id number] EXAM:  XR CHEST PORTABLE URGENT 1V   ORDERED BY: DAVID DIETZ     PROCEDURE DATE:  12/06/2023          INTERPRETATION:  CLINICAL INFORMATION: Chest pain    TIME OF EXAMINATION: December 6, 2023 at 1:55 PM    EXAM: Portable chest    FINDINGS:  Sternotomy wires are present. The lungs are clear, the heart is not   enlarged and there are no effusions or congestion to indicate CHF.    The bones show no acute findings.        COMPARISON: November 18, 2022        IMPRESSION: Clear lungs.    --- End of Report ---      < end of copied text >

## 2023-12-07 NOTE — PATIENT PROFILE ADULT - FUNCTIONAL ASSESSMENT - BASIC MOBILITY 6.
4-calculated by average/Not able to assess (calculate score using [facility] averaging method)  4-calculated by average/Not able to assess (calculate score using [facility] averaging method)

## 2023-12-07 NOTE — CHART NOTE - NSCHARTNOTEFT_GEN_A_CORE
Patient seen at bedside status post cath via R femoral artery. Site clean, dry and intact. No bleeding, underlying hematoma, or erythema. No bruit auscultated. Patient denies chest pain, SOB, numbness/weakness/tingling. Pulses present, capillary refill appropriate. Patient educated and understands if any of the above symptoms were to arise, to notify RN. Will continue to monitor closely.     Antonio Castillo PA-C  Department of Medicine  Pager #[phone] Patient seen at bedside status post cath via R femoral artery. Site clean, dry and intact. No bleeding, underlying hematoma, or erythema. No bruit auscultated. Patient denies chest pain, SOB, numbness/weakness/tingling. Pulses present, capillary refill appropriate. Patient educated and understands if any of the above symptoms were to arise, to notify RN. Will continue to monitor closely.     Antonio Castillo PA-C  Department of Medicine  Pager #[phone]

## 2023-12-07 NOTE — PATIENT PROFILE ADULT - FALL HARM RISK - HARM RISK INTERVENTIONS
no difficulties Communicate Risk of Fall with Harm to all staff/Reinforce activity limits and safety measures with patient and family/Tailored Fall Risk Interventions/Visual Cue: Yellow wristband and red socks/Bed in lowest position, wheels locked, appropriate side rails in place/Call bell, personal items and telephone in reach/Instruct patient to call for assistance before getting out of bed or chair/Non-slip footwear when patient is out of bed/[place] to call system/Physically safe environment - no spills, clutter or unnecessary equipment/Purposeful Proactive Rounding/Room/bathroom lighting operational, light cord in reach Communicate Risk of Fall with Harm to all staff/Reinforce activity limits and safety measures with patient and family/Tailored Fall Risk Interventions/Visual Cue: Yellow wristband and red socks/Bed in lowest position, wheels locked, appropriate side rails in place/Call bell, personal items and telephone in reach/Instruct patient to call for assistance before getting out of bed or chair/Non-slip footwear when patient is out of bed/[place] to call system/Physically safe environment - no spills, clutter or unnecessary equipment/Purposeful Proactive Rounding/Room/bathroom lighting operational, light cord in reach

## 2023-12-07 NOTE — PROGRESS NOTE ADULT - ASSESSMENT
53-year-old female past medical history of CAD status post 4 stents and CABG, hypertension, hyperlipidemia, diabetes presents to ED with worsening dyspnea on exertion admitted for r/o ACS.

## 2023-12-07 NOTE — CONSULT NOTE ADULT - ASSESSMENT
53-year-old female past medical history of CAD status post 4 stents and CABG, hypertension, hyperlipidemia, diabetes presents ED complaining of 2 months of shortness of breath.  Worse when climbing stairs.  Today was at cardiologist office of Yomi Reveles MD for c/o of SOB when she was sent to the ED.  Currently denying chest pain, however reports intermittent chest heaviness when becomes SOB.    Plan:  -BNP WNL  -troponin negative x2, ST depression in lead aVL is resolved on repeat EKG likely reflecting lead placement issue.   -EKG:  normal sinus rhythm, Unchanged Q waves inferiorly, Unchanged T wave inversions in lead III and aVF, no STEMI or equivalent. ?? ST depression in lead aVL ??  -Repeat Echo for wall motion abnormality  -No urgent ischemic cardiac needs        Thank you, if any questions or clinical situation changes please call spectra #[id number].  Case discussed with on call [place] fellow Dr. Armand Wolf  Attending Attestation to follow   53-year-old female past medical history of CAD status post 4 stents and CABG, hypertension, hyperlipidemia, diabetes presents ED complaining of 2 months of shortness of breath.  Worse when climbing stairs.  Today was at cardiologist office of Yomi Reveles MD for c/o of SOB when she was sent to the ED.  Currently denying chest pain, however reports intermittent chest heaviness when becomes SOB.    Plan:  -BNP WNL  -troponin negative x2, ST depression in lead aVL is resolved on repeat EKG likely reflecting lead placement issue.   -EKG:  normal sinus rhythm, Unchanged Q waves inferiorly, Unchanged T wave inversions in lead III and aVF, no STEMI or equivalent. ?? ST depression in lead aVL ??  -Repeat Echo for wall motion abnormality  -No urgent ischemic cardiac needs        Thank you, if any questions or clinical situation changes please call spectra #[id number].  Case discussed with on call [place] fellow Dr. Armand Wolf  Attending Attestation to follow

## 2023-12-07 NOTE — PROGRESS NOTE ADULT - SUBJECTIVE AND OBJECTIVE BOX
Cardiology Progress Note  ------------------------------------------------------------------------------------------    SUBJECTIVE/EVENTS:  - [facility] this morning showing 2 patent grafts  - NAOE    -------------------------------------------------------------------------------------------  ROS:  CV: chest pain (-), palpitation (-), orthopnea (-), PND (-), edema (-)  PULM: SOB (-), cough (-), wheezing (-), hemoptysis (-).   CONST: fever (-), chills (-) or fatigue (-)  GI: abdominal distension (-), abdominal pain (-) , nausea/vomiting (-), hematemesis, (-), melena (-), hematochezia (-)  : dysuria (-), frequency (-), hematuria (-).   NEURO: numbness (-), weakness (-), dizziness (-)  MSK: myalgia (-), joint pain (-)   SKIN: itching (-), rash (-)  HEENT:  visual changes (-); vertigo or throat pain (-);  neck stiffness (-)   Psych: change in mood (-), anxiety (-), depression (-)     All other review of systems is negative unless indicated above.   -------------------------------------------------------------------------------------------  VITALS:  T(F): 97.2 (), Max: 98.8 ()  HR: 64 () (64 - 73)  BP: 118/57 () (118/57 - 137/69)  RR: 18 ()  SpO2: 97% ()  I&O's Summary    ------------------------------------------------------------------------------------------  PHYSICAL EXAM:  GEN: NAD, sitting in bed  HEENT: NCAT, EOMI  CV: RRR, Ns1/s2, no m/r/g, JVP not elevated  RESP: CTA B/l, no w/r/r  ABD: soft, nt, nd  EXT: warm, 2+ pulses, no edema  SKIN: no visible lesions, rashes  NEURO: AAOx3, no focal deficits  PSYCH: Calm, cooperative  -------------------------------------------------------------------------------------------  LABS:                          13.2   10.35 )-----------( 283      ( 07 Dec 2023 05:50 )             39.2         142  |  108<H>  |  11  ----------------------------<  79  3.7   |  23  |  0.70    Ca    9.2      07 Dec 2023 05:50  Phos  4.1       Mg     1.60         TPro  6.4  /  Alb  3.7  /  TBili  0.6  /  DBili  x   /  AST  22  /  ALT  25  /  AlkPhos  82  12-06    PT/INR - ( 06 Dec 2023 13:54 )   PT: 11.6 sec;   INR: 1.04 ratio         PTT - ( 06 Dec 2023 13:54 )  PTT:38.3 sec  CARDIAC MARKERS ( 07 Dec 2023 02:35 )  8 ng/L / x     / x     / 98 U/L / x     / 1.2 ng/mL  CARDIAC MARKERS ( 06 Dec 2023 14:47 )  9 ng/L / x     / x     / 113 U/L / x     / 1.6 ng/mL  CARDIAC MARKERS ( 06 Dec 2023 13:54 )  9 ng/L / x     / x     / x     / x     / x          Total Cholesterol: 121  LDL: --  HDL: 36  T        -------------------------------------------------------------------------------------------  Meds:  acetaminophen     Tablet .. 650 milliGRAM(s) Oral every 6 hours PRN  aluminum hydroxide/magnesium hydroxide/simethicone Suspension 30 milliLiter(s) Oral every 4 hours PRN  aspirin enteric coated 81 milliGRAM(s) Oral daily  atorvastatin 40 milliGRAM(s) Oral at bedtime  dextrose 5%. 1000 milliLiter(s) IV Continuous <Continuous>  dextrose 5%. 1000 milliLiter(s) IV Continuous <Continuous>  dextrose 50% Injectable 25 Gram(s) IV Push once  dextrose 50% Injectable 12.5 Gram(s) IV Push once  dextrose 50% Injectable 25 Gram(s) IV Push once  dextrose Oral Gel 15 Gram(s) Oral once PRN  enoxaparin Injectable 40 milliGRAM(s) SubCutaneous every 24 hours  furosemide    Tablet 40 milliGRAM(s) Oral daily  glucagon  Injectable 1 milliGRAM(s) IntraMuscular once  insulin glargine Injectable (LANTUS) 35 Unit(s) SubCutaneous at bedtime  insulin lispro (ADMELOG) corrective regimen sliding scale   SubCutaneous at bedtime  insulin lispro (ADMELOG) corrective regimen sliding scale   SubCutaneous three times a day before meals  meclizine 25 milliGRAM(s) Oral three times a day PRN  melatonin 3 milliGRAM(s) Oral at bedtime PRN  metoprolol tartrate 25 milliGRAM(s) Oral two times a day  ondansetron Injectable 4 milliGRAM(s) IV Push every 8 hours PRN  sodium chloride 0.9%. 1000 milliLiter(s) IV Continuous <Continuous>    -------------------------------------------------------------------------------------------  Cardiovascular Diagnostic Testing:      -------------------------------------------------------------------------------------------  Assessment and Plan:   Ms Smith is a 52 yo woman w/ a hx of CAD s/p multiple stents to the RCA (STEMI in 3/2022, & +NST in 2022) w/ 4v CABG in 2022 (LIMA-Diag, SVG-LAD, SVG-PDA, SVG-RPL), DM, and HTN who presented to the ED from her cardiologists office w/ progressive LANDRY w/ negative initial ischemic eval including neg trop, bnp admitted for further evaluation of worsening anginal like sx.    #CAD  #HTN  #LANDRY  Pt w/ progressive LANDRY for the past 1m, reporting no exertional SOB after her CABG in 2022 but noting that she can no longer walk a flight of stairs. She denies CP. She saw her cardiologists in clinic who sent her to the ED for further evaluation. In the ED she was HDS, w/ neg trop, neg BNP, EKG at b/l (inferior Q waves) and had a non focal exam. She underwent LHC on  that showed 2 grafts that are closed. Per discussion w/ pt, will attempt for additional revascularization and GDMT.    Recommendations:  - Plan for [facility] tomorrow for additional GAVIN placement  - please increase home metop succ to 100mg qd for HR goal 50-60, hold for HR <50  - please start imdur 30mg  - please given gentle IV hydration w/ 1/2NS 50cc/hr for the next 12 hours  - can stop trending cardiac enzymes  - please order a TTE  - please continue home ASA 81mg and atrova 80mg qd      *** Recommendations are preliminary until cosigned by the attending.    Keanu Gil MD  Cardiology Fellow    For all New Consults and Questions:  [email or web address]   Login: [facility] Progress Note  ------------------------------------------------------------------------------------------    SUBJECTIVE/EVENTS:  - [facility] this morning showing 2 patent grafts  - NAOE    -------------------------------------------------------------------------------------------  ROS:  CV: chest pain (-), palpitation (-), orthopnea (-), PND (-), edema (-)  PULM: SOB (-), cough (-), wheezing (-), hemoptysis (-).   CONST: fever (-), chills (-) or fatigue (-)  GI: abdominal distension (-), abdominal pain (-) , nausea/vomiting (-), hematemesis, (-), melena (-), hematochezia (-)  : dysuria (-), frequency (-), hematuria (-).   NEURO: numbness (-), weakness (-), dizziness (-)  MSK: myalgia (-), joint pain (-)   SKIN: itching (-), rash (-)  HEENT:  visual changes (-); vertigo or throat pain (-);  neck stiffness (-)   Psych: change in mood (-), anxiety (-), depression (-)     All other review of systems is negative unless indicated above.   -------------------------------------------------------------------------------------------  VITALS:  T(F): 97.2 (), Max: 98.8 ()  HR: 64 () (64 - 73)  BP: 118/57 () (118/57 - 137/69)  RR: 18 ()  SpO2: 97% ()  I&O's Summary    ------------------------------------------------------------------------------------------  PHYSICAL EXAM:  GEN: NAD, sitting in bed  HEENT: NCAT, EOMI  CV: RRR, Ns1/s2, no m/r/g, JVP not elevated  RESP: CTA B/l, no w/r/r  ABD: soft, nt, nd  EXT: warm, 2+ pulses, no edema  SKIN: no visible lesions, rashes  NEURO: AAOx3, no focal deficits  PSYCH: Calm, cooperative  -------------------------------------------------------------------------------------------  LABS:                          13.2   10.35 )-----------( 283      ( 07 Dec 2023 05:50 )             39.2         142  |  108<H>  |  11  ----------------------------<  79  3.7   |  23  |  0.70    Ca    9.2      07 Dec 2023 05:50  Phos  4.1       Mg     1.60         TPro  6.4  /  Alb  3.7  /  TBili  0.6  /  DBili  x   /  AST  22  /  ALT  25  /  AlkPhos  82  12-06    PT/INR - ( 06 Dec 2023 13:54 )   PT: 11.6 sec;   INR: 1.04 ratio         PTT - ( 06 Dec 2023 13:54 )  PTT:38.3 sec  CARDIAC MARKERS ( 07 Dec 2023 02:35 )  8 ng/L / x     / x     / 98 U/L / x     / 1.2 ng/mL  CARDIAC MARKERS ( 06 Dec 2023 14:47 )  9 ng/L / x     / x     / 113 U/L / x     / 1.6 ng/mL  CARDIAC MARKERS ( 06 Dec 2023 13:54 )  9 ng/L / x     / x     / x     / x     / x          Total Cholesterol: 121  LDL: --  HDL: 36  T        -------------------------------------------------------------------------------------------  Meds:  acetaminophen     Tablet .. 650 milliGRAM(s) Oral every 6 hours PRN  aluminum hydroxide/magnesium hydroxide/simethicone Suspension 30 milliLiter(s) Oral every 4 hours PRN  aspirin enteric coated 81 milliGRAM(s) Oral daily  atorvastatin 40 milliGRAM(s) Oral at bedtime  dextrose 5%. 1000 milliLiter(s) IV Continuous <Continuous>  dextrose 5%. 1000 milliLiter(s) IV Continuous <Continuous>  dextrose 50% Injectable 25 Gram(s) IV Push once  dextrose 50% Injectable 12.5 Gram(s) IV Push once  dextrose 50% Injectable 25 Gram(s) IV Push once  dextrose Oral Gel 15 Gram(s) Oral once PRN  enoxaparin Injectable 40 milliGRAM(s) SubCutaneous every 24 hours  furosemide    Tablet 40 milliGRAM(s) Oral daily  glucagon  Injectable 1 milliGRAM(s) IntraMuscular once  insulin glargine Injectable (LANTUS) 35 Unit(s) SubCutaneous at bedtime  insulin lispro (ADMELOG) corrective regimen sliding scale   SubCutaneous at bedtime  insulin lispro (ADMELOG) corrective regimen sliding scale   SubCutaneous three times a day before meals  meclizine 25 milliGRAM(s) Oral three times a day PRN  melatonin 3 milliGRAM(s) Oral at bedtime PRN  metoprolol tartrate 25 milliGRAM(s) Oral two times a day  ondansetron Injectable 4 milliGRAM(s) IV Push every 8 hours PRN  sodium chloride 0.9%. 1000 milliLiter(s) IV Continuous <Continuous>    -------------------------------------------------------------------------------------------  Cardiovascular Diagnostic Testing:      -------------------------------------------------------------------------------------------  Assessment and Plan:   Ms Smith is a 52 yo woman w/ a hx of CAD s/p multiple stents to the RCA (STEMI in 3/2022, & +NST in 2022) w/ 4v CABG in 2022 (LIMA-Diag, SVG-LAD, SVG-PDA, SVG-RPL), DM, and HTN who presented to the ED from her cardiologists office w/ progressive LANDRY w/ negative initial ischemic eval including neg trop, bnp admitted for further evaluation of worsening anginal like sx.    #CAD  #HTN  #LANDRY  Pt w/ progressive LANDRY for the past 1m, reporting no exertional SOB after her CABG in 2022 but noting that she can no longer walk a flight of stairs. She denies CP. She saw her cardiologists in clinic who sent her to the ED for further evaluation. In the ED she was HDS, w/ neg trop, neg BNP, EKG at b/l (inferior Q waves) and had a non focal exam. She underwent LHC on  that showed 2 grafts that are closed. Per discussion w/ pt, will attempt for additional revascularization and GDMT.    Recommendations:  - Plan for [facility] tomorrow for additional GAVIN placement  - please increase home metop succ to 100mg qd for HR goal 50-60, hold for HR <50  - please start imdur 30mg  - please given gentle IV hydration w/ 1/2NS 50cc/hr for the next 12 hours  - can stop trending cardiac enzymes  - please order a TTE  - please continue home ASA 81mg and atrova 80mg qd      *** Recommendations are preliminary until cosigned by the attending.    Keanu Gil MD  Cardiology Fellow    For all New Consults and Questions:  [email or web address]   Login: [employer]

## 2023-12-08 ENCOUNTER — TRANSCRIPTION ENCOUNTER (OUTPATIENT)
Age: 53
End: 2023-12-08

## 2023-12-08 LAB
ANION GAP SERPL CALC-SCNC: 9 MMOL/L — SIGNIFICANT CHANGE UP (ref 7–14)
ANION GAP SERPL CALC-SCNC: 9 MMOL/L — SIGNIFICANT CHANGE UP (ref 7–14)
BUN SERPL-MCNC: 13 MG/DL — SIGNIFICANT CHANGE UP (ref 7–23)
BUN SERPL-MCNC: 13 MG/DL — SIGNIFICANT CHANGE UP (ref 7–23)
CALCIUM SERPL-MCNC: 9.8 MG/DL — SIGNIFICANT CHANGE UP (ref 8.4–10.5)
CALCIUM SERPL-MCNC: 9.8 MG/DL — SIGNIFICANT CHANGE UP (ref 8.4–10.5)
CHLORIDE SERPL-SCNC: 104 MMOL/L — SIGNIFICANT CHANGE UP (ref 98–107)
CHLORIDE SERPL-SCNC: 104 MMOL/L — SIGNIFICANT CHANGE UP (ref 98–107)
CO2 SERPL-SCNC: 27 MMOL/L — SIGNIFICANT CHANGE UP (ref 22–31)
CO2 SERPL-SCNC: 27 MMOL/L — SIGNIFICANT CHANGE UP (ref 22–31)
CREAT SERPL-MCNC: 0.79 MG/DL — SIGNIFICANT CHANGE UP (ref 0.5–1.3)
CREAT SERPL-MCNC: 0.79 MG/DL — SIGNIFICANT CHANGE UP (ref 0.5–1.3)
EGFR: 91 ML/MIN/1.73M2 — SIGNIFICANT CHANGE UP
EGFR: 91 ML/MIN/1.73M2 — SIGNIFICANT CHANGE UP
GLUCOSE BLDC GLUCOMTR-MCNC: 107 MG/DL — HIGH (ref 70–99)
GLUCOSE BLDC GLUCOMTR-MCNC: 107 MG/DL — HIGH (ref 70–99)
GLUCOSE BLDC GLUCOMTR-MCNC: 139 MG/DL — HIGH (ref 70–99)
GLUCOSE BLDC GLUCOMTR-MCNC: 139 MG/DL — HIGH (ref 70–99)
GLUCOSE BLDC GLUCOMTR-MCNC: 175 MG/DL — HIGH (ref 70–99)
GLUCOSE BLDC GLUCOMTR-MCNC: 175 MG/DL — HIGH (ref 70–99)
GLUCOSE BLDC GLUCOMTR-MCNC: 84 MG/DL — SIGNIFICANT CHANGE UP (ref 70–99)
GLUCOSE BLDC GLUCOMTR-MCNC: 84 MG/DL — SIGNIFICANT CHANGE UP (ref 70–99)
GLUCOSE BLDC GLUCOMTR-MCNC: 92 MG/DL — SIGNIFICANT CHANGE UP (ref 70–99)
GLUCOSE BLDC GLUCOMTR-MCNC: 92 MG/DL — SIGNIFICANT CHANGE UP (ref 70–99)
GLUCOSE SERPL-MCNC: 88 MG/DL — SIGNIFICANT CHANGE UP (ref 70–99)
GLUCOSE SERPL-MCNC: 88 MG/DL — SIGNIFICANT CHANGE UP (ref 70–99)
HCT VFR BLD CALC: 43.7 % — SIGNIFICANT CHANGE UP (ref 34.5–45)
HCT VFR BLD CALC: 43.7 % — SIGNIFICANT CHANGE UP (ref 34.5–45)
HGB BLD-MCNC: 14.8 G/DL — SIGNIFICANT CHANGE UP (ref 11.5–15.5)
HGB BLD-MCNC: 14.8 G/DL — SIGNIFICANT CHANGE UP (ref 11.5–15.5)
MAGNESIUM SERPL-MCNC: 1.7 MG/DL — SIGNIFICANT CHANGE UP (ref 1.6–2.6)
MAGNESIUM SERPL-MCNC: 1.7 MG/DL — SIGNIFICANT CHANGE UP (ref 1.6–2.6)
MCHC RBC-ENTMCNC: 27.2 PG — SIGNIFICANT CHANGE UP (ref 27–34)
MCHC RBC-ENTMCNC: 27.2 PG — SIGNIFICANT CHANGE UP (ref 27–34)
MCHC RBC-ENTMCNC: 33.9 GM/DL — SIGNIFICANT CHANGE UP (ref 32–36)
MCHC RBC-ENTMCNC: 33.9 GM/DL — SIGNIFICANT CHANGE UP (ref 32–36)
MCV RBC AUTO: 80.2 FL — SIGNIFICANT CHANGE UP (ref 80–100)
MCV RBC AUTO: 80.2 FL — SIGNIFICANT CHANGE UP (ref 80–100)
NRBC # BLD: 0 /100 WBCS — SIGNIFICANT CHANGE UP (ref 0–0)
NRBC # BLD: 0 /100 WBCS — SIGNIFICANT CHANGE UP (ref 0–0)
NRBC # FLD: 0 K/UL — SIGNIFICANT CHANGE UP (ref 0–0)
NRBC # FLD: 0 K/UL — SIGNIFICANT CHANGE UP (ref 0–0)
PHOSPHATE SERPL-MCNC: 4.1 MG/DL — SIGNIFICANT CHANGE UP (ref 2.5–4.5)
PHOSPHATE SERPL-MCNC: 4.1 MG/DL — SIGNIFICANT CHANGE UP (ref 2.5–4.5)
PLATELET # BLD AUTO: 294 K/UL — SIGNIFICANT CHANGE UP (ref 150–400)
PLATELET # BLD AUTO: 294 K/UL — SIGNIFICANT CHANGE UP (ref 150–400)
POTASSIUM SERPL-MCNC: 3.8 MMOL/L — SIGNIFICANT CHANGE UP (ref 3.5–5.3)
POTASSIUM SERPL-MCNC: 3.8 MMOL/L — SIGNIFICANT CHANGE UP (ref 3.5–5.3)
POTASSIUM SERPL-SCNC: 3.8 MMOL/L — SIGNIFICANT CHANGE UP (ref 3.5–5.3)
POTASSIUM SERPL-SCNC: 3.8 MMOL/L — SIGNIFICANT CHANGE UP (ref 3.5–5.3)
RBC # BLD: 5.45 M/UL — HIGH (ref 3.8–5.2)
RBC # BLD: 5.45 M/UL — HIGH (ref 3.8–5.2)
RBC # FLD: 15 % — HIGH (ref 10.3–14.5)
RBC # FLD: 15 % — HIGH (ref 10.3–14.5)
SODIUM SERPL-SCNC: 140 MMOL/L — SIGNIFICANT CHANGE UP (ref 135–145)
SODIUM SERPL-SCNC: 140 MMOL/L — SIGNIFICANT CHANGE UP (ref 135–145)
WBC # BLD: 10.93 K/UL — HIGH (ref 3.8–10.5)
WBC # BLD: 10.93 K/UL — HIGH (ref 3.8–10.5)
WBC # FLD AUTO: 10.93 K/UL — HIGH (ref 3.8–10.5)
WBC # FLD AUTO: 10.93 K/UL — HIGH (ref 3.8–10.5)

## 2023-12-08 PROCEDURE — 99233 SBSQ HOSP IP/OBS HIGH 50: CPT

## 2023-12-08 PROCEDURE — 92978 ENDOLUMINL IVUS OCT C 1ST: CPT | Mod: 26,LD

## 2023-12-08 PROCEDURE — 99152 MOD SED SAME PHYS/QHP 5/>YRS: CPT

## 2023-12-08 PROCEDURE — 99232 SBSQ HOSP IP/OBS MODERATE 35: CPT | Mod: 25,GC

## 2023-12-08 PROCEDURE — 92933 PRQ TRLML C ATHRC ST ANGIOP1: CPT | Mod: LD

## 2023-12-08 PROCEDURE — 0715T: CPT

## 2023-12-08 PROCEDURE — 93010 ELECTROCARDIOGRAM REPORT: CPT

## 2023-12-08 RX ORDER — TICAGRELOR 90 MG/1
1 TABLET ORAL
Qty: 60 | Refills: 0
Start: 2023-12-08 | End: 2024-01-06

## 2023-12-08 RX ORDER — SODIUM CHLORIDE 9 MG/ML
1000 INJECTION INTRAMUSCULAR; INTRAVENOUS; SUBCUTANEOUS
Refills: 0 | Status: DISCONTINUED | OUTPATIENT
Start: 2023-12-08 | End: 2023-12-09

## 2023-12-08 RX ORDER — FENTANYL CITRATE 50 UG/ML
50 INJECTION INTRAVENOUS ONCE
Refills: 0 | Status: DISCONTINUED | OUTPATIENT
Start: 2023-12-08 | End: 2023-12-08

## 2023-12-08 RX ADMIN — Medication 100 MILLIGRAM(S): at 05:10

## 2023-12-08 RX ADMIN — SODIUM CHLORIDE 100 MILLILITER(S): 9 INJECTION INTRAMUSCULAR; INTRAVENOUS; SUBCUTANEOUS at 10:43

## 2023-12-08 RX ADMIN — ISOSORBIDE MONONITRATE 30 MILLIGRAM(S): 60 TABLET, EXTENDED RELEASE ORAL at 16:43

## 2023-12-08 RX ADMIN — TICAGRELOR 90 MILLIGRAM(S): 90 TABLET ORAL at 17:37

## 2023-12-08 RX ADMIN — TICAGRELOR 90 MILLIGRAM(S): 90 TABLET ORAL at 05:10

## 2023-12-08 RX ADMIN — ENOXAPARIN SODIUM 40 MILLIGRAM(S): 100 INJECTION SUBCUTANEOUS at 22:25

## 2023-12-08 RX ADMIN — Medication 40 MILLIGRAM(S): at 05:10

## 2023-12-08 RX ADMIN — FENTANYL CITRATE 50 MICROGRAM(S): 50 INJECTION INTRAVENOUS at 13:55

## 2023-12-08 RX ADMIN — INSULIN GLARGINE 35 UNIT(S): 100 INJECTION, SOLUTION SUBCUTANEOUS at 22:24

## 2023-12-08 RX ADMIN — Medication 81 MILLIGRAM(S): at 06:31

## 2023-12-08 RX ADMIN — ATORVASTATIN CALCIUM 40 MILLIGRAM(S): 80 TABLET, FILM COATED ORAL at 22:25

## 2023-12-08 NOTE — PROGRESS NOTE ADULT - PROBLEM/PLAN-3
DISPLAY PLAN FREE TEXT
DISPLAY PLAN FREE TEXT
Composite Graft Text: The defect edges were debeveled with a #15 scalpel blade.  Given the location of the defect, shape of the defect, the proximity to free margins and the fact the defect was full thickness a composite graft was deemed most appropriate.  The defect was outline and then transferred to the donor site.  A full thickness graft was then excised from the donor site. The graft was then placed in the primary defect, oriented appropriately and then sutured into place.  The secondary defect was then repaired using a primary closure.

## 2023-12-08 NOTE — PROGRESS NOTE ADULT - ASSESSMENT
Impression and Recommendations:   53-year-old woman with coronary artery disease, multiple PCI and subsequent 4-vessel CABG one year ago, who presents with progressive dyspnea and intermittent chest discomfort for two months, similar to her symptoms prior to CABG.     Angiography demonstrates that her SVG-LAD and SVG-RPDA are closed. PCI was performed this morning.     Continue aspirin and ticagrelor post-PCI.   Continue atorvastatin for known coronary disease.   Continue metoprolol succinate and isosorbide as antianginal therapy.  Nitrates may be titrated off in the outpatient setting if not needed post-revascularization.     Would recommend starting an ARB or ACE-inhibitor given her diabetes if additional antihypertensive agents are needed.    Chuck Teixeira MD [facility] FACP  Cardiology  [phone]    Impression and Recommendations:   53-year-old woman with coronary artery disease, multiple PCI and subsequent 4-vessel CABG one year ago, who presents with progressive dyspnea and intermittent chest discomfort for two months, similar to her symptoms prior to CABG.     Angiography demonstrates that her SVG-LAD and SVG-RPDA are closed. PCI was performed this morning.     Continue aspirin and ticagrelor post-PCI.   Continue atorvastatin for known coronary disease.   Continue metoprolol succinate and isosorbide as antianginal therapy.  Nitrates may be titrated off in the outpatient setting if not needed post-revascularization.     Would recommend starting an ARB or ACE-inhibitor given her diabetes if additional antihypertensive agents are needed.    Chuck Teixeira MD [facility] FACP  Cardiology  [phone]

## 2023-12-08 NOTE — DISCHARGE NOTE PROVIDER - CARE PROVIDERS DIRECT ADDRESSES
[email or web address]

## 2023-12-08 NOTE — PROGRESS NOTE ADULT - PROBLEM SELECTOR PLAN 2
[id number] [address]  Outpatient Physical Therapy    Treatment Note        Date: 4/15/2022  Patient: Celi Chou  : 1940  ACCT #: [de-identified]  Referring Practitioner: Sharlene Petty DPM  Diagnosis: Chronic pain of left ankle; Achilles tendinitis of left lower extremity  Treatment Diagnosis: left ankle pain, decreased left ankle ROM, decreased left ankle strength    Visit Information:  PT Visit Information  PT Insurance Information: [employer] Medicare  Total # of Visits Approved:  (visits based off medical necessity)  Total # of Visits to Date: 6  Plan of Care/Certification Expiration Date: 22  No Show: 0  Canceled Appointment: 0  Progress Note Counter:  (PN due 4/15/22)    Subjective: Pain continues to occure in L heel after periods of inactivity. Pt performs ankle AROM to ease symptoms. HEP Compliance:  [] Good [x] Fair [] Poor [] Reports not doing due to:    Vital Signs  Patient Currently in Pain: Yes   Pain Screening  Patient Currently in Pain: Yes  Pain Assessment  Pain Assessment: 0-10  Pain Level: 6  Pain Type: Chronic pain  Pain Location: Ankle  Pain Orientation: Left  Pain Descriptors: Rondall Bush; Verenice    OBJECTIVE:   Exercises  Exercise 1: L gastroc stretches: standing partial tandem stance; w/ slantboard; seated w/ strap pull  Exercise 2: seated heel raises (soleus focus) x 20  Exercise 3: standing heel raises: 5\" holds x 15-20  Exercise 4: 2 up, 1 down eccentric heel lower x 5 juan (difficulty to coordinate and maintain heel elevation in SLS)  Exercise 20: HEP: all new exercises provided from today's session    Manual:   Manual therapy  Soft Tissue Mobalization: STM at insertion of left Achilles and up left gastroc musculature x 8 min    *Indicates exercise, modality, or manual techniques to be initiated when appropriate    Assessment:         Body structures, Functions, Activity limitations: Decreased ROM,Decreased strength,Increased pain  Assessment: Pain continues to
intermittently occur in patients L heel and achilles. Further education provided on therEx to improve accuracy of completion of HEP. Manual also provided for soft tissue healing and pain relief. Pt encouraged to stay active with stretching and strengthening exercises as provided. Pt would like to take 1 week off of tx to focus on HEP. Schedule adjusted to reflect request.  Treatment Diagnosis: left ankle pain, decreased left ankle ROM, decreased left ankle strength  Prognosis: Good       Goals:       Long term goals  Time Frame for Long term goals : 4 weeks  Long term goal 1: Patient will report </= 1/10 pain in left Achilles with all movements. Long term goal 2: Patient will increase left DF ROM >/= 20 degrees for improved functional tolerance. Long term goal 3: Patient will increase left ankle strength to 5/5 for improved ambulation tolerance. Long term goal 4: LEFS >/= 25/80 to demonstrate functional improvements. Long term goal 5: Patient will be independent with HEP. Progress toward goals: limit by recurrent heel pain    POST-PAIN       Pain Rating (0-10 pain scale): \"it feels better now\"   Location and pain description same as pre-treatment unless indicated. Action: [] NA   [x] Perform HEP  [] Meds as prescribed  [x] Modalities as prescribed   [] Call Physician     Frequency/Duration:  Plan  Times per week: 2  Plan weeks: 4  Current Treatment Recommendations: Kimmie Davila Re-education,Manual Therapy - Soft Tissue Mobilization,Manual Therapy - Joint Manipulation,Home Exercise Program,Patient/Caregiver Education & Training,Modalities     Pt to continue current HEP. See objective section for any therapeutic exercise changes, additions or modifications this date.          PT Individual Minutes  Time In: 1122  Time Out: 1203  Minutes: 41  Timed Code Treatment Minutes: 40 Minutes  Procedure Minutes: 0     Timed Activity Minutes Units   Ther Ex 32 2   Manual  8 1
Signature:  Electronically signed by Sidney Yu PT on 4/15/22 at 12:16 PM EDT
-as above, eval for cardiac etiology of SOB, now s/p diagnostic LHC on 12/7 showing significant LAD and CX disease, s/p staged PCI and laser on 12/8  - f/u additional cardiology recs
-as above, eval for cardiac etiology of SOB  -trops flat, ST depression in lead aVL is resolved on repeat EKG likely reflecting lead placement issue. Low c/f ACS at this time. Appreciate cards recs, no need for hep gtt/Brilinta load   -TTE pending to eval for new WMA  -c/w aspirin, atorva  -toprol uptitrated to 100mg qd and imdur to 30mg qd  -cards following, now s/p diagnostic LHC on 12/7 showing significant LAD and CX disease  -plan for staged LHC with PCI on 12/8 and uptitration of GDMT as tolerated

## 2023-12-08 NOTE — DISCHARGE NOTE PROVIDER - HOSPITAL COURSE
53-year-old female past medical history of CAD status post 4 stents and CABG, hypertension, hyperlipidemia, diabetes presents to ED with worsening dyspnea on exertion. She had  flat troponins, ST depression in lead aVL is resolved on repeat EKG. Had clear CXR, no physical exam findings c/f ADHF. Probnp not elevated. D-dimer negative, low c/f PE. RVP negative. Seen by cardiology, underwent diagnostic LHC on 12/7 showing significant LAD and CX disease, s/p staged PCI and laser on 12/8. TTE showed ___________.    53-year-old female past medical history of CAD status post 4 stents and CABG, hypertension, hyperlipidemia, diabetes presents to ED with worsening dyspnea on exertion. She had  flat troponins, ST depression in lead aVL is resolved on repeat EKG. Had clear CXR, no physical exam findings c/f ADHF. Probnp not elevated. D-dimer negative, low c/f PE. RVP negative. Seen by cardiology, underwent diagnostic LHC on 12/7 showing significant LAD and CX disease, s/p staged PCI and laser on 12/8. Case discussed with DR Montana. Levi martinez for discharge home. Follow up cardiology in one week.    53-year-old female past medical history of CAD status post 4 stents and CABG, hypertension, hyperlipidemia, diabetes presents to ED with worsening dyspnea on exertion. She had  flat troponins, ST depression in lead aVL is resolved on repeat EKG. Had clear CXR, no physical exam findings c/f ADHF. Probnp not elevated. D-dimer negative, low c/f PE. RVP negative. Seen by cardiology, underwent diagnostic LHC on 12/7 showing significant LAD and CX disease, s/p staged PCI and laser on 12/8. Case discussed with DR Montana. Patient stable for discharge home. Follow up cardiology in one week.     Patient also has T2DM and insulin requirements are significantly better while hospitalized. Plan to DC on 35 units long acting qhs.

## 2023-12-08 NOTE — PROGRESS NOTE ADULT - PROBLEM SELECTOR PLAN 1
-pt reports 1 month history of progressive LANDRY that acutely worsened in the last few days   -trops flat, ST depression in lead aVL is resolved on repeat EKG likely reflecting lead placement issue. Low c/f ACS at this time  -CXR clear, no physical exam findings c/f ADHF. Probnp not elevated   -d-dimer negative, low c/f PE  -RVP negative  -TTE pending to eval for new WMA  -cards following, now s/p diagnostic LHC on 12/7 showing significant LAD and CX disease, s/p staged PCI and laser on 12/8  - f/u additional cardiology recs
-pt reports 1 month history of progressive LANDRY that acutely worsened in the last few days   -trops flat, ST depression in lead aVL is resolved on repeat EKG likely reflecting lead placement issue. Low c/f ACS at this time  -CXR clear, no physical exam findings c/f ADHF. Probnp not elevated   -d-dimer negative, low c/f PE  -RVP negative  -TTE pending to eval for new WMA  -cards following, now s/p diagnostic LHC on 12/7 showing significant LAD and CX disease  -plan for staged LHC with PCI on 12/8 and uptitration of GDMT

## 2023-12-08 NOTE — DISCHARGE NOTE PROVIDER - CARE PROVIDER_API CALL
Chuck Teixeira [facility]  Cardiology  [address] [place] [address], #   [place] [address]  Phone: [phone]  Fax: [phone]  Established Patient  Follow Up Time:    Chuck Teixeira [facility]  Cardiology  [address] [place] [address], #   [place] [address]  Phone: [phone]  Fax: [phone]  Established Patient  Follow Up Time:

## 2023-12-08 NOTE — PROGRESS NOTE ADULT - SUBJECTIVE AND OBJECTIVE BOX
The patient was seen and examined with the Cardiology Consultation Teaching Service.     No overnight events  The patient was off the mcgregor in the cath lab during rounds  She was seen briefly in the cath lab recovery area    PMH/PSH:  Coronary artery disease  Coronary artery bypass grafting, 11/2022    LIMA-Diagonal, SVG-LAD, SVG-PDA, SVG-RPL; OM heavily calcified  Percutaneous coronary intervention to the RCA, most recently in 3/2022  Diabetes  Hypertension  Dyslipidemia    Comfortable-appearing woman in no acute distress  Alert and oriented  Afebrile  Vital signs stable  Full examination was deferred as the patient was immediately post-cath.    Leukocytosis 10.9K  GFR 91    Hs-troponin 8, 9, 9  Pro-  D-dimer negative    Echocardiography in 11/2022 demonstrated normal LV systolic function, mild concentric LVH, moderate AS, mild MR, normal LA size

## 2023-12-08 NOTE — DISCHARGE NOTE PROVIDER - NSDCCPCAREPLAN_GEN_ALL_CORE_FT
PRINCIPAL DISCHARGE DIAGNOSIS  Diagnosis: Chest pain  Assessment and Plan of Treatment: You were evaluated for chest pain and difficulty breathing and underwent a cardiac catheterization on 12/7 and 12/8. Please take all medications as directed and follow up with Cardiology as instructed along with your other medical providers.     PRINCIPAL DISCHARGE DIAGNOSIS  Diagnosis: Chest pain  Assessment and Plan of Treatment: You were evaluated for chest pain and difficulty breathing and underwent a cardiac catheterization on 12/7 and 12/8. Please take all medications as directed and follow up with Cardiology as instructed along with your other medical providers. You may follow up with DR Chuck Teixeira or your own cardiologist in one week  Continue aspirin, brilinta, lipitor, increased metoprolol, and started imdur      SECONDARY DISCHARGE DIAGNOSES  Diagnosis: Diabetes mellitus  Assessment and Plan of Treatment: 1800 calorie diabetic diet/ low salt, low fat , low cholesterol   Decrease Toujeo dose to 35 units daily  hold metformin for 2 days and restart on monday  PLease follow up with your [facility] doctor or endocrinologist     PRINCIPAL DISCHARGE DIAGNOSIS  Diagnosis: Chest pain  Assessment and Plan of Treatment: You were evaluated for chest pain and difficulty breathing and underwent a cardiac catheterization on 12/7 and 12/8. Please take all medications as directed and follow up with Cardiology as instructed along with your other medical providers. You may follow up with DR Chuck Teixeira or your own cardiologist in one week  Continue aspirin, brilinta, lipitor, increased metoprolol, and started imdur      SECONDARY DISCHARGE DIAGNOSES  Diagnosis: Diabetes mellitus  Assessment and Plan of Treatment: 1800 calorie diabetic diet/ low salt, low fat , low cholesterol   Decrease Toujeo dose to 35 units daily  hold metformin for 2 days and restart on monday  PLease follow up with your [facility] doctor or endocrinologist     PRINCIPAL DISCHARGE DIAGNOSIS  Diagnosis: Chest pain  Assessment and Plan of Treatment: You were evaluated for chest pain and difficulty breathing and underwent a cardiac catheterization on 12/7 and 12/8. Please take all medications as directed and follow up with Cardiology as instructed along with your other medical providers. You may follow up with DR Chuck Teixeira or your own cardiologist in one week  Continue aspirin, brilinta, lipitor, increased metoprolol, and started imdur      SECONDARY DISCHARGE DIAGNOSES  Diagnosis: Diabetes mellitus  Assessment and Plan of Treatment: 1800 calorie diabetic diet/ low salt, low fat , low cholesterol   Decrease Toujeo dose to 35 units daily  hold metformin for 2 days and restart on monday  PLease follow up with your primary care doctor or endocrinologist within 1 week for further reccomendation

## 2023-12-08 NOTE — PROGRESS NOTE ADULT - PROBLEM SELECTOR PLAN 3
-reportedly on 88 units of lantus and metformin   -reduce to 35 units of lantus due to low FS, will cont to maintain reduced dose as patient to be NPO for further cath  -low ISS before meals and at bedtime  -f/u hgba1c 7.1%  -says no longer taking farxiga due to pruritis
-reportedly on 88 units of lantus and metformin   -reduce to 35 units of lantus due to low FS, will cont to maintain reduced dose as patient to be NPO for further cath  -low ISS before meals and at bedtime  -hgba1c 7.1%  -says no longer taking farxiga due to pruritis

## 2023-12-08 NOTE — CHART NOTE - NSCHARTNOTEFT_GEN_A_CORE
[facility] MEDICINE DAY COVERAGE    Patient seen at the bedside status post cath and PCI via R femoral artery. Site clean, dry, and intact. No bleeding, underlying hematoma, or erythema. No bruit auscultated. Patient denies chest pain, SOB, numbness/weakness/tingling. Pulses present, capillary refill appropriate. Patient educated and understands if any of the above symptoms were to arise, to notify RN. Will continue to monitor closely.    T(C): 36.8 (12-08-23 @ 16:35), Max: 36.8 (12-07-23 @ 20:45)  HR: 82 (12-08-23 @ 16:35) (73 - 82)  BP: 133/77 (12-08-23 @ 16:35) (130/77 - 153/81)  RR: 18 (12-08-23 @ 16:35) (18 - 18)  SpO2: 98% (12-08-23 @ 16:35) (97% - 100%)    Robert Ko PA-C  Medicine [id number] [facility] MEDICINE DAY COVERAGE    Patient seen at the bedside status post cath and PCI via R femoral artery. Site clean, dry, and intact. No bleeding, underlying hematoma, or erythema. No bruit auscultated. Patient denies chest pain, SOB, numbness/weakness/tingling. Pulses present, capillary refill appropriate. Patient educated and understands if any of the above symptoms were to arise, to notify RN. Will continue to monitor closely.    T(C): 36.8 (12-08-23 @ 16:35), Max: 36.8 (12-07-23 @ 20:45)  HR: 82 (12-08-23 @ 16:35) (73 - 82)  BP: 133/77 (12-08-23 @ 16:35) (130/77 - 153/81)  RR: 18 (12-08-23 @ 16:35) (18 - 18)  SpO2: 98% (12-08-23 @ 16:35) (97% - 100%)    Robert Ko PA-C  Medicine [id number]

## 2023-12-08 NOTE — DISCHARGE NOTE PROVIDER - ATTENDING DISCHARGE PHYSICAL EXAMINATION:
Constitutional: NAD, resting comfortably   Head: NC/AT  Eyes: anicteric sclera  Respiratory: clear to ascultation b/l, No wheezing or rhonchi, no retractions   Cardiac: +S1/S2; RRR; no M/R/G  Gastrointestinal: abdomen soft, non-distended, no rebound or guarding; +BSx4  Extremities: no peripheral edema  Musculoskeletal: NROM x4; no joint swelling, tenderness or erythema  Vascular: 2+ radial, DP pulses B/L  Neurologic: AAOx3; moving all extremities   Psychiatric: affect and characteristics of appearance, verbalizations, behaviors are appropriate

## 2023-12-08 NOTE — DISCHARGE NOTE PROVIDER - NSDCMRMEDTOKEN_GEN_ALL_CORE_FT
acetaminophen: 2 tab(s) orally every 6 hours, As Needed  for mild pain and/or headache  aspirin 81 mg oral delayed release tablet: 1 tab(s) orally once a day  atorvastatin 40 mg oral tablet: 1 tab(s) orally once a day  Lasix 40 mg oral tablet: 1 tab(s) orally once a day   meclizine 25 mg oral tablet: 1 tab(s) orally 3 times a day as needed for  dizziness  metFORMIN 500 mg oral tablet, extended release: 1 tab(s) orally 2 times a day  metoprolol tartrate 25 mg oral tablet: 1 tab(s) orally 2 times a day  Ozempic 2 mg/1.5 mL (0.25 mg or 0.5 mg dose) subcutaneous solution: 0.5 milligram(s) subcutaneously once a week   senna leaf extract oral tablet: 2 tab(s) orally once a day (at bedtime)  Toujeo SoloStar 300 units/mL subcutaneous solution: 88 unit(s) subcutaneous once a day (at bedtime)   aspirin 81 mg oral delayed release tablet: 1 tab(s) orally once a day  atorvastatin 40 mg oral tablet: 1 tab(s) orally once a day  Brilinta (ticagrelor) 90 mg oral tablet: 1 tab(s) orally every 12 hours  isosorbide mononitrate 30 mg oral tablet, extended release: 1 tab(s) orally once a day  Lasix 40 mg oral tablet: 1 tab(s) orally once a day   meclizine 25 mg oral tablet: 1 tab(s) orally 3 times a day as needed for  dizziness  metFORMIN 500 mg oral tablet, extended release: 1 tab(s) orally 2 times a day DO NOT TAKE UNTIL 12/11/2023 MONDAY  Metoprolol Succinate  mg oral tablet, extended release: 1 tab(s) orally once a day  Ozempic 2 mg/1.5 mL (0.25 mg or 0.5 mg dose) subcutaneous solution: 0.5 milligram(s) subcutaneously once a week   Toujeo SoloStar 300 units/mL subcutaneous solution: 35 unit(s) subcutaneous once a day (at bedtime)

## 2023-12-08 NOTE — DISCHARGE NOTE PROVIDER - PROVIDER TOKENS
PROVIDER:[TOKEN:[[id number]],ESTABLISHEDPATIENT:[T]] PROVIDER:[TOKEN:[[id number]],ESTABLISHEDPATIENT:[T]]

## 2023-12-08 NOTE — PROGRESS NOTE ADULT - SUBJECTIVE AND OBJECTIVE BOX
Patient is a 53y old  Female who presents with a chief complaint of LANDRY, chest pain (07 Dec 2023 16:22)    SUBJECTIVE / OVERNIGHT EVENTS: S/p PCI this AM, post procedure experiencing some chest pressure- improving from initially. Cardiology aware. No dizziness, fever, nausea.     MEDICATIONS  (STANDING):  aspirin enteric coated 81 milliGRAM(s) Oral daily  atorvastatin 40 milliGRAM(s) Oral at bedtime  dextrose 5%. 1000 milliLiter(s) (50 mL/Hr) IV Continuous <Continuous>  dextrose 5%. 1000 milliLiter(s) (100 mL/Hr) IV Continuous <Continuous>  dextrose 50% Injectable 25 Gram(s) IV Push once  dextrose 50% Injectable 12.5 Gram(s) IV Push once  dextrose 50% Injectable 25 Gram(s) IV Push once  enoxaparin Injectable 40 milliGRAM(s) SubCutaneous every 24 hours  furosemide    Tablet 40 milliGRAM(s) Oral daily  glucagon  Injectable 1 milliGRAM(s) IntraMuscular once  insulin glargine Injectable (LANTUS) 35 Unit(s) SubCutaneous at bedtime  insulin lispro (ADMELOG) corrective regimen sliding scale   SubCutaneous at bedtime  insulin lispro (ADMELOG) corrective regimen sliding scale   SubCutaneous three times a day before meals  isosorbide   mononitrate ER Tablet (IMDUR) 30 milliGRAM(s) Oral daily  metoprolol succinate  milliGRAM(s) Oral daily  sodium chloride 0.9%. 1000 milliLiter(s) (100 mL/Hr) IV Continuous <Continuous>  sodium chloride 0.9%. 1000 milliLiter(s) (100 mL/Hr) IV Continuous <Continuous>  ticagrelor 90 milliGRAM(s) Oral every 12 hours    MEDICATIONS  (PRN):  acetaminophen     Tablet .. 650 milliGRAM(s) Oral every 6 hours PRN Temp greater or equal to 38C (100.4F), Mild Pain (1 - 3)  aluminum hydroxide/magnesium hydroxide/simethicone Suspension 30 milliLiter(s) Oral every 4 hours PRN Dyspepsia  dextrose Oral Gel 15 Gram(s) Oral once PRN Blood Glucose LESS THAN 70 milliGRAM(s)/deciliter  meclizine 25 milliGRAM(s) Oral three times a day PRN for dizziness  melatonin 3 milliGRAM(s) Oral at bedtime PRN Insomnia  ondansetron Injectable 4 milliGRAM(s) IV Push every 8 hours PRN Nausea and/or Vomiting    CAPILLARY BLOOD GLUCOSE    POCT Blood Glucose.: 107 mg/dL (08 Dec 2023 09:54)  POCT Blood Glucose.: 84 mg/dL (08 Dec 2023 06:05)  POCT Blood Glucose.: 157 mg/dL (07 Dec 2023 21:53)  POCT Blood Glucose.: 119 mg/dL (07 Dec 2023 19:18)  POCT Blood Glucose.: 161 mg/dL (07 Dec 2023 16:58)    I&O's Summary    PHYSICAL EXAM:  Vital Signs Last 24 Hrs  T(C): 36.6 (08 Dec 2023 05:09), Max: 36.8 (07 Dec 2023 20:45)  T(F): 97.9 (08 Dec 2023 05:09), Max: 98.3 (07 Dec 2023 20:45)  HR: 73 (08 Dec 2023 05:09) (73 - 77)  BP: 130/77 (08 Dec 2023 05:09) (130/77 - 153/81)  BP(mean): --  RR: 18 (08 Dec 2023 05:09) (18 - 18)  SpO2: 100% (08 Dec 2023 05:09) (97% - 100%)    Parameters below as of 08 Dec 2023 05:09  Patient On (Oxygen Delivery Method): room air    CONSTITUTIONAL: NAD, laying in bed  EYES: conjunctiva and sclera clear  ENMT: Moist oral mucosa  NECK: Supple  RESPIRATORY: Normal respiratory effort; lungs are clear to auscultation bilaterally  CARDIOVASCULAR: Regular rate and rhythm, normal S1 and S2, No lower extremity edema; Peripheral pulses are 2+ bilaterally  ABDOMEN: Nontender to palpation, normoactive bowel sounds, no rebound/guarding  PSYCH: calm, affect appropriate  SKIN: sternal scar from prior surgery    LABS:                        14.8   10.93 )-----------( 294      ( 08 Dec 2023 05:40 )             43.7     12-08    140  |  104  |  13  ----------------------------<  88  3.8   |  27  |  0.79    Ca    9.8      08 Dec 2023 05:40  Phos  4.1     12-08  Mg     1.70     12-08      CARDIAC MARKERS ( 07 Dec 2023 02:35 )  x     / x     / 98 U/L / x     / 1.2 ng/mL      Urinalysis Basic - ( 08 Dec 2023 05:40 )    Color: x / Appearance: x / SG: x / pH: x  Gluc: 88 mg/dL / Ketone: x  / Bili: x / Urobili: x   Blood: x / Protein: x / Nitrite: x   Leuk Esterase: x / RBC: x / WBC x   Sq Epi: x / Non Sq Epi: x / Bacteria: x    RADIOLOGY & ADDITIONAL TESTS: Reviewed    COORDINATION OF CARE:  Care Discussed with Consultants/Other Providers [Y- cardiology]

## 2023-12-09 ENCOUNTER — TRANSCRIPTION ENCOUNTER (OUTPATIENT)
Age: 53
End: 2023-12-09

## 2023-12-09 VITALS — SYSTOLIC BLOOD PRESSURE: 130 MMHG | DIASTOLIC BLOOD PRESSURE: 77 MMHG | HEART RATE: 77 BPM

## 2023-12-09 LAB
ANION GAP SERPL CALC-SCNC: 13 MMOL/L — SIGNIFICANT CHANGE UP (ref 7–14)
ANION GAP SERPL CALC-SCNC: 13 MMOL/L — SIGNIFICANT CHANGE UP (ref 7–14)
BUN SERPL-MCNC: 16 MG/DL — SIGNIFICANT CHANGE UP (ref 7–23)
BUN SERPL-MCNC: 16 MG/DL — SIGNIFICANT CHANGE UP (ref 7–23)
CALCIUM SERPL-MCNC: 9.5 MG/DL — SIGNIFICANT CHANGE UP (ref 8.4–10.5)
CALCIUM SERPL-MCNC: 9.5 MG/DL — SIGNIFICANT CHANGE UP (ref 8.4–10.5)
CHLORIDE SERPL-SCNC: 103 MMOL/L — SIGNIFICANT CHANGE UP (ref 98–107)
CHLORIDE SERPL-SCNC: 103 MMOL/L — SIGNIFICANT CHANGE UP (ref 98–107)
CO2 SERPL-SCNC: 24 MMOL/L — SIGNIFICANT CHANGE UP (ref 22–31)
CO2 SERPL-SCNC: 24 MMOL/L — SIGNIFICANT CHANGE UP (ref 22–31)
CREAT SERPL-MCNC: 0.84 MG/DL — SIGNIFICANT CHANGE UP (ref 0.5–1.3)
CREAT SERPL-MCNC: 0.84 MG/DL — SIGNIFICANT CHANGE UP (ref 0.5–1.3)
EGFR: 84 ML/MIN/1.73M2 — SIGNIFICANT CHANGE UP
EGFR: 84 ML/MIN/1.73M2 — SIGNIFICANT CHANGE UP
GLUCOSE BLDC GLUCOMTR-MCNC: 127 MG/DL — HIGH (ref 70–99)
GLUCOSE BLDC GLUCOMTR-MCNC: 127 MG/DL — HIGH (ref 70–99)
GLUCOSE BLDC GLUCOMTR-MCNC: 192 MG/DL — HIGH (ref 70–99)
GLUCOSE BLDC GLUCOMTR-MCNC: 192 MG/DL — HIGH (ref 70–99)
GLUCOSE SERPL-MCNC: 127 MG/DL — HIGH (ref 70–99)
GLUCOSE SERPL-MCNC: 127 MG/DL — HIGH (ref 70–99)
HCT VFR BLD CALC: 39.9 % — SIGNIFICANT CHANGE UP (ref 34.5–45)
HCT VFR BLD CALC: 39.9 % — SIGNIFICANT CHANGE UP (ref 34.5–45)
HGB BLD-MCNC: 13.6 G/DL — SIGNIFICANT CHANGE UP (ref 11.5–15.5)
HGB BLD-MCNC: 13.6 G/DL — SIGNIFICANT CHANGE UP (ref 11.5–15.5)
MAGNESIUM SERPL-MCNC: 1.7 MG/DL — SIGNIFICANT CHANGE UP (ref 1.6–2.6)
MAGNESIUM SERPL-MCNC: 1.7 MG/DL — SIGNIFICANT CHANGE UP (ref 1.6–2.6)
MCHC RBC-ENTMCNC: 26.7 PG — LOW (ref 27–34)
MCHC RBC-ENTMCNC: 26.7 PG — LOW (ref 27–34)
MCHC RBC-ENTMCNC: 34.1 GM/DL — SIGNIFICANT CHANGE UP (ref 32–36)
MCHC RBC-ENTMCNC: 34.1 GM/DL — SIGNIFICANT CHANGE UP (ref 32–36)
MCV RBC AUTO: 78.2 FL — LOW (ref 80–100)
MCV RBC AUTO: 78.2 FL — LOW (ref 80–100)
NRBC # BLD: 0 /100 WBCS — SIGNIFICANT CHANGE UP (ref 0–0)
NRBC # BLD: 0 /100 WBCS — SIGNIFICANT CHANGE UP (ref 0–0)
NRBC # FLD: 0 K/UL — SIGNIFICANT CHANGE UP (ref 0–0)
NRBC # FLD: 0 K/UL — SIGNIFICANT CHANGE UP (ref 0–0)
PHOSPHATE SERPL-MCNC: 3.6 MG/DL — SIGNIFICANT CHANGE UP (ref 2.5–4.5)
PHOSPHATE SERPL-MCNC: 3.6 MG/DL — SIGNIFICANT CHANGE UP (ref 2.5–4.5)
PLATELET # BLD AUTO: 314 K/UL — SIGNIFICANT CHANGE UP (ref 150–400)
PLATELET # BLD AUTO: 314 K/UL — SIGNIFICANT CHANGE UP (ref 150–400)
POTASSIUM SERPL-MCNC: 3.8 MMOL/L — SIGNIFICANT CHANGE UP (ref 3.5–5.3)
POTASSIUM SERPL-MCNC: 3.8 MMOL/L — SIGNIFICANT CHANGE UP (ref 3.5–5.3)
POTASSIUM SERPL-SCNC: 3.8 MMOL/L — SIGNIFICANT CHANGE UP (ref 3.5–5.3)
POTASSIUM SERPL-SCNC: 3.8 MMOL/L — SIGNIFICANT CHANGE UP (ref 3.5–5.3)
RBC # BLD: 5.1 M/UL — SIGNIFICANT CHANGE UP (ref 3.8–5.2)
RBC # BLD: 5.1 M/UL — SIGNIFICANT CHANGE UP (ref 3.8–5.2)
RBC # FLD: 15.1 % — HIGH (ref 10.3–14.5)
RBC # FLD: 15.1 % — HIGH (ref 10.3–14.5)
SODIUM SERPL-SCNC: 140 MMOL/L — SIGNIFICANT CHANGE UP (ref 135–145)
SODIUM SERPL-SCNC: 140 MMOL/L — SIGNIFICANT CHANGE UP (ref 135–145)
WBC # BLD: 12.51 K/UL — HIGH (ref 3.8–10.5)
WBC # BLD: 12.51 K/UL — HIGH (ref 3.8–10.5)
WBC # FLD AUTO: 12.51 K/UL — HIGH (ref 3.8–10.5)
WBC # FLD AUTO: 12.51 K/UL — HIGH (ref 3.8–10.5)

## 2023-12-09 PROCEDURE — 99232 SBSQ HOSP IP/OBS MODERATE 35: CPT

## 2023-12-09 PROCEDURE — 99239 HOSP IP/OBS DSCHRG MGMT >30: CPT

## 2023-12-09 RX ORDER — ISOSORBIDE MONONITRATE 60 MG/1
1 TABLET, EXTENDED RELEASE ORAL
Qty: 30 | Refills: 0
Start: 2023-12-09 | End: 2024-01-07

## 2023-12-09 RX ORDER — TICAGRELOR 90 MG/1
1 TABLET ORAL
Qty: 60 | Refills: 0
Start: 2023-12-09 | End: 2024-01-07

## 2023-12-09 RX ORDER — METFORMIN HYDROCHLORIDE 850 MG/1
1 TABLET ORAL
Qty: 0 | Refills: 0 | DISCHARGE

## 2023-12-09 RX ORDER — METOPROLOL TARTRATE 50 MG
1 TABLET ORAL
Qty: 30 | Refills: 0
Start: 2023-12-09 | End: 2024-01-07

## 2023-12-09 RX ORDER — METOPROLOL TARTRATE 50 MG
1 TABLET ORAL
Refills: 0 | DISCHARGE

## 2023-12-09 RX ADMIN — ISOSORBIDE MONONITRATE 30 MILLIGRAM(S): 60 TABLET, EXTENDED RELEASE ORAL at 12:24

## 2023-12-09 RX ADMIN — Medication 40 MILLIGRAM(S): at 06:22

## 2023-12-09 RX ADMIN — TICAGRELOR 90 MILLIGRAM(S): 90 TABLET ORAL at 06:22

## 2023-12-09 RX ADMIN — Medication 1: at 12:22

## 2023-12-09 RX ADMIN — Medication 100 MILLIGRAM(S): at 06:22

## 2023-12-09 RX ADMIN — Medication 81 MILLIGRAM(S): at 12:24

## 2023-12-09 NOTE — PROGRESS NOTE ADULT - ASSESSMENT
53-year-old woman with coronary artery disease, multiple PCI and subsequent 4-vessel CABG one year ago, who presents with progressive dyspnea and intermittent chest discomfort for two months, similar to her symptoms prior to CABG.     Angiography demonstrates that her LIMA-D1 and SVG-RCA are pateint, but SVG-LAD and SVG-RPDA are closed. She is s/p PCI with GAVIN to pLAD.     #CAD s/p PCI to pLAD  - Continue DAPT with ASA 81 mg QD and ticagrelor 90 mg q12h  - Continue atorvastatin 40 mg qHS  - Continue metoprolol succinate 25 mg QD  - Can continue Isosorbide mononitrate 30 mg QD for now, but may be titrated off in the outpatient setting if not needed post-revascularization.   - Can add on ACEi/ARB if additional antihypertensive is needed given her diabetes (e.g., losartan, enalapril, etc.)    Tho Singletary MD  Department of Cardiology  Cardiology Fellow, PGY6

## 2023-12-09 NOTE — DISCHARGE NOTE NURSING/CASE MANAGEMENT/SOCIAL WORK - NSDCPEFALRISK_GEN_ALL_CORE
For information on Fall & Injury Prevention, visit: [email or web address] OR  [email or web address] OR  https://www.cdc.gov/steadi/patient.html For information on Fall & Injury Prevention, visit: [email or web address]://www.[facility]/[email or web address]-[email or web address]-mobility OR  https://www.[facility]/news/fall-prevention-tips-to-avoid-injury OR  https://www.cdc.gov/steadi/patient.html

## 2023-12-09 NOTE — DISCHARGE NOTE NURSING/CASE MANAGEMENT/SOCIAL WORK - PATIENT PORTAL LINK FT
You can access the FollowMyHealth Patient Portal offered by [facility] by registering at the following website: [email or web address]. By joining [employer]’s FollowMyHealth portal, you will also be able to view your health information using other applications (apps) compatible with our system. You can access the FollowMyHealth Patient Portal offered by [facility] by registering at the following website: [email or web address]. By joining [employer]’s FollowMyHealth portal, you will also be able to view your health information using other applications (apps) compatible with our system.

## 2023-12-09 NOTE — PROGRESS NOTE ADULT - ATTENDING COMMENTS
Patient seen and examined by me during morning rounds.  Assessment and recommendations were reviewed with the CCU/Cardiology NP, and as outlined above.

## 2023-12-09 NOTE — PROGRESS NOTE ADULT - SUBJECTIVE AND OBJECTIVE BOX
Cardiology Progress Note    Patient seen and examined at bedside.    Interval Events:   S/p PCI including lithotripsy and laser atherectomy and GAVIN to pLAD    Review Of Systems: No chest pain, shortness of breath, or palpitations            Current Meds:  acetaminophen     Tablet .. 650 milliGRAM(s) Oral every 6 hours PRN  aluminum hydroxide/magnesium hydroxide/simethicone Suspension 30 milliLiter(s) Oral every 4 hours PRN  aspirin enteric coated 81 milliGRAM(s) Oral daily  atorvastatin 40 milliGRAM(s) Oral at bedtime  dextrose 5%. 1000 milliLiter(s) IV Continuous <Continuous>  dextrose 5%. 1000 milliLiter(s) IV Continuous <Continuous>  dextrose 50% Injectable 25 Gram(s) IV Push once  dextrose 50% Injectable 25 Gram(s) IV Push once  dextrose 50% Injectable 12.5 Gram(s) IV Push once  dextrose Oral Gel 15 Gram(s) Oral once PRN  enoxaparin Injectable 40 milliGRAM(s) SubCutaneous every 24 hours  furosemide    Tablet 40 milliGRAM(s) Oral daily  glucagon  Injectable 1 milliGRAM(s) IntraMuscular once  insulin glargine Injectable (LANTUS) 35 Unit(s) SubCutaneous at bedtime  insulin lispro (ADMELOG) corrective regimen sliding scale   SubCutaneous at bedtime  insulin lispro (ADMELOG) corrective regimen sliding scale   SubCutaneous three times a day before meals  isosorbide   mononitrate ER Tablet (IMDUR) 30 milliGRAM(s) Oral daily  meclizine 25 milliGRAM(s) Oral three times a day PRN  melatonin 3 milliGRAM(s) Oral at bedtime PRN  metoprolol succinate  milliGRAM(s) Oral daily  ondansetron Injectable 4 milliGRAM(s) IV Push every 8 hours PRN  sodium chloride 0.9%. 1000 milliLiter(s) IV Continuous <Continuous>  sodium chloride 0.9%. 1000 milliLiter(s) IV Continuous <Continuous>  ticagrelor 90 milliGRAM(s) Oral every 12 hours      Vitals:  T(F): 98.2 (12-09), Max: 98.8 (12-08)  HR: 72 (12-09) (72 - 93)  BP: 131/75 (12-09) (106/69 - 133/77)  RR: 17 (12-09)  SpO2: 98% (12-09)  I&O's Summary    08 Dec 2023 07:01  -  09 Dec 2023 07:00  --------------------------------------------------------  IN: 300 mL / OUT: 0 mL / NET: 300 mL      Physical Exam:  Appearance: No acute distress; well appearing  Eyes: PERRL, EOMI, pink conjunctiva  HEENT: Normal oral mucosa  Cardiovascular: RRR, S1, S2, no murmurs, rubs, or gallops; no edema; no JVD, RFA site C/D/I without ecchymoses or hematoma  Respiratory: Clear to auscultation bilaterally  Gastrointestinal: soft, non-tender, non-distended with normal bowel sounds  Musculoskeletal: No clubbing; no joint deformity   Neurologic: Non-focal  Lymphatic: No lymphadenopathy  Psychiatry: AAOx3, mood & affect appropriate  Skin: No rashes, ecchymoses, or cyanosis                          13.6   12.51 )-----------( 314      ( 09 Dec 2023 06:00 )             39.9     12-09    140  |  103  |  16  ----------------------------<  127<H>  3.8   |  24  |  0.84    Ca    9.5      09 Dec 2023 06:00  Phos  3.6     12-09  Mg     1.70     12-09        CARDIAC MARKERS ( 07 Dec 2023 02:35 )  8 ng/L / x     / x     / 98 U/L / x     / 1.2 ng/mL  CARDIAC MARKERS ( 06 Dec 2023 14:47 )  9 ng/L / x     / x     / 113 U/L / x     / 1.6 ng/mL  CARDIAC MARKERS ( 06 Dec 2023 13:54 )  9 ng/L / x     / x     / x     / x     / x        Cath:  12/8 Cath report pending

## 2024-01-02 NOTE — H&P CARDIOLOGY - URINARY CATHETER
Dr Mitchell at  to discuss findings. VSS. No  Pain, no GI bleeding. Pt to be discharged from unit    no

## 2024-04-10 NOTE — ED PROVIDER NOTE - OBJECTIVE STATEMENT
.
Attn - pt seen in Rm23 - pt transferred from [facility] ED for NSTEMI - pt had onset of chest pressure/tightness with some sob while taking shower this am. pt con't c/o some chest pressure, but much better than this am.  no radiation. no prior.  no n/v, palp, dizziness.  + diaphoresis this am.  PMHx - DM, HTN, cholesterol. non smoker, +father  MI.

## 2024-05-23 NOTE — ED PROVIDER NOTE - NS ED MD DISPO SPECIAL CONSIDERATION1
[FreeTextEntry1] : She may continue with the Voltaren gel as needed since that has given some improvement Will start her on a course of physical therapy Will give prescription for oral diclofenac twice daily as needed  Discussed possible cortisone injection if acute pain persists at next visit Holding off today due to patient's past medical history of diabetes with A1c of 7 None/Cath Lab

## 2024-07-02 NOTE — H&P ADULT - TIME-BASED BILLING (NON-CRITICAL CARE)
4 Eyes Skin Assessment     NAME:  Agustina Fried  YOB: 1957  MEDICAL RECORD NUMBER:  [id number]    The patient is being assessed for  Admission    I agree that at least one RN has performed a thorough Head to Toe Skin Assessment on the patient. ALL assessment sites listed below have been assessed.      Areas assessed by both nurses:    Head, Face, Ears, Shoulders, Back, Chest, Arms, Elbows, Hands, Sacrum. Buttock, Coccyx, Ischium, Legs. Feet and Heels, and Under Medical Devices          Does the Patient have a Wound? No noted wound(s)       Maco Prevention initiated by RN: Yes  Wound Care Orders initiated by RN: No    Pressure Injury (Stage 3,4, Unstageable, DTI, NWPT, and Complex wounds) if present, place Wound referral order by RN under : No    New Ostomies, if present place, Ostomy referral order under : No     Nurse 1 eSignature: Electronically signed by Monse Adan RN on 7/2/24 at 1:34 AM EDT    **SHARE this note so that the co-signing nurse can place an eSignature**    Nurse 2 eSignature: {Esignature:[id number]}    Time-based billing (NON-critical care)

## 2024-08-02 ENCOUNTER — OUTPATIENT (OUTPATIENT)
Dept: OUTPATIENT SERVICES | Facility: HOSPITAL | Age: 54
LOS: 1 days | End: 2024-08-02
Payer: COMMERCIAL

## 2024-08-02 VITALS
RESPIRATION RATE: 16 BRPM | SYSTOLIC BLOOD PRESSURE: 120 MMHG | OXYGEN SATURATION: 97 % | DIASTOLIC BLOOD PRESSURE: 70 MMHG | HEART RATE: 77 BPM

## 2024-08-02 VITALS
HEART RATE: 75 BPM | HEIGHT: 65 IN | OXYGEN SATURATION: 97 % | DIASTOLIC BLOOD PRESSURE: 72 MMHG | SYSTOLIC BLOOD PRESSURE: 135 MMHG | WEIGHT: 192.9 LBS | RESPIRATION RATE: 16 BRPM

## 2024-08-02 DIAGNOSIS — Z95.1 PRESENCE OF AORTOCORONARY BYPASS GRAFT: Chronic | ICD-10-CM

## 2024-08-02 DIAGNOSIS — I25.10 ATHEROSCLEROTIC HEART DISEASE OF NATIVE CORONARY ARTERY WITHOUT ANGINA PECTORIS: ICD-10-CM

## 2024-08-02 DIAGNOSIS — Z95.5 PRESENCE OF CORONARY ANGIOPLASTY IMPLANT AND GRAFT: Chronic | ICD-10-CM

## 2024-08-02 LAB
ANION GAP SERPL CALC-SCNC: 12 MMOL/L — SIGNIFICANT CHANGE UP (ref 5–17)
BUN SERPL-MCNC: 10 MG/DL — SIGNIFICANT CHANGE UP (ref 7–23)
CALCIUM SERPL-MCNC: 9.2 MG/DL — SIGNIFICANT CHANGE UP (ref 8.4–10.5)
CHLORIDE SERPL-SCNC: 108 MMOL/L — SIGNIFICANT CHANGE UP (ref 96–108)
CO2 SERPL-SCNC: 22 MMOL/L — SIGNIFICANT CHANGE UP (ref 22–31)
CREAT SERPL-MCNC: 0.7 MG/DL — SIGNIFICANT CHANGE UP (ref 0.5–1.3)
EGFR: 104 ML/MIN/1.73M2 — SIGNIFICANT CHANGE UP
GLUCOSE BLDC GLUCOMTR-MCNC: 125 MG/DL — HIGH (ref 70–99)
GLUCOSE SERPL-MCNC: 138 MG/DL — HIGH (ref 70–99)
HCT VFR BLD CALC: 44 % — SIGNIFICANT CHANGE UP (ref 34.5–45)
HGB BLD-MCNC: 14.3 G/DL — SIGNIFICANT CHANGE UP (ref 11.5–15.5)
MCHC RBC-ENTMCNC: 26.3 PG — LOW (ref 27–34)
MCHC RBC-ENTMCNC: 32.5 GM/DL — SIGNIFICANT CHANGE UP (ref 32–36)
MCV RBC AUTO: 80.9 FL — SIGNIFICANT CHANGE UP (ref 80–100)
NRBC # BLD: 0 /100 WBCS — SIGNIFICANT CHANGE UP (ref 0–0)
PLATELET # BLD AUTO: 345 K/UL — SIGNIFICANT CHANGE UP (ref 150–400)
POTASSIUM SERPL-MCNC: 3.9 MMOL/L — SIGNIFICANT CHANGE UP (ref 3.5–5.3)
POTASSIUM SERPL-SCNC: 3.9 MMOL/L — SIGNIFICANT CHANGE UP (ref 3.5–5.3)
RBC # BLD: 5.44 M/UL — HIGH (ref 3.8–5.2)
RBC # FLD: 15.6 % — HIGH (ref 10.3–14.5)
SODIUM SERPL-SCNC: 142 MMOL/L — SIGNIFICANT CHANGE UP (ref 135–145)
WBC # BLD: 10.78 K/UL — HIGH (ref 3.8–10.5)
WBC # FLD AUTO: 10.78 K/UL — HIGH (ref 3.8–10.5)

## 2024-08-02 PROCEDURE — 85027 COMPLETE CBC AUTOMATED: CPT

## 2024-08-02 PROCEDURE — 93005 ELECTROCARDIOGRAM TRACING: CPT

## 2024-08-02 PROCEDURE — C1887: CPT

## 2024-08-02 PROCEDURE — 93454 CORONARY ARTERY ANGIO S&I: CPT | Mod: 26

## 2024-08-02 PROCEDURE — 99152 MOD SED SAME PHYS/QHP 5/>YRS: CPT

## 2024-08-02 PROCEDURE — 93454 CORONARY ARTERY ANGIO S&I: CPT

## 2024-08-02 PROCEDURE — 93010 ELECTROCARDIOGRAM REPORT: CPT

## 2024-08-02 PROCEDURE — C1894: CPT

## 2024-08-02 PROCEDURE — 80048 BASIC METABOLIC PNL TOTAL CA: CPT

## 2024-08-02 PROCEDURE — 82962 GLUCOSE BLOOD TEST: CPT

## 2024-08-02 RX ORDER — BACTERIOSTATIC SODIUM CHLORIDE 0.9 %
1000 VIAL (ML) INJECTION
Refills: 0 | Status: ACTIVE | OUTPATIENT
Start: 2024-08-02 | End: 2024-08-02

## 2024-08-02 RX ORDER — PRASUGREL 5 MG/1
10 TABLET, FILM COATED ORAL DAILY
Refills: 0 | Status: ACTIVE | OUTPATIENT
Start: 2024-08-02 | End: 2025-07-01

## 2024-08-02 RX ADMIN — Medication 75 MILLILITER(S): at 09:20

## 2024-08-02 RX ADMIN — PRASUGREL 10 MILLIGRAM(S): 5 TABLET, FILM COATED ORAL at 10:36

## 2024-08-02 NOTE — ASU DISCHARGE PLAN (ADULT/PEDIATRIC) - CARE PROVIDER_API CALL
Ronald Kent  Interventional Cardiology  [address], [address] [place] [id number]  Phone: [phone]  Fax: [phone]  Follow Up Time:     Yomi Reveles  Cardiovascular Disease  [address] [place] [address]  Phone: [phone]  Fax: [phone]  Established Patient  Follow Up Time: Routine

## 2024-08-02 NOTE — ASU DISCHARGE PLAN (ADULT/PEDIATRIC) - PROVIDER TOKENS
PROVIDER:[TOKEN:[[id number]]],PROVIDER:[TOKEN:[[id number]],FOLLOWUP:[Routine],ESTABLISHEDPATIENT:[T]]

## 2024-08-02 NOTE — H&P CARDIOLOGY - EKG AND INTERPRETATION
ECG NSR @74 with moderate voltage criteria for LVH, inferior infarct and anterior infarct with no acute ischemic changes.

## 2024-08-02 NOTE — H&P CARDIOLOGY - PATIENT REFERRED TO
Eugenio from [facility] states he still has not received the death certificate form he needed updated. Fax number is on the certificate form.       CB# [phone]    Cardiac catherization with possible intervention

## 2024-08-02 NOTE — ASU DISCHARGE PLAN (ADULT/PEDIATRIC) - PLEASE INDICATE TEMPERATURE IN FAHRENHEIT OR CELSIUS
your child has a peak flow meter, use it to check how well your child is breathing. This can help you predict when an asthma attack is going to occur. Then your child can take medicine to prevent the asthma attack or make it less severe.    Keep your child away from triggers    · Try to learn what triggers your child's asthma attacks, and avoid the triggers when you can. Common triggers include colds, smoke, air pollution, pollen, mold, pets, cockroaches, stress, and cold air.     · If tests show that dust is a trigger for your child's asthma, try to control house dust.     · Talk to your child's doctor about whether to have your child tested for allergies.    Other care    · Have your child drink plenty of fluids.     · Encourage your child to be physically active, including playing on sports teams. If needed, using medicine right before exercise usually prevents problems.     · Have your child get a pneumococcal vaccine and an annual flu vaccine. When should you call for help? Call 911 anytime you think your child may need emergency care. For example, call if:    · Your child has severe trouble breathing. Signs may include the chest sinking in, using belly muscles to breathe, or nostrils flaring while your child is struggling to breathe.    Call your doctor now or seek immediate medical care if:    · Your child has an asthma attack and does not get better after you use the action plan.     · Your child coughs up yellow, dark brown, or bloody mucus (sputum).    Watch closely for changes in your child's health, and be sure to contact your doctor if:    · Your child's wheezing and coughing get worse.     · Your child needs quick-relief medicine on more than 2 days a week (unless it is just for exercise).     · Your child has any new symptoms, such as a fever. Where can you learn more? Go to [email or web address] and sign in to your [employer] account.  Enter Z426 in the [facility]

## 2024-08-02 NOTE — ASU DISCHARGE PLAN (ADULT/PEDIATRIC) - FOR NEXT 24 HOURS DO NOT:
Called patient's daughter advised of physician's recommendation. Patient's daughter verbalized an understanding& states she will call back to schedule an appointment.  
Please have her come in for an office visit, let know the medication is not being approved because it is not safe to take with her pain medication.    We need to discuss options for her treatment    Kirti Morris MD    
Sarah, pharmacist, states that the patient's precription for temazepam is not being approved by her insurance because she is also currently taking Percocet and Roxicodone which were sent by another prescriber.  Sarah states that a PA will be required to obtain approval for temazepam.  Please advise.   
Statement Selected

## 2024-08-02 NOTE — H&P CARDIOLOGY - HISTORY OF PRESENT ILLNESS
53 y/o female with FH of MI dad  at 65 with pmh of HTN, HLD, DMT2 (controlled AIc 7.0 on Tourjeo, Farxiga, Ozempic), CAD c/b IWSTEMI 3/2022 s/p PCI with GAVIN to RCA, RPDA and RPL; [facility] 2022 with GAVIN to RCA and POBA to distal small branches with UA 2022 s/p CABG x 4 2023, [facility] with recurrent UA 2023 with LAD-SVG graft occluded and PCI/GAVIN to LAD. ICM LVEF 40% is followed by Dr. Yomi Reveles, Cardiologist for reports of SOB at rest and worse with exertion and chest pressure over the past 2 weeks.  Her Imdur was increased from 60mg daily to BID.  Her TTE 2024 revealed LVEF 52% mildly reduced LV systolic function total WMA is 2.00.  Recovered from prior TTE post STEMI.  There is akinesis of the basal to mid inferior wall.  There is akinesis of the basal to mid inferoseptal wall.  The basal to mid inferoseptal wall.  The basal to mid inferoseptal wall demonstrates normal motion.  The entire inferolateral wall demonstrates normal motion.  ETT 2023 submaximal exercise ECG w/o ischemic change MPHR 81% after 6:18 min.  Gilbert Treadmill score positive 6.  CTA 3/2024 reveals LIMA to LAD graft patent and SVG to RCA patent.  There are calcification and/or stenting of the coronary arteries.  Pt presents for [facility] for further evaluation of her CAD with ongoing chest pressure and sob at rest and worsens with exertion so much so her activity is limited.  Her discomfort level waxes and wanes from 1-5/10.  She is in no distress with ECG NSR @74 with moderate voltage criteria for LVH, inferior infarct and anterior infarct with no acute ischemic changes.

## 2024-08-02 NOTE — ASU DISCHARGE PLAN (ADULT/PEDIATRIC) - ASU DC SPECIAL INSTRUCTIONSFT
Please see preprinted discharge instruction sheet    Followup as instructed for your staged procedure with Dr. Kent

## 2024-08-02 NOTE — ASU DISCHARGE PLAN (ADULT/PEDIATRIC) - NS MD DC FALL RISK RISK
For information on Fall & Injury Prevention, visit: [email or web address] OR  [email or web address] OR  https://www.cdc.gov/steadi/patient.html

## 2024-08-05 ENCOUNTER — TRANSCRIPTION ENCOUNTER (OUTPATIENT)
Age: 54
End: 2024-08-05

## 2024-08-05 ENCOUNTER — OUTPATIENT (OUTPATIENT)
Dept: OUTPATIENT SERVICES | Facility: HOSPITAL | Age: 54
LOS: 1 days | End: 2024-08-05
Payer: COMMERCIAL

## 2024-08-05 VITALS
OXYGEN SATURATION: 96 % | HEART RATE: 70 BPM | SYSTOLIC BLOOD PRESSURE: 115 MMHG | TEMPERATURE: 98 F | DIASTOLIC BLOOD PRESSURE: 60 MMHG | RESPIRATION RATE: 19 BRPM

## 2024-08-05 VITALS
HEART RATE: 68 BPM | HEIGHT: 65 IN | DIASTOLIC BLOOD PRESSURE: 76 MMHG | SYSTOLIC BLOOD PRESSURE: 127 MMHG | OXYGEN SATURATION: 96 % | WEIGHT: 192.9 LBS | RESPIRATION RATE: 16 BRPM

## 2024-08-05 DIAGNOSIS — Z95.1 PRESENCE OF AORTOCORONARY BYPASS GRAFT: Chronic | ICD-10-CM

## 2024-08-05 DIAGNOSIS — Z95.5 PRESENCE OF CORONARY ANGIOPLASTY IMPLANT AND GRAFT: Chronic | ICD-10-CM

## 2024-08-05 DIAGNOSIS — I25.10 ATHEROSCLEROTIC HEART DISEASE OF NATIVE CORONARY ARTERY WITHOUT ANGINA PECTORIS: ICD-10-CM

## 2024-08-05 LAB
A1C WITH ESTIMATED AVERAGE GLUCOSE RESULT: 6.8 % — HIGH (ref 4–5.6)
ADD ON TEST-SPECIMEN IN LAB: SIGNIFICANT CHANGE UP
ANION GAP SERPL CALC-SCNC: 12 MMOL/L — SIGNIFICANT CHANGE UP (ref 5–17)
BUN SERPL-MCNC: 13 MG/DL — SIGNIFICANT CHANGE UP (ref 7–23)
CALCIUM SERPL-MCNC: 9.5 MG/DL — SIGNIFICANT CHANGE UP (ref 8.4–10.5)
CHLORIDE SERPL-SCNC: 105 MMOL/L — SIGNIFICANT CHANGE UP (ref 96–108)
CHOLEST SERPL-MCNC: 96 MG/DL — SIGNIFICANT CHANGE UP
CO2 SERPL-SCNC: 21 MMOL/L — LOW (ref 22–31)
CREAT SERPL-MCNC: 0.82 MG/DL — SIGNIFICANT CHANGE UP (ref 0.5–1.3)
EGFR: 86 ML/MIN/1.73M2 — SIGNIFICANT CHANGE UP
ESTIMATED AVERAGE GLUCOSE: 148 MG/DL — HIGH (ref 68–114)
GLUCOSE BLDC GLUCOMTR-MCNC: 125 MG/DL — HIGH (ref 70–99)
GLUCOSE BLDC GLUCOMTR-MCNC: 138 MG/DL — HIGH (ref 70–99)
GLUCOSE BLDC GLUCOMTR-MCNC: 79 MG/DL — SIGNIFICANT CHANGE UP (ref 70–99)
GLUCOSE BLDC GLUCOMTR-MCNC: 89 MG/DL — SIGNIFICANT CHANGE UP (ref 70–99)
GLUCOSE SERPL-MCNC: 143 MG/DL — HIGH (ref 70–99)
HCT VFR BLD CALC: 42.9 % — SIGNIFICANT CHANGE UP (ref 34.5–45)
HDLC SERPL-MCNC: 38 MG/DL — LOW
HGB BLD-MCNC: 13.7 G/DL — SIGNIFICANT CHANGE UP (ref 11.5–15.5)
LIPID PNL WITH DIRECT LDL SERPL: 35 MG/DL — SIGNIFICANT CHANGE UP
MCHC RBC-ENTMCNC: 26 PG — LOW (ref 27–34)
MCHC RBC-ENTMCNC: 31.9 GM/DL — LOW (ref 32–36)
MCV RBC AUTO: 81.4 FL — SIGNIFICANT CHANGE UP (ref 80–100)
NON HDL CHOLESTEROL: 58 MG/DL — SIGNIFICANT CHANGE UP
NRBC # BLD: 0 /100 WBCS — SIGNIFICANT CHANGE UP (ref 0–0)
PLATELET # BLD AUTO: 317 K/UL — SIGNIFICANT CHANGE UP (ref 150–400)
POTASSIUM SERPL-MCNC: 3.9 MMOL/L — SIGNIFICANT CHANGE UP (ref 3.5–5.3)
POTASSIUM SERPL-SCNC: 3.9 MMOL/L — SIGNIFICANT CHANGE UP (ref 3.5–5.3)
RBC # BLD: 5.27 M/UL — HIGH (ref 3.8–5.2)
RBC # FLD: 15.6 % — HIGH (ref 10.3–14.5)
SODIUM SERPL-SCNC: 138 MMOL/L — SIGNIFICANT CHANGE UP (ref 135–145)
TRIGL SERPL-MCNC: 129 MG/DL — SIGNIFICANT CHANGE UP
TROPONIN T, HIGH SENSITIVITY RESULT: 11 NG/L — SIGNIFICANT CHANGE UP (ref 0–51)
TROPONIN T, HIGH SENSITIVITY RESULT: 8 NG/L — SIGNIFICANT CHANGE UP (ref 0–51)
WBC # BLD: 12.27 K/UL — HIGH (ref 3.8–10.5)
WBC # FLD AUTO: 12.27 K/UL — HIGH (ref 3.8–10.5)

## 2024-08-05 PROCEDURE — 93452 LEFT HRT CATH W/VENTRCLGRPHY: CPT | Mod: 59

## 2024-08-05 PROCEDURE — 92920 PRQ TRLUML C ANGIOP 1ART&/BR: CPT | Mod: RC

## 2024-08-05 PROCEDURE — 85027 COMPLETE CBC AUTOMATED: CPT

## 2024-08-05 PROCEDURE — 80048 BASIC METABOLIC PNL TOTAL CA: CPT

## 2024-08-05 PROCEDURE — 83036 HEMOGLOBIN GLYCOSYLATED A1C: CPT

## 2024-08-05 PROCEDURE — 84484 ASSAY OF TROPONIN QUANT: CPT

## 2024-08-05 PROCEDURE — 93010 ELECTROCARDIOGRAM REPORT: CPT

## 2024-08-05 PROCEDURE — 82962 GLUCOSE BLOOD TEST: CPT

## 2024-08-05 PROCEDURE — 80061 LIPID PANEL: CPT

## 2024-08-05 PROCEDURE — 99152 MOD SED SAME PHYS/QHP 5/>YRS: CPT

## 2024-08-05 PROCEDURE — 93005 ELECTROCARDIOGRAM TRACING: CPT

## 2024-08-05 PROCEDURE — 93452 LEFT HRT CATH W/VENTRCLGRPHY: CPT | Mod: 26,59

## 2024-08-05 RX ORDER — DEXTROSE MONOHYDRATE, SODIUM CHLORIDE, SODIUM LACTATE, CALCIUM CHLORIDE, MAGNESIUM CHLORIDE 1.5; 538; 448; 18.4; 5.08 G/100ML; MG/100ML; MG/100ML; MG/100ML; MG/100ML
1000 SOLUTION INTRAPERITONEAL
Refills: 0 | Status: DISCONTINUED | OUTPATIENT
Start: 2024-08-05 | End: 2024-08-19

## 2024-08-05 RX ORDER — DEXTROSE 4 G
25 TABLET,CHEWABLE ORAL ONCE
Refills: 0 | Status: DISCONTINUED | OUTPATIENT
Start: 2024-08-05 | End: 2024-08-19

## 2024-08-05 RX ORDER — INSULIN LISPRO 100/ML
VIAL (ML) SUBCUTANEOUS
Refills: 0 | Status: DISCONTINUED | OUTPATIENT
Start: 2024-08-05 | End: 2024-08-19

## 2024-08-05 RX ORDER — PRASUGREL 5 MG/1
1 TABLET, FILM COATED ORAL
Refills: 0 | DISCHARGE

## 2024-08-05 RX ORDER — EZETIMIBE 10 MG/1
1 TABLET ORAL
Refills: 0 | DISCHARGE

## 2024-08-05 RX ORDER — BACTERIOSTATIC SODIUM CHLORIDE 0.9 %
250 VIAL (ML) INJECTION ONCE
Refills: 0 | Status: COMPLETED | OUTPATIENT
Start: 2024-08-05 | End: 2024-08-05

## 2024-08-05 RX ORDER — ATORVASTATIN CALCIUM 40 MG/1
1 TABLET, FILM COATED ORAL
Refills: 0 | DISCHARGE

## 2024-08-05 RX ORDER — DEXTROSE 4 G
12.5 TABLET,CHEWABLE ORAL ONCE
Refills: 0 | Status: DISCONTINUED | OUTPATIENT
Start: 2024-08-05 | End: 2024-08-19

## 2024-08-05 RX ORDER — METOPROLOL TARTRATE 100 MG
1 TABLET ORAL
Refills: 0 | DISCHARGE

## 2024-08-05 RX ORDER — DEXTROSE 4 G
15 TABLET,CHEWABLE ORAL ONCE
Refills: 0 | Status: DISCONTINUED | OUTPATIENT
Start: 2024-08-05 | End: 2024-08-19

## 2024-08-05 RX ORDER — ACETAMINOPHEN 500 MG
650 TABLET ORAL EVERY 6 HOURS
Refills: 0 | Status: DISCONTINUED | OUTPATIENT
Start: 2024-08-05 | End: 2024-08-19

## 2024-08-05 RX ORDER — ASPIRIN 500 MG
1 TABLET ORAL
Refills: 0 | DISCHARGE

## 2024-08-05 RX ORDER — BACTERIOSTATIC SODIUM CHLORIDE 0.9 %
1000 VIAL (ML) INJECTION
Refills: 0 | Status: DISCONTINUED | OUTPATIENT
Start: 2024-08-05 | End: 2024-08-19

## 2024-08-05 RX ORDER — ISOSORBIDE MONONITRATE 60 MG/1
1 TABLET, EXTENDED RELEASE ORAL
Refills: 0 | DISCHARGE

## 2024-08-05 RX ORDER — GLUCAGON INJECTION, SOLUTION 0.5 MG/.1ML
1 INJECTION, SOLUTION SUBCUTANEOUS ONCE
Refills: 0 | Status: DISCONTINUED | OUTPATIENT
Start: 2024-08-05 | End: 2024-08-19

## 2024-08-05 RX ORDER — INSULIN LISPRO 100/ML
VIAL (ML) SUBCUTANEOUS AT BEDTIME
Refills: 0 | Status: DISCONTINUED | OUTPATIENT
Start: 2024-08-05 | End: 2024-08-19

## 2024-08-05 RX ADMIN — Medication 650 MILLIGRAM(S): at 14:42

## 2024-08-05 RX ADMIN — Medication 75 MILLILITER(S): at 08:05

## 2024-08-05 RX ADMIN — Medication 500 MILLILITER(S): at 08:05

## 2024-08-05 RX ADMIN — Medication 650 MILLIGRAM(S): at 15:42

## 2024-08-05 RX ADMIN — Medication 50 MILLILITER(S): at 10:01

## 2024-08-05 NOTE — ASU DISCHARGE PLAN (ADULT/PEDIATRIC) - ASU DC SPECIAL INSTRUCTIONSFT
Wound Care:   the day AFTER your procedure remove bandage GENTLY, and clean using  mild soap and gentle warm, water stream, pat dry. leave OPEN to air. YOU MAY SHOWER   DO NOT apply lotions, creams, ointments, powder, perfumes to your incision site  DO NOT SOAK your site for 1 week ( no baths, no pools, no tubs, etc...)  Check  your groin and/or wrist daily. A small amount of bruising, and soreness are normal    ACTIVITY: for 24 hours   - DO NOT DRIVE  - DO NOT make any important decisions or sign legal documents   - DO NOT operate heavy machineries   - you may resume sexual activity in 48 hours, unless otherwise instructed by your cardiologist     If your procedure was done through the WRIST: for the NEXT 3 DAYS  - avoid pushing, pulling, with that affected wrist   - avoid repeated movement of that hand and wrist ( eg: typing, hammering)  - DO NOT LIFT anything more than 5 lbs     If your procedure was done through the GROIN: for the NEXT 5 DAYS  - Limit climbing stairs, DO NOT soak in bathtub or pool  - no strenuous activities, pushing, pulling, straining  - Do not lift anything 10lbs or heavier     MEDICATION:   take your medications as explained ( see discharge paperwork)   If you received a STENT, you will be taking antiplatelet medications to KEEP YOUR STENT OPEN ( eg: Aspirin, Plavix, Brilinta, Effient, etc).  Take as prescribed DO NOT STOP taking them without consulting with your cardiologist first.     Follow heart healthy diet recommended by your doctor, , if you smoke STOP SMOKING ( may call [phone] for center of tobacco control if you need assistance)     CALL your doctor to make appointment in 2 WEEKS     ***CALL YOUR DOCTOR***  if you experience: fever, chills, body aches, or severe pain, swelling, redness, heat or yellow discharge at incision site  If you experience Bleeding or excruciating pain at the procedural site, swelling (golf ball size) at your procedural site  If you experience CHEST PAIN  If you experience extremity numbness, tingling, temperature change (of your procedural site)   If you are unable to reach your doctor, you may contact:   -Cardiology Office at [facility] at [phone] or   - [facility] [phone]  - [facility] [phone] We have provided you with a prescription for cardiac rehab which is medically supervised exercise program for your heart and has been shown to improve the quantity and quality of life of people with heart disease like yours. You should attend cardiac rehab 3 times per week for 12 weeks. We have provided you with a list of nearby facilities. Please call your insurance carrier to determine which of these facilities are covered under your plan. Please bring this prescription with you to your follow up appointment with your cardiologist who can then further assist you to enroll into a cardiac rehab program.    Wound Care:   the day AFTER your procedure remove bandage GENTLY, and clean using  mild soap and gentle warm, water stream, pat dry. leave OPEN to air. YOU MAY SHOWER   DO NOT apply lotions, creams, ointments, powder, perfumes to your incision site  DO NOT SOAK your site for 1 week ( no baths, no pools, no tubs, etc...)  Check  your groin and/or wrist daily. A small amount of bruising, and soreness are normal    ACTIVITY: for 24 hours   - DO NOT DRIVE  - DO NOT make any important decisions or sign legal documents   - DO NOT operate heavy machineries   - you may resume sexual activity in 48 hours, unless otherwise instructed by your cardiologist     If your procedure was done through the WRIST: for the NEXT 3 DAYS  - avoid pushing, pulling, with that affected wrist   - avoid repeated movement of that hand and wrist ( eg: typing, hammering)  - DO NOT LIFT anything more than 5 lbs     If your procedure was done through the GROIN: for the NEXT 5 DAYS  - Limit climbing stairs, DO NOT soak in bathtub or pool  - no strenuous activities, pushing, pulling, straining  - Do not lift anything 10lbs or heavier     MEDICATION:   take your medications as explained ( see discharge paperwork)   If you received a STENT, you will be taking antiplatelet medications to KEEP YOUR STENT OPEN ( eg: Aspirin, Plavix, Brilinta, Effient, etc).  Take as prescribed DO NOT STOP taking them without consulting with your cardiologist first.     Follow heart healthy diet recommended by your doctor, , if you smoke STOP SMOKING ( may call [phone] for center of tobacco control if you need assistance)     CALL your doctor to make appointment in 2 WEEKS     ***CALL YOUR DOCTOR***  if you experience: fever, chills, body aches, or severe pain, swelling, redness, heat or yellow discharge at incision site  If you experience Bleeding or excruciating pain at the procedural site, swelling (golf ball size) at your procedural site  If you experience CHEST PAIN  If you experience extremity numbness, tingling, temperature change (of your procedural site)   If you are unable to reach your doctor, you may contact:   -Cardiology Office at [facility] at [phone] or   - [facility] [phone]  - [facility] [phone]

## 2024-08-05 NOTE — ASU DISCHARGE PLAN (ADULT/PEDIATRIC) - CARE PROVIDER_API CALL
Yomi Reveles  Cardiovascular Disease  [address] [place] [address]  Phone: [phone]  Fax: [phone]  Established Patient  Follow Up Time: 2 weeks

## 2024-08-05 NOTE — ASU DISCHARGE PLAN (ADULT/PEDIATRIC) - PROCEDURE
Cardiac catheterization: POBA m/d RCA ISR (balloon angioplasty to mid and distal right coronary artery in-stent-restenosis

## 2024-08-05 NOTE — ASU DISCHARGE PLAN (ADULT/PEDIATRIC) - DO NOT DRIVE IF TAKING PAIN MEDICATION
From: Aram Roa  To: KENDALL Edouard  Sent: 9/24/2018 11:02 AM CDT  Subject: Colonoscopy    Graham Chacon,    What do you think of ordering Cologuard for me ? Or would you advise me to schedule the colonoscopy? I do have internal hemorrhoids.     Thanks.     Aram CM  
NULL

## 2024-08-05 NOTE — CHART NOTE - NSCHARTNOTEFT_GEN_A_CORE
Cardiac Rehab (STEMI/NSTEMI/ACS/Unstable Angina/CHF/Chronic Stable Angina/Heart Surgery (CABG,Valve)/Post PCI):              *Education on benefits of Cardiac Rehab provided to patient: Yes         *Referral and Prescription Given for Cardiac Rehab : Yes         *Pt given list of locations & instructed to contact their insurance company to review list of participating providers         *Pt instructed to bring Cardiac Rehab prescription with them to Cardiology Follow up appointment for assistance with enrollment: Yes         *Pt discharged with copies detail cardiovascular history, medications, testing/treatments         *Reasons for NO Cardiac rehab referral rx:
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 Now s/p cardiac catheterization 8/5: POBA x1 m/dRCA ISR via RFA      Removal of Femoral Sheath    Pulses in the right lower extremity are palpable. The patient was placed in the supine position. The insertion site was identified and the sutures were removed per protocol.  The 6 [place] femoral sheath was then removed. Direct pressure was applied for  20 minutes.     Monitoring of the right groin and both lower extremities including neuro-vascular checks and vital signs every 15 minutes x 4, then every 30 minutes x 2, then every 1 hour was ordered.    Complications: None/Other    Comments:  Patient tolerated procedure well. Good hemostasis achieved post sheath pull. No acute distress noted. Importance of medication adherence with DAPT stressed to patient, as well as groin care. Patient verbalized understanding. Will continue to monitor closely.

## 2024-08-05 NOTE — H&P CARDIOLOGY - HISTORY OF PRESENT ILLNESS
55 y/o female with FH of MI dad  at 65 with pmh of HTN, HLD, DMT2 (controlled AIc 7.0 on Tourjeo, Farxiga, Ozempic), CAD c/b IWSTEMI 3/2022 s/p PCI with GAVIN to RCA, RPDA and RPL; [facility] 2022 with GAVIN to RCA and POBA to distal small branches with UA 2022 s/p CABG x 4 2023, [facility] with recurrent UA 2023 with LAD-SVG graft occluded and PCI/GAVIN to LAD. ICM LVEF 40% is followed by Dr. Yomi Reveles, Cardiologist for reports of SOB at rest and worse with exertion and chest pressure over the past 2 weeks.  Her Imdur was increased from 60mg daily to BID.  Her TTE 2024 revealed LVEF 52% mildly reduced LV systolic function total WMA is 2.00.  Recovered from prior TTE post STEMI.  There is akinesis of the basal to mid inferior wall.  There is akinesis of the basal to mid inferoseptal wall.  The basal to mid inferoseptal wall.  The basal to mid inferoseptal wall demonstrates normal motion.  The entire inferolateral wall demonstrates normal motion.  ETT 2023 submaximal exercise ECG w/o ischemic change MPHR 81% after 6:18 min.  Gilbert Treadmill score positive 6.  CTA 3/2024 reveals LIMA to LAD graft patent and SVG to RCA patent.  There are calcification and/or stenting of the coronary arteries.  Pt presents for [facility] for further evaluation of her CAD with ongoing chest pressure and sob at rest and worsens with exertion so much so her activity is limited.  Her discomfort level waxes and wanes from 1-5/10.  She is in no distress with ECG NSR @74 with moderate voltage criteria for LVH, inferior infarct and anterior infarct with no acute ischemic changes.     53 y/o female with FH of MI dad  at 65 with pmh of HTN, HLD, DMT2 (controlled AIc 7.0 on Tourjeo, Farxiga, Ozempic), CAD c/b IWSTEMI 3/2022 s/p PCI with GAVIN to RCA, RPDA and RPL; [facility] 2022 with GAVIN to RCA and POBA to distal small branches with UA 2022 s/p CABG x 4 2023, [facility] with recurrent UA 2023 with LAD-SVG graft occluded and PCI/GAVIN to LAD. ICM LVEF 40% is followed by Dr. Yomi Reveles, Cardiologist for reports of SOB at rest and worse with exertion and chest pressure over the past 2 weeks.  Her Imdur was increased from 60mg daily to BID.  Her TTE 2024 revealed LVEF 52% mildly reduced LV systolic function total WMA is 2.00.  Recovered from prior TTE post STEMI.  There is akinesis of the basal to mid inferior wall.  There is akinesis of the basal to mid inferoseptal wall.  The basal to mid inferoseptal wall.  The basal to mid inferoseptal wall demonstrates normal motion.  The entire inferolateral wall demonstrates normal motion.  ETT 2023 submaximal exercise ECG w/o ischemic change MPHR 81% after 6:18 min.  Gilbert Treadmill score positive 6.  CTA 3/2024 reveals LIMA to LAD graft patent and SVG to RCA patent.  There are calcification and/or stenting of the coronary arteries.  Pt s/p [facility] on 24 (report unavailable) for  further evaluation of her CAD with ongoing chest pressure and sob at rest and worsens with exertion so much so her activity is limited.  Her discomfort level waxes and wanes from 1-5/10. Returns today for PCI 53 y/o female with FH of MI dad  at 65 with pmh of HTN, HLD, DMT2 (controlled AIc 7.0) , CAD c/b IWSTEMI 3/2022 s/p PCI with GAVIN to RCA, RPDA and RPL;   [facility] 2022 with GAVIN to RCA and POBA to distal small branches with UA 2022 s/p CABG x 4 2023,  [facility] with recurrent UA 2023 with LAD-SVG graft occluded and PCI/GAVIN to LAD. ICM LVEF 40% is followed by Dr. Yomi Reveles, Cardiologist for reports of SOB at rest and worse with exertion and chest pressure over the past 2 weeks.  Her Imdur was increased from 60mg daily to BID.    TTE 2024 revealed LVEF 52% mildly reduced LV systolic function total WMA is 2.00.  Recovered from prior TTE post STEMI.  There is akinesis of the basal to mid inferior wall.  There is akinesis of the basal to mid inferoseptal wall.  The basal to mid inferoseptal wall.  The basal to mid inferoseptal wall demonstrates normal motion.  The entire inferolateral wall demonstrates normal motion.  ETT 2023 submaximal exercise ECG w/o ischemic change MPHR 81% after 6:18 min.  Gilbert Treadmill score positive 6.  CTA 3/2024 reveals LIMA to LAD graft patent and SVG to RCA patent.  There are calcification and/or stenting of the coronary arteries.  Pt s/p [facility] on 24 (report unavailable) for  further evaluation of her CAD with ongoing chest pressure and sob at rest and worsens with exertion so much so her activity is limited.  Her discomfort level waxes and wanes from 1-5/10. Returns today for PCI

## 2024-10-01 NOTE — PATIENT PROFILE ADULT - NSPROGENSOURCEINFO_GEN_A_NUR
Biopsy Photograph Reviewed: Yes Size Of Lesion In Cm: 0.6 Size Of Margin In Cm: 0.5 Anesthesia Volume In Cc: 3 Was An Eye Clamp Used?: No Eye Clamp Note Details: An eye clamp was used during the procedure. Excision Method: Elliptical Saucerization Depth: dermis and superficial adipose tissue Repair Type: Intermediate Intermediate / Complex Repair - Final Wound Length In Cm: 4.3 Suturegard Retention Suture: 2-0 Nylon Retention Suture Bite Size: 3 mm Length To Time In Minutes Device Was In Place: 10 Number Of Hemigard Strips Per Side: 1 Undermining Type: Entire Wound Debridement Text: The wound edges were debrided prior to proceeding with the closure to facilitate wound healing. Helical Rim Text: The closure involved the helical rim. Vermilion Border Text: The closure involved the vermilion border. Nostril Rim Text: The closure involved the nostril rim. Retention Suture Text: Retention sutures were placed to support the closure and prevent dehiscence. Primary Defect Length (In Cm): 0 Suture Removal: 14 days Lab: [id number] Lab Facility: [id number] Graft Donor Site Bandage (Optional-Leave Blank If You Don't Want In Note): Steri-strips and a pressure bandage were applied to the donor site. Epidermal Closure Graft Donor Site (Optional): simple interrupted Billing Type: Third-Party Bill Excision Depth: adipose tissue Scalpel Size: 15 blade Anesthesia Type: 1% lidocaine with epinephrine Hemostasis: Electrocautery Estimated Blood Loss (Cc): minimal Detail Level: Detailed Repair Depth: use same depth as excision depth Anesthesia Type: 1% lidocaine with epinephrine and a 1:10 solution of 8.4% sodium bicarbonate Deep Sutures: 4-0 Vicryl Epidermal Sutures: 4-0 Ethilon Epidermal Closure: running Wound Care: Vaseline Dressing: pressure dressing with telfa Suturegard Intro: Intraoperative tissue expansion was performed, utilizing the SUTUREGARD device, in order to reduce wound tension. Suturegard Body: The suture ends were repeatedly re-tightened and re-clamped to achieve the desired tissue expansion. Hemigard Intro: Due to skin fragility and wound tension, it was decided to use HEMIGARD adhesive retention suture devices to permit a linear closure. The skin was cleaned and dried for a 6cm distance away from the wound. Excessive hair, if present, was removed to allow for adhesion. Hemigard Postcare Instructions: The HEMIGARD strips are to remain completely dry for at least 5-7 days. Positioning (Leave Blank If You Do Not Want): The patient was placed in a comfortable position exposing the surgical site. Pre-Excision Curettage Text (Leave Blank If You Do Not Want): Prior to drawing the surgical margin the visible lesion was removed with electrodesiccation and curettage to clearly define the lesion size. Complex Repair Preamble Text (Leave Blank If You Do Not Want): Extensive wide undermining was performed. Intermediate Repair Preamble Text (Leave Blank If You Do Not Want): Undermining was performed with blunt dissection. Curvilinear Excision Additional Text (Leave Blank If You Do Not Want): The margin was drawn around the clinically apparent lesion.  A curvilinear shape was then drawn on the skin incorporating the lesion and margins.  Incisions were then made along these lines to the appropriate tissue plane and the lesion was extirpated. Fusiform Excision Additional Text (Leave Blank If You Do Not Want): The margin was drawn around the clinically apparent lesion.  A fusiform shape was then drawn on the skin incorporating the lesion and margins.  Incisions were then made along these lines to the appropriate tissue plane and the lesion was extirpated. Elliptical Excision Additional Text (Leave Blank If You Do Not Want): The margin was drawn around the clinically apparent lesion.  An elliptical shape was then drawn on the skin incorporating the lesion and margins.  Incisions were then made along these lines to the appropriate tissue plane and the lesion was extirpated. Saucerization Excision Additional Text (Leave Blank If You Do Not Want): The margin was drawn around the clinically apparent lesion.  Incisions were then made along these lines, in a tangential fashion, to the appropriate tissue plane and the lesion was extirpated. Slit Excision Additional Text (Leave Blank If You Do Not Want): A linear line was drawn on the skin overlying the lesion. An incision was made slowly until the lesion was visualized.  Once visualized, the lesion was removed with blunt dissection. Excisional Biopsy Additional Text (Leave Blank If You Do Not Want): The margin was drawn around the clinically apparent lesion. An elliptical shape was then drawn on the skin incorporating the lesion and margins.  Incisions were then made along these lines to the appropriate tissue plane and the lesion was extirpated. Perilesional Excision Additional Text (Leave Blank If You Do Not Want): The margin was drawn around the clinically apparent lesion. Incisions were then made along these lines to the appropriate tissue plane and the lesion was extirpated. Repair Performed By Another Provider Text (Leave Blank If You Do Not Want): After the tissue was excised the defect was repaired by another provider. No Repair - Repaired With Adjacent Surgical Defect Text (Leave Blank If You Do Not Want): After the excision the defect was repaired concurrently with another surgical defect which was in close approximation. Adjacent Tissue Transfer Text: The defect edges were debeveled with a #15 scalpel blade. Given the location of the defect and the proximity to free margins an adjacent tissue transfer was deemed most appropriate. Using a sterile surgical marker, an appropriate flap was drawn incorporating the defect and placing the expected incisions within the relaxed skin tension lines where possible. The area thus outlined was incised deep to adipose tissue with a #15 scalpel blade. The skin margins were undermined to an appropriate distance in all directions utilizing iris scissors and carried over to close the primary defect. Advancement Flap (Single) Text: The defect edges were debeveled with a #15 scalpel blade.  Given the location of the defect and the proximity to free margins a single advancement flap was deemed most appropriate.  Using a sterile surgical marker, an appropriate advancement flap was drawn incorporating the defect and placing the expected incisions within the relaxed skin tension lines where possible.    The area thus outlined was incised deep to adipose tissue with a #15 scalpel blade.  The skin margins were undermined to an appropriate distance in all directions utilizing iris scissors. Advancement Flap (Double) Text: The defect edges were debeveled with a #15 scalpel blade.  Given the location of the defect and the proximity to free margins a double advancement flap was deemed most appropriate.  Using a sterile surgical marker, the appropriate advancement flaps were drawn incorporating the defect and placing the expected incisions within the relaxed skin tension lines where possible.    The area thus outlined was incised deep to adipose tissue with a #15 scalpel blade.  The skin margins were undermined to an appropriate distance in all directions utilizing iris scissors. Burow's Advancement Flap Text: The defect edges were debeveled with a #15 scalpel blade.  Given the location of the defect and the proximity to free margins a Burow's advancement flap was deemed most appropriate.  Using a sterile surgical marker, the appropriate advancement flap was drawn incorporating the defect and placing the expected incisions within the relaxed skin tension lines where possible.    The area thus outlined was incised deep to adipose tissue with a #15 scalpel blade.  The skin margins were undermined to an appropriate distance in all directions utilizing iris scissors. Chonodrocutaneous Helical Advancement Flap Text: The defect edges were debeveled with a #15 scalpel blade.  Given the location of the defect and the proximity to free margins a chondrocutaneous helical advancement flap was deemed most appropriate.  Using a sterile surgical marker, the appropriate advancement flap was drawn incorporating the defect and placing the expected incisions within the relaxed skin tension lines where possible.    The area thus outlined was incised deep to adipose tissue with a #15 scalpel blade.  The skin margins were undermined to an appropriate distance in all directions utilizing iris scissors. Crescentic Advancement Flap Text: The defect edges were debeveled with a #15 scalpel blade.  Given the location of the defect and the proximity to free margins a crescentic advancement flap was deemed most appropriate.  Using a sterile surgical marker, the appropriate advancement flap was drawn incorporating the defect and placing the expected incisions within the relaxed skin tension lines where possible.    The area thus outlined was incised deep to adipose tissue with a #15 scalpel blade.  The skin margins were undermined to an appropriate distance in all directions utilizing iris scissors. A-T Advancement Flap Text: The defect edges were debeveled with a #15 scalpel blade.  Given the location of the defect, shape of the defect and the proximity to free margins an A-T advancement flap was deemed most appropriate.  Using a sterile surgical marker, an appropriate advancement flap was drawn incorporating the defect and placing the expected incisions within the relaxed skin tension lines where possible.    The area thus outlined was incised deep to adipose tissue with a #15 scalpel blade.  The skin margins were undermined to an appropriate distance in all directions utilizing iris scissors. O-T Advancement Flap Text: The defect edges were debeveled with a #15 scalpel blade.  Given the location of the defect, shape of the defect and the proximity to free margins an O-T advancement flap was deemed most appropriate.  Using a sterile surgical marker, an appropriate advancement flap was drawn incorporating the defect and placing the expected incisions within the relaxed skin tension lines where possible.    The area thus outlined was incised deep to adipose tissue with a #15 scalpel blade.  The skin margins were undermined to an appropriate distance in all directions utilizing iris scissors. O-L Flap Text: The defect edges were debeveled with a #15 scalpel blade.  Given the location of the defect, shape of the defect and the proximity to free margins an O-L flap was deemed most appropriate.  Using a sterile surgical marker, an appropriate advancement flap was drawn incorporating the defect and placing the expected incisions within the relaxed skin tension lines where possible.    The area thus outlined was incised deep to adipose tissue with a #15 scalpel blade.  The skin margins were undermined to an appropriate distance in all directions utilizing iris scissors. O-Z Flap Text: The defect edges were debeveled with a #15 scalpel blade.  Given the location of the defect, shape of the defect and the proximity to free margins an O-Z flap was deemed most appropriate.  Using a sterile surgical marker, an appropriate transposition flap was drawn incorporating the defect and placing the expected incisions within the relaxed skin tension lines where possible. The area thus outlined was incised deep to adipose tissue with a #15 scalpel blade.  The skin margins were undermined to an appropriate distance in all directions utilizing iris scissors. Double O-Z Flap Text: The defect edges were debeveled with a #15 scalpel blade.  Given the location of the defect, shape of the defect and the proximity to free margins a Double O-Z flap was deemed most appropriate.  Using a sterile surgical marker, an appropriate transposition flap was drawn incorporating the defect and placing the expected incisions within the relaxed skin tension lines where possible. The area thus outlined was incised deep to adipose tissue with a #15 scalpel blade.  The skin margins were undermined to an appropriate distance in all directions utilizing iris scissors. V-Y Flap Text: The defect edges were debeveled with a #15 scalpel blade.  Given the location of the defect, shape of the defect and the proximity to free margins a V-Y flap was deemed most appropriate.  Using a sterile surgical marker, an appropriate advancement flap was drawn incorporating the defect and placing the expected incisions within the relaxed skin tension lines where possible.    The area thus outlined was incised deep to adipose tissue with a #15 scalpel blade.  The skin margins were undermined to an appropriate distance in all directions utilizing iris scissors. Advancement-Rotation Flap Text: The defect edges were debeveled with a #15 scalpel blade.  Given the location of the defect, shape of the defect and the proximity to free margins an advancement-rotation flap was deemed most appropriate.  Using a sterile surgical marker, an appropriate flap was drawn incorporating the defect and placing the expected incisions within the relaxed skin tension lines where possible. The area thus outlined was incised deep to adipose tissue with a #15 scalpel blade.  The skin margins were undermined to an appropriate distance in all directions utilizing iris scissors. Mercedes Flap Text: The defect edges were debeveled with a #15 scalpel blade.  Given the location of the defect, shape of the defect and the proximity to free margins a Mercedes flap was deemed most appropriate.  Using a sterile surgical marker, an appropriate advancement flap was drawn incorporating the defect and placing the expected incisions within the relaxed skin tension lines where possible. The area thus outlined was incised deep to adipose tissue with a #15 scalpel blade.  The skin margins were undermined to an appropriate distance in all directions utilizing iris scissors. Modified Advancement Flap Text: The defect edges were debeveled with a #15 scalpel blade.  Given the location of the defect, shape of the defect and the proximity to free margins a modified advancement flap was deemed most appropriate.  Using a sterile surgical marker, an appropriate advancement flap was drawn incorporating the defect and placing the expected incisions within the relaxed skin tension lines where possible.    The area thus outlined was incised deep to adipose tissue with a #15 scalpel blade.  The skin margins were undermined to an appropriate distance in all directions utilizing iris scissors. Mucosal Advancement Flap Text: Given the location of the defect, shape of the defect and the proximity to free margins a mucosal advancement flap was deemed most appropriate. Incisions were made with a 15 blade scalpel in the appropriate fashion along the cutaneous vermillion border and the mucosal lip. The remaining actinically damaged mucosal tissue was excised.  The mucosal advancement flap was then elevated to the gingival sulcus with care taken to preserve the neurovascular structures and advanced into the primary defect. Care was taken to ensure that precise realignment of the vermillion border was achieved. Peng Advancement Flap Text: The defect edges were debeveled with a #15 scalpel blade.  Given the location of the defect, shape of the defect and the proximity to free margins a Peng advancement flap was deemed most appropriate.  Using a sterile surgical marker, an appropriate advancement flap was drawn incorporating the defect and placing the expected incisions within the relaxed skin tension lines where possible. The area thus outlined was incised deep to adipose tissue with a #15 scalpel blade.  The skin margins were undermined to an appropriate distance in all directions utilizing iris scissors. Hatchet Flap Text: The defect edges were debeveled with a #15 scalpel blade.  Given the location of the defect, shape of the defect and the proximity to free margins a hatchet flap was deemed most appropriate.  Using a sterile surgical marker, an appropriate hatchet flap was drawn incorporating the defect and placing the expected incisions within the relaxed skin tension lines where possible.    The area thus outlined was incised deep to adipose tissue with a #15 scalpel blade.  The skin margins were undermined to an appropriate distance in all directions utilizing iris scissors. Rotation Flap Text: The defect edges were debeveled with a #15 scalpel blade.  Given the location of the defect, shape of the defect and the proximity to free margins a rotation flap was deemed most appropriate.  Using a sterile surgical marker, an appropriate rotation flap was drawn incorporating the defect and placing the expected incisions within the relaxed skin tension lines where possible.    The area thus outlined was incised deep to adipose tissue with a #15 scalpel blade.  The skin margins were undermined to an appropriate distance in all directions utilizing iris scissors. Bilateral Rotation Flap Text: The defect edges were debeveled with a #15 scalpel blade. Given the location of the defect, shape of the defect and the proximity to free margins a bilateral rotation flap was deemed most appropriate. Using a sterile surgical marker, an appropriate rotation flap was drawn incorporating the defect and placing the expected incisions within the relaxed skin tension lines where possible. The area thus outlined was incised deep to adipose tissue with a #15 scalpel blade. The skin margins were undermined to an appropriate distance in all directions utilizing iris scissors. Following this, the designed flap was carried over into the primary defect and sutured into place. Spiral Flap Text: The defect edges were debeveled with a #15 scalpel blade.  Given the location of the defect, shape of the defect and the proximity to free margins a spiral flap was deemed most appropriate.  Using a sterile surgical marker, an appropriate rotation flap was drawn incorporating the defect and placing the expected incisions within the relaxed skin tension lines where possible. The area thus outlined was incised deep to adipose tissue with a #15 scalpel blade.  The skin margins were undermined to an appropriate distance in all directions utilizing iris scissors. Staged Advancement Flap Text: The defect edges were debeveled with a #15 scalpel blade. Given the location of the defect, shape of the defect and the proximity to free margins a staged advancement flap was deemed most appropriate. Using a sterile surgical marker, an appropriate advancement flap was drawn incorporating the defect and placing the expected incisions within the relaxed skin tension lines where possible. The area thus outlined was incised deep to adipose tissue with a #15 scalpel blade. The skin margins were undermined to an appropriate distance in all directions utilizing iris scissors. Following this, the designed flap was carried over into the primary defect and sutured into place. Star Wedge Flap Text: The defect edges were debeveled with a #15 scalpel blade.  Given the location of the defect, shape of the defect and the proximity to free margins a star wedge flap was deemed most appropriate.  Using a sterile surgical marker, an appropriate rotation flap was drawn incorporating the defect and placing the expected incisions within the relaxed skin tension lines where possible. The area thus outlined was incised deep to adipose tissue with a #15 scalpel blade.  The skin margins were undermined to an appropriate distance in all directions utilizing iris scissors. Transposition Flap Text: The defect edges were debeveled with a #15 scalpel blade.  Given the location of the defect and the proximity to free margins a transposition flap was deemed most appropriate.  Using a sterile surgical marker, an appropriate transposition flap was drawn incorporating the defect.    The area thus outlined was incised deep to adipose tissue with a #15 scalpel blade.  The skin margins were undermined to an appropriate distance in all directions utilizing iris scissors. Muscle Hinge Flap Text: The defect edges were debeveled with a #15 scalpel blade.  Given the size, depth and location of the defect and the proximity to free margins a muscle hinge flap was deemed most appropriate.  Using a sterile surgical marker, an appropriate hinge flap was drawn incorporating the defect. The area thus outlined was incised with a #15 scalpel blade.  The skin margins were undermined to an appropriate distance in all directions utilizing iris scissors. Mustarde Flap Text: The defect edges were debeveled with a #15 scalpel blade.  Given the size, depth and location of the defect and the proximity to free margins a Mustarde flap was deemed most appropriate. Using a sterile surgical marker, an appropriate flap was drawn incorporating the defect. The area thus outlined was incised with a #15 scalpel blade. The skin margins were undermined to an appropriate distance in all directions utilizing iris scissors. Following this, the designed flap was carried into the primary defect and sutured into place. Nasal Turnover Hinge Flap Text: The defect edges were debeveled with a #15 scalpel blade.  Given the size, depth, location of the defect and the defect being full thickness a nasal turnover hinge flap was deemed most appropriate.  Using a sterile surgical marker, an appropriate hinge flap was drawn incorporating the defect. The area thus outlined was incised with a #15 scalpel blade. The flap was designed to recreate the nasal mucosal lining and the alar rim. The skin margins were undermined to an appropriate distance in all directions utilizing iris scissors. Nasalis-Muscle-Based Myocutaneous Island Pedicle Flap Text: Using a #15 blade, an incision was made around the donor flap to the level of the nasalis muscle. Wide lateral undermining was then performed in both the subcutaneous plane above the nasalis muscle, and in a submuscular plane just above periosteum. This allowed the formation of a free nasalis muscle axial pedicle (based on the angular artery) which was still attached to the actual cutaneous flap, increasing its mobility and vascular viability. Hemostasis was obtained with pinpoint electrocoagulation. The flap was mobilized into position and the pivotal anchor points positioned and stabilized with buried interrupted sutures. Subcutaneous and dermal tissues were closed in a multilayered fashion with sutures. Tissue redundancies were excised, and the epidermal edges were apposed without significant tension and sutured with sutures. Nasalis Myocutaneous Flap Text: Using a #15 blade, an incision was made around the donor flap to the level of the nasalis muscle. Wide lateral undermining was then performed in both the subcutaneous plane above the nasalis muscle, and in a submuscular plane just above periosteum. This allowed the formation of a free nasalis muscle axial pedicle which was still attached to the actual cutaneous flap, increasing its mobility and vascular viability. Hemostasis was obtained with pinpoint electrocoagulation. The flap was mobilized into position and the pivotal anchor points positioned and stabilized with buried interrupted sutures. Subcutaneous and dermal tissues were closed in a multilayered fashion with sutures. Tissue redundancies were excised, and the epidermal edges were apposed without significant tension and sutured with sutures. Nasolabial Transposition Flap Text: The defect edges were debeveled with a #15 scalpel blade.  Given the size, depth and location of the defect and the proximity to free margins a nasolabial transposition flap was deemed most appropriate. Using a sterile surgical marker, an appropriate flap was drawn incorporating the defect. The area thus outlined was incised with a #15 scalpel blade. The skin margins were undermined to an appropriate distance in all directions utilizing iris scissors. Following this, the designed flap was carried into the primary defect and sutured into place. Orbicularis Oris Muscle Flap Text: The defect edges were debeveled with a #15 scalpel blade.  Given that the defect affected the competency of the oral sphincter an orbicularis oris muscle flap was deemed most appropriate to restore this competency and normal muscle function.  Using a sterile surgical marker, an appropriate flap was drawn incorporating the defect. The area thus outlined was incised with a #15 scalpel blade. Melolabial Transposition Flap Text: The defect edges were debeveled with a #15 scalpel blade.  Given the location of the defect and the proximity to free margins a melolabial flap was deemed most appropriate.  Using a sterile surgical marker, an appropriate melolabial transposition flap was drawn incorporating the defect.    The area thus outlined was incised deep to adipose tissue with a #15 scalpel blade.  The skin margins were undermined to an appropriate distance in all directions utilizing iris scissors. Rectangular Flap Text: The defect edges were debeveled with a #15 scalpel blade. Given the location of the defect and the proximity to free margins a rectangular flap was deemed most appropriate. Using a sterile surgical marker, an appropriate rectangular flap was drawn incorporating the defect. The area thus outlined was incised deep to adipose tissue with a #15 scalpel blade. The skin margins were undermined to an appropriate distance in all directions utilizing iris scissors. Following this, the designed flap was carried over into the primary defect and sutured into place. patient Rhombic Flap Text: The defect edges were debeveled with a #15 scalpel blade.  Given the location of the defect and the proximity to free margins a rhombic flap was deemed most appropriate.  Using a sterile surgical marker, an appropriate rhombic flap was drawn incorporating the defect.    The area thus outlined was incised deep to adipose tissue with a #15 scalpel blade.  The skin margins were undermined to an appropriate distance in all directions utilizing iris scissors. Rhomboid Transposition Flap Text: The defect edges were debeveled with a #15 scalpel blade.  Given the location of the defect and the proximity to free margins a rhomboid transposition flap was deemed most appropriate.  Using a sterile surgical marker, an appropriate rhomboid flap was drawn incorporating the defect.    The area thus outlined was incised deep to adipose tissue with a #15 scalpel blade.  The skin margins were undermined to an appropriate distance in all directions utilizing iris scissors. Bi-Rhombic Flap Text: The defect edges were debeveled with a #15 scalpel blade.  Given the location of the defect and the proximity to free margins a bi-rhombic flap was deemed most appropriate.  Using a sterile surgical marker, an appropriate rhombic flap was drawn incorporating the defect. The area thus outlined was incised deep to adipose tissue with a #15 scalpel blade.  The skin margins were undermined to an appropriate distance in all directions utilizing iris scissors. Helical Rim Advancement Flap Text: The defect edges were debeveled with a #15 blade scalpel.  Given the location of the defect and the proximity to free margins (helical rim) a double helical rim advancement flap was deemed most appropriate.  Using a sterile surgical marker, the appropriate advancement flaps were drawn incorporating the defect and placing the expected incisions between the helical rim and antihelix where possible.  The area thus outlined was incised through and through with a #15 scalpel blade.  With a skin hook and iris scissors, the flaps were gently and sharply undermined and freed up. Bilateral Helical Rim Advancement Flap Text: The defect edges were debeveled with a #15 blade scalpel.  Given the location of the defect and the proximity to free margins (helical rim) a bilateral helical rim advancement flap was deemed most appropriate.  Using a sterile surgical marker, the appropriate advancement flaps were drawn incorporating the defect and placing the expected incisions between the helical rim and antihelix where possible.  The area thus outlined was incised through and through with a #15 scalpel blade.  With a skin hook and iris scissors, the flaps were gently and sharply undermined and freed up. Ear Star Wedge Flap Text: The defect edges were debeveled with a #15 blade scalpel.  Given the location of the defect and the proximity to free margins (helical rim) an ear star wedge flap was deemed most appropriate.  Using a sterile surgical marker, the appropriate flap was drawn incorporating the defect and placing the expected incisions between the helical rim and antihelix where possible.  The area thus outlined was incised through and through with a #15 scalpel blade. Flip-Flop Flap Text: The defect edges were debeveled with a #15 blade scalpel.  Given the location of the defect and the proximity to free margins a flip-flop flap was deemed most appropriate. Using a sterile surgical marker, the appropriate flap was drawn incorporating the defect and placing the expected incisions between the helical rim and antihelix where possible.  The area thus outlined was incised through and through with a #15 scalpel blade. Following this, the designed flap was carried over into the primary defect and sutured into place. Banner Transposition Flap Text: The defect edges were debeveled with a #15 scalpel blade.  Given the location of the defect and the proximity to free margins a Banner transposition flap was deemed most appropriate.  Using a sterile surgical marker, an appropriate flap drawn around the defect. The area thus outlined was incised deep to adipose tissue with a #15 scalpel blade.  The skin margins were undermined to an appropriate distance in all directions utilizing iris scissors. Bilobed Flap Text: The defect edges were debeveled with a #15 scalpel blade.  Given the location of the defect and the proximity to free margins a bilobe flap was deemed most appropriate.  Using a sterile surgical marker, an appropriate bilobe flap drawn around the defect.    The area thus outlined was incised deep to adipose tissue with a #15 scalpel blade.  The skin margins were undermined to an appropriate distance in all directions utilizing iris scissors. Bilobed Transposition Flap Text: The defect edges were debeveled with a #15 scalpel blade.  Given the location of the defect and the proximity to free margins a bilobed transposition flap was deemed most appropriate.  Using a sterile surgical marker, an appropriate bilobe flap drawn around the defect.    The area thus outlined was incised deep to adipose tissue with a #15 scalpel blade.  The skin margins were undermined to an appropriate distance in all directions utilizing iris scissors. Trilobed Flap Text: The defect edges were debeveled with a #15 scalpel blade.  Given the location of the defect and the proximity to free margins a trilobed flap was deemed most appropriate.  Using a sterile surgical marker, an appropriate trilobed flap drawn around the defect.    The area thus outlined was incised deep to adipose tissue with a #15 scalpel blade.  The skin margins were undermined to an appropriate distance in all directions utilizing iris scissors. Dorsal Nasal Flap Text: The defect edges were debeveled with a #15 scalpel blade.  Given the location of the defect and the proximity to free margins a dorsal nasal flap was deemed most appropriate.  Using a sterile surgical marker, an appropriate dorsal nasal flap was drawn around the defect.    The area thus outlined was incised deep to adipose tissue with a #15 scalpel blade.  The skin margins were undermined to an appropriate distance in all directions utilizing iris scissors. Island Pedicle Flap Text: The defect edges were debeveled with a #15 scalpel blade.  Given the location of the defect, shape of the defect and the proximity to free margins an island pedicle advancement flap was deemed most appropriate.  Using a sterile surgical marker, an appropriate advancement flap was drawn incorporating the defect, outlining the appropriate donor tissue and placing the expected incisions within the relaxed skin tension lines where possible.    The area thus outlined was incised deep to adipose tissue with a #15 scalpel blade.  The skin margins were undermined to an appropriate distance in all directions around the primary defect and laterally outward around the island pedicle utilizing iris scissors.  There was minimal undermining beneath the pedicle flap. Island Pedicle Flap With Canthal Suspension Text: The defect edges were debeveled with a #15 scalpel blade.  Given the location of the defect, shape of the defect and the proximity to free margins an island pedicle advancement flap was deemed most appropriate.  Using a sterile surgical marker, an appropriate advancement flap was drawn incorporating the defect, outlining the appropriate donor tissue and placing the expected incisions within the relaxed skin tension lines where possible. The area thus outlined was incised deep to adipose tissue with a #15 scalpel blade.  The skin margins were undermined to an appropriate distance in all directions around the primary defect and laterally outward around the island pedicle utilizing iris scissors.  There was minimal undermining beneath the pedicle flap. A suspension suture was placed in the canthal tendon to prevent tension and prevent ectropion. Alar Island Pedicle Flap Text: The defect edges were debeveled with a #15 scalpel blade.  Given the location of the defect, shape of the defect and the proximity to the alar rim an island pedicle advancement flap was deemed most appropriate.  Using a sterile surgical marker, an appropriate advancement flap was drawn incorporating the defect, outlining the appropriate donor tissue and placing the expected incisions within the nasal ala running parallel to the alar rim. The area thus outlined was incised with a #15 scalpel blade.  The skin margins were undermined minimally to an appropriate distance in all directions around the primary defect and laterally outward around the island pedicle utilizing iris scissors.  There was minimal undermining beneath the pedicle flap. Double Island Pedicle Flap Text: The defect edges were debeveled with a #15 scalpel blade.  Given the location of the defect, shape of the defect and the proximity to free margins a double island pedicle advancement flap was deemed most appropriate.  Using a sterile surgical marker, an appropriate advancement flap was drawn incorporating the defect, outlining the appropriate donor tissue and placing the expected incisions within the relaxed skin tension lines where possible.    The area thus outlined was incised deep to adipose tissue with a #15 scalpel blade.  The skin margins were undermined to an appropriate distance in all directions around the primary defect and laterally outward around the island pedicle utilizing iris scissors.  There was minimal undermining beneath the pedicle flap. Island Pedicle Flap-Requiring Vessel Identification Text: The defect edges were debeveled with a #15 scalpel blade.  Given the location of the defect, shape of the defect and the proximity to free margins an island pedicle advancement flap was deemed most appropriate.  Using a sterile surgical marker, an appropriate advancement flap was drawn, based on the axial vessel mentioned above, incorporating the defect, outlining the appropriate donor tissue and placing the expected incisions within the relaxed skin tension lines where possible.    The area thus outlined was incised deep to adipose tissue with a #15 scalpel blade.  The skin margins were undermined to an appropriate distance in all directions around the primary defect and laterally outward around the island pedicle utilizing iris scissors.  There was minimal undermining beneath the pedicle flap. Keystone Flap Text: The defect edges were debeveled with a #15 scalpel blade.  Given the location of the defect, shape of the defect a keystone flap was deemed most appropriate.  Using a sterile surgical marker, an appropriate keystone flap was drawn incorporating the defect, outlining the appropriate donor tissue and placing the expected incisions within the relaxed skin tension lines where possible. The area thus outlined was incised deep to adipose tissue with a #15 scalpel blade.  The skin margins were undermined to an appropriate distance in all directions around the primary defect and laterally outward around the flap utilizing iris scissors. O-T Plasty Text: The defect edges were debeveled with a #15 scalpel blade.  Given the location of the defect, shape of the defect and the proximity to free margins an O-T plasty was deemed most appropriate.  Using a sterile surgical marker, an appropriate O-T plasty was drawn incorporating the defect and placing the expected incisions within the relaxed skin tension lines where possible.    The area thus outlined was incised deep to adipose tissue with a #15 scalpel blade.  The skin margins were undermined to an appropriate distance in all directions utilizing iris scissors. O-Z Plasty Text: The defect edges were debeveled with a #15 scalpel blade.  Given the location of the defect, shape of the defect and the proximity to free margins an O-Z plasty (double transposition flap) was deemed most appropriate.  Using a sterile surgical marker, the appropriate transposition flaps were drawn incorporating the defect and placing the expected incisions within the relaxed skin tension lines where possible.    The area thus outlined was incised deep to adipose tissue with a #15 scalpel blade.  The skin margins were undermined to an appropriate distance in all directions utilizing iris scissors.  Hemostasis was achieved with electrocautery.  The flaps were then transposed into place, one clockwise and the other counterclockwise, and anchored with interrupted buried subcutaneous sutures. Double O-Z Plasty Text: The defect edges were debeveled with a #15 scalpel blade.  Given the location of the defect, shape of the defect and the proximity to free margins a Double O-Z plasty (double transposition flap) was deemed most appropriate.  Using a sterile surgical marker, the appropriate transposition flaps were drawn incorporating the defect and placing the expected incisions within the relaxed skin tension lines where possible. The area thus outlined was incised deep to adipose tissue with a #15 scalpel blade.  The skin margins were undermined to an appropriate distance in all directions utilizing iris scissors.  Hemostasis was achieved with electrocautery.  The flaps were then transposed into place, one clockwise and the other counterclockwise, and anchored with interrupted buried subcutaneous sutures. V-Y Plasty Text: The defect edges were debeveled with a #15 scalpel blade.  Given the location of the defect, shape of the defect and the proximity to free margins an V-Y advancement flap was deemed most appropriate.  Using a sterile surgical marker, an appropriate advancement flap was drawn incorporating the defect and placing the expected incisions within the relaxed skin tension lines where possible.    The area thus outlined was incised deep to adipose tissue with a #15 scalpel blade.  The skin margins were undermined to an appropriate distance in all directions utilizing iris scissors. H Plasty Text: Given the location of the defect, shape of the defect and the proximity to free margins a H-plasty was deemed most appropriate for repair.  Using a sterile surgical marker, the appropriate advancement arms of the H-plasty were drawn incorporating the defect and placing the expected incisions within the relaxed skin tension lines where possible. The area thus outlined was incised deep to adipose tissue with a #15 scalpel blade. The skin margins were undermined to an appropriate distance in all directions utilizing iris scissors.  The opposing advancement arms were then advanced into place in opposite direction and anchored with interrupted buried subcutaneous sutures. W Plasty Text: The lesion was extirpated to the level of the fat with a #15 scalpel blade.  Given the location of the defect, shape of the defect and the proximity to free margins a W-plasty was deemed most appropriate for repair.  Using a sterile surgical marker, the appropriate transposition arms of the W-plasty were drawn incorporating the defect and placing the expected incisions within the relaxed skin tension lines where possible.    The area thus outlined was incised deep to adipose tissue with a #15 scalpel blade.  The skin margins were undermined to an appropriate distance in all directions utilizing iris scissors.  The opposing transposition arms were then transposed into place in opposite direction and anchored with interrupted buried subcutaneous sutures. Z Plasty Text: The lesion was extirpated to the level of the fat with a #15 scalpel blade.  Given the location of the defect, shape of the defect and the proximity to free margins a Z-plasty was deemed most appropriate for repair.  Using a sterile surgical marker, the appropriate transposition arms of the Z-plasty were drawn incorporating the defect and placing the expected incisions within the relaxed skin tension lines where possible.    The area thus outlined was incised deep to adipose tissue with a #15 scalpel blade.  The skin margins were undermined to an appropriate distance in all directions utilizing iris scissors.  The opposing transposition arms were then transposed into place in opposite direction and anchored with interrupted buried subcutaneous sutures. Double Z Plasty Text: The lesion was extirpated to the level of the fat with a #15 scalpel blade. Given the location of the defect, shape of the defect and the proximity to free margins a double Z-plasty was deemed most appropriate for repair. Using a sterile surgical marker, the appropriate transposition arms of the double Z-plasty were drawn incorporating the defect and placing the expected incisions within the relaxed skin tension lines where possible. The area thus outlined was incised deep to adipose tissue with a #15 scalpel blade. The skin margins were undermined to an appropriate distance in all directions utilizing iris scissors. The opposing transposition arms were then transposed and carried over into place in opposite direction and anchored with interrupted buried subcutaneous sutures. Zygomaticofacial Flap Text: Given the location of the defect, shape of the defect and the proximity to free margins a zygomaticofacial flap was deemed most appropriate for repair.  Using a sterile surgical marker, the appropriate flap was drawn incorporating the defect and placing the expected incisions within the relaxed skin tension lines where possible. The area thus outlined was incised deep to adipose tissue with a #15 scalpel blade with preservation of a vascular pedicle.  The skin margins were undermined to an appropriate distance in all directions utilizing iris scissors.  The flap was then placed into the defect and anchored with interrupted buried subcutaneous sutures. Cheek Interpolation Flap Text: A decision was made to reconstruct the defect utilizing an interpolation axial flap and a staged reconstruction.  A telfa template was made of the defect.  This telfa template was then used to outline the Cheek Interpolation flap.  The donor area for the pedicle flap was then injected with anesthesia.  The flap was excised through the skin and subcutaneous tissue down to the layer of the underlying musculature.  The interpolation flap was carefully excised within this deep plane to maintain its blood supply.  The edges of the donor site were undermined.   The donor site was closed in a primary fashion.  The pedicle was then rotated into position and sutured.  Once the tube was sutured into place, adequate blood supply was confirmed with blanching and refill.  The pedicle was then wrapped with xeroform gauze and dressed appropriately with a telfa and gauze bandage to ensure continued blood supply and protect the attached pedicle. Cheek-To-Nose Interpolation Flap Text: A decision was made to reconstruct the defect utilizing an interpolation axial flap and a staged reconstruction.  A telfa template was made of the defect.  This telfa template was then used to outline the Cheek-To-Nose Interpolation flap.  The donor area for the pedicle flap was then injected with anesthesia.  The flap was excised through the skin and subcutaneous tissue down to the layer of the underlying musculature.  The interpolation flap was carefully excised within this deep plane to maintain its blood supply.  The edges of the donor site were undermined.   The donor site was closed in a primary fashion.  The pedicle was then rotated into position and sutured.  Once the tube was sutured into place, adequate blood supply was confirmed with blanching and refill.  The pedicle was then wrapped with xeroform gauze and dressed appropriately with a telfa and gauze bandage to ensure continued blood supply and protect the attached pedicle. Interpolation Flap Text: A decision was made to reconstruct the defect utilizing an interpolation axial flap and a staged reconstruction.  A telfa template was made of the defect.  This telfa template was then used to outline the interpolation flap.  The donor area for the pedicle flap was then injected with anesthesia.  The flap was excised through the skin and subcutaneous tissue down to the layer of the underlying musculature.  The interpolation flap was carefully excised within this deep plane to maintain its blood supply.  The edges of the donor site were undermined.   The donor site was closed in a primary fashion.  The pedicle was then rotated into position and sutured.  Once the tube was sutured into place, adequate blood supply was confirmed with blanching and refill.  The pedicle was then wrapped with xeroform gauze and dressed appropriately with a telfa and gauze bandage to ensure continued blood supply and protect the attached pedicle. Melolabial Interpolation Flap Text: A decision was made to reconstruct the defect utilizing an interpolation axial flap and a staged reconstruction.  A telfa template was made of the defect.  This telfa template was then used to outline the melolabial interpolation flap.  The donor area for the pedicle flap was then injected with anesthesia.  The flap was excised through the skin and subcutaneous tissue down to the layer of the underlying musculature.  The pedicle flap was carefully excised within this deep plane to maintain its blood supply.  The edges of the donor site were undermined.   The donor site was closed in a primary fashion.  The pedicle was then rotated into position and sutured.  Once the tube was sutured into place, adequate blood supply was confirmed with blanching and refill.  The pedicle was then wrapped with xeroform gauze and dressed appropriately with a telfa and gauze bandage to ensure continued blood supply and protect the attached pedicle. Mastoid Interpolation Flap Text: A decision was made to reconstruct the defect utilizing an interpolation axial flap and a staged reconstruction.  A telfa template was made of the defect.  This telfa template was then used to outline the mastoid interpolation flap.  The donor area for the pedicle flap was then injected with anesthesia.  The flap was excised through the skin and subcutaneous tissue down to the layer of the underlying musculature.  The pedicle flap was carefully excised within this deep plane to maintain its blood supply.  The edges of the donor site were undermined.   The donor site was closed in a primary fashion.  The pedicle was then rotated into position and sutured.  Once the tube was sutured into place, adequate blood supply was confirmed with blanching and refill.  The pedicle was then wrapped with xeroform gauze and dressed appropriately with a telfa and gauze bandage to ensure continued blood supply and protect the attached pedicle. Posterior Auricular Interpolation Flap Text: A decision was made to reconstruct the defect utilizing an interpolation axial flap and a staged reconstruction.  A telfa template was made of the defect.  This telfa template was then used to outline the posterior auricular interpolation flap.  The donor area for the pedicle flap was then injected with anesthesia.  The flap was excised through the skin and subcutaneous tissue down to the layer of the underlying musculature.  The pedicle flap was carefully excised within this deep plane to maintain its blood supply.  The edges of the donor site were undermined.   The donor site was closed in a primary fashion.  The pedicle was then rotated into position and sutured.  Once the tube was sutured into place, adequate blood supply was confirmed with blanching and refill.  The pedicle was then wrapped with xeroform gauze and dressed appropriately with a telfa and gauze bandage to ensure continued blood supply and protect the attached pedicle. Paramedian Forehead Flap Text: A decision was made to reconstruct the defect utilizing an interpolation axial flap and a staged reconstruction.  A telfa template was made of the defect.  This telfa template was then used to outline the paramedian forehead pedicle flap.  The donor area for the pedicle flap was then injected with anesthesia.  The flap was excised through the skin and subcutaneous tissue down to the layer of the underlying musculature.  The pedicle flap was carefully excised within this deep plane to maintain its blood supply.  The edges of the donor site were undermined.   The donor site was closed in a primary fashion.  The pedicle was then rotated into position and sutured.  Once the tube was sutured into place, adequate blood supply was confirmed with blanching and refill.  The pedicle was then wrapped with xeroform gauze and dressed appropriately with a telfa and gauze bandage to ensure continued blood supply and protect the attached pedicle. Abbe Flap (Upper To Lower Lip) Text: The defect of the lower lip was assessed and measured.  Given the location and size of the defect, an Abbe flap was deemed most appropriate. Using a sterile surgical marker, an appropriate Abbe flap was measured and drawn on the upper lip. Local anesthesia was then infiltrated.  A scalpel was then used to incise the upper lip through and through the skin, vermilion, muscle and mucosa, leaving the flap pedicled on the opposite side.  The flap was then rotated and transferred to the lower lip defect.  The flap was then sutured into place with a three layer technique, closing the orbicularis oris muscle layer with subcutaneous buried sutures, followed by a mucosal layer and an epidermal layer. Abbe Flap (Lower To Upper Lip) Text: The defect of the upper lip was assessed and measured.  Given the location and size of the defect, an Abbe flap was deemed most appropriate. Using a sterile surgical marker, an appropriate Abbe flap was measured and drawn on the lower lip. Local anesthesia was then infiltrated. A scalpel was then used to incise the upper lip through and through the skin, vermilion, muscle and mucosa, leaving the flap pedicled on the opposite side.  The flap was then rotated and transferred to the lower lip defect.  The flap was then sutured into place with a three layer technique, closing the orbicularis oris muscle layer with subcutaneous buried sutures, followed by a mucosal layer and an epidermal layer. Estlander Flap (Upper To Lower Lip) Text: The defect of the lower lip was assessed and measured.  Given the location and size of the defect, an Estlander flap was deemed most appropriate. Using a sterile surgical marker, an appropriate Estlander flap was measured and drawn on the upper lip. Local anesthesia was then infiltrated. A scalpel was then used to incise the lateral aspect of the flap, through skin, muscle and mucosa, leaving the flap pedicled medially.  The flap was then rotated and positioned to fill the lower lip defect.  The flap was then sutured into place with a three layer technique, closing the orbicularis oris muscle layer with subcutaneous buried sutures, followed by a mucosal layer and an epidermal layer. Lip Wedge Excision Repair Text: Given the location of the defect and the proximity to free margins a full thickness wedge repair was deemed most appropriate.  Using a sterile surgical marker, the appropriate repair was drawn incorporating the defect and placing the expected incisions perpendicular to the vermillion border.  The vermillion border was also meticulously outlined to ensure appropriate reapproximation during the repair.  The area thus outlined was incised through and through with a #15 scalpel blade.  The muscularis and dermis were reaproximated with deep sutures following hemostasis. Care was taken to realign the vermillion border before proceeding with the superficial closure.  Once the vermillion was realigned the superfical and mucosal closure was finished. Ftsg Text: The defect edges were debeveled with a #15 scalpel blade.  Given the location of the defect, shape of the defect and the proximity to free margins a full thickness skin graft was deemed most appropriate.  Using a sterile surgical marker, the primary defect shape was transferred to the donor site. The area thus outlined was incised deep to adipose tissue with a #15 scalpel blade.  The harvested graft was then trimmed of adipose tissue until only dermis and epidermis was left.  The skin margins of the secondary defect were undermined to an appropriate distance in all directions utilizing iris scissors.  The secondary defect was closed with interrupted buried subcutaneous sutures.  The skin edges were then re-apposed with running  sutures.  The skin graft was then placed in the primary defect and oriented appropriately. Split-Thickness Skin Graft Text: The defect edges were debeveled with a #15 scalpel blade.  Given the location of the defect, shape of the defect and the proximity to free margins a split thickness skin graft was deemed most appropriate.  Using a sterile surgical marker, the primary defect shape was transferred to the donor site. The split thickness graft was then harvested.  The skin graft was then placed in the primary defect and oriented appropriately. Pinch Graft Text: The defect edges were debeveled with a #15 scalpel blade. Given the location of the defect, shape of the defect and the proximity to free margins a pinch graft was deemed most appropriate. Using a sterile surgical marker, the primary defect shape was transferred to the donor site. The area thus outlined was incised deep to adipose tissue with a #15 scalpel blade.  The harvested graft was then trimmed of adipose tissue until only dermis and epidermis was left. The skin graft was then placed in the primary defect and oriented appropriately. Burow's Graft Text: The defect edges were debeveled with a #15 scalpel blade.  Given the location of the defect, shape of the defect, the proximity to free margins and the presence of a standing cone deformity a Burow's skin graft was deemed most appropriate. The standing cone was removed and this tissue was then trimmed to the shape of the primary defect. The adipose tissue was also removed until only dermis and epidermis were left.  The skin margins of the secondary defect were undermined to an appropriate distance in all directions utilizing iris scissors.  The secondary defect was closed with interrupted buried subcutaneous sutures.  The skin edges were then re-apposed with running  sutures.  The skin graft was then placed in the primary defect and oriented appropriately. Cartilage Graft Text: The defect edges were debeveled with a #15 scalpel blade.  Given the location of the defect, shape of the defect, the fact the defect involved a full thickness cartilage defect a cartilage graft was deemed most appropriate.  An appropriate donor site was identified, cleansed, and anesthetized. The cartilage graft was then harvested and transferred to the recipient site, oriented appropriately and then sutured into place.  The secondary defect was then repaired using a primary closure. Composite Graft Text: The defect edges were debeveled with a #15 scalpel blade.  Given the location of the defect, shape of the defect, the proximity to free margins and the fact the defect was full thickness a composite graft was deemed most appropriate.  The defect was outline and then transferred to the donor site.  A full thickness graft was then excised from the donor site. The graft was then placed in the primary defect, oriented appropriately and then sutured into place.  The secondary defect was then repaired using a primary closure. Epidermal Autograft Text: The defect edges were debeveled with a #15 scalpel blade.  Given the location of the defect, shape of the defect and the proximity to free margins an epidermal autograft was deemed most appropriate.  Using a sterile surgical marker, the primary defect shape was transferred to the donor site. The epidermal graft was then harvested.  The skin graft was then placed in the primary defect and oriented appropriately. Dermal Autograft Text: The defect edges were debeveled with a #15 scalpel blade.  Given the location of the defect, shape of the defect and the proximity to free margins a dermal autograft was deemed most appropriate.  Using a sterile surgical marker, the primary defect shape was transferred to the donor site. The area thus outlined was incised deep to adipose tissue with a #15 scalpel blade.  The harvested graft was then trimmed of adipose and epidermal tissue until only dermis was left.  The skin graft was then placed in the primary defect and oriented appropriately. Skin Substitute Text: The defect edges were debeveled with a #15 scalpel blade.  Given the location of the defect, shape of the defect and the proximity to free margins a skin substitute graft was deemed most appropriate.  The graft material was trimmed to fit the size of the defect. The graft was then placed in the primary defect and oriented appropriately. Tissue Cultured Epidermal Autograft Text: The defect edges were debeveled with a #15 scalpel blade.  Given the location of the defect, shape of the defect and the proximity to free margins a tissue cultured epidermal autograft was deemed most appropriate.  The graft was then trimmed to fit the size of the defect.  The graft was then placed in the primary defect and oriented appropriately. Xenograft Text: The defect edges were debeveled with a #15 scalpel blade.  Given the location of the defect, shape of the defect and the proximity to free margins a xenograft was deemed most appropriate.  The graft was then trimmed to fit the size of the defect.  The graft was then placed in the primary defect and oriented appropriately. Purse String (Intermediate) Text: Given the location of the defect and the characteristics of the surrounding skin a pursestring intermediate closure was deemed most appropriate.  Undermining was performed circumfirentially around the surgical defect.  A purstring suture was then placed and tightened. Purse String (Simple) Text: Given the location of the defect and the characteristics of the surrounding skin a purse string simple closure was deemed most appropriate.  Undermining was performed circumferentially around the surgical defect.  A purse string suture was then placed and tightened. Partial Purse String (Intermediate) Text: Given the location of the defect and the characteristics of the surrounding skin an intermediate purse string closure was deemed most appropriate.  Undermining was performed circumferentially around the surgical defect.  A purse string suture was then placed and tightened. Wound tension of the circular defect prevented complete closure of the wound. Partial Purse String (Simple) Text: Given the location of the defect and the characteristics of the surrounding skin a simple purse string closure was deemed most appropriate.  Undermining was performed circumferentially around the surgical defect.  A purse string suture was then placed and tightened. Wound tension of the circular defect prevented complete closure of the wound. Complex Repair And Single Advancement Flap Text: The defect edges were debeveled with a #15 scalpel blade.  The primary defect was closed partially with a complex linear closure.  Given the location of the remaining defect, shape of the defect and the proximity to free margins a single advancement flap was deemed most appropriate for complete closure of the defect.  Using a sterile surgical marker, an appropriate advancement flap was drawn incorporating the defect and placing the expected incisions within the relaxed skin tension lines where possible.    The area thus outlined was incised deep to adipose tissue with a #15 scalpel blade.  The skin margins were undermined to an appropriate distance in all directions utilizing iris scissors. Complex Repair And Double Advancement Flap Text: The defect edges were debeveled with a #15 scalpel blade.  The primary defect was closed partially with a complex linear closure.  Given the location of the remaining defect, shape of the defect and the proximity to free margins a double advancement flap was deemed most appropriate for complete closure of the defect.  Using a sterile surgical marker, an appropriate advancement flap was drawn incorporating the defect and placing the expected incisions within the relaxed skin tension lines where possible.    The area thus outlined was incised deep to adipose tissue with a #15 scalpel blade.  The skin margins were undermined to an appropriate distance in all directions utilizing iris scissors. Complex Repair And Modified Advancement Flap Text: The defect edges were debeveled with a #15 scalpel blade.  The primary defect was closed partially with a complex linear closure.  Given the location of the remaining defect, shape of the defect and the proximity to free margins a modified advancement flap was deemed most appropriate for complete closure of the defect.  Using a sterile surgical marker, an appropriate advancement flap was drawn incorporating the defect and placing the expected incisions within the relaxed skin tension lines where possible.    The area thus outlined was incised deep to adipose tissue with a #15 scalpel blade.  The skin margins were undermined to an appropriate distance in all directions utilizing iris scissors. Complex Repair And A-T Advancement Flap Text: The defect edges were debeveled with a #15 scalpel blade.  The primary defect was closed partially with a complex linear closure.  Given the location of the remaining defect, shape of the defect and the proximity to free margins an A-T advancement flap was deemed most appropriate for complete closure of the defect.  Using a sterile surgical marker, an appropriate advancement flap was drawn incorporating the defect and placing the expected incisions within the relaxed skin tension lines where possible.    The area thus outlined was incised deep to adipose tissue with a #15 scalpel blade.  The skin margins were undermined to an appropriate distance in all directions utilizing iris scissors. Complex Repair And O-T Advancement Flap Text: The defect edges were debeveled with a #15 scalpel blade.  The primary defect was closed partially with a complex linear closure.  Given the location of the remaining defect, shape of the defect and the proximity to free margins an O-T advancement flap was deemed most appropriate for complete closure of the defect.  Using a sterile surgical marker, an appropriate advancement flap was drawn incorporating the defect and placing the expected incisions within the relaxed skin tension lines where possible.    The area thus outlined was incised deep to adipose tissue with a #15 scalpel blade.  The skin margins were undermined to an appropriate distance in all directions utilizing iris scissors. Complex Repair And O-L Flap Text: The defect edges were debeveled with a #15 scalpel blade.  The primary defect was closed partially with a complex linear closure.  Given the location of the remaining defect, shape of the defect and the proximity to free margins an O-L flap was deemed most appropriate for complete closure of the defect.  Using a sterile surgical marker, an appropriate flap was drawn incorporating the defect and placing the expected incisions within the relaxed skin tension lines where possible.    The area thus outlined was incised deep to adipose tissue with a #15 scalpel blade.  The skin margins were undermined to an appropriate distance in all directions utilizing iris scissors. Complex Repair And Bilobe Flap Text: The defect edges were debeveled with a #15 scalpel blade.  The primary defect was closed partially with a complex linear closure.  Given the location of the remaining defect, shape of the defect and the proximity to free margins a bilobe flap was deemed most appropriate for complete closure of the defect.  Using a sterile surgical marker, an appropriate advancement flap was drawn incorporating the defect and placing the expected incisions within the relaxed skin tension lines where possible.    The area thus outlined was incised deep to adipose tissue with a #15 scalpel blade.  The skin margins were undermined to an appropriate distance in all directions utilizing iris scissors. Complex Repair And Melolabial Flap Text: The defect edges were debeveled with a #15 scalpel blade.  The primary defect was closed partially with a complex linear closure.  Given the location of the remaining defect, shape of the defect and the proximity to free margins a melolabial flap was deemed most appropriate for complete closure of the defect.  Using a sterile surgical marker, an appropriate advancement flap was drawn incorporating the defect and placing the expected incisions within the relaxed skin tension lines where possible.    The area thus outlined was incised deep to adipose tissue with a #15 scalpel blade.  The skin margins were undermined to an appropriate distance in all directions utilizing iris scissors. Complex Repair And Rotation Flap Text: The defect edges were debeveled with a #15 scalpel blade.  The primary defect was closed partially with a complex linear closure.  Given the location of the remaining defect, shape of the defect and the proximity to free margins a rotation flap was deemed most appropriate for complete closure of the defect.  Using a sterile surgical marker, an appropriate advancement flap was drawn incorporating the defect and placing the expected incisions within the relaxed skin tension lines where possible.    The area thus outlined was incised deep to adipose tissue with a #15 scalpel blade.  The skin margins were undermined to an appropriate distance in all directions utilizing iris scissors. Complex Repair And Rhombic Flap Text: The defect edges were debeveled with a #15 scalpel blade.  The primary defect was closed partially with a complex linear closure.  Given the location of the remaining defect, shape of the defect and the proximity to free margins a rhombic flap was deemed most appropriate for complete closure of the defect.  Using a sterile surgical marker, an appropriate advancement flap was drawn incorporating the defect and placing the expected incisions within the relaxed skin tension lines where possible.    The area thus outlined was incised deep to adipose tissue with a #15 scalpel blade.  The skin margins were undermined to an appropriate distance in all directions utilizing iris scissors. Complex Repair And Transposition Flap Text: The defect edges were debeveled with a #15 scalpel blade.  The primary defect was closed partially with a complex linear closure.  Given the location of the remaining defect, shape of the defect and the proximity to free margins a transposition flap was deemed most appropriate for complete closure of the defect.  Using a sterile surgical marker, an appropriate advancement flap was drawn incorporating the defect and placing the expected incisions within the relaxed skin tension lines where possible.    The area thus outlined was incised deep to adipose tissue with a #15 scalpel blade.  The skin margins were undermined to an appropriate distance in all directions utilizing iris scissors. Complex Repair And V-Y Plasty Text: The defect edges were debeveled with a #15 scalpel blade.  The primary defect was closed partially with a complex linear closure.  Given the location of the remaining defect, shape of the defect and the proximity to free margins a V-Y plasty was deemed most appropriate for complete closure of the defect.  Using a sterile surgical marker, an appropriate advancement flap was drawn incorporating the defect and placing the expected incisions within the relaxed skin tension lines where possible.    The area thus outlined was incised deep to adipose tissue with a #15 scalpel blade.  The skin margins were undermined to an appropriate distance in all directions utilizing iris scissors. Complex Repair And M Plasty Text: The defect edges were debeveled with a #15 scalpel blade.  The primary defect was closed partially with a complex linear closure.  Given the location of the remaining defect, shape of the defect and the proximity to free margins an M plasty was deemed most appropriate for complete closure of the defect.  Using a sterile surgical marker, an appropriate advancement flap was drawn incorporating the defect and placing the expected incisions within the relaxed skin tension lines where possible.    The area thus outlined was incised deep to adipose tissue with a #15 scalpel blade.  The skin margins were undermined to an appropriate distance in all directions utilizing iris scissors. Complex Repair And Double M Plasty Text: The defect edges were debeveled with a #15 scalpel blade.  The primary defect was closed partially with a complex linear closure.  Given the location of the remaining defect, shape of the defect and the proximity to free margins a double M plasty was deemed most appropriate for complete closure of the defect.  Using a sterile surgical marker, an appropriate advancement flap was drawn incorporating the defect and placing the expected incisions within the relaxed skin tension lines where possible.    The area thus outlined was incised deep to adipose tissue with a #15 scalpel blade.  The skin margins were undermined to an appropriate distance in all directions utilizing iris scissors. Complex Repair And W Plasty Text: The defect edges were debeveled with a #15 scalpel blade.  The primary defect was closed partially with a complex linear closure.  Given the location of the remaining defect, shape of the defect and the proximity to free margins a W plasty was deemed most appropriate for complete closure of the defect.  Using a sterile surgical marker, an appropriate advancement flap was drawn incorporating the defect and placing the expected incisions within the relaxed skin tension lines where possible.    The area thus outlined was incised deep to adipose tissue with a #15 scalpel blade.  The skin margins were undermined to an appropriate distance in all directions utilizing iris scissors. Complex Repair And Z Plasty Text: The defect edges were debeveled with a #15 scalpel blade.  The primary defect was closed partially with a complex linear closure.  Given the location of the remaining defect, shape of the defect and the proximity to free margins a Z plasty was deemed most appropriate for complete closure of the defect.  Using a sterile surgical marker, an appropriate advancement flap was drawn incorporating the defect and placing the expected incisions within the relaxed skin tension lines where possible.    The area thus outlined was incised deep to adipose tissue with a #15 scalpel blade.  The skin margins were undermined to an appropriate distance in all directions utilizing iris scissors. Complex Repair And Dorsal Nasal Flap Text: The defect edges were debeveled with a #15 scalpel blade.  The primary defect was closed partially with a complex linear closure.  Given the location of the remaining defect, shape of the defect and the proximity to free margins a dorsal nasal flap was deemed most appropriate for complete closure of the defect.  Using a sterile surgical marker, an appropriate flap was drawn incorporating the defect and placing the expected incisions within the relaxed skin tension lines where possible.    The area thus outlined was incised deep to adipose tissue with a #15 scalpel blade.  The skin margins were undermined to an appropriate distance in all directions utilizing iris scissors. Complex Repair And Ftsg Text: The defect edges were debeveled with a #15 scalpel blade.  The primary defect was closed partially with a complex linear closure.  Given the location of the defect, shape of the defect and the proximity to free margins a full thickness skin graft was deemed most appropriate to repair the remaining defect.  The graft was trimmed to fit the size of the remaining defect.  The graft was then placed in the primary defect, oriented appropriately, and sutured into place. Complex Repair And Burow's Graft Text: The defect edges were debeveled with a #15 scalpel blade.  The primary defect was closed partially with a complex linear closure.  Given the location of the defect, shape of the defect, the proximity to free margins and the presence of a standing cone deformity a Burow's graft was deemed most appropriate to repair the remaining defect.  The graft was trimmed to fit the size of the remaining defect.  The graft was then placed in the primary defect, oriented appropriately, and sutured into place. Complex Repair And Split-Thickness Skin Graft Text: The defect edges were debeveled with a #15 scalpel blade.  The primary defect was closed partially with a complex linear closure.  Given the location of the defect, shape of the defect and the proximity to free margins a split thickness skin graft was deemed most appropriate to repair the remaining defect.  The graft was trimmed to fit the size of the remaining defect.  The graft was then placed in the primary defect, oriented appropriately, and sutured into place. Complex Repair And Epidermal Autograft Text: The defect edges were debeveled with a #15 scalpel blade.  The primary defect was closed partially with a complex linear closure.  Given the location of the defect, shape of the defect and the proximity to free margins an epidermal autograft was deemed most appropriate to repair the remaining defect.  The graft was trimmed to fit the size of the remaining defect.  The graft was then placed in the primary defect, oriented appropriately, and sutured into place. Complex Repair And Dermal Autograft Text: The defect edges were debeveled with a #15 scalpel blade.  The primary defect was closed partially with a complex linear closure.  Given the location of the defect, shape of the defect and the proximity to free margins an dermal autograft was deemed most appropriate to repair the remaining defect.  The graft was trimmed to fit the size of the remaining defect.  The graft was then placed in the primary defect, oriented appropriately, and sutured into place. Complex Repair And Tissue Cultured Epidermal Autograft Text: The defect edges were debeveled with a #15 scalpel blade.  The primary defect was closed partially with a complex linear closure.  Given the location of the defect, shape of the defect and the proximity to free margins an tissue cultured epidermal autograft was deemed most appropriate to repair the remaining defect.  The graft was trimmed to fit the size of the remaining defect.  The graft was then placed in the primary defect, oriented appropriately, and sutured into place. Complex Repair And Skin Substitute Graft Text: The defect edges were debeveled with a #15 scalpel blade.  The primary defect was closed partially with a complex linear closure.  Given the location of the remaining defect, shape of the defect and the proximity to free margins a skin substitute graft was deemed most appropriate to repair the remaining defect.  The graft was trimmed to fit the size of the remaining defect.  The graft was then placed in the primary defect, oriented appropriately, and sutured into place. Path Notes (To The Dermatopathologist): Please check margins. Consent was obtained from the patient. The risks and benefits to therapy were discussed in detail. Specifically, the risks of infection, scarring, bleeding, prolonged wound healing, incomplete removal, allergy to anesthesia, nerve injury and recurrence were addressed. Prior to the procedure, the treatment site was clearly identified and confirmed by the patient. All components of Universal Protocol/PAUSE Rule completed. Post-Care Instructions: I reviewed with the patient in detail post-care instructions. Patient is not to engage in any heavy lifting, exercise, or swimming for the next 14 days. Should the patient develop any fevers, chills, bleeding, severe pain patient will contact the office immediately.\\nWriten wound care sheet sent home with patient Home Suture Removal Text: Patient was provided a home suture removal kit and will remove their sutures at home.  If they have any questions or difficulties they will call the office. Where Do You Want The Question To Include Opioid Counseling Located?: Case Summary Tab Information: Selecting Yes will display possible errors in your note based on the variables you have selected. This validation is only offered as a suggestion for you. PLEASE NOTE THAT THE VALIDATION TEXT WILL BE REMOVED WHEN YOU FINALIZE YOUR NOTE. IF YOU WANT TO FAX A PRELIMINARY NOTE YOU WILL NEED TO TOGGLE THIS TO 'NO' IF YOU DO NOT WANT IT IN YOUR FAXED NOTE.

## 2024-11-18 ENCOUNTER — EMERGENCY (EMERGENCY)
Facility: HOSPITAL | Age: 54
LOS: 1 days | Discharge: HOME | End: 2024-11-18
Attending: EMERGENCY MEDICINE | Admitting: EMERGENCY MEDICINE
Payer: COMMERCIAL

## 2024-11-18 VITALS
DIASTOLIC BLOOD PRESSURE: 81 MMHG | WEIGHT: 166.89 LBS | HEART RATE: 65 BPM | TEMPERATURE: 98 F | SYSTOLIC BLOOD PRESSURE: 136 MMHG | RESPIRATION RATE: 18 BRPM | OXYGEN SATURATION: 98 %

## 2024-11-18 VITALS
HEART RATE: 66 BPM | DIASTOLIC BLOOD PRESSURE: 76 MMHG | OXYGEN SATURATION: 96 % | RESPIRATION RATE: 17 BRPM | TEMPERATURE: 98 F | SYSTOLIC BLOOD PRESSURE: 144 MMHG

## 2024-11-18 DIAGNOSIS — Z95.5 PRESENCE OF CORONARY ANGIOPLASTY IMPLANT AND GRAFT: Chronic | ICD-10-CM

## 2024-11-18 DIAGNOSIS — Z95.1 PRESENCE OF AORTOCORONARY BYPASS GRAFT: Chronic | ICD-10-CM

## 2024-11-18 LAB
ALBUMIN SERPL ELPH-MCNC: 3.7 G/DL — SIGNIFICANT CHANGE UP (ref 3.3–5)
ALP SERPL-CCNC: 77 U/L — SIGNIFICANT CHANGE UP (ref 40–120)
ALT FLD-CCNC: 13 U/L — SIGNIFICANT CHANGE UP (ref 4–33)
ANION GAP SERPL CALC-SCNC: 14 MMOL/L — SIGNIFICANT CHANGE UP (ref 7–14)
APPEARANCE UR: CLEAR — SIGNIFICANT CHANGE UP
AST SERPL-CCNC: 15 U/L — SIGNIFICANT CHANGE UP (ref 4–32)
BACTERIA # UR AUTO: ABNORMAL /HPF
BASOPHILS # BLD AUTO: 0.06 K/UL — SIGNIFICANT CHANGE UP (ref 0–0.2)
BASOPHILS NFR BLD AUTO: 0.3 % — SIGNIFICANT CHANGE UP (ref 0–2)
BILIRUB SERPL-MCNC: 1.8 MG/DL — HIGH (ref 0.2–1.2)
BILIRUB UR-MCNC: NEGATIVE — SIGNIFICANT CHANGE UP
BUN SERPL-MCNC: 21 MG/DL — SIGNIFICANT CHANGE UP (ref 7–23)
CALCIUM SERPL-MCNC: 9.8 MG/DL — SIGNIFICANT CHANGE UP (ref 8.4–10.5)
CAST: 6 /LPF — HIGH (ref 0–4)
CHLORIDE SERPL-SCNC: 99 MMOL/L — SIGNIFICANT CHANGE UP (ref 98–107)
CO2 SERPL-SCNC: 25 MMOL/L — SIGNIFICANT CHANGE UP (ref 22–31)
COLOR SPEC: YELLOW — SIGNIFICANT CHANGE UP
CREAT SERPL-MCNC: 1.38 MG/DL — HIGH (ref 0.5–1.3)
DIFF PNL FLD: NEGATIVE — SIGNIFICANT CHANGE UP
EGFR: 46 ML/MIN/1.73M2 — LOW
EOSINOPHIL # BLD AUTO: 0.04 K/UL — SIGNIFICANT CHANGE UP (ref 0–0.5)
EOSINOPHIL NFR BLD AUTO: 0.2 % — SIGNIFICANT CHANGE UP (ref 0–6)
GLUCOSE SERPL-MCNC: 219 MG/DL — HIGH (ref 70–99)
GLUCOSE UR QL: 500 MG/DL
HCT VFR BLD CALC: 44.6 % — SIGNIFICANT CHANGE UP (ref 34.5–45)
HGB BLD-MCNC: 14.7 G/DL — SIGNIFICANT CHANGE UP (ref 11.5–15.5)
IANC: 15.86 K/UL — HIGH (ref 1.8–7.4)
IMM GRANULOCYTES NFR BLD AUTO: 0.4 % — SIGNIFICANT CHANGE UP (ref 0–0.9)
KETONES UR-MCNC: ABNORMAL MG/DL
LEUKOCYTE ESTERASE UR-ACNC: ABNORMAL
LIDOCAIN IGE QN: 33 U/L — SIGNIFICANT CHANGE UP (ref 7–60)
LYMPHOCYTES # BLD AUTO: 12.2 % — LOW (ref 13–44)
LYMPHOCYTES # BLD AUTO: 2.4 K/UL — SIGNIFICANT CHANGE UP (ref 1–3.3)
MCHC RBC-ENTMCNC: 26.3 PG — LOW (ref 27–34)
MCHC RBC-ENTMCNC: 33 G/DL — SIGNIFICANT CHANGE UP (ref 32–36)
MCV RBC AUTO: 79.6 FL — LOW (ref 80–100)
MONOCYTES # BLD AUTO: 1.18 K/UL — HIGH (ref 0–0.9)
MONOCYTES NFR BLD AUTO: 6 % — SIGNIFICANT CHANGE UP (ref 2–14)
NEUTROPHILS # BLD AUTO: 15.86 K/UL — HIGH (ref 1.8–7.4)
NEUTROPHILS NFR BLD AUTO: 80.9 % — HIGH (ref 43–77)
NITRITE UR-MCNC: NEGATIVE — SIGNIFICANT CHANGE UP
NRBC # BLD: 0 /100 WBCS — SIGNIFICANT CHANGE UP (ref 0–0)
NRBC # FLD: 0 K/UL — SIGNIFICANT CHANGE UP (ref 0–0)
PH UR: 6.5 — SIGNIFICANT CHANGE UP (ref 5–8)
PLATELET # BLD AUTO: 298 K/UL — SIGNIFICANT CHANGE UP (ref 150–400)
POTASSIUM SERPL-MCNC: 4.3 MMOL/L — SIGNIFICANT CHANGE UP (ref 3.5–5.3)
POTASSIUM SERPL-SCNC: 4.3 MMOL/L — SIGNIFICANT CHANGE UP (ref 3.5–5.3)
PROT SERPL-MCNC: 7.2 G/DL — SIGNIFICANT CHANGE UP (ref 6–8.3)
PROT UR-MCNC: 100 MG/DL
RBC # BLD: 5.6 M/UL — HIGH (ref 3.8–5.2)
RBC # FLD: 15.3 % — HIGH (ref 10.3–14.5)
RBC CASTS # UR COMP ASSIST: 3 /HPF — SIGNIFICANT CHANGE UP (ref 0–4)
REVIEW: SIGNIFICANT CHANGE UP
SODIUM SERPL-SCNC: 138 MMOL/L — SIGNIFICANT CHANGE UP (ref 135–145)
SP GR SPEC: 1.02 — SIGNIFICANT CHANGE UP (ref 1–1.03)
SQUAMOUS # UR AUTO: 8 /HPF — HIGH (ref 0–5)
TROPONIN T, HIGH SENSITIVITY RESULT: 10 NG/L — SIGNIFICANT CHANGE UP
UROBILINOGEN FLD QL: 0.2 MG/DL — SIGNIFICANT CHANGE UP (ref 0.2–1)
WBC # BLD: 19.62 K/UL — HIGH (ref 3.8–10.5)
WBC # FLD AUTO: 19.62 K/UL — HIGH (ref 3.8–10.5)
WBC UR QL: 9 /HPF — HIGH (ref 0–5)

## 2024-11-18 PROCEDURE — 99285 EMERGENCY DEPT VISIT HI MDM: CPT

## 2024-11-18 PROCEDURE — 93010 ELECTROCARDIOGRAM REPORT: CPT

## 2024-11-18 PROCEDURE — 74177 CT ABD & PELVIS W/CONTRAST: CPT | Mod: 26,MC

## 2024-11-18 RX ORDER — SODIUM CHLORIDE 9 MG/ML
1000 INJECTION, SOLUTION INTRAMUSCULAR; INTRAVENOUS; SUBCUTANEOUS ONCE
Refills: 0 | Status: COMPLETED | OUTPATIENT
Start: 2024-11-18 | End: 2024-11-18

## 2024-11-18 RX ORDER — ACETAMINOPHEN 500 MG
1000 TABLET ORAL ONCE
Refills: 0 | Status: COMPLETED | OUTPATIENT
Start: 2024-11-18 | End: 2024-11-18

## 2024-11-18 RX ORDER — KETOROLAC TROMETHAMINE 30 MG/ML
15 INJECTION INTRAMUSCULAR; INTRAVENOUS ONCE
Refills: 0 | Status: DISCONTINUED | OUTPATIENT
Start: 2024-11-18 | End: 2024-11-18

## 2024-11-18 RX ORDER — ONDANSETRON HYDROCHLORIDE 2 MG/ML
4 INJECTION, SOLUTION INTRAMUSCULAR; INTRAVENOUS ONCE
Refills: 0 | Status: COMPLETED | OUTPATIENT
Start: 2024-11-18 | End: 2024-11-18

## 2024-11-18 RX ADMIN — SODIUM CHLORIDE 1000 MILLILITER(S): 9 INJECTION, SOLUTION INTRAMUSCULAR; INTRAVENOUS; SUBCUTANEOUS at 08:16

## 2024-11-18 RX ADMIN — ONDANSETRON HYDROCHLORIDE 4 MILLIGRAM(S): 2 INJECTION, SOLUTION INTRAMUSCULAR; INTRAVENOUS at 08:08

## 2024-11-18 RX ADMIN — Medication 400 MILLIGRAM(S): at 09:12

## 2024-11-18 RX ADMIN — KETOROLAC TROMETHAMINE 15 MILLIGRAM(S): 30 INJECTION INTRAMUSCULAR; INTRAVENOUS at 12:33

## 2024-11-18 NOTE — ED PROVIDER NOTE - NSFOLLOWUPINSTRUCTIONS_ED_ALL_ED_FT
Kidney Stones    Kidney stones (urolithiasis) are crystal deposits that form inside your kidneys. Pain is caused by the stone moving through the urinary tract, causing spasms of the ureter. Drink enough water and fluids to keep your urine clear or pale yellow. This will help you to pass the stone or stone fragments. If provided a strainer, strain all urine and keep all particulate matter and stones for a follow up appointment with a urologist.    SEEK IMMEDIATE MEDICAL CARE IF YOU HAVE ANY OF THE FOLLOWING SYMPTOMS: pain not controlled with medication, fever/chills, worsening vomiting, inability to urinate, or dizziness/lightheadedness.    You have been diagnosed with a kidney stone. To control the pain, you should take Ibuprofen 400 mg along with Tylenol 650mg every 6 hours. These are both over the counter medications that you can  at your local pharmacy without a prescription. We also sent a stronger pain medication to your pharmacy that you should only take when you are still having pain despite trying Tylenol and Ibuprofen. If you are still having severe pain in 48 hours you should return to the emergency room or a urologist if able. If you began having fever, uncontrollable nausea and vomiting then you also need to come back to the ED.    Please follow up with your primary care doctor within the next week.

## 2024-11-18 NOTE — SBIRT NOTE ADULT - NSSBIRTALCPOSREINDET_GEN_A_CORE
Positive reinforcement provided to patient for staying within low-risk guidelines.  .[email or web address] Ambulatory

## 2024-11-18 NOTE — ED ADULT NURSE NOTE - OBJECTIVE STATEMENT
Assumed care of pt from RN, found laying in bed, c/o diffused adb pain, ivf infusing, labs obtained, pt has been medicated for pain and nausea, will treat per plan of care. ENRRIQUE Casillas

## 2024-11-18 NOTE — ED ADULT TRIAGE NOTE - CHIEF COMPLAINT QUOTE
Pt c/o LUQ pain associated with n/v x2 days. Denies urinary symptoms, diarrhea, chest pain, SOB. Hx CAD, DM2

## 2024-11-18 NOTE — ED PROVIDER NOTE - PATIENT PORTAL LINK FT
You can access the FollowMyHealth Patient Portal offered by [facility] by registering at the following website: [email or web address]. By joining [facility]’s FollowMyHealth portal, you will also be able to view your health information using other applications (apps) compatible with our system.

## 2024-11-18 NOTE — ED PROVIDER NOTE - OBJECTIVE STATEMENT
54-year-old female with past medical history of CAD status post CABG in 2022, diabetes, hyperlipidemia, hypertension presenting due to left flank/left lower quad abdominal pain associate with nausea and vomiting since Friday.  Patient states pain was constant and she was unable to tolerate any p.o.  Patient states pain resolved not long after arriving to the ED.  Patient has not had bowel movement since Thursday.  No fevers, chills, body aches, dysuria, history of kidney stones, diarrhea.

## 2024-11-18 NOTE — ED PROCEDURE NOTE - US POC STATEMENT
Please advise.    The patient/family was/were informed of limited nature of the exam. Representative images were printed to be scanned into the chart or directly uploaded into the medical record.

## 2024-11-18 NOTE — ED PROVIDER NOTE - PROGRESS NOTE DETAILS
CAT scan showed kidney stone.  Patient states he is feeling better but still having some pain.  Will give Toradol and discharged with instructions to take Tylenol and Motrin for the pain.    Jerry Mills MD (PGY 2)

## 2024-11-18 NOTE — ED PROVIDER NOTE - CLINICAL SUMMARY MEDICAL DECISION MAKING FREE TEXT BOX
54-year-old female with past medical history of CAD status post CABG in 2022, diabetes, hyperlipidemia, hypertension presenting due to left flank/left lower quad abdominal pain associate with nausea and vomiting since Friday.  Patient states pain was constant and she was unable to tolerate any p.o.  Patient states pain resolved not long after arriving to the ED.  Patient has not had bowel movement since Thursday.  No fevers, chills, body aches, dysuria, history of kidney stones, diarrhea.    Patient afebrile Emergency Department.  Hemodynamically stable.  No tenderness palpation of the abdomen or flanks bilaterally.  Lungs clear to auscultation bilaterally.  Auscultation of the heart is normal.  Concern for kidney stone secondary to location of pain.  However, since patient has not had bowel movement since Thursday also concern for SBO.  Will order CBC, CMP, lipase, urinalysis, urine culture, CT abdomen pelvis with IV contrast.  Will also order EKG and troponin due to patient's nausea and history of CABG.

## 2024-11-19 LAB
CULTURE RESULTS: SIGNIFICANT CHANGE UP
SPECIMEN SOURCE: SIGNIFICANT CHANGE UP

## 2025-02-10 NOTE — ED ADULT NURSE NOTE - NSFALLLASTSIX_ED_ALL_ED
Purposeful Round    1. Is the patient comfortable and safe in their room? Yes    If \"No,\" I completed the following intervention: N/A    2. Does the patient have to go to the bathroom at this time? No    If \"Yes,\" was this concern addressed? N/A    3. Does the patient have everything they need within reach (including the call light)? Yes   - If \"No,\" was this concern addressed? N/A     4. Was a new set of vital signs taken or documented in the EHR? Yes   - If \"No,\" what was the reason why? N/A      No.

## 2025-03-27 NOTE — OCCUPATIONAL THERAPY INITIAL EVALUATION ADULT - ASR WT BEARING STATUS EVAL
Macario José (:  2018) is a 7 y.o. male,Established patient, here for evaluation of the following chief complaint(s):  Ear Pain      ASSESSMENT/PLAN:    ICD-10-CM    1. Non-recurrent acute suppurative otitis media of left ear without spontaneous rupture of tympanic membrane  H66.002 amoxicillin (AMOXIL) 400 MG/5ML suspension      2. Impacted cerumen of right ear  H61.21 ofloxacin (FLOXIN) 0.3 % otic solution          Return in about 4 weeks (around 2025), or if symptoms worsen or fail to improve.    SUBJECTIVE/OBJECTIVE:  HPI    Reports otalgia  Reports nasal congestion and drainage  Reports cough.  He has been taking Claritin   Denies a fever  Denies sore throat, abdominal pain or HA.    BP 90/60 (BP Site: Left Upper Arm, Patient Position: Sitting, BP Cuff Size: Small Adult)   Pulse (!) 128   Temp 96.9 °F (36.1 °C) (Temporal)   Ht 1.105 m (3' 7.5\")   Wt 18.2 kg (40 lb 2 oz)   SpO2 98%   BMI 14.91 kg/m²     Review of Systems   Constitutional:  Negative for fever.   HENT:  Positive for congestion, ear pain and rhinorrhea. Negative for sore throat.    Respiratory:  Positive for cough (mild).    Gastrointestinal:  Negative for abdominal pain.   Neurological:  Negative for headaches.       Physical Exam  Vitals reviewed.   Constitutional:       Appearance: He is well-developed.   HENT:      Right Ear: External ear normal. There is impacted cerumen.      Left Ear: External ear normal. A middle ear effusion is present. Tympanic membrane is erythematous.      Nose: Nose normal.      Mouth/Throat:      Pharynx: Oropharynx is clear.   Cardiovascular:      Rate and Rhythm: Regular rhythm.      Heart sounds: S1 normal and S2 normal.   Pulmonary:      Effort: Pulmonary effort is normal. No respiratory distress.      Breath sounds: Normal breath sounds. No wheezing, rhonchi or rales.   Abdominal:      General: Bowel sounds are normal.      Palpations: Abdomen is soft.   Musculoskeletal:      Cervical back:  no weight-bearing restrictions

## 2025-05-30 ENCOUNTER — TRANSCRIPTION ENCOUNTER (OUTPATIENT)
Age: 55
End: 2025-05-30

## 2025-05-30 ENCOUNTER — OUTPATIENT (OUTPATIENT)
Dept: OUTPATIENT SERVICES | Facility: HOSPITAL | Age: 55
LOS: 1 days | End: 2025-05-30
Payer: COMMERCIAL

## 2025-05-30 VITALS
DIASTOLIC BLOOD PRESSURE: 70 MMHG | HEART RATE: 75 BPM | RESPIRATION RATE: 18 BRPM | SYSTOLIC BLOOD PRESSURE: 122 MMHG | OXYGEN SATURATION: 97 %

## 2025-05-30 VITALS
HEIGHT: 65 IN | WEIGHT: 195.11 LBS | DIASTOLIC BLOOD PRESSURE: 69 MMHG | TEMPERATURE: 98 F | HEART RATE: 68 BPM | RESPIRATION RATE: 18 BRPM | SYSTOLIC BLOOD PRESSURE: 114 MMHG | OXYGEN SATURATION: 98 %

## 2025-05-30 DIAGNOSIS — Z95.1 PRESENCE OF AORTOCORONARY BYPASS GRAFT: Chronic | ICD-10-CM

## 2025-05-30 DIAGNOSIS — I25.10 ATHEROSCLEROTIC HEART DISEASE OF NATIVE CORONARY ARTERY WITHOUT ANGINA PECTORIS: ICD-10-CM

## 2025-05-30 DIAGNOSIS — Z95.5 PRESENCE OF CORONARY ANGIOPLASTY IMPLANT AND GRAFT: Chronic | ICD-10-CM

## 2025-05-30 LAB
ADD ON TEST-SPECIMEN IN LAB: SIGNIFICANT CHANGE UP
ANION GAP SERPL CALC-SCNC: 12 MMOL/L — SIGNIFICANT CHANGE UP (ref 7–14)
BUN SERPL-MCNC: 16 MG/DL — SIGNIFICANT CHANGE UP (ref 7–23)
CALCIUM SERPL-MCNC: 9.7 MG/DL — SIGNIFICANT CHANGE UP (ref 8.4–10.5)
CHLORIDE SERPL-SCNC: 104 MMOL/L — SIGNIFICANT CHANGE UP (ref 98–107)
CO2 SERPL-SCNC: 21 MMOL/L — LOW (ref 22–31)
CREAT SERPL-MCNC: 1.03 MG/DL — SIGNIFICANT CHANGE UP (ref 0.5–1.3)
EGFR: 65 ML/MIN/1.73M2 — SIGNIFICANT CHANGE UP
EGFR: 65 ML/MIN/1.73M2 — SIGNIFICANT CHANGE UP
GLUCOSE SERPL-MCNC: 77 MG/DL — SIGNIFICANT CHANGE UP (ref 70–99)
HCT VFR BLD CALC: 42 % — SIGNIFICANT CHANGE UP (ref 34.5–45)
HGB BLD-MCNC: 13.7 G/DL — SIGNIFICANT CHANGE UP (ref 11.5–15.5)
MCHC RBC-ENTMCNC: 26.8 PG — LOW (ref 27–34)
MCHC RBC-ENTMCNC: 32.6 G/DL — SIGNIFICANT CHANGE UP (ref 32–36)
MCV RBC AUTO: 82.2 FL — SIGNIFICANT CHANGE UP (ref 80–100)
NRBC # BLD AUTO: 0 K/UL — SIGNIFICANT CHANGE UP (ref 0–0)
NRBC # FLD: 0 K/UL — SIGNIFICANT CHANGE UP (ref 0–0)
NRBC BLD AUTO-RTO: 0 /100 WBCS — SIGNIFICANT CHANGE UP (ref 0–0)
PLATELET # BLD AUTO: 270 K/UL — SIGNIFICANT CHANGE UP (ref 150–400)
POTASSIUM SERPL-MCNC: 4.2 MMOL/L — SIGNIFICANT CHANGE UP (ref 3.5–5.3)
POTASSIUM SERPL-SCNC: 4.2 MMOL/L — SIGNIFICANT CHANGE UP (ref 3.5–5.3)
RBC # BLD: 5.11 M/UL — SIGNIFICANT CHANGE UP (ref 3.8–5.2)
RBC # FLD: 13.9 % — SIGNIFICANT CHANGE UP (ref 10.3–14.5)
SODIUM SERPL-SCNC: 137 MMOL/L — SIGNIFICANT CHANGE UP (ref 135–145)
WBC # BLD: 11.05 K/UL — HIGH (ref 3.8–10.5)
WBC # FLD AUTO: 11.05 K/UL — HIGH (ref 3.8–10.5)

## 2025-05-30 PROCEDURE — 93010 ELECTROCARDIOGRAM REPORT: CPT | Mod: 76

## 2025-05-30 PROCEDURE — 92928 PRQ TCAT PLMT NTRAC ST 1 LES: CPT | Mod: LC

## 2025-05-30 PROCEDURE — 93459 L HRT ART/GRFT ANGIO: CPT | Mod: 26,59

## 2025-05-30 PROCEDURE — 99152 MOD SED SAME PHYS/QHP 5/>YRS: CPT

## 2025-05-30 RX ORDER — RANOLAZINE 1000 MG/1
1 TABLET, FILM COATED, EXTENDED RELEASE ORAL
Refills: 0 | DISCHARGE

## 2025-05-30 RX ORDER — EMPAGLIFLOZIN 25 MG/1
1 TABLET, FILM COATED ORAL
Refills: 0 | DISCHARGE

## 2025-05-30 RX ORDER — INSULIN GLARGINE-YFGN 100 [IU]/ML
20 INJECTION, SOLUTION SUBCUTANEOUS
Refills: 0 | DISCHARGE

## 2025-05-30 RX ORDER — ACETAMINOPHEN 500 MG/5ML
650 LIQUID (ML) ORAL ONCE
Refills: 0 | Status: COMPLETED | OUTPATIENT
Start: 2025-05-30 | End: 2025-05-30

## 2025-05-30 RX ORDER — GLIMEPIRIDE 4 MG/1
1 TABLET ORAL
Refills: 0 | DISCHARGE

## 2025-05-30 RX ORDER — ASPIRIN 325 MG
81 TABLET ORAL DAILY
Refills: 0 | Status: ACTIVE | OUTPATIENT
Start: 2025-05-31 | End: 2026-04-29

## 2025-05-30 RX ORDER — PRASUGREL HYDROCHLORIDE 10 MG/1
10 TABLET, COATED ORAL DAILY
Refills: 0 | Status: ACTIVE | OUTPATIENT
Start: 2025-05-31 | End: 2026-04-29

## 2025-05-30 RX ADMIN — Medication 75 MILLILITER(S): at 15:48

## 2025-05-30 RX ADMIN — Medication 3 MILLILITER(S): at 16:37

## 2025-05-30 RX ADMIN — Medication 650 MILLIGRAM(S): at 16:35

## 2025-05-30 NOTE — CHART NOTE - NSCHARTNOTEFT_GEN_A_CORE
Cardiac Rehabilitation Referral (Post PCI):       - Education on benefits of cardiac rehab provided to patient: Yes  - Referral and prescription given for cardiac rehab: Yes  - Patient given a list of locations and instructed to contact their insurance company to review list of participating providers: Yes  - Patient instructed to bring cardiac rehab prescription with them to cardiology follow up appointment for assistance with enrollment: Yes  - Patient discharged with copies detailing cardiovascular history, medications, testing/treatments: Yes

## 2025-05-30 NOTE — ASU PREOP CHECKLIST - BP NONINVASIVE DIASTOLIC (MM HG)
Call to home, message left on unidentified voicemail for call back.   Calling to notify of the lab results per Dr Carolina.    69

## 2025-05-30 NOTE — ASU DISCHARGE PLAN (ADULT/PEDIATRIC) - NS MD DC FALL RISK RISK
For information on Fall & Injury Prevention, visit: [email or web address] OR  [email or web address] OR  https://www.cdc.gov/steadi/patient.html

## 2025-05-30 NOTE — H&P CARDIOLOGY - HISTORY OF PRESENT ILLNESS
55 year old female with HTN, hyperlipidemia, DM type 2, known CAD with PTCA/stent 2022 who presented to her Cardiologist complaining of  Underwent a   In light of patients CAD history, symptoms and abnormal noninvasive test findings there is high suspicion for CAD progression. Patient is now referred to [facility] for a cardiac catheterization with possible PTCA/stent.     Referring MD:  55 year old female with HTN, hyperlipidemia, DM type 2, known CAD with STEMI with PTCA/stent 2022 with subsequent 4V CABG (LIMA to Diag1, SVG to LAD, SVG to PDA, SVG to RPL) 11/15/2022 and subsequent SVG to LAD graft occluded 1year later treated with PCI LAD 2023 and PTCA RCA ISR in 2024 who presented to her Cardiologist complaining of recurrent chest pain with LANDRY. Most recent Echo 3/25 wit EF 5%, akinesis of the basal to inferior wall, basal and mid inferoseptal wall.    In light of patients CAD history and symptoms there is high suspicion for CAD progression. Patient is now referred to [facility] for a cardiac catheterization with possible PTCA/stent.     Referring MD: Maribeth Gabriel    please see hard copy H&P in paper chart 5/23/25  PATIENT SEEN AND EXAMINED AND NO NEW CLINICAL CHANGES SINCE office visit

## 2025-05-30 NOTE — H&P CARDIOLOGY - COMMENTS
IVF hydration    Pre-sedation Evaluation  Anticoagulation last dose: not applicable  Dentures: None  Last PO intake:   Obstructive sleep apnea: No  Aspiration risk: No  Mallampati score: 2  ASA Classification: 2  Prior Sedative or Anesthesia Experience: No complications  Informed consent by responsible adult: Yes  Responsible adult escort: Yes  Based on today's assessment, anesthesia consult requested: No IVF hydration    Pre-sedation Evaluation  Anticoagulation last dose: not applicable  Dentures: None  Last PO intake: 5/29 at 1700  Obstructive sleep apnea: No  Aspiration risk: No  Mallampati score: 2  ASA Classification: 3  Prior Sedative or Anesthesia Experience: No complications  Informed consent by responsible adult: Yes  Responsible adult escort: Yes  Based on today's assessment, anesthesia consult requested: No

## 2025-05-30 NOTE — H&P CARDIOLOGY - NSICDXPASTMEDICALHX_GEN_ALL_CORE_FT
PAST MEDICAL HISTORY:  CAD (coronary artery disease) 3/10/22 stent to midRCA and RPDA    Diabetes mellitus     Essential hypertension     Hyperlipidemia      PAST MEDICAL HISTORY:  CAD (coronary artery disease) 3/10/22 stent to midRCA and RPDA    Diabetes mellitus     Essential hypertension     H/O heart artery stent     Hyperlipidemia     ST elevation MI (STEMI)

## 2025-05-30 NOTE — ASU DISCHARGE PLAN (ADULT/PEDIATRIC) - FOLLOW UP APPOINTMENTS
855 [Feeling Poorly] : not feeling poorly [Feeling Tired] : not feeling tired [Chest Pain] : no chest pain [Constipation] : no constipation [Diarrhea] : no diarrhea [Dysuria] : no dysuria [Nocturia] : no nocturia [Burning Sensation] : no burning sensation during urination [Difficulty Walking] : no difficulty walking

## 2025-05-30 NOTE — H&P CARDIOLOGY - NSICDXPASTSURGICALHX_GEN_ALL_CORE_FT
PAST SURGICAL HISTORY:  S/P CABG x 1     S/P coronary artery stent placement      PAST SURGICAL HISTORY:  S/P CABG x 4     S/P coronary artery stent placement

## 2025-05-30 NOTE — ASU DISCHARGE PLAN (ADULT/PEDIATRIC) - FINANCIAL ASSISTANCE
[facility] provides services at a reduced cost to those who are determined to be eligible through [facility]’s financial assistance program. Information regarding [facility]’s financial assistance program can be found by going to [email or web address] or by calling [phone].

## 2025-05-30 NOTE — ASU DISCHARGE PLAN (ADULT/PEDIATRIC) - ASU DC SPECIAL INSTRUCTIONSFT
Your procedure was performed through the [facility],  For the next 5 days:  - Limit climbing stairs.  - Do not soak in a bathtub or pool.  - Avoid strenuous activities (pushing, pulling, straining).  - Do not lift anything more than 10 pounds.  ----------------------------------------------  MEDICATION:   - Take your medications as explained (see attached medication reconciliation on discharge paperwork).  - You had a stent placed, you will be taking antiplatelet medications to keep your stent open, Enteric coated Aspirin 81mg oral daily for a minimum 1 year and Effient (Prasugrel) 10mg oral daily for a minimum of 1 year. Please take next dose of ASA and Prasugrel for your morning dose on Saturday 5/31. Take your medications as prescribed. Do not stop taking them without consulting with your cardiologist/vascular surgeon first.  ----------------------------------------------  ADDITIONAL INSTRUCTIONS:  - We have provided you with a prescription for cardiac rehab which is a medically supervised exercise program for your heart and has been shown to improve the quantity and quality of life of people with heart disease like yours. You should attend cardiac rehab 3 times per week for 12 weeks. We have provided you with a list of nearby facilities. Please call your insurance carrier to determine which of these facilities are covered under your plan. Please bring this prescription with you to your follow up appointment with your cardiologist who can then further assist you to enroll into a cardiac rehab program.  - Follow a heart healthy diet recommended by your doctor.   - If you smoke, we encourage smoking cessation. You may call [phone] for center of tobacco control if you need assistance.  - Call your doctor to make appointment within 2 weeks.  -----------------------------------------------  ***CALL YOUR DOCTOR***  - If you experience fever, chills, body aches, or severe pain, swelling, redness, heat, or yellow discharge from your incision site.  - If you notice bleeding or significant swelling at the procedural site.  - If you experience chest pain.  - If you experience extremity numbness, tingling, or temperature change.  ------------------------------------------------  If you are unable to get in contact with your doctor, you may contact the [facility] at [phone].  Please reference your discharge instructions for any questions or concerns.

## 2025-05-30 NOTE — ASU DISCHARGE PLAN (ADULT/PEDIATRIC) - CARE PROVIDER_API CALL
Maribeth Gabriel  Cardiology  [address] [place] [id number]  Phone: [phone]  Fax: [phone]  Follow Up Time:

## 2025-06-09 ENCOUNTER — TRANSCRIPTION ENCOUNTER (OUTPATIENT)
Age: 55
End: 2025-06-09

## 2025-09-08 ENCOUNTER — NON-APPOINTMENT (OUTPATIENT)
Age: 55
End: 2025-09-08

## 2025-09-10 ENCOUNTER — APPOINTMENT (OUTPATIENT)
Dept: CARDIOLOGY | Facility: CLINIC | Age: 55
End: 2025-09-10

## 2025-09-10 VITALS
HEIGHT: 65 IN | HEART RATE: 62 BPM | BODY MASS INDEX: 31.65 KG/M2 | WEIGHT: 190 LBS | SYSTOLIC BLOOD PRESSURE: 118 MMHG | OXYGEN SATURATION: 98 % | DIASTOLIC BLOOD PRESSURE: 75 MMHG

## (undated) DEVICE — MULTIPLE PERFUSION SET FEMALE 1 INLET LEG W 4 LEGS 15" (BLUE/RED)

## (undated) DEVICE — SUCTION CATH ARGYLE WHISTLE TIP 14FR STRAIGHT PACKED

## (undated) DEVICE — BEAVER BLADE MINI SHARP ALL ROUND (BLUE)

## (undated) DEVICE — TUBING INSUFFLATION LAP FILTER 10FT

## (undated) DEVICE — BLADE SCALPEL SAFETYLOCK #11

## (undated) DEVICE — TUBING TUR 2 PRONG

## (undated) DEVICE — CONNECTOR "Y" 1/2 X 3/8 X 3/8"

## (undated) DEVICE — PACK UNIVERSAL CARDIAC SUPPLEMNTAL B

## (undated) DEVICE — DRAPE LIGHT HANDLE COVER (BLUE)

## (undated) DEVICE — NDL BLUNT 18G LOOP VESSEL MAXI WHITE

## (undated) DEVICE — STEALTH CLAMP INSERT FIBRA/FIBRA 60MM

## (undated) DEVICE — NDL COUNTER FOAM AND MAGNET 40-70

## (undated) DEVICE — STAPLER SKIN VISI-STAT 35 WIDE

## (undated) DEVICE — AORTIC PUNCH 5MM STANDARD HANDLE

## (undated) DEVICE — VENODYNE/SCD SLEEVE CALF MEDIUM

## (undated) DEVICE — MEDICATION LABELS W MARKER

## (undated) DEVICE — BLADE SCALPEL SAFETYLOCK #10

## (undated) DEVICE — ELCTR BOVIE TIP BLADE VALLEYLAB 6.5"

## (undated) DEVICE — GLV 6.5 PROTEXIS (WHITE)

## (undated) DEVICE — GLV 7.5 PROTEXIS (WHITE)

## (undated) DEVICE — GLV 7 PROTEXIS (WHITE)

## (undated) DEVICE — TUBING SUCTION 20FT

## (undated) DEVICE — TUBING ATS SUCTION LINE

## (undated) DEVICE — SYNOVIS VASCULAR PROBE 1.5MM 15CM

## (undated) DEVICE — DRSG STOCKINETTE IMPERVIOUS XL

## (undated) DEVICE — SYR LUER LOK 20CC

## (undated) DEVICE — SOL ANTI FOG

## (undated) DEVICE — SUT STAINLESS STEEL 5 18" SCC

## (undated) DEVICE — DRAIN CHANNEL 32FR ROUND HUBLESS FULL FLUTED

## (undated) DEVICE — SUT PROLENE 7-0 4-24" BV-1

## (undated) DEVICE — CONNECTOR INTERSEPT "Y" 1/4 X 1/4 X 1/4"

## (undated) DEVICE — DRSG TEGADERM 6"X8"

## (undated) DEVICE — SYR ASEPTO

## (undated) DEVICE — SYS VEIN HARVESTING VIRTUOSAPH PLUS W/ RADIAL

## (undated) DEVICE — WARMING BLANKET FULL ADULT

## (undated) DEVICE — DRAPE MAYO STAND 30"

## (undated) DEVICE — SUT SOFSILK 0 30" V-20

## (undated) DEVICE — SUT BLUNT SZ 5

## (undated) DEVICE — SUT PROLENE 4-0 36" BB

## (undated) DEVICE — MARKING PEN W RULER

## (undated) DEVICE — FEEDING TUBE NG SUMP 16FR 48"

## (undated) DEVICE — SUT SOFSILK 2 60" TIES

## (undated) DEVICE — SAW BLADE MICROAIRE STERNUM 1X34X9.4MM

## (undated) DEVICE — SENSOR MYOCARDIAL TEMP 15MM

## (undated) DEVICE — DRAIN RESERVOIR FOR JACKSON PRATT 100CC CARDINAL

## (undated) DEVICE — SUT POLYSORB 0 36" GS-25 UNDYED

## (undated) DEVICE — TOURNIQUET SET 12FR (1 RED, 1 BLUE, 1 SNARE) 7"

## (undated) DEVICE — DRSG DERMABOND PRINEO 60CM

## (undated) DEVICE — DRAPE 3/4 SHEET W REINFORCEMENT 56X77"

## (undated) DEVICE — GETINGE VASOVIEW 7 ENDOSCOPIC VESSEL HARVESTING SYSTEM

## (undated) DEVICE — DRAPE INSTRUMENT POUCH 6.75" X 11"

## (undated) DEVICE — SUT PROLENE 6-0 4-30" C-1

## (undated) DEVICE — PHRENIC NERVE PAD MEDIUM

## (undated) DEVICE — POSITIONER FOAM EGG CRATE ULNAR 2PCS (PINK)

## (undated) DEVICE — BULLDOG SPRING CLIP 6MM SOFT/SOFT

## (undated) DEVICE — SUT PROLENE 8-0 24" BV175-6

## (undated) DEVICE — GOWN TRIMAX XXL

## (undated) DEVICE — SOL IRR POUR NS 0.9% 500ML

## (undated) DEVICE — SUT POLYSORB 2 30" GS-26

## (undated) DEVICE — STEALTH CLAMP INSERT FIBRA/FIBRA 90MM

## (undated) DEVICE — DRSG ACE BANDAGE 6"

## (undated) DEVICE — GOWN TRIMAX LG

## (undated) DEVICE — SUT TICRON 5 30" KV-40

## (undated) DEVICE — LAP PAD 18 X 18"

## (undated) DEVICE — SUMP PERICARDIAL 20FR 1/4" ADULT

## (undated) DEVICE — POSITIONER CARDIAC BUMP

## (undated) DEVICE — DRAIN CHANNEL 19FR ROUND FULL FLUTED

## (undated) DEVICE — GLV 8 PROTEXIS ORTHO (CREAM)

## (undated) DEVICE — SOL NORMOSOL-R PH7.4 1000ML

## (undated) DEVICE — FOLEY TRAY 16FR 5CC LF LUBRISIL ADVANCE TEMP CLOSED

## (undated) DEVICE — AORTIC PUNCH 4.0MM LONG LENGTH HANDLE

## (undated) DEVICE — SUT PROLENE 3-0 36" SH

## (undated) DEVICE — VESSEL LOOP MAXI-RED  0.120" X 16"

## (undated) DEVICE — WARMING BLANKET UPPER ADULT

## (undated) DEVICE — DRSG OPSITE 13.75 X 4"

## (undated) DEVICE — SUT BIOSYN 4-0 18" P-12

## (undated) DEVICE — BLADE SCALPEL SAFETYLOCK #15

## (undated) DEVICE — DRSG STERISTRIPS 0.5 X 4"

## (undated) DEVICE — SUCTION YANKAUER NO CONTROL VENT

## (undated) DEVICE — GLV 8 PROTEXIS (WHITE)

## (undated) DEVICE — AORTIC PUNCH 4.0MM STANDARD HANDLE

## (undated) DEVICE — DRAPE 1/2 SHEET 40X57"

## (undated) DEVICE — DRSG KLING 6"

## (undated) DEVICE — PREP CHLORAPREP HI-LITE ORANGE 26ML

## (undated) DEVICE — PACING CABLE (BROWN) A/V TEMP SCREW DOWN 12FT

## (undated) DEVICE — GOWN XXXL

## (undated) DEVICE — SUT POLYSORB 2-0 30" GS-21 UNDYED

## (undated) DEVICE — GLV 8.5 PROTEXIS (WHITE)

## (undated) DEVICE — DRAPE TOWEL BLUE 17" X 24"

## (undated) DEVICE — SOL IRR BAG NS 0.9% 3000ML

## (undated) DEVICE — SUT POLYSORB 4-0 30" CVF-23

## (undated) DEVICE — PACK UNIVERSAL CARDIAC

## (undated) DEVICE — CONNECTOR STRAIGHT 3/8 X 1/2"

## (undated) DEVICE — SUT PROLENE 7-0 24" BV175-6

## (undated) DEVICE — SPECIMEN CONTAINER 100ML

## (undated) DEVICE — CHEST DRAIN PLEUR-EVAC WET/WET ADULT-PEDS SINGLE (QUICK)

## (undated) DEVICE — SUT SURGICAL STEEL 6 30" BP-1

## (undated) DEVICE — SUT PROLENE 4-0 36" RB-1

## (undated) DEVICE — STRYKER INTERPULSE HANDPIECE W IRR SUCTION TUBE

## (undated) DEVICE — VISITEC 4X4

## (undated) DEVICE — SOL IRR POUR H2O 250ML

## (undated) DEVICE — DRAPE IOBAN 23" X 23"

## (undated) DEVICE — VENTING ADAPTER "Y" (RED/BLUE) 7.5"

## (undated) DEVICE — SUT PLEDGET PRE PUNCH 4.8 X 9.5 X 1.5 MM

## (undated) DEVICE — WARMING BLANKET DUO-THERM HYPER/HYPOTHERM ADULT

## (undated) DEVICE — DRSG OPSITE 2.5 X 2"

## (undated) DEVICE — DRAPE SLUSH / WARMER 44 X 66"

## (undated) DEVICE — SUT DOUBLE 6 WIRE STERNAL